# Patient Record
Sex: MALE | Race: BLACK OR AFRICAN AMERICAN | NOT HISPANIC OR LATINO | Employment: OTHER | URBAN - METROPOLITAN AREA
[De-identification: names, ages, dates, MRNs, and addresses within clinical notes are randomized per-mention and may not be internally consistent; named-entity substitution may affect disease eponyms.]

---

## 2017-01-01 PROBLEM — I50.32 CHRONIC DIASTOLIC CONGESTIVE HEART FAILURE: Status: ACTIVE | Noted: 2017-01-01

## 2017-10-18 ENCOUNTER — TELEPHONE (OUTPATIENT)
Dept: UROLOGY | Facility: CLINIC | Age: 57
End: 2017-10-18

## 2017-10-18 NOTE — TELEPHONE ENCOUNTER
----- Message from Coco Alcocer sent at 10/18/2017 12:01 PM CDT -----  Contact: Em- Brianda Strickland- Brianda [facility] -New pt needs to follow up  with Dr Gastelum who saw in the hospital in 1 week 10/25 for post discharge follow up ...[phone]

## 2017-11-06 ENCOUNTER — OFFICE VISIT (OUTPATIENT)
Dept: UROLOGY | Facility: CLINIC | Age: 57
End: 2017-11-06
Payer: COMMERCIAL

## 2017-11-06 ENCOUNTER — LAB VISIT (OUTPATIENT)
Dept: LAB | Facility: HOSPITAL | Age: 57
End: 2017-11-06
Attending: NURSE PRACTITIONER
Payer: COMMERCIAL

## 2017-11-06 VITALS
BODY MASS INDEX: 44.1 KG/M2 | SYSTOLIC BLOOD PRESSURE: 176 MMHG | HEIGHT: 71 IN | WEIGHT: 315 LBS | TEMPERATURE: 98 F | HEART RATE: 89 BPM | DIASTOLIC BLOOD PRESSURE: 92 MMHG

## 2017-11-06 DIAGNOSIS — R39.198 DIFFICULTY IN URINATION: ICD-10-CM

## 2017-11-06 DIAGNOSIS — R39.198 DIFFICULTY IN URINATION: Primary | ICD-10-CM

## 2017-11-06 DIAGNOSIS — R32 URINARY INCONTINENCE, UNSPECIFIED TYPE: ICD-10-CM

## 2017-11-06 PROCEDURE — 36415 COLL VENOUS BLD VENIPUNCTURE: CPT

## 2017-11-06 PROCEDURE — 99999 PR PBB SHADOW E&M-EST. PATIENT-LVL V: CPT | Mod: PBBFAC,,, | Performed by: NURSE PRACTITIONER

## 2017-11-06 PROCEDURE — 51798 US URINE CAPACITY MEASURE: CPT | Mod: S$GLB,,, | Performed by: NURSE PRACTITIONER

## 2017-11-06 PROCEDURE — 99204 OFFICE O/P NEW MOD 45 MIN: CPT | Mod: 25,S$GLB,, | Performed by: NURSE PRACTITIONER

## 2017-11-06 PROCEDURE — 84153 ASSAY OF PSA TOTAL: CPT

## 2017-11-06 RX ORDER — ALFUZOSIN HYDROCHLORIDE 10 MG/1
10 TABLET, EXTENDED RELEASE ORAL
Qty: 30 TABLET | Refills: 12 | Status: SHIPPED | OUTPATIENT
Start: 2017-11-06 | End: 2018-01-11

## 2017-11-06 NOTE — PATIENT INSTRUCTIONS
Treating Urinary Incontinence in Men    You cant always control the release of urine. You may leak urine. Or you may not be able to hold your urine until you can get to a bathroom. This is called urinary incontinence. The problem can be managed. Talk to your doctor about your treatment options.  Taking medicines  Prescription medicine may help you. They may help:  · The sphincter to work better (this is the muscle that closes to keep urine from leaking out of the bladder)  · Stop the bladder from sabina too often to push urine out  · The bladder muscles contract with more force  · Relax the sphincter muscle and allow urine to flow more freely  Making changes to your routine  Certain changes in your daily routine may help. These include:  · Avoiding caffeine and alcohol  · Using timed voiding (this is following a schedule for drinking fluids and urinating)  · Doing Kegel exercises daily (these exercises involve tightening the muscles in your sphincter and around your bladder to help strengthen them)  Using a catheter  A catheter is a narrow tube that is inserted through the urethra into the bladder. It drains urine. A condom catheter covers the penis. It channels urine into a collection bag. It is worn most of the time. Intermittent catheterization means inserting a catheter to drain the bladder, then removing it. This is done on a regular schedule.  Special therapies  · Biofeedback. This technique is taught by a nurse or physical therapist. During the therapy, a small sensor is placed inside or just outside the anus. Another sensor is placed on your stomach. these sensors read signals from the pelvic floor muscles. When you contract or relax your muscles, these signals are show as images on a computer screen. Using the images, you can learn to relax or contract certain muscles. This can help you better control these muscles. And, it can help you learn pelvic floor muscle exercises.  · Electrical stimulation.  This is a painless therapy that uses a tiny amount of electrical current. It helps strengthen very weak or damaged pelvic floor muscles. The electric current is sent through the muscles of the pelvic floor and bladder. This causes the muscles to contract. In time, this helps make the muscles stronger.  · Stimulator implants. This technique is used to treat urge incontinence. A small device is implanted under the skin near the stomach. This device gives off mild electrical signals. These block extra signals that are being sent to the bladder muscles. This helps the bladder work more normally.  Having surgery  If other options dont work, surgery may be recommended. If surgery is an option, your healthcare provider can discuss it with you and explain its risks and benefits.  Healing after prostate surgery  Surgery on the prostate gland can cause incontinence. Most often, the incontinence is only for a short time. It clears up when healing is complete. Rarely, prostate surgery can result in permanent incontinence.   Date Last Reviewed: 1/1/2017  © 4303-2769 [employer]. [address] [place] [address], [place] [address]. All rights reserved. This information is not intended as a substitute for professional medical care. Always follow your healthcare professional's instructions.

## 2017-11-06 NOTE — PROGRESS NOTES
[facility] Note  Staff: MARK Maldonado    Referring provider and please cc:   PCP: Dr. Matias Barboza    Chief Complaint: Urinary incontinence    Subjective:        HPI: Jose Leon is a 57 y.o. male new patient to Urology clinic presents with complaints of inability to control urination.  He is unable to make it to restroom without urinating on himself.  Pt's LUTS has progressively worsened over the last six months.  Pt currently is taking Flomax 0.4 mg daily for hx of BPH with lower urinary tract symptoms but pt states meds are not helping with his symptoms at this time.    *Medical history does include uncontrollable diabetes    Questions asked the pt during office visit today:  Urgency: YES, urge incontinence? YES  NTF: 5-6x, DTF: YES  Dysuria: No  Gross Hematuria:No  Straining:No, Hesistancy:No, Intermittency:No, Weak stream:No  ED:No  STDs in past: No  Vasectomy: None    Last PSA Screening:   Lab Results   Component Value Date    PSA 0.34 06/28/2017     REVIEW OF SYSTEMS:  Review of Systems   Constitutional: Negative for chills, diaphoresis, fever and weight loss.   HENT: Negative for congestion, hearing loss, nosebleeds and sore throat.    Eyes: Negative for blurred vision and pain.   Respiratory: Negative for cough and wheezing.    Cardiovascular: Negative for chest pain, palpitations and leg swelling.   Gastrointestinal: Negative for abdominal pain, heartburn, nausea and vomiting.   Genitourinary: Positive for frequency and urgency. Negative for dysuria, flank pain and hematuria.        +Urinary incontinence   Musculoskeletal: Negative for back pain, joint pain, myalgias and neck pain.   Skin: Negative for itching and rash.   Neurological: Negative for dizziness, tremors, sensory change, seizures, loss of consciousness, weakness and headaches.   Endo/Heme/Allergies: Does not bruise/bleed easily.   Psychiatric/Behavioral: Negative for depression and suicidal ideas. The  "patient is not nervous/anxious.      Physical Exam    PMHx:  Past Medical History:   Diagnosis Date    a A H/O Medical Noncompliance     With His CHF Diet    a Cardiac Diastolic Dysfunction     Dr. Aure Washington    a Chronic Anticoagulation With Eliquis     Dr. Aure Washington    a Chronic Anticoagulation With Eliquis     Dr. Aure Washington    a Coronary Artery Disease With H/O Stenting     Dr. Aure Washington; Was Hospitalized At [facility] 3/8/17-3/17/17 For CHF Exacerbatioin Due To "Dietary Discrepancies" With LCST Negative There    a Nonsustained Ventricular Tachycardia (NSVT)     a Paroxysmal Atrial Fibrillation With H/O RVR     Dr. Aure Washington; On Chronic Eliquis    a Syncopal Episode     [facility] 4/3/17-4/7/17 Stay For This: Was Likely Due To NSVT, And His Medications Were Adjusted    a Systolic CHF With EF 35-45%     Dr. Aure Washington; Was Hospitalized At [facility] 3/8/17-3/17/17 For CHF Exacerbatioin Due To "Dietary Discrepancies" With LCST Negative There    b Hypertension     b Proteinuria     04/2014 Referred To Dr. Leryo Allison; 4/1/14 Bilateral Renal U/S = Normal; On Lisinopril 20 Mg Daily    b Stage 2 CKD     c Hypercholesterolemia With Low HDL     d Type 2 DM     7/5/17 Referred To [facility]    f Morbid Obesity     i 1 PPD X 25+ YRs Chronic Tobacco Use Disorder     7/5/17 Increased Wellbutrin-XL To 300 Mg Daily; 6/8/17 RXd Wellbutrin- Mg Daily X 4 Months    i JULY On CPAP     Dr. Marion ngo Chronic Left Groin Pain     l Chronic Left Shoulder Pain     Dr. MARTINA Suarez    l Left 5-7th Rib FXs 04/2016 4/23/16 [facility] Left Rib XRays = Questionable Nondisplaced Left 5-7th Rib FXs With Normal Lung Fields    l Right Shouder SX 5/26/16 Due To Work Related Injury     Dr. Mora At [facility]; Dr. MARTINA hatch H/O Transient Ischemic Attack In 2013     n Anxiety And Depression     n H/O ETOH Abuse, Quit In 09/2014     q Accident at workplace     q Chronic Bilateral Lower Extremity Edema     q " "Disability Examination 7/15/16     Dr. Aure Washington; Was Hospitalized At [facility] 3/8/17-3/17/17 For CHF Exacerbatioin Due To "Dietary Discrepancies" With LCST Negative There    Wellness Visit 11/6/2017      Kidney stones: No  Cataracts? None    PSHx:  Past Surgical History:   Procedure Laterality Date    CARDIAC SURGERY      coronary stent    heart stent      INCISION AND DRAINAGE OF WOUND      on stomach    SHOULDER SURGERY       Fam Hx:   malignancies: No    kidney stones: Yes - daughter     Soc Hx:  Quit tobacco use three months ago    Allergies:  Atorvastatin and Effexor [venlafaxine]    Medications: reviewed   Anticoagulation: Yes - Eliquis 5 mg, Aspirin 81 mg daily    Objective:     Vitals:    11/06/17 1418   BP: (!) 176/92   Pulse: 89   Temp: 98.1 °F (36.7 °C)     General:WDWN in NAD  Eyes: PERRLA, normal conjunctiva  Respiratory: no increased work on breathing, clear to auscultation  Cardiovascular: regular rate and rhythm. No obvious extremity edema.  GI: palpation of masses. No tenderness. No hepatosplenomegaly to palpation.  Musculoskeletal: normal range of motion of bilateral upper extremities. Normal muscle strength and tone.  Skin: no obvious rashes or lesions. No tightening of skin noted.  Neurologic: CN grossly normal. Normal sensation.   Psychiatric: awake, alert and oriented x 3. Mood and affect normal. Cooperative.    :  Inspection of anus and perineum normal  No scrotal rashes, cysts or lesions  Epididymis normal in size, no tenderness  Testes normal and size, equal size bilaterally, no masses  Urethral meatus normal without discharge  Penis is circumcised,    KRISS: Unable to exam due to large body habitus    PVR by bladder scan performed by me today: 11 mL    LABS REVIEW:  UA today:  Pt tried several times to give a urine specimen and was unable to do so.  He urinated right before appointment and then after several attempts urinated all over his clothes.    UCx: No results found for this or " any previous visit.  Cr:   Lab Results   Component Value Date    CREATININE 1.14 06/28/2017     Assessment:       1. Difficulty in urination    2. Urinary incontinence, unspecified type          Plan:     1.  Stop Flomax  2.  Start Uroxatral 10 mg one tablet daily.  3.  Draw PSA level today.    F/u--Schedule pt for pvr and UFS in two weeks.  Report findings to me in office on day of tests and I will determine next POC.  Pt verbalized understanding.      Althea Castro, QUETAP-C

## 2017-11-07 LAB
PROSTATE SPECIFIC ANTIGEN, TOTAL: 0.17 NG/ML
PSA FREE MFR SERPL: 64.71 %
PSA FREE SERPL-MCNC: 0.11 NG/ML

## 2017-11-20 ENCOUNTER — TELEPHONE (OUTPATIENT)
Dept: UROLOGY | Facility: CLINIC | Age: 57
End: 2017-11-20

## 2017-11-20 NOTE — TELEPHONE ENCOUNTER
----- Message from Rupa Potter sent at 11/20/2017  9:30 AM CST -----  Contact: self  Patient needs to reschedule nurse appointment due to uroflow w/PVR, give results to NP. Patient states his transportation did not pick him up. Please call patient at [phone]. Thanks!

## 2017-11-20 NOTE — TELEPHONE ENCOUNTER
Returned call patient states transportation did not come to pick him up, nurse visit appt rescheduled as requested, patient verbally understood.

## 2017-12-05 ENCOUNTER — CLINICAL SUPPORT (OUTPATIENT)
Dept: UROLOGY | Facility: CLINIC | Age: 57
End: 2017-12-05
Payer: COMMERCIAL

## 2017-12-05 NOTE — PROGRESS NOTES
Patient arrived to clinic for uroflow and PVR he could not hold his urine before arriving to clinic. Drank a cup of water and had the need to go.  Bladder scan 34 ml, patient complains of urinary leakage. Results given to NP, she order for patient to follow up with one of the Providers.Uroflow results today: Peak flow: 49 mL/s, Mean flow: 24mL/s, Voided time: 3s, Flow time: 3s, TTP flow: 1s, Voided volume: 81 mL, Pattern of curve: TBD. Pvr: 34

## 2017-12-11 ENCOUNTER — OFFICE VISIT (OUTPATIENT)
Dept: UROLOGY | Facility: CLINIC | Age: 57
End: 2017-12-11
Payer: COMMERCIAL

## 2017-12-11 VITALS
RESPIRATION RATE: 18 BRPM | HEIGHT: 71 IN | BODY MASS INDEX: 44.1 KG/M2 | SYSTOLIC BLOOD PRESSURE: 115 MMHG | DIASTOLIC BLOOD PRESSURE: 69 MMHG | HEART RATE: 73 BPM | WEIGHT: 315 LBS

## 2017-12-11 DIAGNOSIS — Z79.4 DIABETES MELLITUS, TYPE II, INSULIN DEPENDENT: ICD-10-CM

## 2017-12-11 DIAGNOSIS — R33.9 INCOMPLETE EMPTYING OF BLADDER: ICD-10-CM

## 2017-12-11 DIAGNOSIS — E66.01 MORBID OBESITY: ICD-10-CM

## 2017-12-11 DIAGNOSIS — N39.41 URGE INCONTINENCE: Primary | ICD-10-CM

## 2017-12-11 DIAGNOSIS — E11.9 DIABETES MELLITUS, TYPE II, INSULIN DEPENDENT: ICD-10-CM

## 2017-12-11 PROCEDURE — 99999 PR PBB SHADOW E&M-EST. PATIENT-LVL III: CPT | Mod: PBBFAC,,, | Performed by: UROLOGY

## 2017-12-11 PROCEDURE — 51798 US URINE CAPACITY MEASURE: CPT | Mod: S$GLB,,, | Performed by: UROLOGY

## 2017-12-11 PROCEDURE — 99215 OFFICE O/P EST HI 40 MIN: CPT | Mod: 25,S$GLB,, | Performed by: UROLOGY

## 2017-12-11 PROCEDURE — 81002 URINALYSIS NONAUTO W/O SCOPE: CPT | Mod: S$GLB,,, | Performed by: UROLOGY

## 2017-12-11 PROCEDURE — 87086 URINE CULTURE/COLONY COUNT: CPT

## 2017-12-11 NOTE — PATIENT INSTRUCTIONS
Discussed conservative measures to control urgency and frequency including avoiding bladder irritants, timed voiding (on a schedule, before the urgent need so you empty before its an emergency), not postponing voiding (dont hold it), and bowel regimen as distended bowel has extrinsic compressive effect on bladder. Also make effort to double void (wait and try to urinate more when done since you dont empty)    Bowel regimen:  - miralax (1cap/day, can go down to 1/2 cap or up to 2x/d)  - stool softener - 1x/day to 2x/day  - fiber supplements (metamucil, fiber pills) and increase dietary fiber  - prunes   Can use any/all in any combination daily that produces soft daily bowel movement - will take some adjustment     Discussed bladder irritants include coffe (even decaf), tea, alcohol, soda, spicy foods, acidic juices (orange, tomato), vinegar, and artificial sweeteners/sugary beverages.    NEED GOOD DIABETIC CONTROL - sugar in urine is irritating and you had a lot of glucose in your urine today. - this includes diet (eliminate sugar/carbs, such as chocolate milk and grits)    CONTINUE uroxatral to help bladder emptying (bc still dont completely empty)  RESTART your lasix   START myrbetriq (mirabegron) 50mg daily. This is to help control overactive bladder and decrease urgency and urge incontinence    Diet and exercise! A healthy you is a healthy bladder

## 2017-12-11 NOTE — PROGRESS NOTES
[facility] Urology:    Jose Leon is a 57 y.o. who presents for presents for evaluation of incontinence    He saw LETICIA Castro on 11/6/17 noting he is unable to make it to the restroom without urinating on himself, with worsening of his LUTS over the last six months despite flomax.  Uncontrolled diabetic. Urgency and frequency with urge incontinence. NTF: 5-6x. Denies dysuria, hematuria, hesitancy, intermittency or weak stream.  He urinated just prior to appointment, PVR 11cc, then in attempt to give specimen urinated on himself  He was switched from flomax to uroxatral 10mg.  On 12/5/17 he presented for nurse visit for uroflow/pvr. Unable to hold urine prior to arriving, drank water, voided 81cc only with Qm 49cc/s, Qa 24cc/s with 3 second voiding time and PVR 34cc.  He was advised to follow-up with a urologist.    He presents today noting urgency with all voids.  He also has frequency, especially when he lays down in bed and needs to urinate 2-3 times per hour.  DT F 15-20 times, NTF 3+.  He has severe urge incontinence and changes his underwear and short 3-4 times per day.  Has not used dependent or pad as he is ashamed to to do so  Keeps a cup close by that he often urinates into as he can rarely make it to the bathroom.  He also notes immediate urge when standing up after he has been sitting in his chair for a while.  He denies urinary hesitancy or intermittency.  He feels like he empties his bladder subjectively.  He denies post void dribble.  A CT scan from 2014, on personal review, demonstrates a very small prostate.  PSA 10/19/15 is 0.51.  PSA 11/6/17 is 0.17 (64.71% free)  He does take Lasix, and as he noted no improvement in urinary symptoms after starting alpha blocker therapy, he self discontinued his Lasix.  Creatinine 1.14 6/28/17, previously 0.97 12/30/16.     He is also morbidly obese poorly controlled diabetic.  His hemoglobin A1c has been in the range of 10-12 for the last 4 years, the most recently  "was 8.8 on 6/28/17.  In the last 4 months, he has quit smoking.  He reports a desire to begin dieting and eliminating sugar and carbs, which she has not done yet.  He most recently saw his PCP in November 2017 for a flu shot only, and lasts management of his chronic medical problems was done in July 2017 with changing his insulin regimen from Lantus to basaglar  He reports his blood glucoses have been running high in the 200s, however if he takes his metformin and meds/insulin, he may bottom out.  He has pending colonoscopy on the 19th    He did also have a stroke 2 years ago and has left-sided deficits.  The stroke was after a right shoulder repair.  He does still have persistent shoulder pain  He also reports that his urinary urge incontinence predates his stroke  He drinks occasional Justin-Aid but mostly water, at least 90 ounces daily.  Alcohol use is social gathering's only and he is trying to not use any alcohol as he has quit smoking and prefers to smoke when he drinks.  He also notes severe constipation, with the bowel movements approximately every 2 days that are long and hard.  This seems to improve with increased spinach and chocolate milk    Urinalysis dipstick: SG 1.015, pH 5, 30 protein, 1000 glucose, trace blood  Postvoid residual by bladder scan: 101 cc      Past Medical History:   Diagnosis Date    a A H/O Medical Noncompliance     With His CHF Diet    a Cardiac Diastolic Dysfunction     Dr. Aure Washington    a Chronic Anticoagulation With Eliquis     Dr. Aure Washington    a Chronic Anticoagulation With Eliquis     Dr. Aure Washington    a Coronary Artery Disease With H/O Stenting     Dr. Aure Washington; Was Hospitalized At [facility] 3/8/17-3/17/17 For CHF Exacerbatioin Due To "Dietary Discrepancies" With LCST Negative There    a Nonsustained Ventricular Tachycardia (NSVT)     a Paroxysmal Atrial Fibrillation With H/O RVR     Dr. Aure Washington; On Chronic Eliquis    a Syncopal Episode     [facility] 4/3/17-4/7/17 Stay " "For This: Was Likely Due To NSVT, And His Medications Were Adjusted    a Systolic CHF With EF 35-45%     Dr. Aure Washington; Was Hospitalized At [facility] 3/8/17-3/17/17 For CHF Exacerbatioin Due To "Dietary Discrepancies" With LCST Negative There    b Hypertension     b Proteinuria     04/2014 Referred To Dr. Leroy Allison; 4/1/14 Bilateral Renal U/S = Normal; On Lisinopril 20 Mg Daily    b Stage 2 CKD     c Hypercholesterolemia With Low HDL     d Type 2 DM     7/5/17 Referred To DM [facility]    f Morbid Obesity     i 1 PPD X 25+ YRs Chronic Tobacco Use Disorder     7/5/17 Increased Wellbutrin-XL To 300 Mg Daily; 6/8/17 RXd Wellbutrin- Mg Daily X 4 Months    i JULY On CPAP     Dr. Marion ngo Chronic Left Groin Pain     l Chronic Left Shoulder Pain     Dr. MARTINA martinez Left 5-7th Rib FXs 04/2016 4/23/16 [facility] Left Rib XRays = Questionable Nondisplaced Left 5-7th Rib FXs With Normal Lung Fields    l Right Shouder SX 5/26/16 Due To Work Related Injury     Dr[facility] Mora [facility]; Dr. MARTINA Suarez    m H/O Transient Ischemic Attack In 2013     n Anxiety And Depression     n H/O ETOH Abuse, Quit In 09/2014     q Accident at workplace     q Chronic Bilateral Lower Extremity Edema     q Disability Examination 7/15/16     Dr. Aure Washington; Was Hospitalized At [facility] 3/8/17-3/17/17 For CHF Exacerbatioin Due To "Dietary Discrepancies" With LCST Negative There    Wellness Visit 11/6/2017        Past Surgical History:   Procedure Laterality Date    CARDIAC SURGERY      coronary stent    heart stent      INCISION AND DRAINAGE OF WOUND      on stomach    SHOULDER SURGERY         Family History   Problem Relation Age of Onset    Heart disease Mother     Arthritis Mother     Diabetes Mother     Hyperlipidemia Mother     Hypertension Mother     Heart disease Father     Arthritis Father     Asthma Father     COPD Father     Hyperlipidemia Father     Hypertension Father     Stroke Father     " Diabetes Sister     Diabetes Maternal Uncle        Social History     Social History    Marital status:      Spouse name: N/A    Number of children: N/A    Years of education: N/A     Occupational History    Not on file.     Social History Main Topics    Smoking status: Former Smoker     Packs/day: 0.25     Types: Cigarettes     Start date: 1/1/1981     Quit date: 9/24/2016    Smokeless tobacco: Never Used      Comment: every now and again with drinks    Alcohol use Yes      Comment: occas    Drug use: No    Sexual activity: Yes     Partners: Female     Birth control/ protection: None     Other Topics Concern    Not on file     Social History Narrative    No narrative on file       Review of patient's allergies indicates:   Allergen Reactions    Atorvastatin      Other reaction(s): Generalized Myalgias    Effexor [venlafaxine] Other (See Comments)     Tremulousness       Medications Reviewed: see MAR    ROS:    Constitutional: denies fevers, chills, night sweats, fatigue, malaise  Respiratory: negative for cough, shortness of breath, wheezing, dyspnea.  Cardiovascular: + for high blood pressure, negative for chest pain, varicose veins, ankle swelling, palpitations, syncope.  GI: negative for abdominal pain, heartburn, indigestion, nausea, vomiting, constipation, diarrhea, blood in stool.   Urology: as noted above in HPI  Endocrinology: negative for cold intolerance, excessive thirst, not feeling tired/sluggish, no heat intolerance.   Hematology/Lymph: negative for easy bleeding, easy bruising, swollen glands.  Musculoskeletal: negative for back pain, joint pain, joint swelling, neck pain.  Allergy-Immunology: negative for seasonal allergies, negative for unusual infections.   Skin: negative for boils, breast lumps, hives, itching, rash.   Neurology: negative for, dizziness, headache, tingling/numbness, tremors.   Psych: satisfied with life; negative for, anxiety, depression, suicidal thoughts.  "    PHYSICAL EXAM:    Vitals:    12/11/17 1318   BP: 115/69   Pulse: 73   Resp: 18     Body mass index is 49.04 kg/m². Weight: (!) 159.5 kg (351 lb 10.1 oz) Height: 5' 11" (180.3 cm)       General: Alert, cooperative, no distress, appears stated age  Head: Normocephalic, without obvious abnormality, atraumatic  Neck: no masses, no thyromegaly, no lymphadenopathy  Eyes: PERRL, conjunctiva/corneas clear  Lungs: Respirations unlabored, normal effort, no accessory muscle use  CV: Warm and well perfused extremities  Abdomen: Soft, non-tender, no CVA tenderness, no hepatosplenomegaly, no hernia, 2+ pannus  Penis: phallus normal, no plaques or lesions, largely buried in suprapubic fat.   Scrotum: no cysts, no lesions, no rash, no hydrocele.   Epididymes: normal, nontender, symmetrical, no masses or cysts.   Testes: normal, both descended, no masses.   Urethra: palpably normal with orthotopic meatus of normal size    KRISS: normal sphincter tone, no masses, unable to palpate prostate secondary to body habitus   Extremities: Extremities normal, atraumatic, no cyanosis or edema  Skin: Normal color, texture, and turgor, no rashes or lesions  Psych: Appropriate, well oriented, normal affect, normal mood  Neuro: Non-focal    Lab Results   Component Value Date    PSA 0.34 06/28/2017       LABS:    Recent Results (from the past 336 hour(s))   Urine culture    Collection Time: 12/11/17  2:51 PM   Result Value Ref Range    Urine Culture, Routine       Multiple organisms isolated. None in predominance.  Repeat if    Urine Culture, Routine clinically necessary.    POCT URINE DIPSTICK WITHOUT MICROSCOPE    Collection Time: 12/13/17  8:41 AM   Result Value Ref Range    Color, UA clear     Spec Grav UA 1.015     pH, UA 5.0     WBC, UA neg     Nitrite, UA neg     Protein 30     Glucose, UA 1,000     Ketones, UA neg     Urobilinogen, UA neg     Bilirubin neg     Blood, UA trace    POCT Bladder Scan    Collection Time: 12/13/17  8:41 AM "   Result Value Ref Range    POC Residual Urine Volume 101 (A) 0 - 100 mL         Assessment/Diagnosis:    1. Urge incontinence  POCT URINE DIPSTICK WITHOUT MICROSCOPE    mirabegron 50 mg Tb24    Urine culture   2. Incomplete emptying of bladder  POCT Bladder Scan   3. Type 2 DM  Hemoglobin A1c    Basic metabolic panel   4. Morbid obesity         Plans:  He does have significant urinary urgency/frequency/OAB and urge incontinence in the setting of poorly controlled diabetes, obesity, and history of stroke.  Though he does note urgency incontinence predates his stroke, it may be contributory.  Certainly contributory to his significant urgency and frequency is his glucosuria as a result of his poorly controlled diabetes, as well as his significant constipation. [place] to his improved urinary symptoms, and overall health, is improved diabetic control and sugar in the urine is irritating.  Important to good glucose control is not only his medical regimen, which may need adjusted based on his described bottoming out as above, but also includes diet of which staples to his diet currently include chocolate milk and grits.  We briefly discussed the effects of sugar and white carbohydrates, and that he would benefit from diabetic education and further counseling.    Discussed conservative measures to control urgency and frequency including avoiding bladder irritants, timed voiding (on a schedule, before the urgent need so can empty before its an emergency), not postponing voiding (dont hold it), and bowel regimen as distended bowel has extrinsic compressive effect on bladder. Also make effort to double void (wait and try to urinate more when done since has incomplete emptying)  Discussed bladder irritants include coffe (even decaf), tea, alcohol, soda, spicy foods, acidic juices (orange, tomato), vinegar, and artificial sweeteners/sugary beverages.  Bowel regimen - with goal of soft daily bowel movement without pushing and  straining given his severe constipation:  - miralax (1cap/day, can go down to 1/2 cap or up to 2x/d)  - stool softener - 1x/day to 2x/day  - fiber supplements (metamucil, fiber pills) and increase dietary fiber  - prunes   Can use any/all in any combination daily that produces soft daily bowel movement - will take some adjustment  We did discuss that chocolate milk is not an appropriate bowel regimen, especially with his poorly controlled diabetes.     Though he does not have many obstructive symptoms, and has a small prostate on imaging, he does demonstrate incomplete emptying and therefore should remain on alpha-blocker therapy and continue his Uroxatral.   Given his significant comorbidities, I did advise that he restart his Lasix as he should not discontinue medications like this himself without consulting physician.  Given the severity of his symptoms, we did discuss starting medical therapy for OAB and urge incontinence in addition to conservative measures as above.  With his history of stroke and unclear cognitive effect, as well as his profound severe chronic constipation, would avoid anticholinergics in this patient due to their side effect profile, and has therefore prescribed myrbetriq (mirabegron) 50mg daily.   In addition to all recommendations above, I did stress the importance of diet and exercise, and increasing water intake    At his request, I have provided him with a urinal so he does not have to continue to use household cups to urinate in.  I have also ordered a repeat hemoglobin A1c and BMP for further assessment of diabetic control and renal function, given slight uptrend of creatinine and incomplete emptying.    I will CC his primary care doctor, Dr. Barboza, as he would benefit from further medical evaluation of his chronic medical problems, specifically his diabetes which may require further evaluation of his insulin regimen and oral agents, as well as continued diabetic education.  Also  should discuss Lasix with his primary care doctor, as he had self discontinued it prior to my evaluation.  The repeat labs I've ordered will be available for his assessment as well.    RTC 3 months

## 2017-12-11 NOTE — Clinical Note
See note - may need to see patient sooner to adjust diabetic meds/insulin and provide further diabetic education.  I did also order a repeat A1c and BMP Thanks, Francesco

## 2017-12-11 NOTE — Clinical Note
1.  Good note to read  2.  Please remind patient to do labs, had given business card with names of labs ordered to present to lab and he wanted to go in [place]. When you talk to patient, please make sure he was able to get his Myrbetriq, as this is often an issue upfront due to cost though I did include clinical justification should there be issues and a preauthorization be necessary

## 2017-12-13 LAB
BACTERIA UR CULT: NORMAL
BACTERIA UR CULT: NORMAL
BILIRUB SERPL-MCNC: ABNORMAL MG/DL
BLOOD URINE, POC: ABNORMAL
COLOR, POC UA: CLEAR
GLUCOSE UR QL STRIP: 1000
KETONES UR QL STRIP: ABNORMAL
LEUKOCYTE ESTERASE URINE, POC: ABNORMAL
NITRITE, POC UA: ABNORMAL
PH, POC UA: 5
POC RESIDUAL URINE VOLUME: 101 ML (ref 0–100)
PROTEIN, POC: 30
SPECIFIC GRAVITY, POC UA: 1.01
UROBILINOGEN, POC UA: ABNORMAL

## 2017-12-19 PROBLEM — Z86.010 HX OF COLONIC POLYP: Status: ACTIVE | Noted: 2017-12-19

## 2017-12-19 PROBLEM — Z86.0100 HX OF COLONIC POLYP: Status: ACTIVE | Noted: 2017-12-19

## 2017-12-27 ENCOUNTER — TELEPHONE (OUTPATIENT)
Dept: UROLOGY | Facility: CLINIC | Age: 57
End: 2017-12-27

## 2017-12-27 NOTE — TELEPHONE ENCOUNTER
Spoke with pt. Pt states he has been taking the myrbetriq as prescribed and states he has had less trips to the bathroom. Pt states he was waiting to hear back from his PCP to coordinate getting his labs done. Pt states he may be going tomorrow. Pt will call back to advise that he got his labs done.

## 2017-12-27 NOTE — TELEPHONE ENCOUNTER
Called pt and left message for pt to return call. Called to make sure pt was able to get his Myrbetriq filled and to see if pt got his labs done.

## 2017-12-28 ENCOUNTER — TELEPHONE (OUTPATIENT)
Dept: UROLOGY | Facility: CLINIC | Age: 57
End: 2017-12-28

## 2017-12-28 NOTE — TELEPHONE ENCOUNTER
Returned pt's call. Pt was calling to let Dr Isaac know that he had his labs done yesterday. Dr Isaac to be advised.

## 2017-12-28 NOTE — TELEPHONE ENCOUNTER
----- Message from Julia Davenport sent at 12/28/2017 12:22 PM CST -----  Contact: patient  Patient returning a missed call about test results. Please advise.    Call back   Thanks!

## 2018-01-02 ENCOUNTER — TELEPHONE (OUTPATIENT)
Dept: UROLOGY | Facility: CLINIC | Age: 58
End: 2018-01-02

## 2018-01-02 NOTE — TELEPHONE ENCOUNTER
Pt returned call. Results given from his bloodwork results. Pt verbalized understanding. Pt stated he has f/u appt with PCP scheduled on 1/11/18.

## 2018-07-24 ENCOUNTER — OFFICE VISIT (OUTPATIENT)
Dept: PAIN MEDICINE | Facility: CLINIC | Age: 58
End: 2018-07-24
Payer: COMMERCIAL

## 2018-07-24 ENCOUNTER — HOSPITAL ENCOUNTER (OUTPATIENT)
Dept: RADIOLOGY | Facility: HOSPITAL | Age: 58
Discharge: HOME | End: 2018-07-24
Attending: ANESTHESIOLOGY
Payer: COMMERCIAL

## 2018-07-24 VITALS
DIASTOLIC BLOOD PRESSURE: 70 MMHG | HEIGHT: 71 IN | BODY MASS INDEX: 44.1 KG/M2 | TEMPERATURE: 97 F | OXYGEN SATURATION: 98 % | HEART RATE: 91 BPM | WEIGHT: 315 LBS | SYSTOLIC BLOOD PRESSURE: 140 MMHG | RESPIRATION RATE: 18 BRPM

## 2018-07-24 DIAGNOSIS — M54.12 CERVICAL RADICULOPATHY: ICD-10-CM

## 2018-07-24 DIAGNOSIS — Z79.4 DIABETES MELLITUS DUE TO UNDERLYING CONDITION WITH HYPERGLYCEMIA, WITH LONG-TERM CURRENT USE OF INSULIN: ICD-10-CM

## 2018-07-24 DIAGNOSIS — M51.36 DDD (DEGENERATIVE DISC DISEASE), LUMBAR: ICD-10-CM

## 2018-07-24 DIAGNOSIS — E08.65 DIABETES MELLITUS DUE TO UNDERLYING CONDITION WITH HYPERGLYCEMIA, WITH LONG-TERM CURRENT USE OF INSULIN: ICD-10-CM

## 2018-07-24 DIAGNOSIS — M50.30 DDD (DEGENERATIVE DISC DISEASE), CERVICAL: ICD-10-CM

## 2018-07-24 DIAGNOSIS — M54.16 BILATERAL LUMBAR RADICULOPATHY: ICD-10-CM

## 2018-07-24 DIAGNOSIS — Z72.0 TOBACCO USE: ICD-10-CM

## 2018-07-24 DIAGNOSIS — M54.12 CERVICAL RADICULOPATHY: Primary | ICD-10-CM

## 2018-07-24 DIAGNOSIS — E66.01 MORBID OBESITY WITH BMI OF 45.0-49.9, ADULT: ICD-10-CM

## 2018-07-24 DIAGNOSIS — Z79.891 OPIOID CONTRACT EXISTS: ICD-10-CM

## 2018-07-24 PROCEDURE — 72131 CT LUMBAR SPINE W/O DYE: CPT | Mod: TC,PO

## 2018-07-24 PROCEDURE — 3077F SYST BP >= 140 MM HG: CPT | Mod: CPTII,S$GLB,, | Performed by: ANESTHESIOLOGY

## 2018-07-24 PROCEDURE — 3008F BODY MASS INDEX DOCD: CPT | Mod: CPTII,S$GLB,, | Performed by: ANESTHESIOLOGY

## 2018-07-24 PROCEDURE — 72125 CT NECK SPINE W/O DYE: CPT | Mod: TC,PO

## 2018-07-24 PROCEDURE — 3078F DIAST BP <80 MM HG: CPT | Mod: CPTII,S$GLB,, | Performed by: ANESTHESIOLOGY

## 2018-07-24 PROCEDURE — 99204 OFFICE O/P NEW MOD 45 MIN: CPT | Mod: S$GLB,,, | Performed by: ANESTHESIOLOGY

## 2018-07-24 PROCEDURE — 72131 CT LUMBAR SPINE W/O DYE: CPT | Mod: 26,,, | Performed by: RADIOLOGY

## 2018-07-24 PROCEDURE — 99999 PR PBB SHADOW E&M-EST. PATIENT-LVL V: CPT | Mod: PBBFAC,,, | Performed by: ANESTHESIOLOGY

## 2018-07-24 PROCEDURE — 72125 CT NECK SPINE W/O DYE: CPT | Mod: 26,,, | Performed by: RADIOLOGY

## 2018-07-24 PROCEDURE — 80307 DRUG TEST PRSMV CHEM ANLYZR: CPT

## 2018-07-24 RX ORDER — GABAPENTIN 300 MG/1
CAPSULE ORAL
Qty: 90 CAPSULE | Refills: 3 | Status: SHIPPED | OUTPATIENT
Start: 2018-07-24 | End: 2018-11-13 | Stop reason: SDUPTHER

## 2018-07-24 RX ORDER — HYDROCODONE BITARTRATE AND ACETAMINOPHEN 5; 325 MG/1; MG/1
1 TABLET ORAL DAILY PRN
Qty: 30 TABLET | Refills: 0 | Status: SHIPPED | OUTPATIENT
Start: 2018-07-24 | End: 2018-08-20 | Stop reason: SDUPTHER

## 2018-07-24 NOTE — LETTER
July 24, 2018      Matias Barboza MD  [address]  [place] [address]           [place] [facility]  [address] [facility] [address]  [facility] [address]  Phone: [phone]  Fax: [phone]          Patient: Jose Leon   MR Number: [id number]   YOB: 1960   Date of Visit: 7/24/2018       Dear Dr. Matias Barboza:    Thank you for referring Jose Leon to me for evaluation. Attached you will find relevant portions of my assessment and plan of care.    If you have questions, please do not hesitate to call me. I look forward to following Jose Leon along with you.    Sincerely,    Ismael Hernández MD    Enclosure  CC:  No Recipients    If you would like to receive this communication electronically, please contact [email or web address] or [phone] to request more information on [facility] Link access.    For providers and/or their staff who would like to refer a patient to [facility], please contact us through our one-stop-shop provider referral line, [facility], at [phone].    If you feel you have received this communication in error or would no longer like to receive these types of communications, please e-mail [email or web address]

## 2018-07-24 NOTE — PROGRESS NOTES
This note was completed with dictation software and grammatical errors may exist.    CC:Back pain, neck pain, left shoulder and arm pain    HPI: The patient is a 57-year-old man with a history of CHF, morbid obesity, diabetes who presents in referral from Dr. Matias Barboza for chronic back pain.  The patient states that over a year ago he was injured when a pole fell onto his right shoulder and cause a rotator cuff tear.  The patient also fell on his back at that time as well.  Nonetheless, he had surgery to repair his right shoulder about one year ago, has had improvement in strength and pain in that shoulder.  However, after his hospital stay for his shoulder, this is when his back pain began.  It is located in the bilateral low back, radiates into the legs, equal right and left.  The longer he stands with the further he walks the pain gets worse.  It does seem to ease up in 3-4 minutes when sitting down.  He does have some numbness in his feet which she attributes to peripheral neuropathy from diabetes, he has been taking gabapentin once to twice a day with some benefit.  He has not done any physical therapy for his back, has not had any imaging for his back.  He has been taking hydrocodone, states that if he takes more than one pill a day, he can have hallucinations so he does not like the way this makes him feel.  It does however provide some relief when he takes it when his pain is severe.    His other complaint is left shoulder pain and left arm pain with numbness and tingling throughout his left arm to his hand.  He is unable to lift his hand above his head, feels that he has weakness in his left arm.  He has numbness through the third through fifth digits.  He actually has great deal of left shoulder pain as well and it radiates into the neck.  He had seen Dr. Suarez, orthopedics for the left shoulder and was told that he could have a surgery to repair this, however he could not come up with the $450  "co-pay.  He has not returned to see Dr. Suarez.     Pain intervention history: He had done physical therapy for his right shoulder but not for his active.  He has been taking hydrocodone either 5-10 mg 0-1 time per day for the last year.    She does have a history of alcohol abuse but quit in 2014.  He continues to smoke.  He has taken benzodiazepines twice daily for years.    4/3/18 Hemoglobin A1c: 7.8    ROS: He reports weight gain, vision change, cough, joint swelling, back pain, difficulty sleeping, anxiety, depression and loss of balance.  Balance of review of systems is negative.    Past Medical History:   Diagnosis Date    a A H/O Medical Noncompliance     H/O Chronic Noncompliance With His CHF Diet    a Cardiac Diastolic Dysfunction     Dr. Aure Washington    a Chronic Anticoagulation With Eliquis     Dr. Aure Washington    a Coronary Artery Disease With H/O Stenting     Dr. Aure Washington; Was Hospitalized At [facility] 3/8/17-3/17/17 For CHF Exacerbatioin Due To "Dietary Discrepancies" With LCST Negative There    a Nonsustained Ventricular Tachycardia (NSVT)     a Paroxysmal Atrial Fibrillation With H/O RVR     Dr. Aure Washington; On Chronic Eliquis    a Syncopal Episode     [facility] 4/3/17-4/7/17 Stay For This: Was Likely Due To NSVT, And His Medications Were Adjusted    a Systolic CHF With EF 35-45%     Dr. Aure Washington; Was Hospitalized At [facility] 3/8/17-3/17/17 For CHF Exacerbatioin Due To "Dietary Discrepancies" With LCST Negative There    b Hypertension     b Proteinuria     04/2014 Referred To Dr. Leroy Allison; 4/1/14 Bilateral Renal U/S = Normal; On Lisinopril 20 Mg Daily    b Stage 2 CKD     c Hypercholesterolemia With Low HDL     d Type 2 DM On Insulin     1/11/18 Referred To Dr. Dipika Rodriguez And Re-Referred To [facility]; 12/28/17 HgA1c = 12.0;" 7/5/17 Referred To [facility]    f Morbid Obesity     i 1 PPD X 25+ YRs Chronic Tobacco Use Disorder     7/5/17 Increased Wellbutrin-XL To 300 Mg Daily; 6/8/17 RXd " Wellbutrin- Mg Daily X 4 Months    i JULY On CPAP     Dr. Marion ngo Chronic Left Groin Pain     l Chronic Left Shoulder Pain     Dr. MARTINA martinez Chronic Recurrent Low Back Pain 05/29/2018 5/219/18 Referred To Dr. Ismael martinez Left 5-7th Rib FXs 04/2016 4/23/16 [facility] Left Rib XRays = Questionable Nondisplaced Left 5-7th Rib FXs With Normal Lung Fields    l Right Shouder SX 5/26/16 Due To Work Related Injury     [facility] Mor[facility]; Dr. MARTINA hatch H/O Transient Ischemic Attack In 2013     n Anxiety And Depression 05/29/2018    RTC In 6 Weeks; 5/29/18 Increased 100 Mg Zoloft To 100 Mg Bid    n Continuous Benzodiazepine (Xanax) Use 05/29/2018 5/29/18 I Am Weaning Him Off Of This By Decreasing 1 Mg Bid To 0.5 Mg Bid PRN, And Will Wean Further Next OV    n H/O ETOH Abuse, Quit In 09/2014     q Bilateral Lower Extremity Venous Stasis Ulcers     2/28/18 [facility]    q Chronic Bilateral Lower Extremity Edema     2/28/18 Added Metolazone 10 Mg qAM On MWF And Referred Back To Dr. Washington; On Lasix 40 Mg Bid; He Wears Bilateral Compressin Hose Stockings    q Disability Examination 7/15/16     For CHF, PAF, DM2, And JULY On CPAP    Wellness Visit 11/6/2017        Past Surgical History:   Procedure Laterality Date    CARDIAC SURGERY      coronary stent    COLONOSCOPY N/A 12/19/2017    Procedure: COLONOSCOPY;  Surgeon: Dave Allen MD;  Location: [facility];  Service: Endoscopy;  Laterality: N/A;    DIABETES MANAGEMENT LABS      heart stent      INCISION AND DRAINAGE OF WOUND      on stomach    SHOULDER SURGERY         Social History     Social History    Marital status:      Spouse name: N/A    Number of children: N/A    Years of education: N/A     Social History Main Topics    Smoking status: Former Smoker     Packs/day: 0.25     Types: Cigarettes     Start date: 1/1/1981      "Quit date: 9/24/2016    Smokeless tobacco: Never Used      Comment: every now and again with drinks    Alcohol use Yes      Comment: occas    Drug use: No    Sexual activity: Yes     Partners: Female     Birth control/ protection: None     Other Topics Concern    None     Social History Narrative    None         Medications/Allergies: See med card    Vitals:    07/24/18 0746   BP: (!) 140/70   Pulse: 91   Resp: 18   Temp: 96.6 °F (35.9 °C)   TempSrc: Oral   SpO2: 98%   Weight: (!) 151 kg (332 lb 12.6 oz)   Height: 5' 11" (1.803 m)   PainSc:   6   PainLoc: Back   Body mass index is 46.41 kg/m².        Physical exam:  Gen: A and O x3, pleasant, well-groomed, morbidly obese  Skin: No rashes or obvious lesions  HEENT: PERRLA, no obvious deformities on ears or in canals. Trachea midline.  CVS: Regular rate and rhythm, normal palpable pulses.  Resp:No increased work of breathing, symmetrical chest rise.  Abdomen: Soft, NT/ND.  Musculoskeletal: Slow, wide-based gait.   Left shoulder exam: Pain with internal and external rotation of shoulder against resistance, pain with empty can test.  Can only raise his left arm to about 70° before pain limits this.  He cannot raise his arm over his head.    Neuro:  Lower extremities: 5/5 strength bilaterally  Reflexes: Patellar 0+, Achilles 0+ bilaterally.  Sensory:  Intact and symmetrical to light touch and pinprick in L2-S1 dermatomes bilaterally.  Neuro:  Upper extremities: 5/5 strength bilaterally, except unable to abduct with deltoid on the left side primarily secondary to pain, also 4/5 strength with extension of interossei left side   Reflexes: Brachioradialis 2+, Bicep 2+, Tricep 2+.   Sensory: Intact and symmetrical to light touch and pinprick in C2-T1 dermatomes bilaterally, except for greatly decreased sensation to light touch throughout left third through fifth fingers.    Lumbar spine:  Lumbar spine: Range of motion is severely reduced with both flexion and extension " with increased pain especially on extension.  Bird's test causes no increased pain on either side.    Supine straight leg raise is negative bilaterally.    Internal and external rotation of the hip causes no increased pain on either side.  Myofascial exam: No tenderness to palpation across lumbar paraspinous muscles.    Cervical Spine:  Cervical spine: Range of motion is moderately reduced with lateral rotation especially to the left side causing left neck pain, full with flexion with mild increased neck pain, lateral rotation of the right is preserved, no increased pain.  Spurling's maneuver causes neck pain and left shoulder pain when turning to the left side.  Myofascial exam: Tenderness palpation across the left cervical paraspinous and left trapezius muscles.    Imaging:  No imaging of the lumbar spine, cervical spine or shoulders are available    Assessment:    1. Cervical radiculopathy  CT Cervical Spine Without Contrast   2. Bilateral lumbar radiculopathy  CT Lumbar Spine Without Contrast   3. DDD (degenerative disc disease), lumbar  CT Lumbar Spine Without Contrast   4. DDD (degenerative disc disease), cervical  CT Cervical Spine Without Contrast   5. Opioid contract exists  [facility] Drug Screen   6. 1 PPD X 25+ YRs Chronic Tobacco Use Disorder     7. Morbid obesity with BMI of 45.0-49.9, adult     8. Diabetes mellitus due to underlying condition with hyperglycemia, with long-term current use of insulin           Plan:  1.  For his back pain, we are going to get a CT of his lumbar spine, he is unable to have an MRI because he has a piece of metal in his body from an accident.  I suspect that he has lumbar stenosis and we can discuss treatments, I would like to see his blood sugars better controlled before we consider any epidural steroid injections or any other steroids.  To help with his radicular leg pain but also his left arm pain, I have encouraged him to continue the gabapentin and increase it  from once daily up to 300 mg in the morning and 600 mg at night and I have given him a new prescription for this.    2.  His left arm pain seems to be a combination of rotator cuff syndrome but he may also have some radiculopathy as well since he has some strength loss in the left hand.  I'm going to get a CT of his cervical spine and we are going to follow-up and review this.  3.  For his severe pain, I have agreed to continue the hydrocodone, although he does have a history of alcohol abuse, tobacco abuse and chronic benzodiazepine use, he actually has been fairly responsible with the use of this medication and has not shown any impulsive behavior with it.  I have given him a prescription for 5/325 hydrocodone to use up to once daily as needed.  I've had him sign an opioid agreement and complete a urine drug screen. [place] Board of Pharmacy website checked and no aberrant patterns noted.  4.  He is going to follow-up in several weeks to review imaging, I am also getting records from his orthopedic surgeon regarding his left shoulder and I may have him return to see Dr. Suarez to discuss further treatment.    Thank you for referring this interesting patient, and I look forward to continuing to collaborate in his care.

## 2018-07-29 LAB

## 2018-08-09 ENCOUNTER — OFFICE VISIT (OUTPATIENT)
Dept: PAIN MEDICINE | Facility: CLINIC | Age: 58
End: 2018-08-09
Payer: COMMERCIAL

## 2018-08-09 VITALS
WEIGHT: 315 LBS | TEMPERATURE: 97 F | SYSTOLIC BLOOD PRESSURE: 139 MMHG | BODY MASS INDEX: 47.01 KG/M2 | DIASTOLIC BLOOD PRESSURE: 86 MMHG | HEART RATE: 105 BPM | RESPIRATION RATE: 20 BRPM | OXYGEN SATURATION: 97 %

## 2018-08-09 DIAGNOSIS — M54.16 BILATERAL LUMBAR RADICULOPATHY: Primary | ICD-10-CM

## 2018-08-09 DIAGNOSIS — M54.12 CERVICAL RADICULOPATHY: ICD-10-CM

## 2018-08-09 DIAGNOSIS — M51.36 DDD (DEGENERATIVE DISC DISEASE), LUMBAR: ICD-10-CM

## 2018-08-09 DIAGNOSIS — M25.512 CHRONIC LEFT SHOULDER PAIN: ICD-10-CM

## 2018-08-09 DIAGNOSIS — M48.02 CERVICAL SPINAL STENOSIS: ICD-10-CM

## 2018-08-09 DIAGNOSIS — G89.29 CHRONIC LEFT SHOULDER PAIN: ICD-10-CM

## 2018-08-09 PROCEDURE — 99999 PR PBB SHADOW E&M-EST. PATIENT-LVL IV: CPT | Mod: PBBFAC,,, | Performed by: ANESTHESIOLOGY

## 2018-08-09 PROCEDURE — 3008F BODY MASS INDEX DOCD: CPT | Mod: CPTII,S$GLB,, | Performed by: ANESTHESIOLOGY

## 2018-08-09 PROCEDURE — 3079F DIAST BP 80-89 MM HG: CPT | Mod: CPTII,S$GLB,, | Performed by: ANESTHESIOLOGY

## 2018-08-09 PROCEDURE — 3075F SYST BP GE 130 - 139MM HG: CPT | Mod: CPTII,S$GLB,, | Performed by: ANESTHESIOLOGY

## 2018-08-09 PROCEDURE — 99213 OFFICE O/P EST LOW 20 MIN: CPT | Mod: S$GLB,,, | Performed by: ANESTHESIOLOGY

## 2018-08-09 NOTE — PROGRESS NOTES
This note was completed with dictation software and grammatical errors may exist.    CC:Back pain, neck pain, left shoulder and arm pain    HPI: The patient is a 57-year-old man with a history of CHF, morbid obesity, diabetes who presents in referral from Dr. Matias Barboza for chronic back pain. He returns in follow-up today to review records from his orthopedic surgeon, review imaging. In review of records from his orthopedic surgeon, Dr. Suarez he was apparently supposed to have a CT arthrogram, but he was unable to complete the injection of the dye secondary to pain.  They did offer him surgery to scope the shoulder in order to evaluate because he cannot have an MRI.  He has not followed up with this.  He also had not followed up with recommendations to get an EMG of his upper extremities because of cost.    While he continues to complain of left shoulder pain, left arm weakness and numbness, he states that the worst pain right now is his low back and bilateral legs.  The further he walks to have ear his legs get any feels that he is less able to do activity and feels that this is likely contributing to weight gain as well.  We have obtained CT of his cervical spine and lumbar spine and have reviewed this as noted below.    Previous pain history:  The patient states that over a year ago he was injured when a pole fell onto his right shoulder and cause a rotator cuff tear.  The patient also fell on his back at that time as well.  Nonetheless, he had surgery to repair his right shoulder about one year ago, has had improvement in strength and pain in that shoulder.  However, after his hospital stay for his shoulder, this is when his back pain began.  It is located in the bilateral low back, radiates into the legs, equal right and left.  The longer he stands with the further he walks the pain gets worse.  It does seem to ease up in 3-4 minutes when sitting down.  He does have some numbness in his feet which she  attributes to peripheral neuropathy from diabetes, he has been taking gabapentin once to twice a day with some benefit.  He has not done any physical therapy for his back, has not had any imaging for his back.  He has been taking hydrocodone, states that if he takes more than one pill a day, he can have hallucinations so he does not like the way this makes him feel.  It does however provide some relief when he takes it when his pain is severe.    His other complaint is left shoulder pain and left arm pain with numbness and tingling throughout his left arm to his hand.  He is unable to lift his hand above his head, feels that he has weakness in his left arm.  He has numbness through the third through fifth digits.  He actually has great deal of left shoulder pain as well and it radiates into the neck.  He had seen Dr. Suarez, orthopedics for the left shoulder and was told that he could have a surgery to repair this, however he could not come up with the $450 co-pay.  He has not returned to see Dr. Suarez.     Pain intervention history: He had done physical therapy for his right shoulder but not for his active.  He has been taking hydrocodone either 5-10 mg 0-1 time per day for the last year.        She does have a history of alcohol abuse but quit in 2014.  He continues to smoke.  He has taken benzodiazepines twice daily for years.    4/3/18 Hemoglobin A1c: 7.8    ROS: He reports weight gain, vision change, cough, joint swelling, back pain, difficulty sleeping, anxiety, depression and loss of balance.  Balance of review of systems is negative.    Past Medical History:   Diagnosis Date    a A H/O Medical Noncompliance     H/O Chronic Noncompliance With His CHF Diet    a Cardiac Diastolic Dysfunction     Dr. Aure Washington    a Chronic Anticoagulation With Eliquis     Dr. Aure Washington    a Coronary Artery Disease With H/O Stenting     Dr. Aure Washington; Was Hospitalized At [facility] 3/8/17-3/17/17 For CHF Exacerbatioin Due  "To "Dietary Discrepancies" With LCST Negative There    a Nonsustained Ventricular Tachycardia (NSVT)     a Paroxysmal Atrial Fibrillation With H/O RVR     Dr. Aure Washington; On Chronic Eliquis    a Syncopal Episode     [facility] 4/3/17-4/7/17 Stay For This: Was Likely Due To NSVT, And His Medications Were Adjusted    a Systolic CHF With EF 35-45%     Dr. Aure Washington; Was Hospitalized At [facility] 3/8/17-3/17/17 For CHF Exacerbatioin Due To "Dietary Discrepancies" With LCST Negative There    b Hypertension     b Proteinuria     04/2014 Referred To Dr. Leroy Allison; 4/1/14 Bilateral Renal U/S = Normal; On Lisinopril 20 Mg Daily    b Stage 2 CKD     c Hypercholesterolemia With Low HDL     d Type 2 DM On Insulin     1/11/18 Referred To Dr. Dipika Rodriguez And Re-Referred To DM EDU; 12/28/17 HgA1c = 12.0;" 7/5/17 Referred To [facility] EDU    f Morbid Obesity     i 1 PPD X 25+ YRs Chronic Tobacco Use Disorder     7/5/17 Increased Wellbutrin-XL To 300 Mg Daily; 6/8/17 RXd Wellbutrin- Mg Daily X 4 Months    i JULY On CPAP     Dr. Marion ngo Chronic Left Groin Pain     l Chronic Left Shoulder Pain     Dr. MARTINA martinez Chronic Recurrent Low Back Pain 05/29/2018 5/219/18 Referred To Dr. Ismael Hernández    l Left 5-7th Rib FXs 04/2016 4/23/16 [facility] Left Rib XRays = Questionable Nondisplaced Left 5-7th Rib FXs With Normal Lung Fields    l Right Shouder SX 5/26/16 Due To Work Related Injury     Dr. Mora At [facility]; Dr. MARTINA hatch H/O Transient Ischemic Attack In 2013     n Anxiety And Depression 05/29/2018    RTC In 6 Weeks; 5/29/18 Increased 100 Mg Zoloft To 100 Mg Bid    n Continuous Benzodiazepine (Xanax) Use 05/29/2018 5/29/18 I Am Weaning Him Off Of This By Decreasing 1 Mg Bid To 0.5 Mg Bid PRN, And Will Wean Further Next OV    n H/O ETOH Abuse, Quit In 09/2014     q Bilateral Lower Extremity Venous Stasis Ulcers     2/28/18 [facility]    q Chronic Bilateral Lower Extremity Edema     2/28/18 Added Metolazone 10 Mg qAM On MWF And Referred Back To Dr. Washington; On Lasix 40 Mg Bid; He Wears Bilateral Compressin Hose Stockings    q Disability Examination 7/15/16     For CHF, PAF, DM2, And JULY On CPAP    Wellness Visit 11/6/2017        Past Surgical History:   Procedure Laterality Date    CARDIAC SURGERY      coronary stent    COLONOSCOPY N/A 12/19/2017    Procedure: COLONOSCOPY;  Surgeon: Dave Allen MD;  Location: [facility];  Service: Endoscopy;  Laterality: N/A;    DIABETES MANAGEMENT LABS      heart stent      INCISION AND DRAINAGE OF WOUND      on stomach    SHOULDER SURGERY         Social History     Social History    Marital status:      Spouse name: N/A    Number of children: N/A    Years of education: N/A     Social History Main Topics    Smoking status: Former Smoker     Packs/day: 0.25     Types: Cigarettes     Start date: 1/1/1981     Quit date: 9/24/2016    Smokeless tobacco: Never Used      Comment: every now and again with drinks    Alcohol use Yes      Comment: occas    Drug use: No    Sexual activity: Yes     Partners: Female     Birth control/ protection: None     Other Topics Concern    None     Social History Narrative    None         Medications/Allergies: See med card    Vitals:    08/09/18 0930   BP: 139/86   Pulse: 105   Resp: 20   Temp: 97.3 °F (36.3 °C)   TempSrc: Oral   SpO2: 97%   Weight: (!) 152.9 kg (337 lb 1.3 oz)   PainSc:   6   PainLoc: Back   Body mass index is 47.01 kg/m².        Physical exam:  Gen: A and O x3, pleasant, well-groomed, morbidly obese  Skin: No rashes or obvious lesions  HEENT: PERRLA, no obvious deformities on ears or in canals. Trachea midline.  CVS: Regular rate and rhythm, normal palpable pulses.  Resp:No increased work of breathing, symmetrical chest rise.  Abdomen: Soft, NT/ND.  Musculoskeletal: Slow, wide-based gait.   Left shoulder exam: Pain  with internal and external rotation of shoulder against resistance, pain with empty can test.  Can only raise his left arm to about 70° before pain limits this.  He cannot raise his arm over his head.    Neuro:  Lower extremities: 5/5 strength bilaterally  Reflexes: Patellar 0+, Achilles 0+ bilaterally.  Sensory:  Intact and symmetrical to light touch and pinprick in L2-S1 dermatomes bilaterally.  Neuro:  Upper extremities: 5/5 strength bilaterally, except unable to abduct with deltoid on the left side primarily secondary to pain, also 4/5 strength with extension of interossei left side   Reflexes: Brachioradialis 2+, Bicep 2+, Tricep 2+.   Sensory: Intact and symmetrical to light touch and pinprick in C2-T1 dermatomes bilaterally, except for greatly decreased sensation to light touch throughout left third through fifth fingers.    Lumbar spine:  Lumbar spine: Range of motion is severely reduced with both flexion and extension with increased pain especially on extension.  Bird's test causes no increased pain on either side.    Supine straight leg raise is negative bilaterally.    Internal and external rotation of the hip causes no increased pain on either side.  Myofascial exam: No tenderness to palpation across lumbar paraspinous muscles.    Cervical Spine:  Cervical spine: Range of motion is moderately reduced with lateral rotation especially to the left side causing left neck pain, full with flexion with mild increased neck pain, lateral rotation of the right is preserved, no increased pain.  Spurling's maneuver causes neck pain and left shoulder pain when turning to the left side.  Myofascial exam: Tenderness palpation across the left cervical paraspinous and left trapezius muscles.    Imagin18 CT L-spine  At the T12-L1 level, no significant disc bulge, central canal stenosis, or neural foraminal stenosis is noted  At the L1-L2 level, no significant disc bulge, central canal stenosis, or neural  foraminal stenosis is noted.  At the L2-L3 level, minimal bulge may be noted towards each neural foramen with minimal bilateral neural foraminal narrowing and no significant central canal stenosis noted.  Mild ligamentous hypertrophy is noted.  At the L3-L4 level, mild ligamentous hypertrophy and facet changes are noted.  Broad-based bulging is noted paracentric to the left greater than right of 3 mm, mild left neural foraminal narrowing is noted.  Mild thecal sac narrowing is noted to 10 mm.  Mild right-sided neural foraminal narrowing is noted.  At the L4-L5 level, broad-based bulge appears to be present of 4 mm.  Mild bilateral neural foraminal narrowing is noted.  Mild thecal sac narrowing is noted to 9 mm.  Left lateral recess narrowing greater than right lateral recess narrowing appears to be present.  At the L5-S1 level, disc osteophyte bulge appears to be present of 4 mm.  Mild to moderate left greater than right neural foraminal narrowing appears to be present.  Mild thecal sac narrowing is noted to 13 mm.   Sclerosis is noted at the SI joints bilaterally with partial ankylosis on the right, please correlate for a history of sacroiliitis or degenerative change.     7/24/18 CT C-spine  A congenitally small central canal is noted which accentuates degenerative changes.  Intervertebral disc height loss noted at the C4-C5-C5-C6 C6-C7-C7-T1 levels. There is loss the normal cervical lordosis which may be positional or related to muscular strain.  Disc osteophyte bulge appears to be present at the C2-C3 level of 3 mm no significant neural foraminal stenosis is noted.  Moderate thecal sac narrowing is noted to 8 mm.  Possible anterior cord contact is noted  At the C3-C4 level, facet arthropathy is noted right greater than left.  Uncovertebral spurring is noted.  Moderate central canal narrowing is noted with possible anterior cord contact, the central canal is narrowed to 7-8 mm.  Moderate to severe right neural  foraminal narrowing is noted with mild left neural foraminal narrowing.  At the C4-C5 level, uncovertebral spurring is noted left greater than right.  Mild central canal narrowing is noted to 9 mm.  Moderate left neural foraminal narrowing is noted with mild right neural foraminal narrowing.  At the C5-C6 level, uncovertebral spurring and facet arthropathy is noted bilaterally.  Mild disc osteophyte spurring is noted of 3 mm.  Mild to moderate thecal sac narrowing to 8 mm is noted.  Moderate bilateral neural foraminal narrowing is suspected.  At the C6-C7 level, disc osteophyte uncovertebral spurring is noted bilaterally.  The central canal is not ideally visualized but mild central canal narrowing is suspected.  Mild to moderate bilateral neural foraminal narrowing is suspected.  At the C7-T1 level, uncovertebral spurring appears to be present bilaterally with moderate bilateral neural foraminal stenosis.  Minimal central canal narrowing is suspected, the central canal is not ideally evaluated.    Assessment:  The patient is a 57-year-old man with a history of CHF, morbid obesity, diabetes who presents in referral from Dr. Matias Barboza for chronic back pain.  1. Bilateral lumbar radiculopathy     2. DDD (degenerative disc disease), lumbar     3. Cervical radiculopathy  EMG W/ ULTRASOUND AND NERVE CONDUCTION TEST 2 Extremities   4. Chronic left shoulder pain  EMG W/ ULTRASOUND AND NERVE CONDUCTION TEST 2 Extremities   5. Cervical spinal stenosis  EMG W/ ULTRASOUND AND NERVE CONDUCTION TEST 2 Extremities         Plan:  1.  Since I had last seen him, the gabapentin was increased up to 600 mg at night, he feels that this helps to a mild degree but not tremendously.  I have encouraged him to continue this however.  2.  In terms of his cervical spine, he does have central canal narrowing at C2/C3 and C3/4 which I am concerned may contribute to some of his left arm weakness but it is difficult to say in light of his  left shoulder injury as well. I explained that we would need to get an EMG of his upper extremities to help delineate the etiology of his pain and weakness in the left arm.  He has agreed to this,.  3.  In the meantime, he continues to have severe back pain and leg pain, we discussed that physical therapy would likely be 1 of the best options for him, however apparently he would have to pay $45 co-pay for each visit and he cannot afford this.  We discussed the role of epidural steroid injections and he would like to try this.  He does have some canal narrowing at multiple levels from L3/4 down to L5/S1 in addition to some disc bulging and this may account for some of his back and leg pain.  I will set him up for an TATIANA and have him follow up in several weeks after the injection or sooner as needed.

## 2018-08-17 ENCOUNTER — TELEPHONE (OUTPATIENT)
Dept: PAIN MEDICINE | Facility: CLINIC | Age: 58
End: 2018-08-17

## 2018-08-20 ENCOUNTER — TELEPHONE (OUTPATIENT)
Dept: PAIN MEDICINE | Facility: CLINIC | Age: 58
End: 2018-08-20

## 2018-08-20 RX ORDER — HYDROCODONE BITARTRATE AND ACETAMINOPHEN 5; 325 MG/1; MG/1
TABLET ORAL
Qty: 30 TABLET | Refills: 0 | Status: SHIPPED | OUTPATIENT
Start: 2018-08-20 | End: 2018-09-17 | Stop reason: SDUPTHER

## 2018-08-20 NOTE — TELEPHONE ENCOUNTER
----- Message from Shahana Velásquez sent at 8/20/2018  1:32 PM CDT -----  Contact: Self  Patient would like to know if you received clearance from his cardio doctor to do the nerve block, he is really hurting.   Please call back to advise at [phone] (home).  Thank you!

## 2018-08-20 NOTE — TELEPHONE ENCOUNTER
----- Message from Rupa Potter sent at 8/20/2018 10:49 AM CDT -----  Contact: self  Patient is calling regarding the appointment for a spinal tap. Patient is in extreme pain and has not heard when he is scheduled for . Please call patient at [phone]. Thanks!

## 2018-08-20 NOTE — TELEPHONE ENCOUNTER
Spoke with patient and informed him we have not recevied the cardio clearance back yet. Patient stated he would call.

## 2018-08-20 NOTE — TELEPHONE ENCOUNTER
----- Message from Rupa Potter sent at 8/20/2018  3:00 PM CDT -----  Contact: self  Type: Needs Medical Advice    Who Called:  self  Best Call Back Number: [phone]  Additional Information: patient would like to speak to nurse regarding conversation with cardiology. Patient has appointment on Thursday 08/23/18 with cardiologist. Please call patient.

## 2018-08-20 NOTE — TELEPHONE ENCOUNTER
Spoke with patient regarding this and he was instructed to call back after cardiology visit so the request can be resent. Verbalized understanding.

## 2018-08-23 ENCOUNTER — TELEPHONE (OUTPATIENT)
Dept: PAIN MEDICINE | Facility: CLINIC | Age: 58
End: 2018-08-23

## 2018-08-23 NOTE — TELEPHONE ENCOUNTER
----- Message from Hosea Nascimento sent at 8/23/2018 11:00 AM CDT -----  Contact: patient  Jose, [phone]. Calling to speak with the nurse regarding his spinal tap CT scan. Says he received a letter from medicaid denying the service, because there wasn't enough information provided for approval. Please advise. Thanks.

## 2018-08-24 NOTE — TELEPHONE ENCOUNTER
Spoke with patient and informed him medicaid does not cover pain management services. He verbalized understanding. Patient will call us once he completes the cardiology visit so we can resend the clearance.

## 2018-08-24 NOTE — TELEPHONE ENCOUNTER
Left voice message that medicaid does not cover pain management services. His primary insurance is [employer] secondary is Medicaid.

## 2018-08-24 NOTE — TELEPHONE ENCOUNTER
----- Message from Jaylen Pickering sent at 8/24/2018  8:42 AM CDT -----  Contact: same  Patient called in and stated someone called in earlier today 8/24/18 and he was returning call.  Patient stated he has been calling back & forth about his procedure he is trying to get scheduled.  Patient call back number is [phone]

## 2018-09-14 RX ORDER — HYDROCODONE BITARTRATE AND ACETAMINOPHEN 5; 325 MG/1; MG/1
TABLET ORAL
Qty: 30 TABLET | Refills: 0 | OUTPATIENT
Start: 2018-09-14

## 2018-09-14 NOTE — TELEPHONE ENCOUNTER
Explained to patient that his medication is not due for refill yet. He verbalized understanding that this will not be increased and that he should only take this as directed. He will call next week after he sees the cardiologist.

## 2018-09-14 NOTE — TELEPHONE ENCOUNTER
----- Message from Kiesha Harper sent at 9/14/2018 10:24 AM CDT -----  Pt is requesting a new prescription / HYDROcodone-acetaminophen (NORCO) 5-325 mg per tablet 30 tablet  / please change to 10 mgs / he is out / had to double the dose / please call  ...[phone]       [facility] [address] [address]  [address] [address] [address]  Phone: [phone] Fax: [phone]

## 2018-09-17 RX ORDER — HYDROCODONE BITARTRATE AND ACETAMINOPHEN 5; 325 MG/1; MG/1
TABLET ORAL
Qty: 30 TABLET | Refills: 0 | Status: SHIPPED | OUTPATIENT
Start: 2018-09-19 | End: 2018-10-15 | Stop reason: SDUPTHER

## 2018-10-15 RX ORDER — HYDROCODONE BITARTRATE AND ACETAMINOPHEN 5; 325 MG/1; MG/1
TABLET ORAL
Qty: 30 TABLET | Refills: 0 | Status: SHIPPED | OUTPATIENT
Start: 2018-10-15 | End: 2018-11-14 | Stop reason: SDUPTHER

## 2018-11-06 ENCOUNTER — TELEPHONE (OUTPATIENT)
Dept: ADMINISTRATIVE | Facility: CLINIC | Age: 58
End: 2018-11-06

## 2018-11-06 NOTE — TELEPHONE ENCOUNTER
Home Health SOC 10/25/2018 - 12/23/2018 with [facility] - Dr. Matias Barboza.  services.

## 2018-11-12 RX ORDER — HYDROCODONE BITARTRATE AND ACETAMINOPHEN 5; 325 MG/1; MG/1
TABLET ORAL
Qty: 30 TABLET | Refills: 0 | OUTPATIENT
Start: 2018-11-12 | End: 2018-12-12

## 2018-11-12 RX ORDER — GABAPENTIN 300 MG/1
CAPSULE ORAL
Qty: 90 CAPSULE | Refills: 3 | OUTPATIENT
Start: 2018-11-12

## 2018-12-19 DIAGNOSIS — N39.41 URGE INCONTINENCE: ICD-10-CM

## 2018-12-22 ENCOUNTER — HOSPITAL ENCOUNTER (EMERGENCY)
Facility: HOSPITAL | Age: 58
Discharge: HOME | End: 2018-12-22
Attending: EMERGENCY MEDICINE
Payer: COMMERCIAL

## 2018-12-22 VITALS
SYSTOLIC BLOOD PRESSURE: 161 MMHG | BODY MASS INDEX: 44.1 KG/M2 | HEART RATE: 66 BPM | WEIGHT: 315 LBS | DIASTOLIC BLOOD PRESSURE: 68 MMHG | RESPIRATION RATE: 20 BRPM | TEMPERATURE: 98 F | OXYGEN SATURATION: 97 % | HEIGHT: 71 IN

## 2018-12-22 DIAGNOSIS — Z91.119 DIETARY NONCOMPLIANCE: ICD-10-CM

## 2018-12-22 DIAGNOSIS — R60.0 PERIPHERAL EDEMA: Primary | ICD-10-CM

## 2018-12-22 LAB
ANION GAP SERPL CALC-SCNC: 7 MMOL/L
BASOPHILS # BLD AUTO: 0 K/UL
BASOPHILS NFR BLD: 0.5 %
BUN SERPL-MCNC: 12 MG/DL
CALCIUM SERPL-MCNC: 8.8 MG/DL
CHLORIDE SERPL-SCNC: 103 MMOL/L
CO2 SERPL-SCNC: 29 MMOL/L
CREAT SERPL-MCNC: 1 MG/DL
DIFFERENTIAL METHOD: ABNORMAL
EOSINOPHIL # BLD AUTO: 0.3 K/UL
EOSINOPHIL NFR BLD: 3.6 %
ERYTHROCYTE [DISTWIDTH] IN BLOOD BY AUTOMATED COUNT: 16.2 %
EST. GFR  (AFRICAN AMERICAN): >60 ML/MIN/1.73 M^2
EST. GFR  (NON AFRICAN AMERICAN): >60 ML/MIN/1.73 M^2
GIANT PLATELETS BLD QL SMEAR: PRESENT
GLUCOSE SERPL-MCNC: 159 MG/DL
HCT VFR BLD AUTO: 42.6 %
HGB BLD-MCNC: 13.2 G/DL
LYMPHOCYTES # BLD AUTO: 1.2 K/UL
LYMPHOCYTES NFR BLD: 15.9 %
MCH RBC QN AUTO: 23.5 PG
MCHC RBC AUTO-ENTMCNC: 31 G/DL
MCV RBC AUTO: 76 FL
MONOCYTES # BLD AUTO: 0.5 K/UL
MONOCYTES NFR BLD: 6.2 %
NEUTROPHILS # BLD AUTO: 5.6 K/UL
NEUTROPHILS NFR BLD: 73.8 %
PLATELET # BLD AUTO: 258 K/UL
PLATELET BLD QL SMEAR: ABNORMAL
PMV BLD AUTO: 10 FL
POTASSIUM SERPL-SCNC: 4.4 MMOL/L
RBC # BLD AUTO: 5.62 M/UL
SODIUM SERPL-SCNC: 139 MMOL/L
WBC # BLD AUTO: 7.6 K/UL

## 2018-12-22 PROCEDURE — 93005 ELECTROCARDIOGRAM TRACING: CPT

## 2018-12-22 PROCEDURE — 63600175 PHARM REV CODE 636 W HCPCS: Performed by: EMERGENCY MEDICINE

## 2018-12-22 PROCEDURE — 80048 BASIC METABOLIC PNL TOTAL CA: CPT

## 2018-12-22 PROCEDURE — 85025 COMPLETE CBC W/AUTO DIFF WBC: CPT

## 2018-12-22 PROCEDURE — 36415 COLL VENOUS BLD VENIPUNCTURE: CPT

## 2018-12-22 PROCEDURE — 96372 THER/PROPH/DIAG INJ SC/IM: CPT

## 2018-12-22 PROCEDURE — 25000003 PHARM REV CODE 250: Performed by: EMERGENCY MEDICINE

## 2018-12-22 PROCEDURE — 99285 EMERGENCY DEPT VISIT HI MDM: CPT | Mod: 25

## 2018-12-22 RX ORDER — DOFETILIDE 0.25 MG/1
125 CAPSULE ORAL EVERY 12 HOURS
Status: ON HOLD | COMMUNITY
End: 2020-04-22 | Stop reason: HOSPADM

## 2018-12-22 RX ORDER — ACETAMINOPHEN 325 MG/1
650 TABLET ORAL
Status: COMPLETED | OUTPATIENT
Start: 2018-12-22 | End: 2018-12-22

## 2018-12-22 RX ORDER — FUROSEMIDE 10 MG/ML
40 INJECTION INTRAMUSCULAR; INTRAVENOUS
Status: COMPLETED | OUTPATIENT
Start: 2018-12-22 | End: 2018-12-22

## 2018-12-22 RX ADMIN — FUROSEMIDE 40 MG: 10 INJECTION, SOLUTION INTRAVENOUS at 03:12

## 2018-12-22 RX ADMIN — ACETAMINOPHEN 650 MG: 325 TABLET ORAL at 03:12

## 2018-12-22 NOTE — ED NOTES
Pt presents to ED from home with c/o SOB and leg swelling. Per the pt he normally has leg swelling but it is worse today. Lower legs are noted to be swollen with blisters to both legs. No drainage noted. Pt is AAOx4. Skin warm, dry to touch. Respirations even, nonabored. NAD Noted. VSS. Pt is wearing LifeVest Defibrillator.

## 2018-12-22 NOTE — ED PROVIDER NOTES
"Encounter Date: 12/22/2018    SCRIBE #1 NOTE: I, Marcie Head, am scribing for, and in the presence of, Dr. Villela.       History     Chief Complaint   Patient presents with    Shortness of Breath     " too much fluid "    Leg Swelling       Time seen by provider: 2:10 PM on 12/22/2018    Jose Leon is a 58 y.o. male with Atrial Fibrillation on pradaxa, CHF with systolc dysfunction, CKD, obesity, HTN, prior TIA, IDDM(T2), and CAD who presents to the ED with shortness of breath and lower extremity swelling. Patient states he usually has lower leg swelling but they have gotten worse over the past few days with associated shortness of breath on exertion. He adds that he has some dizziness when standing and coughing noted last night that was non productive. He reports sleeping with 3-4 pillows, does not follow his salt restricted diet, and is a smoker. Patient denies any new injuries to his legs, nausea, vomiting, diarrhea, chest pain, palpitations, passing out, sneezing, congestion, pain in his legs, back pain, neck pain, or fever. Patient adds his defibrillator recently shocked him about a week ago and at that time he stayed at [facility] for a few days and states he also had a spine fusion recently; he is a poor historian regarding both of these occurences. Shx includes coronary stents.       The history is provided by the patient. No  was used.     Review of patient's allergies indicates:   Allergen Reactions    Atorvastatin      Other reaction(s): Generalized Myalgias    Effexor [venlafaxine] Other (See Comments)     Tremulousness     Past Medical History:   Diagnosis Date    a A H/O Medical Noncompliance     H/O Chronic Noncompliance With His CHF Diet    a Cardiac Diastolic Dysfunction     Dr. Aure Washington    a Chronic Anticoagulation With Pradaxa     Dr. Aure Washington    a Coronary Artery Disease With H/O Stenting     Dr. Aure Washington; Was Hospitalized At [facility] 3/8/17-3/17/17 For CHF " "Exacerbatioin Due To "Dietary Discrepancies" With LCST Negative There    a Nonsustained Ventricular Tachycardia (NSVT)     a Paroxysmal Atrial Fibrillation With H/O RVR     Dr. Aure Washington; On Chronic Eliquis    a Syncopal Episode     [facility] 4/3/17-4/7/17 Stay For This: Was Likely Due To NSVT, And His Medications Were Adjusted    a Systolic CHF With EF 35-45%     Dr. Aure Washington; Was Hospitalized At [facility] 3/8/17-3/17/17 For CHF Exacerbatioin Due To "Dietary Discrepancies" With LCST Negative There    b Hypertension     b Proteinuria     04/2014 Referred To Dr. Leroy Allison; 4/1/14 Bilateral Renal U/S = Normal; On Lisinopril 20 Mg Daily    b Stage 2 CKD     c Hypercholesterolemia With Low HDL     d Type 2 DM On Insulin     ** 12/4/18 Referred To [facility] EDU; 1/11/18 Referred To Dr. Dipika Rodriguez And Re-Referred To [facility] ED[facility]; 12/28/17 HgA1c = 12.0;" 7/5/17 Referred To [facility] EDU    f Morbid Obesity     i 1 PPD X 25+ YRs Chronic Tobacco Use Disorder     7/5/17 Increased Wellbutrin-XL To 300 Mg Daily; 6/8/17 RXd Wellbutrin- Mg Daily X 4 Months    i JULY On CPAP     Dr. Marion ngo Chronic Left Groin Pain     l Chronic Left Shoulder Pain     Dr. MARTINA martinez Chronic Recurrent Low Back Pain 12/04/2018    Dr. Llamas Is His Pain Management Neurologist; 5/219/18 Referred To Dr. Ismael Hernández    l Left 5-7th Rib FXs 04/2016 4/23/16 [place] Left Rib XRays = Questionable Nondisplaced Left 5-7th Rib FXs With Normal Lung Fields    l Right Shouder SX 5/26/16 Due To Work Related Injury     Dr. Mora At [facility]; Dr. MARTINA hatch H/O Transient Ischemic Attack In 2013     n Anxiety And Depression 12/04/2018    RTC In 6 Weeks; 12/4/18 Added Wellbutrin- Mg qAM; 5/29/18 Increased 100 Mg Zoloft To 100 Mg Bid    n Continuous Benzodiazepine (Xanax) Use 12/04/2018 5/29/18 I Am Weaning Him Off Of This By Decreasing 1 Mg Bid To 0.5 Mg Bid PRN, And Will Wean Further Next OV    n H/O ETOH Abuse, Quit " In 2014     q Bilateral Lower Extremity Venous Stasis Ulcers     18 [facility]    q Chronic Bilateral Lower Extremity Edema     18 Added Metolazone 10 Mg qAM On MWF And Referred Back To Dr. Washington; On Lasix 40 Mg Bid; He Wears Bilateral Compressin Hose Stockings    q Disability Examination 7/15/16     For CHF, PAF, DM2, And JULY On CPAP    Wellness Visit 2017      Past Surgical History:   Procedure Laterality Date    ANGIOGRAM-CORONARY Left 3/11/2016    [facility]    CARDIAC SURGERY      coronary stent    COLONOSCOPY N/A 2017    Performed by [facility] at [facility] ENDO    DIABETES MANAGEMENT LABS      heart stent      INCISION AND DRAINAGE OF WOUND      on stomach    PLACEMENT-LOOP RECORDER N/A 2016    [facility] CATH    SHOULDER SURGERY       Family History   Problem Relation Age of Onset    Heart disease Mother     Arthritis Mother     Diabetes Mother     Hyperlipidemia Mother     Hypertension Mother     Heart disease Father     Arthritis Father     Asthma Father     COPD Father     Hyperlipidemia Father     Hypertension Father     Stroke Father     Diabetes Sister     Diabetes Maternal Uncle      Social History     Tobacco Use    Smoking status: Former Smoker     Packs/day: 0.25     Types: Cigarettes     Start date: 1981     Last attempt to quit: 2016     Years since quittin.2    Smokeless tobacco: Never Used    Tobacco comment: every now and again with drinks   Substance Use Topics    Alcohol use: Yes     Comment: occas    Drug use: No     Review of Systems   Constitutional: Negative for chills and fever.   HENT: Negative for congestion, drooling, sore throat and trouble swallowing.    Eyes: Negative for photophobia and visual disturbance.   Respiratory: Positive for cough and shortness of breath.         +dysnpea  on exertion  +chronic orthopnea   Cardiovascular: Positive for leg swelling. Negative for chest pain and palpitations.   Gastrointestinal: Negative for abdominal pain, diarrhea, nausea and vomiting.   Genitourinary: Negative for dysuria and hematuria.   Musculoskeletal: Negative for back pain and neck pain.   Skin: Negative for rash.   Neurological: Positive for dizziness (upon standing). Negative for seizures, syncope, speech difficulty, weakness and numbness.   Hematological: Does not bruise/bleed easily.   Psychiatric/Behavioral: Negative for confusion.       Physical Exam     Initial Vitals [12/22/18 1352]   BP Pulse Resp Temp SpO2   (!) 186/81 68 20 97.9 °F (36.6 °C) 96 %      MAP       --         Physical Exam    Nursing note and vitals reviewed.  Constitutional: He appears well-developed and well-nourished. He is not diaphoretic. No distress.   HENT:   Head: Normocephalic and atraumatic.   Mouth/Throat: Oropharynx is clear and moist.   Eyes: Conjunctivae are normal.   Neck: Neck supple.   Cardiovascular: Normal rate, regular rhythm, normal heart sounds and intact distal pulses. Exam reveals no gallop and no friction rub.    No murmur heard.  Pulmonary/Chest: Breath sounds normal. No respiratory distress. He has no wheezes. He has no rhonchi. He has no rales.   No accessory muscle use.    Abdominal: Soft. He exhibits no distension. There is no tenderness.   Musculoskeletal: Normal range of motion. He exhibits edema.        Right lower leg: He exhibits edema.        Left lower leg: He exhibits edema.   2+ pitting edema to bilateral lower legs just short of the knee with few clear blisters.    Neurological: He is alert and oriented to person, place, and time.   Skin: No rash noted. No erythema.         ED Course   Procedures  Labs Reviewed   CBC W/ AUTO DIFFERENTIAL   BASIC METABOLIC PANEL          Imaging Results          X-Ray Chest PA And Lateral (Final result)  Result time 12/22/18 14:57:21    Final result  by Miles Londono MD (12/22/18 14:57:21)                 Impression:      1. No acute chest disease.  2. Cardiomegaly.      Electronically signed by: Miles Londono  Date:    12/22/2018  Time:    14:57             Narrative:    EXAMINATION:  XR CHEST PA AND LATERAL    CLINICAL HISTORY:  SOB;    TECHNIQUE:  PA and lateral views of the chest were performed.    COMPARISON:  Chest x-ray 11/03/2015.    FINDINGS:  The lungs are clear.  No focal consolidation.  Pulmonary vascularity unremarkable.    Heart size is enlarged.  Stimulator device projecting over the left cardiac heart border minimal calcified aortic plaque.  Trachea midline.    Bony thorax intact.                            (radiology reading, visualized by me)     Medical Decision Making:   History:   Old Medical Records: I decided to obtain old medical records.  Clinical Tests:   Lab Tests: Ordered and Reviewed  Radiological Study: Ordered and Reviewed  Medical Tests: Ordered and Reviewed            Scribe Attestation:   Scribe #1: I performed the above scribed service and the documentation accurately describes the services I performed. I attest to the accuracy of the note.    I, Dr. Paco Villela, personally performed the services described in this documentation. All medical record entries made by the scribe were at my direction and in my presence.  I have reviewed the chart and agree that the record reflects my personal performance and is accurate and complete. Paco Villela MD.  6:18 PM 12/22/2018    Jose Leon is a 58 y.o. male presenting with acute on chronic leg swelling in the setting of worsened a exertional dyspnea.  Patient has no new orthopnea or dyspnea at rest.  He is well-appearing with normal work of breathing here.  There is no sign of pulmonary edema. I have very low suspicion for ACS with EKG reviewed.  I do not think further cardiac biomarkers are indicated.  No sign of new anemia or renal failure.  He is noncompliant with  diet reviewed in detail with him.  Continue outpatient diuretic with IM furosemide given for additional diuresis pending close cardiology follow-up.  I have very low suspicion for other emergent, life-threatening etiologies such as pneumonia, sepsis, PE.  I do not think additional imaging or inpatient monitoring is necessary.  I doubt significant CHF exacerbation.  There is no sign of pulmonary edema.  Return precautions reviewed.        ED Course as of Dec 22 1509   Sat Dec 22, 2018   1438 EKG:  Atrial flutter with variable block, normal intervals, L axis.  Rate of 66.  Old lateral ST/T changes in lateral leads I, aVL, V5, V6 compared to prior. There are no acute ST or T wave changes suggestive of acute ischemia or infarction.    [MR]      ED Course User Index  [MR] Paco Villela MD     Clinical Impression:   There were no encounter diagnoses.      Disposition:   Disposition: Discharged  Condition: Stable                        Paco Villela MD  12/22/18 4704

## 2018-12-31 RX ORDER — MIRABEGRON 50 MG/1
TABLET, FILM COATED, EXTENDED RELEASE ORAL
Qty: 30 TABLET | Refills: 11 | Status: ON HOLD | OUTPATIENT
Start: 2018-12-31 | End: 2020-04-22 | Stop reason: HOSPADM

## 2019-01-03 ENCOUNTER — TELEPHONE (OUTPATIENT)
Dept: ADMINISTRATIVE | Facility: CLINIC | Age: 59
End: 2019-01-03

## 2019-01-03 NOTE — TELEPHONE ENCOUNTER
Home Health Recert 12/24/2018 - 02/21/2019 with [facility] ([place]) - Dr. Matias Barboza.  services.

## 2019-03-01 ENCOUNTER — LAB VISIT (OUTPATIENT)
Dept: LAB | Facility: HOSPITAL | Age: 59
End: 2019-03-01
Attending: EMERGENCY MEDICINE
Payer: COMMERCIAL

## 2019-03-01 DIAGNOSIS — E11.22 TYPE 2 DIABETES MELLITUS WITH END-STAGE RENAL DISEASE: Primary | ICD-10-CM

## 2019-03-01 DIAGNOSIS — N18.6 TYPE 2 DIABETES MELLITUS WITH END-STAGE RENAL DISEASE: Primary | ICD-10-CM

## 2019-03-01 DIAGNOSIS — I87.2 PERIPHERAL VENOUS INSUFFICIENCY: ICD-10-CM

## 2019-03-01 LAB
ALBUMIN SERPL BCP-MCNC: 3.2 G/DL
ALP SERPL-CCNC: 117 U/L
ALT SERPL W/O P-5'-P-CCNC: 10 U/L
ANION GAP SERPL CALC-SCNC: 11 MMOL/L
AST SERPL-CCNC: 21 U/L
BILIRUB SERPL-MCNC: 0.6 MG/DL
BUN SERPL-MCNC: 14 MG/DL
CALCIUM SERPL-MCNC: 9.3 MG/DL
CHLORIDE SERPL-SCNC: 103 MMOL/L
CO2 SERPL-SCNC: 23 MMOL/L
CREAT SERPL-MCNC: 0.9 MG/DL
CRP SERPL-MCNC: 11.3 MG/L
ERYTHROCYTE [DISTWIDTH] IN BLOOD BY AUTOMATED COUNT: 17.9 %
ERYTHROCYTE [SEDIMENTATION RATE] IN BLOOD BY WESTERGREN METHOD: 5 MM/HR
EST. GFR  (AFRICAN AMERICAN): >60 ML/MIN/1.73 M^2
EST. GFR  (NON AFRICAN AMERICAN): >60 ML/MIN/1.73 M^2
GLUCOSE SERPL-MCNC: 112 MG/DL
HCT VFR BLD AUTO: 45.4 %
HGB BLD-MCNC: 14.4 G/DL
MCH RBC QN AUTO: 23.2 PG
MCHC RBC AUTO-ENTMCNC: 31.7 G/DL
MCV RBC AUTO: 73 FL
PLATELET # BLD AUTO: 219 K/UL
PMV BLD AUTO: ABNORMAL FL
POTASSIUM SERPL-SCNC: 5.2 MMOL/L
PROT SERPL-MCNC: 7.1 G/DL
RBC # BLD AUTO: 6.2 M/UL
SODIUM SERPL-SCNC: 137 MMOL/L
WBC # BLD AUTO: 8.89 K/UL

## 2019-03-01 PROCEDURE — 85651 RBC SED RATE NONAUTOMATED: CPT | Mod: PO

## 2019-03-01 PROCEDURE — 86140 C-REACTIVE PROTEIN: CPT

## 2019-03-01 PROCEDURE — 85027 COMPLETE CBC AUTOMATED: CPT | Mod: PO

## 2019-03-01 PROCEDURE — 80053 COMPREHEN METABOLIC PANEL: CPT

## 2019-03-01 PROCEDURE — 84134 ASSAY OF PREALBUMIN: CPT

## 2019-03-04 LAB — PREALB SERPL-MCNC: 17 MG/DL

## 2019-06-10 ENCOUNTER — HOSPITAL ENCOUNTER (EMERGENCY)
Facility: HOSPITAL | Age: 59
Discharge: HOME | End: 2019-06-10
Attending: EMERGENCY MEDICINE
Payer: MEDICARE

## 2019-06-10 VITALS
BODY MASS INDEX: 47.14 KG/M2 | RESPIRATION RATE: 16 BRPM | DIASTOLIC BLOOD PRESSURE: 75 MMHG | WEIGHT: 315 LBS | OXYGEN SATURATION: 100 % | HEART RATE: 79 BPM | TEMPERATURE: 98 F | SYSTOLIC BLOOD PRESSURE: 144 MMHG

## 2019-06-10 DIAGNOSIS — R06.02 SHORTNESS OF BREATH: ICD-10-CM

## 2019-06-10 LAB
ALBUMIN SERPL BCP-MCNC: 3.5 G/DL (ref 3.5–5.2)
ALP SERPL-CCNC: 112 U/L (ref 55–135)
ALT SERPL W/O P-5'-P-CCNC: 10 U/L (ref 10–44)
ANION GAP SERPL CALC-SCNC: 7 MMOL/L (ref 8–16)
AST SERPL-CCNC: 11 U/L (ref 10–40)
BASOPHILS # BLD AUTO: 0 K/UL (ref 0–0.2)
BASOPHILS NFR BLD: 0.4 % (ref 0–1.9)
BILIRUB SERPL-MCNC: 0.5 MG/DL (ref 0.1–1)
BNP SERPL-MCNC: 122 PG/ML (ref 0–99)
BUN SERPL-MCNC: 17 MG/DL (ref 6–20)
CALCIUM SERPL-MCNC: 9.1 MG/DL (ref 8.7–10.5)
CHLORIDE SERPL-SCNC: 102 MMOL/L (ref 95–110)
CO2 SERPL-SCNC: 28 MMOL/L (ref 23–29)
CREAT SERPL-MCNC: 1.2 MG/DL (ref 0.5–1.4)
DIFFERENTIAL METHOD: ABNORMAL
EOSINOPHIL # BLD AUTO: 0.2 K/UL (ref 0–0.5)
EOSINOPHIL NFR BLD: 2.2 % (ref 0–8)
ERYTHROCYTE [DISTWIDTH] IN BLOOD BY AUTOMATED COUNT: 16.6 % (ref 11.5–14.5)
EST. GFR  (AFRICAN AMERICAN): >60 ML/MIN/1.73 M^2
EST. GFR  (NON AFRICAN AMERICAN): >60 ML/MIN/1.73 M^2
GLUCOSE SERPL-MCNC: 195 MG/DL (ref 70–110)
HCT VFR BLD AUTO: 44.8 % (ref 40–54)
HGB BLD-MCNC: 14 G/DL (ref 14–18)
INR PPP: 1.1 (ref 0.8–1.2)
LYMPHOCYTES # BLD AUTO: 1.5 K/UL (ref 1–4.8)
LYMPHOCYTES NFR BLD: 21.1 % (ref 18–48)
MCH RBC QN AUTO: 23.4 PG (ref 27–31)
MCHC RBC AUTO-ENTMCNC: 31.4 G/DL (ref 32–36)
MCV RBC AUTO: 75 FL (ref 82–98)
MONOCYTES # BLD AUTO: 0.5 K/UL (ref 0.3–1)
MONOCYTES NFR BLD: 7.3 % (ref 4–15)
NEUTROPHILS # BLD AUTO: 4.9 K/UL (ref 1.8–7.7)
NEUTROPHILS NFR BLD: 69 % (ref 38–73)
PLATELET # BLD AUTO: 183 K/UL (ref 150–350)
PMV BLD AUTO: 11.1 FL (ref 9.2–12.9)
POTASSIUM SERPL-SCNC: 4.3 MMOL/L (ref 3.5–5.1)
PROT SERPL-MCNC: 7.3 G/DL (ref 6–8.4)
PROTHROMBIN TIME: 10.8 SEC (ref 9–12.5)
RBC # BLD AUTO: 6.01 M/UL (ref 4.6–6.2)
SODIUM SERPL-SCNC: 137 MMOL/L (ref 136–145)
TROPONIN I SERPL DL<=0.01 NG/ML-MCNC: 0.01 NG/ML (ref 0–0.03)
WBC # BLD AUTO: 7.1 K/UL (ref 3.9–12.7)

## 2019-06-10 PROCEDURE — 63600175 PHARM REV CODE 636 W HCPCS: Performed by: EMERGENCY MEDICINE

## 2019-06-10 PROCEDURE — 85025 COMPLETE CBC W/AUTO DIFF WBC: CPT

## 2019-06-10 PROCEDURE — 93010 ELECTROCARDIOGRAM REPORT: CPT | Mod: ,,, | Performed by: INTERNAL MEDICINE

## 2019-06-10 PROCEDURE — 36415 COLL VENOUS BLD VENIPUNCTURE: CPT

## 2019-06-10 PROCEDURE — 85610 PROTHROMBIN TIME: CPT

## 2019-06-10 PROCEDURE — 84484 ASSAY OF TROPONIN QUANT: CPT

## 2019-06-10 PROCEDURE — 99285 EMERGENCY DEPT VISIT HI MDM: CPT | Mod: 25

## 2019-06-10 PROCEDURE — 93010 EKG 12-LEAD: ICD-10-PCS | Mod: ,,, | Performed by: INTERNAL MEDICINE

## 2019-06-10 PROCEDURE — 80053 COMPREHEN METABOLIC PANEL: CPT

## 2019-06-10 PROCEDURE — 83880 ASSAY OF NATRIURETIC PEPTIDE: CPT

## 2019-06-10 PROCEDURE — 93005 ELECTROCARDIOGRAM TRACING: CPT

## 2019-06-10 PROCEDURE — 96374 THER/PROPH/DIAG INJ IV PUSH: CPT

## 2019-06-10 RX ORDER — AZITHROMYCIN 250 MG/1
250 TABLET, FILM COATED ORAL DAILY
Qty: 6 TABLET | Refills: 0 | Status: SHIPPED | OUTPATIENT
Start: 2019-06-10 | End: 2019-06-15

## 2019-06-10 RX ORDER — FUROSEMIDE 10 MG/ML
80 INJECTION INTRAMUSCULAR; INTRAVENOUS
Status: COMPLETED | OUTPATIENT
Start: 2019-06-10 | End: 2019-06-10

## 2019-06-10 RX ADMIN — FUROSEMIDE 80 MG: 10 INJECTION, SOLUTION INTRAVENOUS at 05:06

## 2019-06-10 NOTE — ED NOTES
"Pt presents to ED with c/o fatigue over the past few days. States " I just keep falling asleep in the middle of everything I do." Pt also complains of pain and swelling to bl lower extremities. Pt does state he feel as if his lungs are filling up with fluid also. Pt denies any chest pain. Placed on BP, pulse px, and cardiac monitor. VS are stable. No other needs are identified at this time. Will monitor prn.   "

## 2019-06-10 NOTE — ED PROVIDER NOTES
"Encounter Date: 6/10/2019    SCRIBE #1 NOTE: I, Paulette Hurtado and am scribing for, and in the presence of, Herb Llanos MD.       History     Chief Complaint   Patient presents with    Weakness     overall weakness and fatigue.     Time seen by provider: 4:36 PM on 06/10/2019    Jose Leon is a 58 y.o. male with PMHx of CAD, HTN, stroke, stage 2 CKD, CHF, and Type 2 DM who presents to the ED with an onset of fatigue, SOB, and dizziness. The patient reports that his SOB is worse with exertion. He reports that he falls asleep really easily and often. He is also complaining of chills and increased urgency of urination. He has had 2 strokes in the past, and he has chronic left-sided weakness due to a stroke. The patient denies fainting, fever, blood in his urine, painful urination, or any other symptoms at this time. Patient's PSHx includes cardiac surgery. Patient is a former smoker. Drug allergies to Atorvastatin and Effexor noted.     The history is provided by the patient.     Review of patient's allergies indicates:   Allergen Reactions    Atorvastatin      Other reaction(s): Generalized Myalgias    Effexor [venlafaxine] Other (See Comments)     Tremulousness     Past Medical History:   Diagnosis Date    a A H/O Medical Noncompliance     H/O Chronic Noncompliance With His CHF Diet    a Cardiac Diastolic Dysfunction     Dr. Aure Washington    a Chronic Anticoagulation With Pradaxa     Dr. Aure Washington    a Coronary Artery Disease With H/O Stenting     Dr. Aure Washington; Was Hospitalized At [facility] 3/8/17-3/17/17 For CHF Exacerbatioin Due To "Dietary Discrepancies" With LCST Negative There    a Nonsustained Ventricular Tachycardia (NSVT)     a Paroxysmal Atrial Fibrillation With H/O RVR     Dr. Aure Washington; On Chronic Eliquis    a Syncopal Episode     [facility] 4/3/17-4/7/17 Stay For This: Was Likely Due To NSVT, And His Medications Were Adjusted    a Systolic CHF With EF 35-45%     Dr. Aure Washington; Was " "Hospitalized At [facility] 3/8/17-3/17/17 For CHF Exacerbatioin Due To "Dietary Discrepancies" With LCST Negative There    b Hypertension     b Proteinuria     04/2014 Referred To Dr. Leroy Allison; 4/1/14 Bilateral Renal U/S = Normal; On Lisinopril 20 Mg Daily    b Stage 2 CKD     c Hypercholesterolemia With Low HDL     d Type 2 DM On Insulin     ** 12/4/18 Referred To [facility]; 1/11/18 Referred To Dr. Dipika Rodriguez And Re-Referred To [facility]; 12/28/17 HgA1c = 12.0;" 7/5/17 Referred To [facility]    f Morbid Obesity     i 1 PPD X 25+ YRs Chronic Tobacco Use Disorder     7/5/17 Increased Wellbutrin-XL To 300 Mg Daily; 6/8/17 RXd Wellbutrin- Mg Daily X 4 Months    i JULY On CPAP     Dr. Marion ngo Chronic Left Groin Pain     l Chronic Left Shoulder Pain     Dr. MARTINA martinez Chronic Recurrent Low Back Pain 12/04/2018    Dr. Llamas Is His Pain Management Neurologist; 5/219/18 Referred To Dr. Ismael Hernández    l Left 5-7th Rib FXs 04/2016 4/23/16 [facility] Left Rib XRays = Questionable Nondisplaced Left 5-7th Rib FXs With Normal Lung Fields    l Right Shouder SX 5/26/16 Due To Work Related Injury     Dr. Mora At [facility]; Dr. MARTINA hatch H/O Transient Ischemic Attack In 2013     n Anxiety And Depression 12/04/2018    RTC In 6 Weeks; 12/4/18 Added Wellbutrin- Mg qAM; 5/29/18 Increased 100 Mg Zoloft To 100 Mg Bid    n Continuous Benzodiazepine (Xanax) Use 12/04/2018 5/29/18 I Am Weaning Him Off Of This By Decreasing 1 Mg Bid To 0.5 Mg Bid PRN, And Will Wean Further Next OV    n H/O ETOH Abuse, Quit In 09/2014     q Bilateral Lower Extremity Venous Stasis Ulcers     2/28/18 [facility]    q Chronic Bilateral Lower Extremity Edema     2/28/18 Added Metolazone 10 Mg qAM On MWF And Referred Back To Dr. Washington; On Lasix 40 Mg Bid; He Wears Bilateral Compressin Hose Stockings    q Disability Examination 7/15/16     For " CHF, PAF, DM2, And JULY On CPAP    Wellness Visit 2017      Past Surgical History:   Procedure Laterality Date    ANGIOGRAM-CORONARY Left 3/11/2016    [facility]    CARDIAC SURGERY      coronary stent    COLONOSCOPY N/A 2017    [facility] END[facility]    DIABETES MANAGEMENT LABS      heart stent      INCISION AND DRAINAGE OF WOUND      on stomach    PLACEMENT-LOOP RECORDER N/A 2016    [facility]    SHOULDER SURGERY       Family History   Problem Relation Age of Onset    Heart disease Mother     Arthritis Mother     Diabetes Mother     Hyperlipidemia Mother     Hypertension Mother     Heart disease Father     Arthritis Father     Asthma Father     COPD Father     Hyperlipidemia Father     Hypertension Father     Stroke Father     Diabetes Sister     Diabetes Maternal Uncle      Social History     Tobacco Use    Smoking status: Former Smoker     Packs/day: 0.25     Types: Cigarettes     Start date: 1981     Last attempt to quit: 2016     Years since quittin.7    Smokeless tobacco: Never Used    Tobacco comment: every now and again with drinks   Substance Use Topics    Alcohol use: Yes     Comment: occas    Drug use: No     Review of Systems   Constitutional: Positive for chills and fatigue. Negative for activity change, diaphoresis and fever.   HENT: Negative for drooling, rhinorrhea, sore throat and trouble swallowing.    Eyes: Negative for pain and visual disturbance.   Respiratory: Positive for shortness of breath. Negative for cough and stridor.    Cardiovascular: Negative for chest pain and leg swelling.   Gastrointestinal: Negative for abdominal distention, abdominal pain, constipation and vomiting.   Genitourinary: Positive for urgency. Negative for discharge, dysuria and hematuria.   Musculoskeletal: Negative for gait problem.   Skin: Negative for rash.   Neurological:  Positive for dizziness and weakness (left-sided, chronic). Negative for seizures, syncope, facial asymmetry and headaches.   Psychiatric/Behavioral: Negative for hallucinations and suicidal ideas.       Physical Exam     Initial Vitals [06/10/19 1616]   BP Pulse Resp Temp SpO2   (!) 166/79 89 16 98.2 °F (36.8 °C) 100 %      MAP       --         Physical Exam    Nursing note and vitals reviewed.  Constitutional: He is Obese . He appears ill. No distress.   Morbid obesity   HENT:   Head: Normocephalic and atraumatic.   Nose: Nose normal.   Eyes: EOM are normal.   Neck: Neck supple. No tracheal deviation present. No JVD present.   Cardiovascular: Normal rate, regular rhythm, normal heart sounds and intact distal pulses. Exam reveals no gallop and no friction rub.    No murmur heard.  2+ pitting edema to BLE.   Pulmonary/Chest: No respiratory distress. He has no wheezes.   Minimal bibasilar crackles on exam.    Abdominal: Soft. Bowel sounds are normal. There is no tenderness.   Musculoskeletal: Normal range of motion.   Neurological: He is alert and oriented to person, place, and time. No cranial nerve deficit.   Skin: Skin is warm and dry. No rash noted.   Psychiatric: He has a normal mood and affect.         ED Course   Procedures  Labs Reviewed   CBC W/ AUTO DIFFERENTIAL - Abnormal; Notable for the following components:       Result Value    Mean Corpuscular Volume 75 (*)     Mean Corpuscular Hemoglobin 23.4 (*)     Mean Corpuscular Hemoglobin Conc 31.4 (*)     RDW 16.6 (*)     All other components within normal limits   COMPREHENSIVE METABOLIC PANEL - Abnormal; Notable for the following components:    Glucose 195 (*)     Anion Gap 7 (*)     All other components within normal limits   B-TYPE NATRIURETIC PEPTIDE - Abnormal; Notable for the following components:     (*)     All other components within normal limits   TROPONIN I   PROTIME-INR     EKG Readings: (Independently Interpreted)   Initial Reading: No  STEMI.   AFIB with pacemaker at a rate of 81 bpm. Normal QRS. Normal QTC. Nonspecific ST changes. No STEMI.       Imaging Results          X-Ray Chest AP Portable (Final result)  Result time 06/10/19 17:34:54    Final result by Jessica Cortez MD (06/10/19 17:34:54)                 Impression:      Mildly prominent right infrahilar markings suggesting mild infrahilar infiltrate without consolidation      Electronically signed by: Jessica Cortez MD  Date:    06/10/2019  Time:    17:34             Narrative:    EXAMINATION:  XR CHEST AP PORTABLE    CLINICAL HISTORY:  CHF;    TECHNIQUE:  Single frontal view of the chest was performed.    COMPARISON:  12/22/2018    FINDINGS:  The cardiomediastinal silhouette appears stable.  There is loop recorder present.  There are mildly prominent markings right infrahilar not appearing significantly changed.  No confluent infiltrate.                                 Medical Decision Making:   History:   Old Medical Records: I decided to obtain old medical records.  Clinical Tests:   Lab Tests: Ordered and Reviewed  Radiological Study: Ordered and Reviewed  Medical Tests: Ordered and Reviewed            Scribe Attestation:   Scribe #1: I performed the above scribed service and the documentation accurately describes the services I performed. I attest to the accuracy of the note.      Attending Attestation:     Physician Attestation for Scribe:    I, Dr. Herb Llanos, personally performed the services described in this documentation.   All medical record entries made by the scribe were at my direction and in my presence.   I have reviewed the chart and agree that the record is accurate and complete.   Herb Llanos MD  10:20 PM 06/10/2019     DISCLAIMER: This note was prepared with [employer] Naturally Speaking voice recognition transcription software. Garbled syntax, mangled pronouns, and other bizarre constructions may be attributed to that software system.          ED Course as of  Serge 10 2219   Mon Serge 10, 2019   1717 58 y.o. male with Atrial Fibrillation on pradaxa, CHF with systolc dysfunction, CKD, obesity, HTN, prior TIA, IDDM(T2), and CAD pw fatigue and SOB.  Reports noncompliance w diet (eats fried chicken and salty red beans).  Afebrile.  Vital signs stable. Obese male. 2+ pitting edema bilateral extremities. Lungs with minimal bibasilar crackles. No increased work of breathing. Oxygenating well without difficulty. Doubt ACS or PE.  Suspect patient with mild volume overload will give 80 of IV Lasix.    [BD]   1757 Impression       Mildly prominent right infrahilar markings suggesting mild infrahilar infiltrate without consolidation      Electronically signed by: Jessica Cortez MD  Date: 06/10/2019  Time: 17:34    BNP similar to previous and CXR shows no pulmonary edema.  Symptoms improved in ED with lasix. CXR w possible early Pneumonia. Will treat with azithromycin given comorbidities and symptoms although likely viral. Recommend diet control and close cardiology and PCP fu. Pt understands and agrees with discharge instructions. Pt also given strict return precautions for any new or worsening symptoms and plans to follow up closely with PCP.      [BD]      ED Course User Index  [BD] Herb Llanos MD     Clinical Impression:       ICD-10-CM ICD-9-CM   1. Shortness of breath R06.02 786.05         Disposition:   Disposition: Discharged  Condition: Stable                        Herb Llanos MD  06/11/19 8209

## 2019-06-17 ENCOUNTER — HOSPITAL ENCOUNTER (OUTPATIENT)
Dept: TELEMEDICINE | Facility: HOSPITAL | Age: 59
Discharge: HOME | End: 2019-06-17
Payer: COMMERCIAL

## 2019-06-17 PROCEDURE — G0425 PR INPT TELEHEALTH CONSULT 30M: ICD-10-PCS | Mod: GT,,, | Performed by: PSYCHIATRY & NEUROLOGY

## 2019-06-17 PROCEDURE — G0425 INPT/ED TELECONSULT30: HCPCS | Mod: GT,,, | Performed by: PSYCHIATRY & NEUROLOGY
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
## 2019-09-01 ENCOUNTER — HOSPITAL ENCOUNTER (EMERGENCY)
Facility: HOSPITAL | Age: 59
Discharge: HOME | End: 2019-09-01
Attending: EMERGENCY MEDICINE
Payer: COMMERCIAL

## 2019-09-01 VITALS
HEIGHT: 71 IN | DIASTOLIC BLOOD PRESSURE: 79 MMHG | SYSTOLIC BLOOD PRESSURE: 136 MMHG | TEMPERATURE: 98 F | RESPIRATION RATE: 28 BRPM | BODY MASS INDEX: 44.1 KG/M2 | HEART RATE: 80 BPM | OXYGEN SATURATION: 98 % | WEIGHT: 315 LBS

## 2019-09-01 DIAGNOSIS — J98.01 BRONCHOSPASM: ICD-10-CM

## 2019-09-01 DIAGNOSIS — I48.20 CHRONIC ATRIAL FIBRILLATION: Primary | ICD-10-CM

## 2019-09-01 DIAGNOSIS — R07.9 CHEST PAIN: ICD-10-CM

## 2019-09-01 DIAGNOSIS — R73.9 HYPERGLYCEMIA: ICD-10-CM

## 2019-09-01 LAB
ALBUMIN SERPL BCP-MCNC: 3.9 G/DL (ref 3.5–5.2)
ALP SERPL-CCNC: 95 U/L (ref 55–135)
ALT SERPL W/O P-5'-P-CCNC: 14 U/L (ref 10–44)
ANION GAP SERPL CALC-SCNC: 12 MMOL/L (ref 8–16)
AST SERPL-CCNC: 13 U/L (ref 10–40)
BASOPHILS # BLD AUTO: 0.08 K/UL (ref 0–0.2)
BASOPHILS NFR BLD: 0.6 % (ref 0–1.9)
BILIRUB SERPL-MCNC: 1 MG/DL (ref 0.1–1)
BNP SERPL-MCNC: 241 PG/ML (ref 0–99)
BUN SERPL-MCNC: 15 MG/DL (ref 6–20)
CALCIUM SERPL-MCNC: 8.7 MG/DL (ref 8.7–10.5)
CHLORIDE SERPL-SCNC: 95 MMOL/L (ref 95–110)
CO2 SERPL-SCNC: 29 MMOL/L (ref 23–29)
CREAT SERPL-MCNC: 1.2 MG/DL (ref 0.5–1.4)
DIFFERENTIAL METHOD: ABNORMAL
EOSINOPHIL # BLD AUTO: 0 K/UL (ref 0–0.5)
EOSINOPHIL NFR BLD: 0.3 % (ref 0–8)
ERYTHROCYTE [DISTWIDTH] IN BLOOD BY AUTOMATED COUNT: 17.2 % (ref 11.5–14.5)
EST. GFR  (AFRICAN AMERICAN): >60 ML/MIN/1.73 M^2
EST. GFR  (NON AFRICAN AMERICAN): >60 ML/MIN/1.73 M^2
GLUCOSE SERPL-MCNC: 193 MG/DL (ref 70–110)
HCT VFR BLD AUTO: 52.8 % (ref 40–54)
HGB BLD-MCNC: 16.4 G/DL (ref 14–18)
IMM GRANULOCYTES # BLD AUTO: 0.04 K/UL (ref 0–0.04)
IMM GRANULOCYTES NFR BLD AUTO: 0.3 % (ref 0–0.5)
INR PPP: 1.1
LYMPHOCYTES # BLD AUTO: 1.4 K/UL (ref 1–4.8)
LYMPHOCYTES NFR BLD: 11.4 % (ref 18–48)
MCH RBC QN AUTO: 23.7 PG (ref 27–31)
MCHC RBC AUTO-ENTMCNC: 31.1 G/DL (ref 32–36)
MCV RBC AUTO: 76 FL (ref 82–98)
MONOCYTES # BLD AUTO: 0.7 K/UL (ref 0.3–1)
MONOCYTES NFR BLD: 5.6 % (ref 4–15)
NEUTROPHILS # BLD AUTO: 10.2 K/UL (ref 1.8–7.7)
NEUTROPHILS NFR BLD: 81.8 % (ref 38–73)
NRBC BLD-RTO: 0 /100 WBC
PLATELET # BLD AUTO: 172 K/UL (ref 150–350)
PMV BLD AUTO: 12.7 FL (ref 9.2–12.9)
POTASSIUM SERPL-SCNC: 3.7 MMOL/L (ref 3.5–5.1)
PROT SERPL-MCNC: 7.5 G/DL (ref 6–8.4)
PROTHROMBIN TIME: 13.7 SEC (ref 11.7–14)
RBC # BLD AUTO: 6.93 M/UL (ref 4.6–6.2)
SODIUM SERPL-SCNC: 136 MMOL/L (ref 136–145)
TROPONIN I SERPL DL<=0.01 NG/ML-MCNC: <0.03 NG/ML (ref 0.02–0.04)
WBC # BLD AUTO: 12.49 K/UL (ref 3.9–12.7)

## 2019-09-01 PROCEDURE — 85025 COMPLETE CBC W/AUTO DIFF WBC: CPT

## 2019-09-01 PROCEDURE — 80053 COMPREHEN METABOLIC PANEL: CPT

## 2019-09-01 PROCEDURE — 93005 ELECTROCARDIOGRAM TRACING: CPT

## 2019-09-01 PROCEDURE — 99285 EMERGENCY DEPT VISIT HI MDM: CPT | Mod: 25

## 2019-09-01 PROCEDURE — 25000242 PHARM REV CODE 250 ALT 637 W/ HCPCS: Performed by: EMERGENCY MEDICINE

## 2019-09-01 PROCEDURE — 85610 PROTHROMBIN TIME: CPT

## 2019-09-01 PROCEDURE — 94640 AIRWAY INHALATION TREATMENT: CPT

## 2019-09-01 PROCEDURE — 83880 ASSAY OF NATRIURETIC PEPTIDE: CPT

## 2019-09-01 PROCEDURE — 84484 ASSAY OF TROPONIN QUANT: CPT

## 2019-09-01 RX ORDER — IPRATROPIUM BROMIDE AND ALBUTEROL SULFATE 2.5; .5 MG/3ML; MG/3ML
3 SOLUTION RESPIRATORY (INHALATION)
Status: COMPLETED | OUTPATIENT
Start: 2019-09-01 | End: 2019-09-01

## 2019-09-01 RX ORDER — NAPROXEN SODIUM 220 MG/1
324 TABLET, FILM COATED ORAL ONCE
Status: DISCONTINUED | OUTPATIENT
Start: 2019-09-01 | End: 2019-09-01 | Stop reason: HOSPADM

## 2019-09-01 RX ADMIN — IPRATROPIUM BROMIDE AND ALBUTEROL SULFATE 3 ML: .5; 3 SOLUTION RESPIRATORY (INHALATION) at 01:09

## 2019-09-01 NOTE — ED PROVIDER NOTES
"Encounter Date: 9/1/2019       History     Chief Complaint   Patient presents with    Shortness of Breath    Chest Pain     58-year-old male who has a history of chronic atrial fibrillation, prior CVA, diastolic dysfunction, coronary artery disease,, stage II CKD, hypertension and chronic lumbar back pain, presents emergency room with a history that today at home he felt somewhat short of breath and weak.  Patient states he took his normal a.m. medications.  He states he had some vague sharp left precordial chest discomfort that lasted only a few seconds.  He had some nausea and no vomiting. No diaphoresis.  No fever or chills. No purulent sputum production. No complaints of any tachycardia.  The patient denies any diarrhea.  He does admit to some urinary dribbling incontinence but no dysuria or hematuria.  He additionally complains of some dizziness.  There is no history of any thyroid disease.  Denies any caffeine excess.        Review of patient's allergies indicates:   Allergen Reactions    Atorvastatin      Other reaction(s): Generalized Myalgias    Effexor [venlafaxine] Other (See Comments)     Tremulousness     Past Medical History:   Diagnosis Date    a A H/O Medical Noncompliance     H/O Chronic Noncompliance With His CHF Diet    a Cardiac Diastolic Dysfunction     Dr. Aure Washington    a Chronic Anticoagulation With Pradaxa     Dr. Aure Washington    a Coronary Artery Disease With H/O Stenting     Dr. Aure Washington; Was Hospitalized At [facility] 3/8/17-3/17/17 For CHF Exacerbatioin Due To "Dietary Discrepancies" With LCST Negative There    a Nonsustained Ventricular Tachycardia (NSVT)     a Paroxysmal Atrial Fibrillation With H/O RVR     Dr. Aure Washington; On Chronic Eliquis    a Syncopal Episode     [facility] 4/3/17-4/7/17 Stay For This: Was Likely Due To NSVT, And His Medications Were Adjusted    a Systolic CHF With EF 35-45%     Dr. Aure Washington; Was Hospitalized At [facility] 3/8/17-3/17/17 For CHF Exacerbatioin Due To " ""Dietary Discrepancies" With LCST Negative There    b Hypertension     b Proteinuria     04/2014 Referred To Dr. Leroy Allison; 4/1/14 Bilateral Renal U/S = Normal; On Lisinopril 20 Mg Daily    b Stage 2 CKD     c Hypercholesterolemia With Low HDL     d Type 2 DM On Insulin     ** 12/4/18 Referred To [facility] EDU; 1/11/18 Referred To Dr. Dipika Rodriguez And Re-Referred To [facility] EDU; 12/28/17 HgA1c = 12.0;" 7/5/17 Referred To [facility] EDU    f Morbid Obesity     i 1 PPD X 25+ YRs Chronic Tobacco Use Disorder     7/5/17 Increased Wellbutrin-XL To 300 Mg Daily; 6/8/17 RXd Wellbutrin- Mg Daily X 4 Months    i JULY On CPAP     Dr. Marion ngo Chronic Left Groin Pain     l Chronic Left Shoulder Pain     Dr. MARTINA martinez Chronic Recurrent Low Back Pain 12/04/2018    Dr. Llamas Is His Pain Management Neurologist; 5/219/18 Referred To Dr. Ismael martinez Left 5-7th Rib FXs 04/2016 4/23/16 [facility] Left Rib XRays = Questionable Nondisplaced Left 5-7th Rib FXs With Normal Lung Fields    l Right Shouder SX 5/26/16 Due To Work Related Injury     Dr. Mora At [facility]; Dr. MARTINA hatch H/O Transient Ischemic Attack In 2013     n Anxiety And Depression 12/04/2018    RTC In 6 Weeks; 12/4/18 Added Wellbutrin- Mg qAM; 5/29/18 Increased 100 Mg Zoloft To 100 Mg Bid    n Continuous Benzodiazepine (Xanax) Use 12/04/2018 5/29/18 I Am Weaning Him Off Of This By Decreasing 1 Mg Bid To 0.5 Mg Bid PRN, And Will Wean Further Next OV    n H/O ETOH Abuse, Quit In 09/2014     q Bilateral Lower Extremity Venous Stasis Ulcers     2/28/18 [facility]    q Chronic Bilateral Lower Extremity Edema     2/28/18 Added Metolazone 10 Mg qAM On MWF And Referred Back To Dr. Washington; On Lasix 40 Mg Bid; He Wears Bilateral Compressin Hose Stockings    q Disability Examination 7/15/16     For CHF, PAF, DM2, And JULY On CPAP    Wellness Visit 11/6/2017  "     Past Surgical History:   Procedure Laterality Date    ANGIOGRAM-CORONARY Left 3/11/2016    Performed by [facility]ure [facility] at [facility] CATH    CARDIAC SURGERY      coronary stent    COLONOSCOPY N/A 2017    Performed by Dave Allen MD at [facility] ENDO    DIABETES MANAGEMENT LABS      heart stent      INCISION AND DRAINAGE OF WOUND      on stomach    PLACEMENT-LOOP RECORDER N/A 2016    Performed by [facility]ure [facility] at [facility] CATH    SHOULDER SURGERY       Family History   Problem Relation Age of Onset    Heart disease Mother     Arthritis Mother     Diabetes Mother     Hyperlipidemia Mother     Hypertension Mother     Heart disease Father     Arthritis Father     Asthma Father     COPD Father     Hyperlipidemia Father     Hypertension Father     Stroke Father     Diabetes Sister     Diabetes Maternal Uncle      Social History     Tobacco Use    Smoking status: Former Smoker     Packs/day: 0.25     Types: Cigarettes     Start date: 1981     Last attempt to quit: 2016     Years since quittin.9    Smokeless tobacco: Never Used    Tobacco comment: every now and again with drinks   Substance Use Topics    Alcohol use: Yes     Comment: occas    Drug use: No     Review of Systems   Constitutional: Negative for activity change, chills and fever.   HENT: Negative for congestion, ear pain, rhinorrhea, sinus pain and sore throat.    Eyes: Negative for pain.   Respiratory: Negative for cough and shortness of breath.    Cardiovascular: Positive for chest pain, palpitations and leg swelling.   Gastrointestinal: Negative for abdominal pain, constipation, diarrhea, nausea and vomiting.   Genitourinary: Negative for flank pain, frequency and hematuria.        Incontinence   Musculoskeletal: Positive for back pain. Negative for neck pain and neck stiffness.   Skin: Negative for pallor, rash and wound.   Neurological: Negative for dizziness, syncope, speech difficulty, weakness, numbness  and headaches.   Hematological: Does not bruise/bleed easily.   All other systems reviewed and are negative.      Physical Exam     Initial Vitals [09/01/19 0528]   BP Pulse Resp Temp SpO2   132/77 (!) 136 (!) 26 98.1 °F (36.7 °C) 97 %      MAP       --         Physical Exam    Vitals reviewed.  Constitutional: He appears well-developed and well-nourished. He is not diaphoretic. No distress.   Obese   HENT:   Head: Normocephalic and atraumatic.   Right Ear: External ear normal.   Left Ear: External ear normal.   Nose: Nose normal.   Mouth/Throat: Oropharynx is clear and moist. No oropharyngeal exudate.   Eyes: Conjunctivae and EOM are normal. Pupils are equal, round, and reactive to light. Right eye exhibits no discharge. Left eye exhibits no discharge. No scleral icterus.   Neck: Normal range of motion. Neck supple. No thyromegaly present. No tracheal deviation present. No JVD present.   Cardiovascular: Normal rate, normal heart sounds and intact distal pulses. Exam reveals no gallop and no friction rub.    No murmur heard.  Pulmonary/Chest: No accessory muscle usage. No tachypnea. No respiratory distress. He has wheezes. He has no rhonchi. He has no rales. He exhibits no tenderness.   Abdominal: Soft. Bowel sounds are normal. He exhibits no distension and no mass. There is no tenderness. There is no rebound and no guarding.   Genitourinary: Penis normal.   Musculoskeletal: Normal range of motion. He exhibits no edema or tenderness.   Both lower legs are wrapped in an Unna boots.  The patient has a history of diabetic ulcers.   Lymphadenopathy:     He has no cervical adenopathy.   Neurological: He is alert and oriented to person, place, and time. He has normal strength and normal reflexes. No cranial nerve deficit or sensory deficit. GCS score is 15. GCS eye subscore is 4. GCS verbal subscore is 5. GCS motor subscore is 6.   Skin: Skin is warm and dry. Capillary refill takes less than 2 seconds. No rash noted. No  erythema. No pallor.   Psychiatric: He has a normal mood and affect. His behavior is normal. Judgment and thought content normal.         ED Course   Procedures  Labs Reviewed   CBC W/ AUTO DIFFERENTIAL - Abnormal; Notable for the following components:       Result Value    RBC 6.93 (*)     Mean Corpuscular Volume 76 (*)     Mean Corpuscular Hemoglobin 23.7 (*)     Mean Corpuscular Hemoglobin Conc 31.1 (*)     RDW 17.2 (*)     Gran # (ANC) 10.2 (*)     Gran% 81.8 (*)     Lymph% 11.4 (*)     All other components within normal limits   COMPREHENSIVE METABOLIC PANEL - Abnormal; Notable for the following components:    Glucose 193 (*)     All other components within normal limits   B-TYPE NATRIURETIC PEPTIDE - Abnormal; Notable for the following components:     (*)     All other components within normal limits   TROPONIN I   PROTIME-INR        ECG Results          EKG 12-lead (In process)  Result time 09/01/19 08:41:32    In process by Interface, Lab In [facility] (09/01/19 08:41:32)                 Narrative:    Test Reason : R07.9,    Vent. Rate : 114 BPM     Atrial Rate : 192 BPM     P-R Int : 000 ms          QRS Dur : 082 ms      QT Int : 348 ms       P-R-T Axes : 000 -65 053 degrees     QTc Int : 479 ms    Atrial fibrillation with rapid ventricular response  Left axis deviation  Anteroseptal infarct (cited on or before 22-DEC-2018)  Abnormal ECG  When compared with ECG of 10-GERBER-2019 17:20,  T wave inversion no longer evident in Lateral leads    Referred By: [email or web address]   SELF           Confirmed By:                             Imaging Results          X-Ray Chest AP Portable (Final result)  Result time 09/01/19 06:24:42    Final result by Parag Capmbell MD (09/01/19 06:24:42)                 Impression:      No evidence of active cardiopulmonary disease.      Electronically signed by: Parag Campbell MD  Date:    09/01/2019  Time:    06:24             Narrative:    EXAMINATION:  XR CHEST AP PORTABLE    CLINICAL  HISTORY:  R07.9 chest pain, unspecified    FINDINGS:  Portable chest radiograph at 05:52 hours compared to 06/10/2019 shows the cardiomediastinal silhouette and pulmonary vasculature are within normal limits.  Cardiac loop recording device lies in the left anterior chest wall.    The lungs are normally expanded, with no consolidation, pleural effusion, or evidence of pulmonary edema. No confluent infiltrates or pneumothorax. There are no significant osseous abnormalities.                                              Attending Attestation:             Attending ED Notes:   This 58-year-old male who has a history of CHF, atrial fibrillation and who continues to smoke, had a negative chest x-ray.  His EKG showed atrial fibrillation initially a rate of 114 but after being observed the emergency room it drop into the 80 s. The troponin was normal.  Chemistries only remarkable for blood sugar of 193.  His BNP was 241.  The patient unfortunately was in the emergency room for a very extended period time due to multiple critically ill patient is taking hours including 1 protracted a resuscitation for 2-3 hours.  The patient was later reassessed and his lungs revealed scattered wheezes for which she received a DuoNeb treatment.  After receiving the treatment his lungs were reassessed lung fields are clear in the patient states he felt fine.  At this point felt that he is able to be discharged to follow up with his primary physicians but will not require hospitalization at this time.             Clinical Impression:       ICD-10-CM ICD-9-CM   1. Chronic atrial fibrillation I48.2 427.31   2. Chest pain R07.9 786.50   3. Bronchospasm J98.01 519.11   4. Hyperglycemia R73.9 790.29                                George Gallegos Jr., MD  09/01/19 2970

## 2020-04-03 ENCOUNTER — HOSPITAL ENCOUNTER (INPATIENT)
Facility: HOSPITAL | Age: 60
LOS: 19 days | Discharge: NURSING, REHAB OR LONG-TERM CARE | DRG: 177 | End: 2020-04-22
Attending: EMERGENCY MEDICINE | Admitting: INTERNAL MEDICINE
Payer: COMMERCIAL

## 2020-04-03 DIAGNOSIS — U07.1 ACUTE RESPIRATORY DISEASE DUE TO COVID-19 VIRUS: ICD-10-CM

## 2020-04-03 DIAGNOSIS — J06.9 ACUTE RESPIRATORY DISEASE DUE TO COVID-19 VIRUS: ICD-10-CM

## 2020-04-03 DIAGNOSIS — E66.01 MORBID OBESITY WITH BMI OF 40.0-44.9, ADULT: ICD-10-CM

## 2020-04-03 DIAGNOSIS — J96.01 ACUTE RESPIRATORY FAILURE WITH HYPOXIA: Primary | ICD-10-CM

## 2020-04-03 DIAGNOSIS — I50.9 CHF (CONGESTIVE HEART FAILURE): ICD-10-CM

## 2020-04-03 DIAGNOSIS — R42 DIZZINESS: ICD-10-CM

## 2020-04-03 PROBLEM — R10.31 RIGHT LOWER QUADRANT ABDOMINAL PAIN: Status: ACTIVE | Noted: 2020-04-03

## 2020-04-03 LAB
ALBUMIN SERPL BCP-MCNC: 3.4 G/DL (ref 3.5–5.2)
ALBUMIN SERPL BCP-MCNC: 3.6 G/DL (ref 3.5–5.2)
ALP SERPL-CCNC: 118 U/L (ref 55–135)
ALP SERPL-CCNC: 121 U/L (ref 55–135)
ALT SERPL W/O P-5'-P-CCNC: 32 U/L (ref 10–44)
ALT SERPL W/O P-5'-P-CCNC: 35 U/L (ref 10–44)
ANION GAP SERPL CALC-SCNC: 14 MMOL/L (ref 8–16)
ANION GAP SERPL CALC-SCNC: 14 MMOL/L (ref 8–16)
AST SERPL-CCNC: 24 U/L (ref 10–40)
AST SERPL-CCNC: 32 U/L (ref 10–40)
BACTERIA #/AREA URNS HPF: ABNORMAL /HPF
BASOPHILS # BLD AUTO: 0.01 K/UL (ref 0–0.2)
BASOPHILS # BLD AUTO: 0.01 K/UL (ref 0–0.2)
BASOPHILS NFR BLD: 0.2 % (ref 0–1.9)
BASOPHILS NFR BLD: 0.2 % (ref 0–1.9)
BILIRUB SERPL-MCNC: 0.7 MG/DL (ref 0.1–1)
BILIRUB SERPL-MCNC: 0.9 MG/DL (ref 0.1–1)
BILIRUB UR QL STRIP: ABNORMAL
BNP SERPL-MCNC: 193 PG/ML (ref 0–99)
BUN SERPL-MCNC: 15 MG/DL (ref 6–20)
BUN SERPL-MCNC: 17 MG/DL (ref 6–20)
CALCIUM SERPL-MCNC: 8.5 MG/DL (ref 8.7–10.5)
CALCIUM SERPL-MCNC: 8.8 MG/DL (ref 8.7–10.5)
CHLORIDE SERPL-SCNC: 100 MMOL/L (ref 95–110)
CHLORIDE SERPL-SCNC: 100 MMOL/L (ref 95–110)
CK SERPL-CCNC: 77 U/L (ref 20–200)
CLARITY UR: ABNORMAL
CO2 SERPL-SCNC: 21 MMOL/L (ref 23–29)
CO2 SERPL-SCNC: 22 MMOL/L (ref 23–29)
COLOR UR: YELLOW
CREAT SERPL-MCNC: 1 MG/DL (ref 0.5–1.4)
CREAT SERPL-MCNC: 1.1 MG/DL (ref 0.5–1.4)
CRP SERPL-MCNC: 151.8 MG/L (ref 0–8.2)
DIFFERENTIAL METHOD: ABNORMAL
DIFFERENTIAL METHOD: ABNORMAL
EOSINOPHIL # BLD AUTO: 0 K/UL (ref 0–0.5)
EOSINOPHIL # BLD AUTO: 0 K/UL (ref 0–0.5)
EOSINOPHIL NFR BLD: 0 % (ref 0–8)
EOSINOPHIL NFR BLD: 0.2 % (ref 0–8)
ERYTHROCYTE [DISTWIDTH] IN BLOOD BY AUTOMATED COUNT: 15.7 % (ref 11.5–14.5)
ERYTHROCYTE [DISTWIDTH] IN BLOOD BY AUTOMATED COUNT: 15.9 % (ref 11.5–14.5)
ERYTHROCYTE [SEDIMENTATION RATE] IN BLOOD BY WESTERGREN METHOD: 27 MM/HR (ref 0–10)
EST. GFR  (AFRICAN AMERICAN): >60 ML/MIN/1.73 M^2
EST. GFR  (AFRICAN AMERICAN): >60 ML/MIN/1.73 M^2
EST. GFR  (NON AFRICAN AMERICAN): >60 ML/MIN/1.73 M^2
EST. GFR  (NON AFRICAN AMERICAN): >60 ML/MIN/1.73 M^2
FERRITIN SERPL-MCNC: 1053 NG/ML (ref 20–300)
GLUCOSE SERPL-MCNC: 92 MG/DL (ref 70–110)
GLUCOSE SERPL-MCNC: 96 MG/DL (ref 70–110)
GLUCOSE UR QL STRIP: ABNORMAL
HCT VFR BLD AUTO: 40.4 % (ref 40–54)
HCT VFR BLD AUTO: 42.8 % (ref 40–54)
HGB BLD-MCNC: 12.3 G/DL (ref 14–18)
HGB BLD-MCNC: 13 G/DL (ref 14–18)
HGB UR QL STRIP: ABNORMAL
HYALINE CASTS #/AREA URNS LPF: 7 /LPF
IMM GRANULOCYTES # BLD AUTO: 0 K/UL (ref 0–0.04)
IMM GRANULOCYTES # BLD AUTO: 0.01 K/UL (ref 0–0.04)
IMM GRANULOCYTES NFR BLD AUTO: 0 % (ref 0–0.5)
IMM GRANULOCYTES NFR BLD AUTO: 0.2 % (ref 0–0.5)
KETONES UR QL STRIP: ABNORMAL
LACTATE SERPL-SCNC: 1.5 MMOL/L (ref 0.5–2.2)
LDH SERPL L TO P-CCNC: 308 U/L (ref 110–260)
LEUKOCYTE ESTERASE UR QL STRIP: NEGATIVE
LIPASE SERPL-CCNC: 4 U/L (ref 4–60)
LYMPHOCYTES # BLD AUTO: 0.6 K/UL (ref 1–4.8)
LYMPHOCYTES # BLD AUTO: 0.7 K/UL (ref 1–4.8)
LYMPHOCYTES NFR BLD: 12.9 % (ref 18–48)
LYMPHOCYTES NFR BLD: 13.9 % (ref 18–48)
MAGNESIUM SERPL-MCNC: 1.6 MG/DL (ref 1.6–2.6)
MCH RBC QN AUTO: 23.7 PG (ref 27–31)
MCH RBC QN AUTO: 23.9 PG (ref 27–31)
MCHC RBC AUTO-ENTMCNC: 30.4 G/DL (ref 32–36)
MCHC RBC AUTO-ENTMCNC: 30.4 G/DL (ref 32–36)
MCV RBC AUTO: 78 FL (ref 82–98)
MCV RBC AUTO: 79 FL (ref 82–98)
MICROSCOPIC COMMENT: ABNORMAL
MONOCYTES # BLD AUTO: 0.3 K/UL (ref 0.3–1)
MONOCYTES # BLD AUTO: 0.4 K/UL (ref 0.3–1)
MONOCYTES NFR BLD: 5.7 % (ref 4–15)
MONOCYTES NFR BLD: 8.4 % (ref 4–15)
NEUTROPHILS # BLD AUTO: 3.7 K/UL (ref 1.8–7.7)
NEUTROPHILS # BLD AUTO: 3.9 K/UL (ref 1.8–7.7)
NEUTROPHILS NFR BLD: 78.5 % (ref 38–73)
NEUTROPHILS NFR BLD: 79.8 % (ref 38–73)
NITRITE UR QL STRIP: NEGATIVE
NRBC BLD-RTO: 0 /100 WBC
NRBC BLD-RTO: 0 /100 WBC
PH UR STRIP: 6 [PH] (ref 5–8)
PHOSPHATE SERPL-MCNC: 2.4 MG/DL (ref 2.7–4.5)
PLATELET # BLD AUTO: 157 K/UL (ref 150–350)
PLATELET # BLD AUTO: 177 K/UL (ref 150–350)
PMV BLD AUTO: 12.4 FL (ref 9.2–12.9)
PMV BLD AUTO: 12.7 FL (ref 9.2–12.9)
POTASSIUM SERPL-SCNC: 3.3 MMOL/L (ref 3.5–5.1)
POTASSIUM SERPL-SCNC: 4.2 MMOL/L (ref 3.5–5.1)
PROCALCITONIN SERPL IA-MCNC: 0.03 NG/ML
PROT SERPL-MCNC: 7.7 G/DL (ref 6–8.4)
PROT SERPL-MCNC: 7.8 G/DL (ref 6–8.4)
PROT UR QL STRIP: ABNORMAL
RBC # BLD AUTO: 5.18 M/UL (ref 4.6–6.2)
RBC # BLD AUTO: 5.43 M/UL (ref 4.6–6.2)
RBC #/AREA URNS HPF: 0 /HPF (ref 0–4)
SARS-COV-2 RNA AMPLIFICATION, QUAL: POSITIVE
SODIUM SERPL-SCNC: 135 MMOL/L (ref 136–145)
SODIUM SERPL-SCNC: 136 MMOL/L (ref 136–145)
SP GR UR STRIP: >=1.03 (ref 1–1.03)
SQUAMOUS #/AREA URNS HPF: 20 /HPF
TROPONIN I SERPL DL<=0.01 NG/ML-MCNC: 0.02 NG/ML (ref 0–0.03)
URN SPEC COLLECT METH UR: ABNORMAL
UROBILINOGEN UR STRIP-ACNC: ABNORMAL EU/DL
WBC # BLD AUTO: 4.74 K/UL (ref 3.9–12.7)
WBC # BLD AUTO: 4.9 K/UL (ref 3.9–12.7)
WBC #/AREA URNS HPF: 4 /HPF (ref 0–5)

## 2020-04-03 PROCEDURE — 83880 ASSAY OF NATRIURETIC PEPTIDE: CPT

## 2020-04-03 PROCEDURE — 87040 BLOOD CULTURE FOR BACTERIA: CPT

## 2020-04-03 PROCEDURE — 82550 ASSAY OF CK (CPK): CPT

## 2020-04-03 PROCEDURE — 36415 COLL VENOUS BLD VENIPUNCTURE: CPT

## 2020-04-03 PROCEDURE — U0002 COVID-19 LAB TEST NON-CDC: HCPCS

## 2020-04-03 PROCEDURE — 27000221 HC OXYGEN, UP TO 24 HOURS

## 2020-04-03 PROCEDURE — 83605 ASSAY OF LACTIC ACID: CPT

## 2020-04-03 PROCEDURE — 80053 COMPREHEN METABOLIC PANEL: CPT

## 2020-04-03 PROCEDURE — 83735 ASSAY OF MAGNESIUM: CPT

## 2020-04-03 PROCEDURE — 25000003 PHARM REV CODE 250: Performed by: INTERNAL MEDICINE

## 2020-04-03 PROCEDURE — 63600175 PHARM REV CODE 636 W HCPCS: Performed by: INTERNAL MEDICINE

## 2020-04-03 PROCEDURE — 80053 COMPREHEN METABOLIC PANEL: CPT | Mod: 91

## 2020-04-03 PROCEDURE — 83615 LACTATE (LD) (LDH) ENZYME: CPT

## 2020-04-03 PROCEDURE — 84484 ASSAY OF TROPONIN QUANT: CPT

## 2020-04-03 PROCEDURE — 85651 RBC SED RATE NONAUTOMATED: CPT

## 2020-04-03 PROCEDURE — 81000 URINALYSIS NONAUTO W/SCOPE: CPT

## 2020-04-03 PROCEDURE — 84100 ASSAY OF PHOSPHORUS: CPT

## 2020-04-03 PROCEDURE — 83690 ASSAY OF LIPASE: CPT

## 2020-04-03 PROCEDURE — 82728 ASSAY OF FERRITIN: CPT

## 2020-04-03 PROCEDURE — 86140 C-REACTIVE PROTEIN: CPT

## 2020-04-03 PROCEDURE — 85025 COMPLETE CBC W/AUTO DIFF WBC: CPT

## 2020-04-03 PROCEDURE — 99291 CRITICAL CARE FIRST HOUR: CPT

## 2020-04-03 PROCEDURE — 12000002 HC ACUTE/MED SURGE SEMI-PRIVATE ROOM

## 2020-04-03 PROCEDURE — 84145 PROCALCITONIN (PCT): CPT

## 2020-04-03 PROCEDURE — 93005 ELECTROCARDIOGRAM TRACING: CPT

## 2020-04-03 PROCEDURE — 25500020 PHARM REV CODE 255: Performed by: EMERGENCY MEDICINE

## 2020-04-03 RX ORDER — SPIRONOLACTONE 25 MG/1
25 TABLET ORAL EVERY MORNING
Status: DISCONTINUED | OUTPATIENT
Start: 2020-04-04 | End: 2020-04-22 | Stop reason: HOSPADM

## 2020-04-03 RX ORDER — PRAVASTATIN SODIUM 40 MG/1
80 TABLET ORAL NIGHTLY
Status: DISCONTINUED | OUTPATIENT
Start: 2020-04-03 | End: 2020-04-22 | Stop reason: HOSPADM

## 2020-04-03 RX ORDER — INSULIN ASPART 100 [IU]/ML
0-5 INJECTION, SOLUTION INTRAVENOUS; SUBCUTANEOUS
Status: DISCONTINUED | OUTPATIENT
Start: 2020-04-03 | End: 2020-04-22 | Stop reason: HOSPADM

## 2020-04-03 RX ORDER — ONDANSETRON 2 MG/ML
4 INJECTION INTRAMUSCULAR; INTRAVENOUS EVERY 8 HOURS PRN
Status: DISCONTINUED | OUTPATIENT
Start: 2020-04-03 | End: 2020-04-22 | Stop reason: HOSPADM

## 2020-04-03 RX ORDER — POTASSIUM CHLORIDE 20 MEQ/15ML
40 SOLUTION ORAL
Status: DISCONTINUED | OUTPATIENT
Start: 2020-04-03 | End: 2020-04-22 | Stop reason: HOSPADM

## 2020-04-03 RX ORDER — ASPIRIN 81 MG/1
81 TABLET ORAL NIGHTLY
Status: DISCONTINUED | OUTPATIENT
Start: 2020-04-03 | End: 2020-04-22 | Stop reason: HOSPADM

## 2020-04-03 RX ORDER — BUPROPION HYDROCHLORIDE 150 MG/1
150 TABLET ORAL EVERY MORNING
Status: DISCONTINUED | OUTPATIENT
Start: 2020-04-04 | End: 2020-04-22 | Stop reason: HOSPADM

## 2020-04-03 RX ORDER — DABIGATRAN ETEXILATE 75 MG/1
75 CAPSULE ORAL 2 TIMES DAILY
Status: DISCONTINUED | OUTPATIENT
Start: 2020-04-03 | End: 2020-04-22 | Stop reason: HOSPADM

## 2020-04-03 RX ORDER — IBUPROFEN 200 MG
24 TABLET ORAL
Status: DISCONTINUED | OUTPATIENT
Start: 2020-04-03 | End: 2020-04-22 | Stop reason: HOSPADM

## 2020-04-03 RX ORDER — DIGOXIN 125 MCG
250 TABLET ORAL DAILY
Status: DISCONTINUED | OUTPATIENT
Start: 2020-04-04 | End: 2020-04-09

## 2020-04-03 RX ORDER — GABAPENTIN 300 MG/1
300 CAPSULE ORAL 2 TIMES DAILY
Status: DISCONTINUED | OUTPATIENT
Start: 2020-04-03 | End: 2020-04-22 | Stop reason: HOSPADM

## 2020-04-03 RX ORDER — METOPROLOL TARTRATE 25 MG/1
25 TABLET, FILM COATED ORAL 2 TIMES DAILY
Status: DISCONTINUED | OUTPATIENT
Start: 2020-04-03 | End: 2020-04-04

## 2020-04-03 RX ORDER — ALPRAZOLAM 0.25 MG/1
0.5 TABLET ORAL 2 TIMES DAILY
Status: DISCONTINUED | OUTPATIENT
Start: 2020-04-03 | End: 2020-04-22 | Stop reason: HOSPADM

## 2020-04-03 RX ORDER — LANOLIN ALCOHOL/MO/W.PET/CERES
400 CREAM (GRAM) TOPICAL DAILY
Status: DISCONTINUED | OUTPATIENT
Start: 2020-04-04 | End: 2020-04-22 | Stop reason: HOSPADM

## 2020-04-03 RX ORDER — IBUPROFEN 200 MG
16 TABLET ORAL
Status: DISCONTINUED | OUTPATIENT
Start: 2020-04-03 | End: 2020-04-22 | Stop reason: HOSPADM

## 2020-04-03 RX ORDER — LANOLIN ALCOHOL/MO/W.PET/CERES
800 CREAM (GRAM) TOPICAL
Status: DISCONTINUED | OUTPATIENT
Start: 2020-04-03 | End: 2020-04-22 | Stop reason: HOSPADM

## 2020-04-03 RX ORDER — SERTRALINE HYDROCHLORIDE 25 MG/1
100 TABLET, FILM COATED ORAL DAILY
Status: DISCONTINUED | OUTPATIENT
Start: 2020-04-04 | End: 2020-04-22 | Stop reason: HOSPADM

## 2020-04-03 RX ORDER — SIMVASTATIN 10 MG/1
20 TABLET, FILM COATED ORAL NIGHTLY
Status: DISCONTINUED | OUTPATIENT
Start: 2020-04-03 | End: 2020-04-03

## 2020-04-03 RX ORDER — AMIODARONE HYDROCHLORIDE 200 MG/1
200 TABLET ORAL 2 TIMES DAILY
Status: DISCONTINUED | OUTPATIENT
Start: 2020-04-03 | End: 2020-04-22 | Stop reason: HOSPADM

## 2020-04-03 RX ORDER — HYDRALAZINE HYDROCHLORIDE 10 MG/1
10 TABLET, FILM COATED ORAL EVERY 12 HOURS
Status: DISCONTINUED | OUTPATIENT
Start: 2020-04-03 | End: 2020-04-09

## 2020-04-03 RX ORDER — LISINOPRIL 10 MG/1
20 TABLET ORAL 2 TIMES DAILY
Status: DISCONTINUED | OUTPATIENT
Start: 2020-04-03 | End: 2020-04-09

## 2020-04-03 RX ORDER — HYDROCODONE BITARTRATE AND ACETAMINOPHEN 5; 325 MG/1; MG/1
1 TABLET ORAL EVERY 6 HOURS PRN
Status: DISCONTINUED | OUTPATIENT
Start: 2020-04-03 | End: 2020-04-07

## 2020-04-03 RX ORDER — SODIUM,POTASSIUM PHOSPHATES 280-250MG
2 POWDER IN PACKET (EA) ORAL
Status: DISCONTINUED | OUTPATIENT
Start: 2020-04-03 | End: 2020-04-22 | Stop reason: HOSPADM

## 2020-04-03 RX ORDER — GLUCAGON 1 MG
1 KIT INJECTION
Status: DISCONTINUED | OUTPATIENT
Start: 2020-04-03 | End: 2020-04-22 | Stop reason: HOSPADM

## 2020-04-03 RX ORDER — FUROSEMIDE 40 MG/1
40 TABLET ORAL 2 TIMES DAILY
Status: DISCONTINUED | OUTPATIENT
Start: 2020-04-03 | End: 2020-04-09

## 2020-04-03 RX ORDER — SODIUM CHLORIDE 0.9 % (FLUSH) 0.9 %
10 SYRINGE (ML) INJECTION
Status: DISCONTINUED | OUTPATIENT
Start: 2020-04-03 | End: 2020-04-22 | Stop reason: HOSPADM

## 2020-04-03 RX ADMIN — METOPROLOL TARTRATE 25 MG: 25 TABLET, FILM COATED ORAL at 10:04

## 2020-04-03 RX ADMIN — AMIODARONE HYDROCHLORIDE 200 MG: 200 TABLET ORAL at 10:04

## 2020-04-03 RX ADMIN — GABAPENTIN 300 MG: 300 CAPSULE ORAL at 10:04

## 2020-04-03 RX ADMIN — ASPIRIN 81 MG: 81 TABLET, COATED ORAL at 10:04

## 2020-04-03 RX ADMIN — HYDROCODONE BITARTRATE AND ACETAMINOPHEN 1 TABLET: 5; 325 TABLET ORAL at 10:04

## 2020-04-03 RX ADMIN — ALPRAZOLAM 0.5 MG: 0.25 TABLET ORAL at 10:04

## 2020-04-03 RX ADMIN — IOHEXOL 100 ML: 350 INJECTION, SOLUTION INTRAVENOUS at 03:04

## 2020-04-03 RX ADMIN — AZITHROMYCIN MONOHYDRATE 500 MG: 500 INJECTION, POWDER, LYOPHILIZED, FOR SOLUTION INTRAVENOUS at 10:04

## 2020-04-03 RX ADMIN — LISINOPRIL 20 MG: 10 TABLET ORAL at 10:04

## 2020-04-03 RX ADMIN — PRAVASTATIN SODIUM 80 MG: 40 TABLET ORAL at 10:04

## 2020-04-03 RX ADMIN — HYDRALAZINE HYDROCHLORIDE 10 MG: 10 TABLET ORAL at 10:04

## 2020-04-03 RX ADMIN — DABIGATRAN ETEXILATE MESYLATE 75 MG: 75 CAPSULE ORAL at 10:04

## 2020-04-03 RX ADMIN — FUROSEMIDE 40 MG: 40 TABLET ORAL at 10:04

## 2020-04-03 NOTE — HPI
Patient is a 59-year-old morbidly obese male with past medical history significant for multiple medical problems including hypertension, hyperlipidemia, coronary artery disease status post coronary artery stent placement, obstructive sleep apnea (on CPAP), chronic combined systolic and diastolic congestive heart failure, history of TIA, long-term anticoagulation with Pradaxa and paroxysmal atrial fibrillation is being admitted to Hospital Medicine from [facility] Emergency Room under inpatient status for worsening shortness of breath and right lower quadrant abdominal pain.  Patient presented to the emergency room with complaint of profound generalized weakness associated with diarrhea and vomiting for 1 day.  If patient feels dizzy and has also been experiencing nonradiating right lower quadrant abdominal pain.  Patient denies any hematemesis, melena or bleeding per rectum.  No recent travel or sick contact history reported.  Patient'sCOVID 19 test is positive in the emergency room.  Patient was recently discharged from [facility].  In the emergency room patient was noted to be hypoxic with exertion and minimal level around 85%.  Presently requiring 3 L per minute supplemental oxygen via nasal cannula.

## 2020-04-03 NOTE — ASSESSMENT & PLAN NOTE
Follow CT abdomen results.  Continue supportive care with intravenous narcotics and antiemetics as needed.

## 2020-04-03 NOTE — ED PROVIDER NOTES
"Encounter Date: 4/3/2020    SCRIBE #1 NOTE: I, Gladys Wilson, am scribing for, and in the presence of, Nicholas Puentes MD.       History     Chief Complaint   Patient presents with    Abdominal Pain    Dizziness    Diarrhea       Time seen by provider: 12:34 PM on 04/03/2020    Jose Leon is a 59 y.o. male who presents the ED with complaints of RLQ abdominal pain, dizziness, generalized weakness, diarrhea, and vomiting since this morning. The patient reports decreased appetite in the past x1 week. He had an episode of diarrhea this morning as well as x4 episodes of vomiting without any blood. The patient denies any known sick contact but discharged from [facility] x2 days ago per triage note. He currently denies fever. PMHx of DM, ulcers, hypercholesterolemia, CHF, HTN, CKD, and CAD.    The history is provided by the patient.     Review of patient's allergies indicates:   Allergen Reactions    Atorvastatin      Other reaction(s): Generalized Myalgias    Effexor [venlafaxine] Other (See Comments)     Tremulousness     Past Medical History:   Diagnosis Date    a A H/O Medical Noncompliance     H/O Chronic Noncompliance With His CHF Diet    a Cardiac Diastolic Dysfunction     Dr. Aure Washington    a Chronic Anticoagulation With Pradaxa     Dr. Aure Washington    a Coronary Artery Disease With H/O Stenting     Dr. Aure Washington; Was Hospitalized At [facility] 3/8/17-3/17/17 For CHF Exacerbatioin Due To "Dietary Discrepancies" With LCST Negative There    a Nonsustained Ventricular Tachycardia (NSVT)     a Paroxysmal Atrial Fibrillation With H/O RVR     Dr. Aure Washington; On Chronic Eliquis    a Syncopal Episode     [facility] 4/3/17-4/7/17 Stay For This: Was Likely Due To NSVT, And His Medications Were Adjusted    a Systolic CHF With EF 35-45%     Dr. Aure Washington; Was Hospitalized At [facility] 3/8/17-3/17/17 For CHF Exacerbatioin Due To "Dietary Discrepancies" With LCST Negative There    b Hypertension     b Proteinuria     " "04/2014 Referred To Dr. Leroy Allison; 4/1/14 Bilateral Renal U/S = Normal; On Lisinopril 20 Mg Daily    b Stage 2 CKD     c Hypercholesterolemia With Low HDL     d Type 2 DM On Insulin     ** 12/4/18 Referred To [facility] ED[facility]; 1/11/18 Referred To Dr. Dipika Rodriguez And Re-Referred To [facility]; 12/28/17 HgA1c = 12.0;" 7/5/17 Referred To [facility]    f Morbid Obesity     i 1 PPD X 25+ YRs Chronic Tobacco Use Disorder     7/5/17 Increased Wellbutrin-XL To 300 Mg Daily; 6/8/17 RXd Wellbutrin- Mg Daily X 4 Months    i JULY On CPAP     Dr. Marion ngo Chronic Left Groin Pain     l Chronic Left Shoulder Pain     Dr. MARTINA martinez Chronic Recurrent Low Back Pain 12/04/2018    Dr. Llamas Is His Pain Management Neurologist; 5/219/18 Referred To Dr. Ismael martinez Left 5-7th Rib FXs 04/2016 4/23/16 [facility] Left Rib XRays = Questionable Nondisplaced Left 5-7th Rib FXs With Normal Lung Fields    l Right Shouder SX 5/26/16 Due To Work Related Injury     Dr. Mora At [facility]; Dr. MARTINA hatch H/O Transient Ischemic Attack In 2013     n Anxiety And Depression 12/04/2018    RTC In 6 Weeks; 12/4/18 Added Wellbutrin- Mg qAM; 5/29/18 Increased 100 Mg Zoloft To 100 Mg Bid    n Continuous Benzodiazepine (Xanax) Use 12/04/2018 5/29/18 I Am Weaning Him Off Of This By Decreasing 1 Mg Bid To 0.5 Mg Bid PRN, And Will Wean Further Next OV    n H/O ETOH Abuse, Quit In 09/2014     q Bilateral Lower Extremity Venous Stasis Ulcers     2/28/18 [facility]    q Chronic Bilateral Lower Extremity Edema     2/28/18 Added Metolazone 10 Mg qAM On MWF And Referred Back To Dr. Washington; On Lasix 40 Mg Bid; He Wears Bilateral Compressin Hose Stockings    q Disability Examination 7/15/16     For CHF, PAF, DM2, And JULY On CPAP    Wellness Visit 11/6/2017      Past Surgical History:   Procedure Laterality Date    CARDIAC SURGERY      coronary stent "    COLONOSCOPY N/A 12/19/2017    Procedure: COLONOSCOPY;  Surgeon: Dave Allen MD;  Location: [facility];  Service: Endoscopy;  Laterality: N/A;    DIABETES MANAGEMENT LABS      heart stent      INCISION AND DRAINAGE OF WOUND      on stomach    SHOULDER SURGERY       Family History   Problem Relation Age of Onset    Heart disease Mother     Arthritis Mother     Diabetes Mother     Hyperlipidemia Mother     Hypertension Mother     Heart disease Father     Arthritis Father     Asthma Father     COPD Father     Hyperlipidemia Father     Hypertension Father     Stroke Father     Diabetes Sister     Diabetes Maternal Uncle      Social History     Tobacco Use    Smoking status: Former Smoker     Packs/day: 0.25     Types: Cigarettes     Start date: 1/1/1981     Last attempt to quit: 9/24/2016     Years since quitting: 3.5    Smokeless tobacco: Never Used    Tobacco comment: every now and again with drinks   Substance Use Topics    Alcohol use: Yes     Comment: occas    Drug use: No     Review of Systems   Constitutional: Positive for appetite change. Negative for fever.   HENT: Negative for rhinorrhea.    Eyes: Negative for visual disturbance.   Respiratory: Negative for shortness of breath.    Cardiovascular: Negative for chest pain.   Gastrointestinal: Positive for abdominal pain, diarrhea and vomiting.   Musculoskeletal: Negative for myalgias.   Skin: Negative for rash.   Neurological: Positive for dizziness and weakness.   Hematological: Does not bruise/bleed easily.       Physical Exam     Initial Vitals   BP Pulse Resp Temp SpO2   04/03/20 1154 04/03/20 1154 04/03/20 1148 04/03/20 1148 04/03/20 1154   (!) 165/99 107 20 99.8 °F (37.7 °C) 98 %      MAP       --                Physical Exam    Constitutional: He appears well-developed and well-nourished.  Non-toxic appearance. No distress.   HENT:   Head: Normocephalic and atraumatic.   Eyes: EOM are normal. Pupils are equal, round, and  reactive to light.   Neck: Normal range of motion. Neck supple. No neck rigidity. No JVD present.   Cardiovascular: Normal rate, regular rhythm, normal heart sounds and intact distal pulses. Exam reveals no gallop and no friction rub.    No murmur heard.  Normal heart sounds.   Pulmonary/Chest: He has wheezes. He has no rhonchi. He has no rales.   Equal breath sounds bilaterally with expiratory wheezing.   Abdominal: Soft. Bowel sounds are normal. He exhibits distension. There is tenderness in the right lower quadrant. There is no rebound and no guarding.   RLQ tenderness without rebound or guarding. Abdomen is mildly distended.   Musculoskeletal: Normal range of motion.   Neurological: He is alert and oriented to person, place, and time. He has normal strength and normal reflexes. No cranial nerve deficit or sensory deficit. He exhibits normal muscle tone. Coordination normal. GCS eye subscore is 4. GCS verbal subscore is 5. GCS motor subscore is 6.   Skin: Skin is warm and dry.   Psychiatric: He has a normal mood and affect. His speech is normal and behavior is normal. He is not actively hallucinating.         ED Course   Procedures  Labs Reviewed   CBC W/ AUTO DIFFERENTIAL - Abnormal; Notable for the following components:       Result Value    Hemoglobin 12.3 (*)     Mean Corpuscular Volume 78 (*)     Mean Corpuscular Hemoglobin 23.7 (*)     Mean Corpuscular Hemoglobin Conc 30.4 (*)     RDW 15.9 (*)     Lymph # 0.6 (*)     Gran% 78.5 (*)     Lymph% 12.9 (*)     All other components within normal limits   COMPREHENSIVE METABOLIC PANEL - Abnormal; Notable for the following components:    Sodium 135 (*)     CO2 21 (*)     Calcium 8.5 (*)     Albumin 3.4 (*)     All other components within normal limits   URINALYSIS, REFLEX TO URINE CULTURE - Abnormal; Notable for the following components:    Appearance, UA Hazy (*)     Specific Gravity, UA >=1.030 (*)     Protein, UA 2+ (*)     Glucose, UA 1+ (*)     Ketones, UA  Trace (*)     Bilirubin (UA) 2+ (*)     Occult Blood UA Trace (*)     Urobilinogen, UA 4.0-6.0 (*)     All other components within normal limits    Narrative:     Preferred Collection Type->Urine, Clean Catch   C-REACTIVE PROTEIN - Abnormal; Notable for the following components:    .8 (*)     All other components within normal limits   SARS-COV-2 RNA AMPLIFICATION, QUAL - Abnormal; Notable for the following components:    SARS-CoV-2 RNA, Amplification, Qual Positive (*)     All other components within normal limits   FERRITIN - Abnormal; Notable for the following components:    Ferritin 1,053 (*)     All other components within normal limits   B-TYPE NATRIURETIC PEPTIDE - Abnormal; Notable for the following components:     (*)     All other components within normal limits   URINALYSIS MICROSCOPIC - Abnormal; Notable for the following components:    Bacteria Moderate (*)     Hyaline Casts, UA 7 (*)     All other components within normal limits    Narrative:     Preferred Collection Type->Urine, Clean Catch   CLOSTRIDIUM DIFFICILE   LIPASE   TROPONIN I   CK          Imaging Results          CT Abdomen Pelvis With Contrast (Final result)  Result time 04/03/20 15:42:55    Final result by Paco Varma MD (04/03/20 15:42:55)                 Impression:      1. No evidence for appendicitis.  2. Urinary bladder wall thickening could relate to under distension.  Outlet obstruction or cystitis also possible in the appropriate clinical setting.  3. Interstitial lung disease with some degree of chronicity.  Consider dedicated CT chest when clinically feasible.  4. Enlarged heart.  5. Remote L1 compression fracture.  Additional compression fractures at the lowest 2 lumbar levels, age indeterminate but new since 2019 comparison.      Electronically signed by: Paco Varma  Date:    04/03/2020  Time:    15:42             Narrative:    EXAMINATION:  CT ABDOMEN PELVIS WITH CONTRAST    CLINICAL HISTORY:  RLQ  pain, appendicitis suspected;    TECHNIQUE:  Low dose axial images, sagittal and coronal reformations were obtained from the lung bases to the pubic symphysis following the IV administration of 100 mL of Omnipaque 350 .  Oral contrast was not given.    COMPARISON:  Lumbar spine radiograph 02/07/2019; chest radiographs 04/03/2020 and 06/10/2019    FINDINGS:  Patchy ground-glass disease in the lung bases left greater than right.  Cardiomegaly.  Nondistended gallbladder.    There is respiratory motion artifact in the upper abdomen.  Pancreas atrophy.  Remaining solid abdominal organs are unremarkable.    There is no enteric contrast which limits bowel assessment.  No dilated bowel loops.  Normal appendix.    Atherosclerosis.  Small fat containing umbilical defect.  Diffuse urinary bladder wall thickening.  Normal size of the prostate.    Mild compression fracture at L1 with prior vertebroplasty.  Additional mild compression fractures along the superior endplate of L4 and L5 minimal degenerative change of both hip joints.                               X-Ray Chest AP Portable (Final result)  Result time 04/03/20 13:15:07    Final result by Leroy Vasquez Jr., MD (04/03/20 13:15:07)                 Impression:      Borderline heart size.  Loop recorder in place.  Mild interstitial, chronic.  New intrapulmonary masses or infiltrates are not seen.      Electronically signed by: Leroy Vasquez MD  Date:    04/03/2020  Time:    13:15             Narrative:    EXAMINATION:  XR CHEST AP PORTABLE    CLINICAL HISTORY:  Dizziness and giddiness    TECHNIQUE:  Single frontal view of the chest was performed.    COMPARISON:  Prior chest of September 1, 2019 and Toya 10, 2019.    FINDINGS:  The heart size is borderline.  A loop recorder is identified in the anterior left chest.  There is faint interstitial prominence in the left mid and right lower lung field however this is unchanged from prior studies.  A new intrapulmonary  mass or infiltrate is not seen.  There is no pneumothorax or pleural effusion.                                 Medical Decision Making:   History:   Old Medical Records: I decided to obtain old medical records.  Initial Assessment:   59-year-old man presents with dizziness, generalized weakness, right lower quadrant abdominal pain, diarrhea, vomiting.  Right lower quadrant tenderness on examination.  Contaminated urinalysis with 20 squamous epithelial, moderate bacteria, nitrite negative and only 4 white blood cells.  COVID-19 test is positive.  Patient with hyponatremia and elevated CRP of 151.  Patient desaturates to 85% with minimal exertion on room air.  I believe the patient needs to be admitted for high risk of decompensation given his multiple comorbidities and hypoxia.  Discussed with Hospital Medicine who agrees to admit the patient on nasal cannula for further management and monitoring.  Independently Interpreted Test(s):   I have ordered and independently interpreted EKG Reading(s) - see summary below  Clinical Tests:   Lab Tests: Reviewed and Ordered  Radiological Study: Reviewed and Ordered  Medical Tests: Reviewed and Ordered  ED Management:  Critical Care Time MDM    The high probability of sudden, clinically significant deterioration in the patient's condition required the highest level of my preparedness to intervene urgently.    The services I provided to this patient were to treat and/or prevent clinically significant deterioration that could result in permanent disability, chronic pain and/or death.     Services included the following: chart data review, reviewing nursing notes and/or old charts, documentation time, consultant collaboration regarding findings and treatment options, medication orders and management, direct patient care, vital sign assessments and ordering, interpreting and reviewing diagnostic studies/lab tests.    Aggregate critical care time was between 30 and 74 minutes, which  includes only time during which I was engaged in work directly related to the patient's care, as described above, whether at the bedside or elsewhere in the Emergency Department.     Nicholas Puentes M.D.                 Scribe Attestation:   Scribe #1: I performed the above scribed service and the documentation accurately describes the services I performed. I attest to the accuracy of the note.     I, Dhaval Kelly, personally performed the services described in this documentation. All medical record entries made by the scribe were at my direction and in my presence.  I have reviewed the chart and agree that the record reflects my personal performance and is accurate and complete. Nicholas Puentes MD.        ED Course as of Apr 03 1602 Fri Apr 03, 2020   1314 EKG interpreted by myself:  Atrial fibrillation 92 beats per minute, left axis deviation with prolonged  milliseconds    [AS]      ED Course User Index  [AS] Nicholas Puentes MD                Clinical Impression:       ICD-10-CM ICD-9-CM   1. Acute respiratory failure with hypoxia J96.01 518.81   2. Dizziness R42 780.4         Disposition:   Disposition: Admitted     ED Disposition Condition    Admit                           Nicholas Puentes MD  04/03/20 1602

## 2020-04-03 NOTE — SUBJECTIVE & OBJECTIVE
"Past Medical History:   Diagnosis Date    a A H/O Medical Noncompliance     H/O Chronic Noncompliance With His CHF Diet    a Cardiac Diastolic Dysfunction     Dr. Aure Washington    a Chronic Anticoagulation With Pradaxa     Dr. Aure Washington    a Coronary Artery Disease With H/O Stenting     Dr. Aure Washington; Was Hospitalized At [facility] 3/8/17-3/17/17 For CHF Exacerbatioin Due To "Dietary Discrepancies" With LCST Negative There    a Nonsustained Ventricular Tachycardia (NSVT)     a Paroxysmal Atrial Fibrillation With H/O RVR     Dr. Aure Washington; On Chronic Eliquis    a Syncopal Episode     [facility] 4/3/17-4/7/17 Stay For This: Was Likely Due To NSVT, And His Medications Were Adjusted    a Systolic CHF With EF 35-45%     Dr. Aure Washington; Was Hospitalized At [facility] 3/8/17-3/17/17 For CHF Exacerbatioin Due To "Dietary Discrepancies" With LCST Negative There    b Hypertension     b Proteinuria     04/2014 Referred To Dr. Leroy Allison; 4/1/14 Bilateral Renal U/S = Normal; On Lisinopril 20 Mg Daily    b Stage 2 CKD     c Hypercholesterolemia With Low HDL     d Type 2 DM On Insulin     ** 12/4/18 Referred To [facility]; 1/11/18 Referred To Dr. Dipika Rodriguez And Re-Referred To [facility]; 12/28/17 HgA1c = 12.0;" 7/5/17 Referred To [facility]    f Morbid Obesity     i 1 PPD X 25+ YRs Chronic Tobacco Use Disorder     7/5/17 Increased Wellbutrin-XL To 300 Mg Daily; 6/8/17 RXd Wellbutrin- Mg Daily X 4 Months    i JULY On CPAP     Dr. Marion ngo Chronic Left Groin Pain     l Chronic Left Shoulder Pain     Dr. MARTINA martinez Chronic Recurrent Low Back Pain 12/04/2018    Dr. Llamas Is His Pain Management Neurologist; 5/219/18 Referred To Dr. Ismael martinez Left 5-7th Rib FXs 04/2016 4/23/16 [facility] Left Rib XRays = Questionable Nondisplaced Left 5-7th Rib FXs With Normal Lung Fields    l Right Shouder SX 5/26/16 Due To Work Related Injury     Dr. Mora At [facility]; Dr. MARTINA hatch H/O Transient Ischemic " Attack In 2013     n Anxiety And Depression 12/04/2018    RTC In 6 Weeks; 12/4/18 Added Wellbutrin- Mg qAM; 5/29/18 Increased 100 Mg Zoloft To 100 Mg Bid    n Continuous Benzodiazepine (Xanax) Use 12/04/2018 5/29/18 I Am Weaning Him Off Of This By Decreasing 1 Mg Bid To 0.5 Mg Bid PRN, And Will Wean Further Next OV    n H/O ETOH Abuse, Quit In 09/2014     q Bilateral Lower Extremity Venous Stasis Ulcers     2/28/18 [facility]    q Chronic Bilateral Lower Extremity Edema     2/28/18 Added Metolazone 10 Mg qAM On MWF And Referred Back To Dr. Washington; On Lasix 40 Mg Bid; He Wears Bilateral Compressin Hose Stockings    q Disability Examination 7/15/16     For CHF, PAF, DM2, And JULY On CPAP    Wellness Visit 11/6/2017        Past Surgical History:   Procedure Laterality Date    CARDIAC SURGERY      coronary stent    COLONOSCOPY N/A 12/19/2017    Procedure: COLONOSCOPY;  Surgeon: Dave Allen MD;  Location: [facility];  Service: Endoscopy;  Laterality: N/A;    DIABETES MANAGEMENT LABS      heart stent      INCISION AND DRAINAGE OF WOUND      on stomach    SHOULDER SURGERY         Review of patient's allergies indicates:   Allergen Reactions    Atorvastatin      Other reaction(s): Generalized Myalgias    Effexor [venlafaxine] Other (See Comments)     Tremulousness       No current facility-administered medications on file prior to encounter.      Current Outpatient Medications on File Prior to Encounter   Medication Sig    ALPRAZolam (XANAX) 0.5 MG tablet TAKE ONE TABLET BY MOUTH TWICE DAILY AS NEEDED FOR ANXIETY    amiodarone (PACERONE) 200 MG Tab 200 mg 2 (two) times daily.     aspirin (ECOTRIN) 81 MG EC tablet Take 81 mg by mouth every evening.     BASAGLAR KWIKPEN U-100 INSULIN 100 unit/mL (3 mL) InPn pen INJECT 32 UNITS INTO THE SKIN EVERY EVENING    buPROPion (WELLBUTRIN XL) 150 MG TB24 tablet Take 1 tablet (150 mg total)  "by mouth every morning.    dabigatran etexilate (PRADAXA) 75 mg Cap Take 75 mg by mouth 2 (two) times daily. "Do NOT break, chew, or open capsules."    digoxin (LANOXIN) 250 mcg tablet Take 250 mcg by mouth once daily.    dofetilide (TIKOSYN) 250 MCG Cap Take 125 mcg by mouth every 12 (twelve) hours.    furosemide (LASIX) 40 MG tablet Take 1 tablet (40 mg total) by mouth 2 (two) times daily.    gabapentin (NEURONTIN) 300 MG capsule TAKE ONE CAPSULE BY MOUTH IN THE MORNING AND TAKE TWO CAPSULES BY MOUTH AT BEDTIME    hydrALAZINE (APRESOLINE) 10 MG tablet Take 10 mg by mouth every 12 (twelve) hours.    lancets Misc 1 lancet by Misc.(Non-Drug; Combo Route) route 4 (four) times daily before meals and nightly. One Touch lancets    lisinopril (PRINIVIL,ZESTRIL) 20 MG tablet Take 1 tablet (20 mg total) by mouth 2 (two) times daily.    magnesium oxide (MAG-OX) 400 mg tablet Take 1 tablet (400 mg total) by mouth once daily.    metFORMIN (GLUCOPHAGE) 1000 MG tablet TAKE 1 TABLET BY MOUTH TWICE DAILY WITH MEALS    metoprolol tartrate (LOPRESSOR) 25 MG tablet TAKE 1 TABLET BY MOUTH 2 TIMES DAILY. (Patient taking differently: TAKE 1 TABLET BY MOUTH DAILY.)    MYRBETRIQ 50 mg Tb24 TAKE ONE TABLET BY MOUTH ONCE DAILY    ONETOUCH ULTRA BLUE TEST STRIP Strp TEST FOUR TIMES A DAY (BEFORE MEALS AND NIGHTLY)    pravastatin (PRAVACHOL) 80 MG tablet TAKE 1 TABLET BY MOUTH AT BEDTIME    sertraline (ZOLOFT) 100 MG tablet Take 1 tablet (100 mg total) by mouth 2 (two) times daily.    simvastatin (ZOCOR) 20 MG tablet TAKE 1 TABLET BY MOUTH EVERY EVENING    spironolactone (ALDACTONE) 25 MG tablet Take 1 tablet (25 mg total) by mouth every morning.    TRUEPLUS PEN NEEDLE 32 gauge x 5/32" Ndle USE TO INJECT INSULIN    UNABLE TO FIND Take by mouth. Super beta prostate for prostate health    zolpidem (AMBIEN) 10 mg Tab TAKE 1 TABLET (10 MG TOTAL) BY MOUTH NIGHTLY.     Family History     Problem Relation (Age of Onset)    " Arthritis Mother, Father    Asthma Father    COPD Father    Diabetes Mother, Sister, Maternal Uncle    Heart disease Mother, Father    Hyperlipidemia Mother, Father    Hypertension Mother, Father    Stroke Father        Tobacco Use    Smoking status: Former Smoker     Packs/day: 0.25     Types: Cigarettes     Start date: 1/1/1981     Last attempt to quit: 9/24/2016     Years since quitting: 3.5    Smokeless tobacco: Never Used    Tobacco comment: every now and again with drinks   Substance and Sexual Activity    Alcohol use: Yes     Comment: occas    Drug use: No    Sexual activity: Yes     Partners: Female     Birth control/protection: None     Review of Systems   Constitutional: Positive for appetite change and fatigue.   Respiratory: Positive for cough.    Gastrointestinal: Positive for abdominal pain, nausea and vomiting.   Neurological: Positive for dizziness and weakness.   All other systems reviewed and are negative.    Objective:     Vital Signs (Most Recent):  Temp: 99.8 °F (37.7 °C) (04/03/20 1148)  Pulse: 96 (04/03/20 1350)  Resp: 20 (04/03/20 1148)  BP: (!) 157/69 (04/03/20 1332)  SpO2: (!) 88 % (04/03/20 1350) Vital Signs (24h Range):  Temp:  [99.8 °F (37.7 °C)] 99.8 °F (37.7 °C)  Pulse:  [] 96  Resp:  [20] 20  SpO2:  [85 %-98 %] 88 %  BP: (157-165)/(69-99) 157/69     Weight: 135.6 kg (299 lb)  Body mass index is 41.7 kg/m².    Physical Exam   Constitutional: He appears well-developed.   Morbidly obese male   HENT:   Head: Normocephalic and atraumatic.   Nose: Nose normal. No septal deviation.   Mouth/Throat: Oropharynx is clear and moist.   Eyes: Pupils are equal, round, and reactive to light. Conjunctivae are normal.   Neck: No JVD present. No tracheal tenderness present. No tracheal deviation present. No thyroid mass present.   Cardiovascular: Normal rate, regular rhythm, S1 normal and S2 normal. Exam reveals no gallop and no friction rub.   No murmur heard.  Pulmonary/Chest: Effort  normal and breath sounds normal. He has no decreased breath sounds. He has no wheezes. He has no rhonchi. He has no rales.   Per Dr. Puentes, bilateral expiratory rhonchi.   Abdominal: Soft. Normal appearance and bowel sounds are normal. He exhibits no distension and no mass. There is no hepatosplenomegaly, splenomegaly or hepatomegaly. There is no tenderness.   Per Dr. Puentes right lower quadrant abdominal tenderness without any peritoneal sign, guarding or rigidity.   Neurological: He is alert. He has normal strength. No cranial nerve deficit or sensory deficit.   Grossly nonfocal   Skin: No rash noted. No cyanosis.   Nursing note and vitals reviewed.        CRANIAL NERVES     CN III, IV, VI   Pupils are equal, round, and reactive to light.       Significant Labs:   CBC:   Recent Labs   Lab 04/03/20  1300   WBC 4.74   HGB 12.3*   HCT 40.4        CMP:   Recent Labs   Lab 04/03/20  1300   *   K 4.2      CO2 21*   GLU 96   BUN 17   CREATININE 1.1   CALCIUM 8.5*   PROT 7.7   ALBUMIN 3.4*   BILITOT 0.7   ALKPHOS 118   AST 32   ALT 32   ANIONGAP 14   EGFRNONAA >60     Troponin:   Recent Labs   Lab 04/03/20  1300   TROPONINI 0.024       Rapid COVID -19 test:  Positive  CPK 77    Ferritin 1053    Significant Imaging:   CXR: Borderline heart size.  Loop recorder in place.  Mild interstitial, chronic.  New intrapulmonary masses or infiltrates are not seen.    CT abdomen and pelvis: Pending.

## 2020-04-03 NOTE — ASSESSMENT & PLAN NOTE
Patient with known CAD s/p CABG. Will continue and monitor for S/Sx of angina/ACS. Continue to monitor on telemetry.

## 2020-04-03 NOTE — ASSESSMENT & PLAN NOTE
Supplemental O2 via nasal canula; titrate O2 saturation to >92%.   Continue beta 2 agonist bronchodilator treatments.   Continue IV antibiotics - Azithromycin and Ceftriaxone.  Check sputum GS and Cx.   Continue routine medications as before.   Follow air bone/contact isolations per protocol.

## 2020-04-03 NOTE — ASSESSMENT & PLAN NOTE
Body mass index is 41.7 kg/m². Morbid obesity complicates all aspects of disease management from diagnostic modalities to treatment. Weight loss encouraged and health benefits explained to patient.

## 2020-04-03 NOTE — ASSESSMENT & PLAN NOTE
Continue supplemental oxygen to maintain pulse ox above 92%.  Unable to use CPAP per hospital policy at this time due to COVID-19.

## 2020-04-03 NOTE — ED TRIAGE NOTES
Patients presents to ED via EMS from home with c/o low abdominal pain, dizziness, and 99 F temp at home. He was discharge from [facility] two days ago. He is AAOx4. Skin warm, dry to touch. Respirations even, nonlabored.

## 2020-04-03 NOTE — ASSESSMENT & PLAN NOTE
Monitor BUN/SCr.  Monitor I/Os.  Monitor electrolytes.  Avoid non-steroidal anti-inflammatory medications.

## 2020-04-03 NOTE — H&P
[facility] Hospital Medicine  History & Physical    Patient Name: Jose Leon  MRN: [id number]  Admission Date: 4/3/2020  Attending Physician: Nicholas Puentes MD   Primary Care Provider: Josh Giordano MD         Patient information was obtained from patient and ER records.     Subjective:     Principal Problem:Acute respiratory disease due to COVID-19 virus    Chief Complaint:   Chief Complaint   Patient presents with    Abdominal Pain    Dizziness    Diarrhea        HPI: Patient is a 59-year-old morbidly obese male with past medical history significant for multiple medical problems including hypertension, hyperlipidemia, coronary artery disease status post coronary artery stent placement, obstructive sleep apnea (on CPAP), chronic combined systolic and diastolic congestive heart failure, history of TIA, long-term anticoagulation with Pradaxa and paroxysmal atrial fibrillation is being admitted to Hospital Medicine from [facility] Emergency Room under inpatient status for worsening shortness of breath and right lower quadrant abdominal pain.  Patient presented to the emergency room with complaint of profound generalized weakness associated with diarrhea and vomiting for 1 day.  If patient feels dizzy and has also been experiencing nonradiating right lower quadrant abdominal pain.  Patient denies any hematemesis, melena or bleeding per rectum.  No recent travel or sick contact history reported.  Patient'sCOVID 19 test is positive in the emergency room.  Patient was recently discharged from [facility] nursing [facility].  In the emergency room patient was noted to be hypoxic with exertion and minimal level around 85%.  Presently requiring 3 L per minute supplemental oxygen via nasal cannula.        Past Medical History:   Diagnosis Date    a A H/O Medical Noncompliance     H/O Chronic Noncompliance With His CHF Diet    a Cardiac Diastolic Dysfunction       "Aure Washington    a Chronic Anticoagulation With Pradaxa     Dr. Aure Washington    a Coronary Artery Disease With H/O Stenting     Dr. Aure Washington; Was Hospitalized At [facility] 3/8/17-3/17/17 For CHF Exacerbatioin Due To "Dietary Discrepancies" With LCST Negative There    a Nonsustained Ventricular Tachycardia (NSVT)     a Paroxysmal Atrial Fibrillation With H/O RVR     Dr. Aure Washington; On Chronic Eliquis    a Syncopal Episode     [facility] 4/3/17-4/7/17 Stay For This: Was Likely Due To NSVT, And His Medications Were Adjusted    a Systolic CHF With EF 35-45%     Dr. Aure Washington; Was Hospitalized At [facility] 3/8/17-3/17/17 For CHF Exacerbatioin Due To "Dietary Discrepancies" With LCST Negative There    b Hypertension     b Proteinuria     04/2014 Referred To Dr. Leroy Allison; 4/1/14 Bilateral Renal U/S = Normal; On Lisinopril 20 Mg Daily    b Stage 2 CKD     c Hypercholesterolemia With Low HDL     d Type 2 DM On Insulin     ** 12/4/18 Referred To DM EDU; 1/11/18 Referred To Dr. Dipika Rodriguez And Re-Referred To DM EDU; 12/28/17 HgA1c = 12.0;" 7/5/17 Referred To DM EDU    f Morbid Obesity     i 1 PPD X 25+ YRs Chronic Tobacco Use Disorder     7/5/17 Increased Wellbutrin-XL To 300 Mg Daily; 6/8/17 RXd Wellbutrin- Mg Daily X 4 Months    i JULY On CPAP     Dr. Marion ngo Chronic Left Groin Pain     l Chronic Left Shoulder Pain     Dr. MARTINA martinez Chronic Recurrent Low Back Pain 12/04/2018    Dr. Llamas Is His Pain Management Neurologist; 5/219/18 Referred To Dr. Ismael martinez Left 5-7th Rib FXs 04/2016 4/23/16 [facility] Left Rib XRays = Questionable Nondisplaced Left 5-7th Rib FXs With Normal Lung Fields    l Right Shouder SX 5/26/16 Due To Work Related Injury     Dr. Mora At [facility]; Dr. MARTINA hatch H/O Transient Ischemic Attack In 2013     n Anxiety And Depression 12/04/2018    RTC In 6 Weeks; 12/4/18 Added Wellbutrin- Mg qAM; 5/29/18 Increased 100 Mg Zoloft To 100 Mg Bid    " "n Continuous Benzodiazepine (Xanax) Use 12/04/2018 5/29/18 I Am Weaning Him Off Of This By Decreasing 1 Mg Bid To 0.5 Mg Bid PRN, And Will Wean Further Next OV    n H/O ETOH Abuse, Quit In 09/2014     q Bilateral Lower Extremity Venous Stasis Ulcers     2/28/18 [facility]    q Chronic Bilateral Lower Extremity Edema     2/28/18 Added Metolazone 10 Mg qAM On MWF And Referred Back To Dr. Washington; On Lasix 40 Mg Bid; He Wears Bilateral Compressin Hose Stockings    q Disability Examination 7/15/16     For CHF, PAF, DM2, And JULY On CPAP    Wellness Visit 11/6/2017        Past Surgical History:   Procedure Laterality Date    CARDIAC SURGERY      coronary stent    COLONOSCOPY N/A 12/19/2017    Procedure: COLONOSCOPY;  Surgeon: Dave Allen MD;  Location: [facility];  Service: Endoscopy;  Laterality: N/A;    DIABETES MANAGEMENT LABS      heart stent      INCISION AND DRAINAGE OF WOUND      on stomach    SHOULDER SURGERY         Review of patient's allergies indicates:   Allergen Reactions    Atorvastatin      Other reaction(s): Generalized Myalgias    Effexor [venlafaxine] Other (See Comments)     Tremulousness       No current facility-administered medications on file prior to encounter.      Current Outpatient Medications on File Prior to Encounter   Medication Sig    ALPRAZolam (XANAX) 0.5 MG tablet TAKE ONE TABLET BY MOUTH TWICE DAILY AS NEEDED FOR ANXIETY    amiodarone (PACERONE) 200 MG Tab 200 mg 2 (two) times daily.     aspirin (ECOTRIN) 81 MG EC tablet Take 81 mg by mouth every evening.     BASAGLAR KWIKPEN U-100 INSULIN 100 unit/mL (3 mL) InPn pen INJECT 32 UNITS INTO THE SKIN EVERY EVENING    buPROPion (WELLBUTRIN XL) 150 MG TB24 tablet Take 1 tablet (150 mg total) by mouth every morning.    dabigatran etexilate (PRADAXA) 75 mg Cap Take 75 mg by mouth 2 (two) times daily. "Do NOT break, chew, or open capsules."    digoxin " "(LANOXIN) 250 mcg tablet Take 250 mcg by mouth once daily.    dofetilide (TIKOSYN) 250 MCG Cap Take 125 mcg by mouth every 12 (twelve) hours.    furosemide (LASIX) 40 MG tablet Take 1 tablet (40 mg total) by mouth 2 (two) times daily.    gabapentin (NEURONTIN) 300 MG capsule TAKE ONE CAPSULE BY MOUTH IN THE MORNING AND TAKE TWO CAPSULES BY MOUTH AT BEDTIME    hydrALAZINE (APRESOLINE) 10 MG tablet Take 10 mg by mouth every 12 (twelve) hours.    lancets Misc 1 lancet by Misc.(Non-Drug; Combo Route) route 4 (four) times daily before meals and nightly. One Touch lancets    lisinopril (PRINIVIL,ZESTRIL) 20 MG tablet Take 1 tablet (20 mg total) by mouth 2 (two) times daily.    magnesium oxide (MAG-OX) 400 mg tablet Take 1 tablet (400 mg total) by mouth once daily.    metFORMIN (GLUCOPHAGE) 1000 MG tablet TAKE 1 TABLET BY MOUTH TWICE DAILY WITH MEALS    metoprolol tartrate (LOPRESSOR) 25 MG tablet TAKE 1 TABLET BY MOUTH 2 TIMES DAILY. (Patient taking differently: TAKE 1 TABLET BY MOUTH DAILY.)    MYRBETRIQ 50 mg Tb24 TAKE ONE TABLET BY MOUTH ONCE DAILY    ONETOUCH ULTRA BLUE TEST STRIP Strp TEST FOUR TIMES A DAY (BEFORE MEALS AND NIGHTLY)    pravastatin (PRAVACHOL) 80 MG tablet TAKE 1 TABLET BY MOUTH AT BEDTIME    sertraline (ZOLOFT) 100 MG tablet Take 1 tablet (100 mg total) by mouth 2 (two) times daily.    simvastatin (ZOCOR) 20 MG tablet TAKE 1 TABLET BY MOUTH EVERY EVENING    spironolactone (ALDACTONE) 25 MG tablet Take 1 tablet (25 mg total) by mouth every morning.    TRUEPLUS PEN NEEDLE 32 gauge x 5/32" Ndle USE TO INJECT INSULIN    UNABLE TO FIND Take by mouth. Super beta prostate for prostate health    zolpidem (AMBIEN) 10 mg Tab TAKE 1 TABLET (10 MG TOTAL) BY MOUTH NIGHTLY.     Family History     Problem Relation (Age of Onset)    Arthritis Mother, Father    Asthma Father    COPD Father    Diabetes Mother, Sister, Maternal Uncle    Heart disease Mother, Father    Hyperlipidemia Mother, Father    " Hypertension Mother, Father    Stroke Father        Tobacco Use    Smoking status: Former Smoker     Packs/day: 0.25     Types: Cigarettes     Start date: 1/1/1981     Last attempt to quit: 9/24/2016     Years since quitting: 3.5    Smokeless tobacco: Never Used    Tobacco comment: every now and again with drinks   Substance and Sexual Activity    Alcohol use: Yes     Comment: occas    Drug use: No    Sexual activity: Yes     Partners: Female     Birth control/protection: None     Review of Systems   Constitutional: Positive for appetite change and fatigue.   Respiratory: Positive for cough.    Gastrointestinal: Positive for abdominal pain, nausea and vomiting.   Neurological: Positive for dizziness and weakness.   All other systems reviewed and are negative.    Objective:     Vital Signs (Most Recent):  Temp: 99.8 °F (37.7 °C) (04/03/20 1148)  Pulse: 96 (04/03/20 1350)  Resp: 20 (04/03/20 1148)  BP: (!) 157/69 (04/03/20 1332)  SpO2: (!) 88 % (04/03/20 1350) Vital Signs (24h Range):  Temp:  [99.8 °F (37.7 °C)] 99.8 °F (37.7 °C)  Pulse:  [] 96  Resp:  [20] 20  SpO2:  [85 %-98 %] 88 %  BP: (157-165)/(69-99) 157/69     Weight: 135.6 kg (299 lb)  Body mass index is 41.7 kg/m².    Physical Exam   Constitutional: He appears well-developed.   Morbidly obese male   HENT:   Head: Normocephalic and atraumatic.   Nose: Nose normal. No septal deviation.   Mouth/Throat: Oropharynx is clear and moist.   Eyes: Pupils are equal, round, and reactive to light. Conjunctivae are normal.   Neck: No JVD present. No tracheal tenderness present. No tracheal deviation present. No thyroid mass present.   Cardiovascular: Normal rate, regular rhythm, S1 normal and S2 normal. Exam reveals no gallop and no friction rub.   No murmur heard.  Pulmonary/Chest: Effort normal and breath sounds normal. He has no decreased breath sounds. He has no wheezes. He has no rhonchi. He has no rales.   Per Dr. Puentes, bilateral expiratory rhonchi.    Abdominal: Soft. Normal appearance and bowel sounds are normal. He exhibits no distension and no mass. There is no hepatosplenomegaly, splenomegaly or hepatomegaly. There is no tenderness.   Per Dr. Puentes right lower quadrant abdominal tenderness without any peritoneal sign, guarding or rigidity.   Neurological: He is alert. He has normal strength. No cranial nerve deficit or sensory deficit.   Grossly nonfocal   Skin: No rash noted. No cyanosis.   Nursing note and vitals reviewed.        CRANIAL NERVES     CN III, IV, VI   Pupils are equal, round, and reactive to light.       Significant Labs:   CBC:   Recent Labs   Lab 04/03/20  1300   WBC 4.74   HGB 12.3*   HCT 40.4        CMP:   Recent Labs   Lab 04/03/20  1300   *   K 4.2      CO2 21*   GLU 96   BUN 17   CREATININE 1.1   CALCIUM 8.5*   PROT 7.7   ALBUMIN 3.4*   BILITOT 0.7   ALKPHOS 118   AST 32   ALT 32   ANIONGAP 14   EGFRNONAA >60     Troponin:   Recent Labs   Lab 04/03/20  1300   TROPONINI 0.024       Rapid COVID -19 test:  Positive  CPK 77    Ferritin 1053    Significant Imaging:   CXR: Borderline heart size.  Loop recorder in place.  Mild interstitial, chronic.  New intrapulmonary masses or infiltrates are not seen.    CT abdomen and pelvis: Pending.    Assessment/Plan:     * Acute respiratory disease due to COVID-19 virus  Supplemental O2 via nasal canula; titrate O2 saturation to >92%.   Continue beta 2 agonist bronchodilator treatments.   Continue IV antibiotics - Azithromycin and Ceftriaxone.  Check sputum GS and Cx.   Continue routine medications as before.   Follow air bone/contact isolations per protocol.            Right lower quadrant abdominal pain  Follow CT abdomen results.  Continue supportive care with intravenous narcotics and antiemetics as needed.      Type 2 DM On Insulin  Check blood glucose level q AC/HS.  Use Novolog Insulin Sliding Scale as needed.   Continue American [employer]abetic Association 1800 Kcal  diet.        1 PPD X 25+ YRs Chronic Tobacco Use Disorder  Smoking cessation counseling performed. Dangers of cigarette smoking were reviewed with patient in detail and patient was encouraged to quit. Nicotine replacement options were discussed for > 3 minutes.        Anxiety And Depression  Continue Xanax and Wellbutrin use as before      Stage 2 CKD  Monitor BUN/SCr.  Monitor I/Os.  Monitor electrolytes.  Avoid non-steroidal anti-inflammatory medications.          Hypertension  Chronic problem. Will continue chronic medications and monitor for any changes, adjusting as needed.          Atrial Fibrillation With H/O RVR  Continue Pradaxa and Pacerone as before      Coronary Artery Disease With H/O Stenting  Patient with known CAD s/p CABG. Will continue and monitor for S/Sx of angina/ACS. Continue to monitor on telemetry.        JULY on CPAP  Continue supplemental oxygen to maintain pulse ox above 92%.  Unable to use CPAP per hospital policy at this time due to COVID-19.      Morbid obesity  Body mass index is 41.7 kg/m². Morbid obesity complicates all aspects of disease management from diagnostic modalities to treatment. Weight loss encouraged and health benefits explained to patient.            DVT prophylaxis, on Pradaxa already.    Jhony Palomo MD  Department of Hospital Medicine   [facility]

## 2020-04-03 NOTE — NURSING
Pt admitted to room 405. VSS obtained. Dinner tray provided. Pt has no appetite and rest chicken noodle soup. RN called dietary for soup. Admit questions obtained via phone. Rn called ex wife to update per pt request, she is very concerned she and their children may have been exposed. Instructed her to self quarantine to decrease exposure and seek medical help as needed.

## 2020-04-04 LAB
INFLUENZA A, MOLECULAR: NEGATIVE
INFLUENZA B, MOLECULAR: NEGATIVE
SPECIMEN SOURCE: NORMAL

## 2020-04-04 PROCEDURE — 25000003 PHARM REV CODE 250: Performed by: INTERNAL MEDICINE

## 2020-04-04 PROCEDURE — 87502 INFLUENZA DNA AMP PROBE: CPT

## 2020-04-04 PROCEDURE — 63600175 PHARM REV CODE 636 W HCPCS: Performed by: INTERNAL MEDICINE

## 2020-04-04 PROCEDURE — C9399 UNCLASSIFIED DRUGS OR BIOLOG: HCPCS | Performed by: INTERNAL MEDICINE

## 2020-04-04 PROCEDURE — 12000002 HC ACUTE/MED SURGE SEMI-PRIVATE ROOM

## 2020-04-04 PROCEDURE — 94761 N-INVAS EAR/PLS OXIMETRY MLT: CPT

## 2020-04-04 PROCEDURE — 21400001 HC TELEMETRY ROOM

## 2020-04-04 PROCEDURE — 27000221 HC OXYGEN, UP TO 24 HOURS

## 2020-04-04 RX ORDER — METOPROLOL TARTRATE 50 MG/1
50 TABLET ORAL 2 TIMES DAILY
Status: DISCONTINUED | OUTPATIENT
Start: 2020-04-04 | End: 2020-04-09

## 2020-04-04 RX ADMIN — DABIGATRAN ETEXILATE MESYLATE 75 MG: 75 CAPSULE ORAL at 08:04

## 2020-04-04 RX ADMIN — BUPROPION HYDROCHLORIDE 150 MG: 150 TABLET, EXTENDED RELEASE ORAL at 06:04

## 2020-04-04 RX ADMIN — AMIODARONE HYDROCHLORIDE 200 MG: 200 TABLET ORAL at 09:04

## 2020-04-04 RX ADMIN — AMIODARONE HYDROCHLORIDE 200 MG: 200 TABLET ORAL at 08:04

## 2020-04-04 RX ADMIN — SPIRONOLACTONE 25 MG: 25 TABLET ORAL at 08:04

## 2020-04-04 RX ADMIN — GABAPENTIN 300 MG: 300 CAPSULE ORAL at 09:04

## 2020-04-04 RX ADMIN — HYDRALAZINE HYDROCHLORIDE 10 MG: 10 TABLET ORAL at 08:04

## 2020-04-04 RX ADMIN — AZITHROMYCIN MONOHYDRATE 500 MG: 500 INJECTION, POWDER, LYOPHILIZED, FOR SOLUTION INTRAVENOUS at 08:04

## 2020-04-04 RX ADMIN — HYDROCODONE BITARTRATE AND ACETAMINOPHEN 1 TABLET: 5; 325 TABLET ORAL at 09:04

## 2020-04-04 RX ADMIN — METOPROLOL TARTRATE 25 MG: 25 TABLET, FILM COATED ORAL at 08:04

## 2020-04-04 RX ADMIN — ASPIRIN 81 MG: 81 TABLET, COATED ORAL at 09:04

## 2020-04-04 RX ADMIN — HYDROCODONE BITARTRATE AND ACETAMINOPHEN 1 TABLET: 5; 325 TABLET ORAL at 08:04

## 2020-04-04 RX ADMIN — LISINOPRIL 20 MG: 10 TABLET ORAL at 08:04

## 2020-04-04 RX ADMIN — METOPROLOL TARTRATE 50 MG: 50 TABLET, FILM COATED ORAL at 08:04

## 2020-04-04 RX ADMIN — GABAPENTIN 300 MG: 300 CAPSULE ORAL at 08:04

## 2020-04-04 RX ADMIN — INSULIN DETEMIR 32 UNITS: 100 INJECTION, SOLUTION SUBCUTANEOUS at 08:04

## 2020-04-04 RX ADMIN — FUROSEMIDE 40 MG: 40 TABLET ORAL at 08:04

## 2020-04-04 RX ADMIN — PRAVASTATIN SODIUM 80 MG: 40 TABLET ORAL at 08:04

## 2020-04-04 RX ADMIN — CEFTRIAXONE 1 G: 1 INJECTION, SOLUTION INTRAVENOUS at 02:04

## 2020-04-04 RX ADMIN — FUROSEMIDE 40 MG: 40 TABLET ORAL at 09:04

## 2020-04-04 RX ADMIN — SERTRALINE HYDROCHLORIDE 100 MG: 50 TABLET ORAL at 08:04

## 2020-04-04 RX ADMIN — ROSUVASTATIN CALCIUM 250 MCG: 10 TABLET, FILM COATED ORAL at 08:04

## 2020-04-04 RX ADMIN — ALPRAZOLAM 0.5 MG: 0.25 TABLET ORAL at 08:04

## 2020-04-04 RX ADMIN — ALPRAZOLAM 0.5 MG: 0.25 TABLET ORAL at 09:04

## 2020-04-04 RX ADMIN — Medication 400 MG: at 08:04

## 2020-04-04 NOTE — RESPIRATORY THERAPY
04/04/20 0753   PRE-TX-O2   O2 Device (Oxygen Therapy) nasal cannula   $ Is the patient on Low Flow Oxygen? Yes   Flow (L/min) 2   SpO2 96 %   Pulse Oximetry Type Continuous   $ Pulse Oximetry - Multiple Charge Pulse Oximetry - Multiple

## 2020-04-04 NOTE — PROGRESS NOTES
[facility] Hospital Medicine  Progress Note    Patient Name: Jose Leon  MRN: [id number]  Patient Class: IP- Inpatient   Admission Date: 4/3/2020  Length of Stay: 1 days  Attending Physician: Jhony Palomo MD  Primary Care Provider: Josh Giordano MD        Subjective:     Principal Problem:Acute respiratory disease due to COVID-19 virus        HPI:  Patient is a 59-year-old morbidly obese male with past medical history significant for multiple medical problems including hypertension, hyperlipidemia, coronary artery disease status post coronary artery stent placement, obstructive sleep apnea (on CPAP), chronic combined systolic and diastolic congestive heart failure, history of TIA, long-term anticoagulation with Pradaxa and paroxysmal atrial fibrillation is being admitted to Hospital Medicine from [facility] Emergency Room under inpatient status for worsening shortness of breath and right lower quadrant abdominal pain.  Patient presented to the emergency room with complaint of profound generalized weakness associated with diarrhea and vomiting for 1 day.  If patient feels dizzy and has also been experiencing nonradiating right lower quadrant abdominal pain.  Patient denies any hematemesis, melena or bleeding per rectum.  No recent travel or sick contact history reported.  Patient'sCOVID 19 test is positive in the emergency room.  Patient was recently discharged from [facility].  In the emergency room patient was noted to be hypoxic with exertion and minimal level around 85%.  Presently requiring 3 L per minute supplemental oxygen via nasal cannula.        Overview/Hospital Course:  No notes on file    Interval History:  Patient admitted yesterday for shortness of breath/respiratory failure due to COVID-19.  Today patient reports shortness of breast persist.  He denies fevers today.  Does reports some abdominal pain and weakness.  He is somewhat  hypertensive this afternoon.    Review of Systems   Constitutional: Positive for appetite change and fatigue.   Respiratory: Positive for cough.    Gastrointestinal: Positive for abdominal pain, nausea and vomiting.   Neurological: Positive for dizziness and weakness.   All other systems reviewed and are negative.    Objective:     Vital Signs (Most Recent):  Temp: 98.8 °F (37.1 °C) (04/04/20 1504)  Pulse: 81 (04/04/20 1504)  Resp: 20 (04/04/20 1504)  BP: (!) 176/86 (04/04/20 1504)  SpO2: 95 % (04/04/20 1504) Vital Signs (24h Range):  Temp:  [98.4 °F (36.9 °C)-99 °F (37.2 °C)] 98.8 °F (37.1 °C)  Pulse:  [67-86] 81  Resp:  [18-24] 20  SpO2:  [95 %-97 %] 95 %  BP: (131-176)/(65-86) 176/86     Weight: 135.6 kg (299 lb)  Body mass index is 41.7 kg/m².  No intake or output data in the 24 hours ending 04/04/20 1509   Physical Exam   Constitutional: He appears well-developed.   Morbidly obese male   HENT:   Head: Normocephalic and atraumatic.   Nose: Nose normal. No septal deviation.   Mouth/Throat: Oropharynx is clear and moist.   Eyes: Pupils are equal, round, and reactive to light. Conjunctivae are normal.   Neck: No JVD present. No tracheal tenderness present. No tracheal deviation present. No thyroid mass present.   Cardiovascular: Normal rate, regular rhythm, S1 normal and S2 normal. Exam reveals no gallop and no friction rub.   No murmur heard.  Pulmonary/Chest: Effort normal and breath sounds normal. He has no decreased breath sounds. He has no wheezes. He has no rhonchi. He has no rales.   Per Dr. Puentes, bilateral expiratory rhonchi.   Abdominal: Soft. Normal appearance and bowel sounds are normal. He exhibits no distension and no mass. There is no hepatosplenomegaly, splenomegaly or hepatomegaly. There is no tenderness.   Per Dr. Puentes right lower quadrant abdominal tenderness without any peritoneal sign, guarding or rigidity.   Neurological: He is alert. He has normal strength. No cranial nerve deficit or sensory  deficit.   Grossly nonfocal   Skin: No rash noted. No cyanosis.   Nursing note and vitals reviewed.      Significant Labs:   Recent Lab Results       04/03/20  1900        Procalcitonin 0.03  Comment:  A concentration < 0.25 ng/mL represents a low risk bacterial   infection.  Procalcitonin may not be accurate among patients with localized   infection, recent trauma or major surgery, immunosuppressed state,   invasive fungal infection, renal dysfunction. Decisions regarding   initiation or continuation of antibiotic therapy should not be based   solely on procalcitonin levels.       Albumin 3.6     Alkaline Phosphatase 121     ALT 35     Anion Gap 14     AST 24     Baso # 0.01     Basophil% 0.2     BILIRUBIN TOTAL 0.9  Comment:  For infants and newborns, interpretation of results should be based  on gestational age, weight and in agreement with clinical  observations.  Premature Infant recommended reference ranges:  Up to 24 hours.............<8.0 mg/dL  Up to 48 hours............<12.0 mg/dL  3-5 days..................<15.0 mg/dL  6-29 days.................<15.0 mg/dL       Blood Culture, Routine No Growth to date[P]      No Growth to date[P]     BUN, Bld 15     Calcium 8.8     Chloride 100     CO2 22     Creatinine 1.0     Differential Method Automated     eGFR if  >60     eGFR if non  >60  Comment:  Calculation used to obtain the estimated glomerular filtration  rate (eGFR) is the CKD-EPI equation.        Eos # 0.0     Eosinophil% 0.2     Glucose 92     Gran # (ANC) 3.9     Gran% 79.8     Hematocrit 42.8     Hemoglobin 13.0     Immature Grans (Abs) 0.01  Comment:  Mild elevation in immature granulocytes is non specific and   can be seen in a variety of conditions including stress response,   acute inflammation, trauma and pregnancy. Correlation with other   laboratory and clinical findings is essential.       Immature Granulocytes 0.2     Lactate, Cory 1.5  Comment:  Falsely low  lactic acid results can be found in samples   containing >=13.0 mg/dL total bilirubin and/or >=3.5 mg/dL   direct bilirubin.         Comment:  Results are increased in hemolyzed samples.     Lymph # 0.7     Lymph% 13.9     Magnesium 1.6     MCH 23.9     MCHC 30.4     MCV 79     Mono # 0.3     Mono% 5.7     MPV 12.4     nRBC 0     Phosphorus 2.4     Platelets 157     Potassium 3.3     PROTEIN TOTAL 7.8     RBC 5.43     RDW 15.7     Sed Rate 27     Sodium 136     WBC 4.90           Significant Imaging: I have reviewed and interpreted all pertinent imaging results/findings within the past 24 hours.      Assessment/Plan:      * Acute respiratory disease due to COVID-19 virus  Supplemental O2 via nasal canula; titrate O2 saturation to >92%.   Continue beta 2 agonist bronchodilator treatments.   Continue IV antibiotics - Azithromycin and Ceftriaxone.  Check sputum GS and Cx.   Continue routine medications as before.   Follow air bone/contact isolations per protocol.            Right lower quadrant abdominal pain  Follow CT abdomen results.  Continue supportive care with intravenous narcotics and antiemetics as needed.      Type 2 DM On Insulin  Check blood glucose level q AC/HS.  Use Novolog Insulin Sliding Scale as needed.   Continue American Diabetic Association 1800 Kcal diet.        1 PPD X 25+ YRs Chronic Tobacco Use Disorder  Smoking cessation counseling performed. Dangers of cigarette smoking were reviewed with patient in detail and patient was encouraged to quit. Nicotine replacement options were discussed for > 3 minutes.        Anxiety And Depression  Continue Xanax and Wellbutrin use as before      Stage 2 CKD  Monitor BUN/SCr.  Monitor I/Os.  Monitor electrolytes.  Avoid non-steroidal anti-inflammatory medications.          Hypertension  Chronic problem. Will continue chronic medications and monitor for any changes, adjusting as needed.          Atrial Fibrillation With H/O RVR  Continue Pradaxa and  Pacerone as before      Coronary Artery Disease With H/O Stenting  Patient with known CAD s/p CABG. Will continue and monitor for S/Sx of angina/ACS. Continue to monitor on telemetry.        JULY on CPAP  Continue supplemental oxygen to maintain pulse ox above 92%.  Unable to use CPAP per hospital policy at this time due to COVID-19.      Morbid obesity  Body mass index is 41.7 kg/m². Morbid obesity complicates all aspects of disease management from diagnostic modalities to treatment. Weight loss encouraged and health benefits explained to patient.            VTE Risk Mitigation (From admission, onward)         Ordered     dabigatran etexilate capsule 75 mg  2 times daily      04/03/20 1813     IP VTE HIGH RISK PATIENT  Once      04/03/20 1813     Place OMI hose  Until discontinued      04/03/20 1813     Place sequential compression device  Until discontinued      04/03/20 1813                      Fredy Teran MD  Department of Hospital Medicine   [facility]

## 2020-04-04 NOTE — PLAN OF CARE
Pt remains on 2L NC. Resp labored, reports SOB. Sats>94%. Up to chair with max X3 assist. Pt incontinent of bowl. Loose stools X3 today. Unable to collect sample due to stool absorbed into sheets. Flu test resulted negative today.

## 2020-04-04 NOTE — SUBJECTIVE & OBJECTIVE
Interval History:  Patient admitted yesterday for shortness of breath/respiratory failure due to COVID-19.  Today patient reports shortness of breast persist.  He denies fevers today.  Does reports some abdominal pain and weakness.  He is somewhat hypertensive this afternoon.    Review of Systems   Constitutional: Positive for appetite change and fatigue.   Respiratory: Positive for cough.    Gastrointestinal: Positive for abdominal pain, nausea and vomiting.   Neurological: Positive for dizziness and weakness.   All other systems reviewed and are negative.    Objective:     Vital Signs (Most Recent):  Temp: 98.8 °F (37.1 °C) (04/04/20 1504)  Pulse: 81 (04/04/20 1504)  Resp: 20 (04/04/20 1504)  BP: (!) 176/86 (04/04/20 1504)  SpO2: 95 % (04/04/20 1504) Vital Signs (24h Range):  Temp:  [98.4 °F (36.9 °C)-99 °F (37.2 °C)] 98.8 °F (37.1 °C)  Pulse:  [67-86] 81  Resp:  [18-24] 20  SpO2:  [95 %-97 %] 95 %  BP: (131-176)/(65-86) 176/86     Weight: 135.6 kg (299 lb)  Body mass index is 41.7 kg/m².  No intake or output data in the 24 hours ending 04/04/20 1509   Physical Exam   Constitutional: He appears well-developed.   Morbidly obese male   HENT:   Head: Normocephalic and atraumatic.   Nose: Nose normal. No septal deviation.   Mouth/Throat: Oropharynx is clear and moist.   Eyes: Pupils are equal, round, and reactive to light. Conjunctivae are normal.   Neck: No JVD present. No tracheal tenderness present. No tracheal deviation present. No thyroid mass present.   Cardiovascular: Normal rate, regular rhythm, S1 normal and S2 normal. Exam reveals no gallop and no friction rub.   No murmur heard.  Pulmonary/Chest: Effort normal and breath sounds normal. He has no decreased breath sounds. He has no wheezes. He has no rhonchi. He has no rales.   Per Dr. Puentes, bilateral expiratory rhonchi.   Abdominal: Soft. Normal appearance and bowel sounds are normal. He exhibits no distension and no mass. There is no hepatosplenomegaly,  splenomegaly or hepatomegaly. There is no tenderness.   Per Dr. Puentes right lower quadrant abdominal tenderness without any peritoneal sign, guarding or rigidity.   Neurological: He is alert. He has normal strength. No cranial nerve deficit or sensory deficit.   Grossly nonfocal   Skin: No rash noted. No cyanosis.   Nursing note and vitals reviewed.      Significant Labs:   Recent Lab Results       04/03/20  1900        Procalcitonin 0.03  Comment:  A concentration < 0.25 ng/mL represents a low risk bacterial   infection.  Procalcitonin may not be accurate among patients with localized   infection, recent trauma or major surgery, immunosuppressed state,   invasive fungal infection, renal dysfunction. Decisions regarding   initiation or continuation of antibiotic therapy should not be based   solely on procalcitonin levels.       Albumin 3.6     Alkaline Phosphatase 121     ALT 35     Anion Gap 14     AST 24     Baso # 0.01     Basophil% 0.2     BILIRUBIN TOTAL 0.9  Comment:  For infants and newborns, interpretation of results should be based  on gestational age, weight and in agreement with clinical  observations.  Premature Infant recommended reference ranges:  Up to 24 hours.............<8.0 mg/dL  Up to 48 hours............<12.0 mg/dL  3-5 days..................<15.0 mg/dL  6-29 days.................<15.0 mg/dL       Blood Culture, Routine No Growth to date[P]      No Growth to date[P]     BUN, Bld 15     Calcium 8.8     Chloride 100     CO2 22     Creatinine 1.0     Differential Method Automated     eGFR if  >60     eGFR if non  >60  Comment:  Calculation used to obtain the estimated glomerular filtration  rate (eGFR) is the CKD-EPI equation.        Eos # 0.0     Eosinophil% 0.2     Glucose 92     Gran # (ANC) 3.9     Gran% 79.8     Hematocrit 42.8     Hemoglobin 13.0     Immature Grans (Abs) 0.01  Comment:  Mild elevation in immature granulocytes is non specific and   can be seen  in a variety of conditions including stress response,   acute inflammation, trauma and pregnancy. Correlation with other   laboratory and clinical findings is essential.       Immature Granulocytes 0.2     Lactate, Cory 1.5  Comment:  Falsely low lactic acid results can be found in samples   containing >=13.0 mg/dL total bilirubin and/or >=3.5 mg/dL   direct bilirubin.         Comment:  Results are increased in hemolyzed samples.     Lymph # 0.7     Lymph% 13.9     Magnesium 1.6     MCH 23.9     MCHC 30.4     MCV 79     Mono # 0.3     Mono% 5.7     MPV 12.4     nRBC 0     Phosphorus 2.4     Platelets 157     Potassium 3.3     PROTEIN TOTAL 7.8     RBC 5.43     RDW 15.7     Sed Rate 27     Sodium 136     WBC 4.90           Significant Imaging: I have reviewed and interpreted all pertinent imaging results/findings within the past 24 hours.

## 2020-04-04 NOTE — PLAN OF CARE
Patient AAO, VSS. PIV CDI/ Infusing IV antbx.  COVID care measures taken. Telemetry monitoring in place. Continuous Pulse ox in place. Pt remains on Airborne/Droplet/Contact precautions. Pt c/o pain PRN meds given for resolve. Pt on 2L NC. Pt verbalized understanding of POC. Purposeful hourly/q2hr rounding done during shift to promote patient safety. Patient free from falls and injury during shift.  Bed in lowest position, brakes locked, and call light within reach.  Will continue to monitor.

## 2020-04-05 LAB
ALBUMIN SERPL BCP-MCNC: 3.1 G/DL (ref 3.5–5.2)
ALP SERPL-CCNC: 120 U/L (ref 55–135)
ALT SERPL W/O P-5'-P-CCNC: 32 U/L (ref 10–44)
ANION GAP SERPL CALC-SCNC: 10 MMOL/L (ref 8–16)
AST SERPL-CCNC: 31 U/L (ref 10–40)
BACTERIA #/AREA URNS HPF: ABNORMAL /HPF
BASOPHILS # BLD AUTO: 0.03 K/UL (ref 0–0.2)
BASOPHILS NFR BLD: 0.8 % (ref 0–1.9)
BILIRUB SERPL-MCNC: 0.5 MG/DL (ref 0.1–1)
BILIRUB UR QL STRIP: NEGATIVE
BUN SERPL-MCNC: 14 MG/DL (ref 6–20)
CALCIUM SERPL-MCNC: 8.3 MG/DL (ref 8.7–10.5)
CHLORIDE SERPL-SCNC: 99 MMOL/L (ref 95–110)
CLARITY UR: CLEAR
CO2 SERPL-SCNC: 28 MMOL/L (ref 23–29)
COLOR UR: YELLOW
CREAT SERPL-MCNC: 1 MG/DL (ref 0.5–1.4)
DIFFERENTIAL METHOD: ABNORMAL
EOSINOPHIL # BLD AUTO: 0.1 K/UL (ref 0–0.5)
EOSINOPHIL NFR BLD: 1.3 % (ref 0–8)
ERYTHROCYTE [DISTWIDTH] IN BLOOD BY AUTOMATED COUNT: 15.6 % (ref 11.5–14.5)
EST. GFR  (AFRICAN AMERICAN): >60 ML/MIN/1.73 M^2
EST. GFR  (NON AFRICAN AMERICAN): >60 ML/MIN/1.73 M^2
GLUCOSE SERPL-MCNC: 98 MG/DL (ref 70–110)
GLUCOSE UR QL STRIP: NEGATIVE
HCT VFR BLD AUTO: 40.7 % (ref 40–54)
HGB BLD-MCNC: 12.3 G/DL (ref 14–18)
HGB UR QL STRIP: ABNORMAL
HYALINE CASTS #/AREA URNS LPF: 3 /LPF
IMM GRANULOCYTES # BLD AUTO: 0.01 K/UL (ref 0–0.04)
IMM GRANULOCYTES NFR BLD AUTO: 0.3 % (ref 0–0.5)
KETONES UR QL STRIP: NEGATIVE
LEUKOCYTE ESTERASE UR QL STRIP: NEGATIVE
LYMPHOCYTES # BLD AUTO: 0.9 K/UL (ref 1–4.8)
LYMPHOCYTES NFR BLD: 23.2 % (ref 18–48)
MAGNESIUM SERPL-MCNC: 1.7 MG/DL (ref 1.6–2.6)
MCH RBC QN AUTO: 23.8 PG (ref 27–31)
MCHC RBC AUTO-ENTMCNC: 30.2 G/DL (ref 32–36)
MCV RBC AUTO: 79 FL (ref 82–98)
MICROSCOPIC COMMENT: ABNORMAL
MONOCYTES # BLD AUTO: 0.2 K/UL (ref 0.3–1)
MONOCYTES NFR BLD: 5.8 % (ref 4–15)
NEUTROPHILS # BLD AUTO: 2.7 K/UL (ref 1.8–7.7)
NEUTROPHILS NFR BLD: 68.6 % (ref 38–73)
NITRITE UR QL STRIP: NEGATIVE
NRBC BLD-RTO: 0 /100 WBC
PH UR STRIP: 5 [PH] (ref 5–8)
PHOSPHATE SERPL-MCNC: 2.7 MG/DL (ref 2.7–4.5)
PLATELET # BLD AUTO: 187 K/UL (ref 150–350)
PMV BLD AUTO: 11.7 FL (ref 9.2–12.9)
POTASSIUM SERPL-SCNC: 3.2 MMOL/L (ref 3.5–5.1)
PROT SERPL-MCNC: 7.1 G/DL (ref 6–8.4)
PROT UR QL STRIP: ABNORMAL
RBC # BLD AUTO: 5.16 M/UL (ref 4.6–6.2)
RBC #/AREA URNS HPF: 1 /HPF (ref 0–4)
SODIUM SERPL-SCNC: 137 MMOL/L (ref 136–145)
SP GR UR STRIP: >=1.03 (ref 1–1.03)
SQUAMOUS #/AREA URNS HPF: 2 /HPF
URN SPEC COLLECT METH UR: ABNORMAL
UROBILINOGEN UR STRIP-ACNC: ABNORMAL EU/DL
WBC # BLD AUTO: 3.97 K/UL (ref 3.9–12.7)
WBC #/AREA URNS HPF: 3 /HPF (ref 0–5)

## 2020-04-05 PROCEDURE — 63600175 PHARM REV CODE 636 W HCPCS: Performed by: INTERNAL MEDICINE

## 2020-04-05 PROCEDURE — 94761 N-INVAS EAR/PLS OXIMETRY MLT: CPT

## 2020-04-05 PROCEDURE — 83735 ASSAY OF MAGNESIUM: CPT

## 2020-04-05 PROCEDURE — 84100 ASSAY OF PHOSPHORUS: CPT

## 2020-04-05 PROCEDURE — 85025 COMPLETE CBC W/AUTO DIFF WBC: CPT

## 2020-04-05 PROCEDURE — 12000002 HC ACUTE/MED SURGE SEMI-PRIVATE ROOM

## 2020-04-05 PROCEDURE — 25000003 PHARM REV CODE 250: Performed by: INTERNAL MEDICINE

## 2020-04-05 PROCEDURE — 81000 URINALYSIS NONAUTO W/SCOPE: CPT

## 2020-04-05 PROCEDURE — 27000221 HC OXYGEN, UP TO 24 HOURS

## 2020-04-05 PROCEDURE — 36415 COLL VENOUS BLD VENIPUNCTURE: CPT

## 2020-04-05 PROCEDURE — 21400001 HC TELEMETRY ROOM

## 2020-04-05 PROCEDURE — 80053 COMPREHEN METABOLIC PANEL: CPT

## 2020-04-05 RX ADMIN — METOPROLOL TARTRATE 50 MG: 50 TABLET, FILM COATED ORAL at 09:04

## 2020-04-05 RX ADMIN — AMIODARONE HYDROCHLORIDE 200 MG: 200 TABLET ORAL at 09:04

## 2020-04-05 RX ADMIN — GABAPENTIN 300 MG: 300 CAPSULE ORAL at 09:04

## 2020-04-05 RX ADMIN — BUPROPION HYDROCHLORIDE 150 MG: 150 TABLET, EXTENDED RELEASE ORAL at 06:04

## 2020-04-05 RX ADMIN — HYDROCODONE BITARTRATE AND ACETAMINOPHEN 1 TABLET: 5; 325 TABLET ORAL at 09:04

## 2020-04-05 RX ADMIN — FUROSEMIDE 40 MG: 40 TABLET ORAL at 08:04

## 2020-04-05 RX ADMIN — METOPROLOL TARTRATE 50 MG: 50 TABLET, FILM COATED ORAL at 08:04

## 2020-04-05 RX ADMIN — GABAPENTIN 300 MG: 300 CAPSULE ORAL at 08:04

## 2020-04-05 RX ADMIN — ALPRAZOLAM 0.5 MG: 0.25 TABLET ORAL at 08:04

## 2020-04-05 RX ADMIN — CEFTRIAXONE 1 G: 1 INJECTION, SOLUTION INTRAVENOUS at 03:04

## 2020-04-05 RX ADMIN — ASPIRIN 81 MG: 81 TABLET, COATED ORAL at 09:04

## 2020-04-05 RX ADMIN — LISINOPRIL 20 MG: 10 TABLET ORAL at 09:04

## 2020-04-05 RX ADMIN — Medication 400 MG: at 08:04

## 2020-04-05 RX ADMIN — HYDRALAZINE HYDROCHLORIDE 10 MG: 10 TABLET ORAL at 08:04

## 2020-04-05 RX ADMIN — PRAVASTATIN SODIUM 80 MG: 40 TABLET ORAL at 09:04

## 2020-04-05 RX ADMIN — HYDRALAZINE HYDROCHLORIDE 10 MG: 10 TABLET ORAL at 09:04

## 2020-04-05 RX ADMIN — ALPRAZOLAM 0.5 MG: 0.25 TABLET ORAL at 09:04

## 2020-04-05 RX ADMIN — SPIRONOLACTONE 25 MG: 25 TABLET ORAL at 08:04

## 2020-04-05 RX ADMIN — SERTRALINE HYDROCHLORIDE 100 MG: 50 TABLET ORAL at 08:04

## 2020-04-05 RX ADMIN — DABIGATRAN ETEXILATE MESYLATE 75 MG: 75 CAPSULE ORAL at 09:04

## 2020-04-05 RX ADMIN — AMIODARONE HYDROCHLORIDE 200 MG: 200 TABLET ORAL at 08:04

## 2020-04-05 RX ADMIN — HYDROCODONE BITARTRATE AND ACETAMINOPHEN 1 TABLET: 5; 325 TABLET ORAL at 03:04

## 2020-04-05 RX ADMIN — LISINOPRIL 20 MG: 10 TABLET ORAL at 08:04

## 2020-04-05 RX ADMIN — HYDROCODONE BITARTRATE AND ACETAMINOPHEN 1 TABLET: 5; 325 TABLET ORAL at 10:04

## 2020-04-05 RX ADMIN — INSULIN DETEMIR 32 UNITS: 100 INJECTION, SOLUTION SUBCUTANEOUS at 08:04

## 2020-04-05 RX ADMIN — FUROSEMIDE 40 MG: 40 TABLET ORAL at 09:04

## 2020-04-05 RX ADMIN — AZITHROMYCIN MONOHYDRATE 500 MG: 500 INJECTION, POWDER, LYOPHILIZED, FOR SOLUTION INTRAVENOUS at 09:04

## 2020-04-05 RX ADMIN — DABIGATRAN ETEXILATE MESYLATE 75 MG: 75 CAPSULE ORAL at 08:04

## 2020-04-05 RX ADMIN — ROSUVASTATIN CALCIUM 250 MCG: 10 TABLET, FILM COATED ORAL at 08:04

## 2020-04-05 NOTE — RESPIRATORY THERAPY
04/04/20 1957   Patient Assessment/Suction   Level of Consciousness (AVPU) alert   PRE-TX-O2   O2 Device (Oxygen Therapy) nasal cannula   Flow (L/min) 2   Oxygen Concentration (%) 28   SpO2 (!) 92 %   Pulse Oximetry Type Continuous   Pulse 83   Resp 20   Ready to Wean/Extubation Screen   FIO2<=50 (chart decimal) 0.28

## 2020-04-05 NOTE — NURSING
Pt found on floor by nurse. Pt was attempting to go to the bathroom but slipped in his diarrhea. New abrasion noted @ L mid back, photo in chart. Pt did not hit his head, and denies any new pain. Nurse x4 to get pt off the floor & back to bed. monitoring

## 2020-04-05 NOTE — PROGRESS NOTES
[facility] Hospital Medicine  Progress Note    Patient Name: Jose Leon  MRN: [id number]  Patient Class: IP- Inpatient   Admission Date: 4/3/2020  Length of Stay: 2 days  Attending Physician: Jhony Palomo MD  Primary Care Provider: Josh Giordano MD        Subjective:     Principal Problem:Acute respiratory disease due to COVID-19 virus        HPI:  Patient is a 59-year-old morbidly obese male with past medical history significant for multiple medical problems including hypertension, hyperlipidemia, coronary artery disease status post coronary artery stent placement, obstructive sleep apnea (on CPAP), chronic combined systolic and diastolic congestive heart failure, history of TIA, long-term anticoagulation with Pradaxa and paroxysmal atrial fibrillation is being admitted to Hospital Medicine from [facility] Emergency Room under inpatient status for worsening shortness of breath and right lower quadrant abdominal pain.  Patient presented to the emergency room with complaint of profound generalized weakness associated with diarrhea and vomiting for 1 day.  If patient feels dizzy and has also been experiencing nonradiating right lower quadrant abdominal pain.  Patient denies any hematemesis, melena or bleeding per rectum.  No recent travel or sick contact history reported.  Patient'sCOVID 19 test is positive in the emergency room.  Patient was recently discharged from [facility].  In the emergency room patient was noted to be hypoxic with exertion and minimal level around 85%.  Presently requiring 3 L per minute supplemental oxygen via nasal cannula.        Overview/Hospital Course:  No notes on file    Interval History:  This morning patient had a fall while sitting up in bed eating breakfast.  Patient was not down long ended night any significant injury.  However while trying to lift patient he was incontinent of his bowels.  He was ultimately  cleaned up place past bed without issue.  At time of interview he was resting comfortably in no acute distress.    Review of Systems   Constitutional: Positive for appetite change and fatigue.   Respiratory: Positive for cough.    Gastrointestinal: Positive for abdominal pain, nausea and vomiting.   Neurological: Positive for dizziness and weakness.   All other systems reviewed and are negative.    Objective:     Vital Signs (Most Recent):  Temp: 97 °F (36.1 °C) (04/05/20 0029)  Pulse: 65 (04/05/20 0650)  Resp: 18 (04/05/20 0650)  BP: (!) 124/59 (04/05/20 0029)  SpO2: 95 % (04/05/20 0650) Vital Signs (24h Range):  Temp:  [97 °F (36.1 °C)-98.8 °F (37.1 °C)] 97 °F (36.1 °C)  Pulse:  [65-83] 65  Resp:  [18-20] 18  SpO2:  [92 %-95 %] 95 %  BP: (124-176)/(59-86) 124/59     Weight: 135.6 kg (299 lb)  Body mass index is 41.7 kg/m².    Intake/Output Summary (Last 24 hours) at 4/5/2020 1257  Last data filed at 4/5/2020 0230  Gross per 24 hour   Intake --   Output 200 ml   Net -200 ml      Physical Exam   Constitutional: He appears well-developed.   Morbidly obese male   HENT:   Head: Normocephalic and atraumatic.   Nose: Nose normal. No septal deviation.   Mouth/Throat: Oropharynx is clear and moist.   Eyes: Pupils are equal, round, and reactive to light. Conjunctivae are normal.   Neck: No JVD present. No tracheal tenderness present. No tracheal deviation present. No thyroid mass present.   Cardiovascular: Normal rate, regular rhythm, S1 normal and S2 normal. Exam reveals no gallop and no friction rub.   No murmur heard.  Pulmonary/Chest: Effort normal and breath sounds normal. He has no decreased breath sounds. He has no wheezes. He has no rhonchi. He has no rales.   Per Dr. Puentes, bilateral expiratory rhonchi.   Abdominal: Soft. Normal appearance and bowel sounds are normal. He exhibits no distension and no mass. There is no hepatosplenomegaly, splenomegaly or hepatomegaly. There is no tenderness.   Per Dr. Dhaval putnam  lower quadrant abdominal tenderness without any peritoneal sign, guarding or rigidity.   Neurological: He is alert. He has normal strength. No cranial nerve deficit or sensory deficit.   Grossly nonfocal   Skin: No rash noted. No cyanosis.   Nursing note and vitals reviewed.      Significant Labs:   Recent Lab Results       04/05/20  0354   04/04/20  1510        Influenza A, Molecular   Negative     Influenza B, Molecular   Negative     Appearance, UA Clear       Bacteria, UA Rare       Bilirubin (UA) Negative       Color, UA Yellow       Flu A & B Source   Nasal swab     Glucose, UA Negative       Hyaline Casts, UA 3       Ketones, UA Negative       Leukocytes, UA Negative       Microscopic Comment SEE COMMENT  Comment:  Other formed elements not mentioned in the report are not   present in the microscopic examination.          NITRITE UA Negative       Occult Blood UA Trace       pH, UA 5.0       Protein, UA 2+  Comment:  Recommend a 24 hour urine protein or a urine   protein/creatinine ratio if globulin induced proteinuria is  clinically suspected.         RBC, UA 1       Specific Gravity, UA >=1.030       Specimen UA Urine, Clean Catch       Squam Epithel, UA 2       UROBILINOGEN UA 2.0-3.0       WBC, UA 3             Significant Imaging: I have reviewed and interpreted all pertinent imaging results/findings within the past 24 hours.     THIS WAS A TELEMEDICINE ENCOUNTER FOR A PATIENT WITH NOVEL CORONAVIRUS, OF WHICH THE WORLD IS IN A PANDEMIC.  INTERVIEW OF PATIENT (IF PERFORMED) AND VISUAL INSPECTION OF PATIENT DONE BY VIDEO CONFERENCE.  I DID NOT EXAMINE PATIENT AT BEDSIDE.      Assessment/Plan:      * Acute respiratory disease due to COVID-19 virus  Supplemental O2 via nasal canula; titrate O2 saturation to >92%.   Continue beta 2 agonist bronchodilator treatments.   Continue IV antibiotics - Azithromycin and Ceftriaxone.  Check sputum GS and Cx.   Continue routine medications as before.   Follow air  bone/contact isolations per protocol.            Right lower quadrant abdominal pain  Follow CT abdomen results.  Continue supportive care with intravenous narcotics and antiemetics as needed.      Type 2 DM On Insulin  Check blood glucose level q AC/HS.  Use Novolog Insulin Sliding Scale as needed.   Continue American Diabetic Association 1800 Kcal diet.        1 PPD X 25+ YRs Chronic Tobacco Use Disorder  Smoking cessation counseling performed. Dangers of cigarette smoking were reviewed with patient in detail and patient was encouraged to quit. Nicotine replacement options were discussed for > 3 minutes.        Anxiety And Depression  Continue Xanax and Wellbutrin use as before      Stage 2 CKD  Monitor BUN/SCr.  Monitor I/Os.  Monitor electrolytes.  Avoid non-steroidal anti-inflammatory medications.          Hypertension  Chronic problem. Will continue chronic medications and monitor for any changes, adjusting as needed.          Atrial Fibrillation With H/O RVR  Continue Pradaxa and Pacerone as before      Coronary Artery Disease With H/O Stenting  Patient with known CAD s/p CABG. Will continue and monitor for S/Sx of angina/ACS. Continue to monitor on telemetry.        JULY on CPAP  Continue supplemental oxygen to maintain pulse ox above 92%.  Unable to use CPAP per hospital policy at this time due to COVID-19.      Morbid obesity  Body mass index is 41.7 kg/m². Morbid obesity complicates all aspects of disease management from diagnostic modalities to treatment. Weight loss encouraged and health benefits explained to patient.            VTE Risk Mitigation (From admission, onward)         Ordered     dabigatran etexilate capsule 75 mg  2 times daily      04/03/20 1813     IP VTE HIGH RISK PATIENT  Once      04/03/20 1813     Place OMI hose  Until discontinued      04/03/20 1813     Place sequential compression device  Until discontinued      04/03/20 1813                      Fredy Teran,  MD  Department of Hospital Medicine   [facility]

## 2020-04-05 NOTE — SUBJECTIVE & OBJECTIVE
Interval History:  This morning patient had a fall while sitting up in bed eating breakfast.  Patient was not down long ended night any significant injury.  However while trying to lift patient he was incontinent of his bowels.  He was ultimately cleaned up place past bed without issue.  At time of interview he was resting comfortably in no acute distress.    Review of Systems   Constitutional: Positive for appetite change and fatigue.   Respiratory: Positive for cough.    Gastrointestinal: Positive for abdominal pain, nausea and vomiting.   Neurological: Positive for dizziness and weakness.   All other systems reviewed and are negative.    Objective:     Vital Signs (Most Recent):  Temp: 97 °F (36.1 °C) (04/05/20 0029)  Pulse: 65 (04/05/20 0650)  Resp: 18 (04/05/20 0650)  BP: (!) 124/59 (04/05/20 0029)  SpO2: 95 % (04/05/20 0650) Vital Signs (24h Range):  Temp:  [97 °F (36.1 °C)-98.8 °F (37.1 °C)] 97 °F (36.1 °C)  Pulse:  [65-83] 65  Resp:  [18-20] 18  SpO2:  [92 %-95 %] 95 %  BP: (124-176)/(59-86) 124/59     Weight: 135.6 kg (299 lb)  Body mass index is 41.7 kg/m².    Intake/Output Summary (Last 24 hours) at 4/5/2020 1257  Last data filed at 4/5/2020 0230  Gross per 24 hour   Intake --   Output 200 ml   Net -200 ml      Physical Exam   Constitutional: He appears well-developed.   Morbidly obese male   HENT:   Head: Normocephalic and atraumatic.   Nose: Nose normal. No septal deviation.   Mouth/Throat: Oropharynx is clear and moist.   Eyes: Pupils are equal, round, and reactive to light. Conjunctivae are normal.   Neck: No JVD present. No tracheal tenderness present. No tracheal deviation present. No thyroid mass present.   Cardiovascular: Normal rate, regular rhythm, S1 normal and S2 normal. Exam reveals no gallop and no friction rub.   No murmur heard.  Pulmonary/Chest: Effort normal and breath sounds normal. He has no decreased breath sounds. He has no wheezes. He has no rhonchi. He has no rales.   Per Dr. Puentes  bilateral expiratory rhonchi.   Abdominal: Soft. Normal appearance and bowel sounds are normal. He exhibits no distension and no mass. There is no hepatosplenomegaly, splenomegaly or hepatomegaly. There is no tenderness.   Per Dr. Puentes right lower quadrant abdominal tenderness without any peritoneal sign, guarding or rigidity.   Neurological: He is alert. He has normal strength. No cranial nerve deficit or sensory deficit.   Grossly nonfocal   Skin: No rash noted. No cyanosis.   Nursing note and vitals reviewed.      Significant Labs:   Recent Lab Results       04/05/20  0354   04/04/20  1510        Influenza A, Molecular   Negative     Influenza B, Molecular   Negative     Appearance, UA Clear       Bacteria, UA Rare       Bilirubin (UA) Negative       Color, UA Yellow       Flu A & B Source   Nasal swab     Glucose, UA Negative       Hyaline Casts, UA 3       Ketones, UA Negative       Leukocytes, UA Negative       Microscopic Comment SEE COMMENT  Comment:  Other formed elements not mentioned in the report are not   present in the microscopic examination.          NITRITE UA Negative       Occult Blood UA Trace       pH, UA 5.0       Protein, UA 2+  Comment:  Recommend a 24 hour urine protein or a urine   protein/creatinine ratio if globulin induced proteinuria is  clinically suspected.         RBC, UA 1       Specific Gravity, UA >=1.030       Specimen UA Urine, Clean Catch       Squam Epithel, UA 2       UROBILINOGEN UA 2.0-3.0       WBC, UA 3             Significant Imaging: I have reviewed and interpreted all pertinent imaging results/findings within the past 24 hours.     THIS WAS A TELEMEDICINE ENCOUNTER FOR A PATIENT WITH NOVEL CORONAVIRUS, OF WHICH THE WORLD IS IN A PANDEMIC.  INTERVIEW OF PATIENT (IF PERFORMED) AND VISUAL INSPECTION OF PATIENT DONE BY VIDEO CONFERENCE.  I DID NOT EXAMINE PATIENT AT BEDSIDE.

## 2020-04-05 NOTE — RESPIRATORY THERAPY
04/05/20 0650   Patient Assessment/Suction   Level of Consciousness (AVPU) alert   PRE-TX-O2   O2 Device (Oxygen Therapy) nasal cannula   $ Is the patient on Low Flow Oxygen? Yes   Flow (L/min) 2   SpO2 95 %   Pulse Oximetry Type Continuous   $ Pulse Oximetry - Multiple Charge Pulse Oximetry - Multiple   Pulse 65   Resp 18

## 2020-04-05 NOTE — PLAN OF CARE
"I competed the assessment with the pts ana Leon [phone] and he added his sister to the emergency contact sheet, Ayesha Leon [phone]. He stated that the pt was admitted to [facility] in January for a stroke and discharged to [facility]. He stated that , " although he had a month left to to stay there with medicaid paying, they discharged him home due to not progressing with therapy." He stated that the pt has been home for 2 days and the  nurse told him that he should not of been sent home. He does not recall the name of the current  company. The pt has a home wheelchair but no walker. He stated that the pt is bedbound and not able to walk unless he has an assisted device. He stated that he had his cousin Fede staying with the pt at his home however he will likely not be able to continue staying there with the pt being covid 19 positive and the pt is not able to be at home alone. He confirmed that they use [facility] and the pts PCP is Dr. Jv Smith. He has [employer] connections insurance . At this time the pts discharge plan is unclear and CM will continue to follow. Lisa Glass, [facility]     04/05/20 105   Discharge Assessment   Assessment Type Discharge Planning Assessment   Confirmed/corrected address and phone number on facesheet? Yes   Assessment information obtained from? Caregiver   Communicated expected length of stay with patient/caregiver no   Prior to hospitilization cognitive status: Unable to Assess   Prior to hospitalization functional status: Assistive Equipment   Current cognitive status: Unable to Assess   Current Functional Status: Assistive Equipment   Lives With alone   Able to Return to Prior Arrangements no   Is patient able to care for self after discharge? Yes   Who are your caregiver(s) and their phone number(s)? SonKhadar Zaman [phone] and pritesh Anders [phone]   Readmission Within the Last 30 Days no previous admission in last 30 days "   Patient currently being followed by outpatient case management? No   Patient currently receives any other outside agency services? Yes   Equipment Currently Used at Home CPAP;wheelchair   Do you have any problems affording any of your prescribed medications? No   Is the patient taking medications as prescribed? yes   Does the patient have transportation home? Yes   Transportation Anticipated family or friend will provide   Does the patient receive services at the [facility]? No   Discharge Plan A Home with family;Home Health   DME Needed Upon Discharge  none   Patient/Family in Agreement with Plan unable to assess

## 2020-04-06 PROBLEM — R41.0 ACUTE DELIRIUM: Status: ACTIVE | Noted: 2020-04-06

## 2020-04-06 PROBLEM — R10.31 RIGHT LOWER QUADRANT ABDOMINAL PAIN: Status: RESOLVED | Noted: 2020-04-03 | Resolved: 2020-04-06

## 2020-04-06 LAB — POCT GLUCOSE: 142 MG/DL (ref 70–110)

## 2020-04-06 PROCEDURE — 12000002 HC ACUTE/MED SURGE SEMI-PRIVATE ROOM

## 2020-04-06 PROCEDURE — 27000221 HC OXYGEN, UP TO 24 HOURS

## 2020-04-06 PROCEDURE — 25000003 PHARM REV CODE 250: Performed by: INTERNAL MEDICINE

## 2020-04-06 PROCEDURE — 63600175 PHARM REV CODE 636 W HCPCS: Performed by: INTERNAL MEDICINE

## 2020-04-06 PROCEDURE — 63700000 PHARM REV CODE 250 ALT 637 W/O HCPCS: Performed by: INTERNAL MEDICINE

## 2020-04-06 PROCEDURE — 21400001 HC TELEMETRY ROOM

## 2020-04-06 PROCEDURE — 94761 N-INVAS EAR/PLS OXIMETRY MLT: CPT

## 2020-04-06 RX ADMIN — POTASSIUM CHLORIDE 40 MEQ: 20 SOLUTION ORAL at 08:04

## 2020-04-06 RX ADMIN — ROSUVASTATIN CALCIUM 250 MCG: 10 TABLET, FILM COATED ORAL at 08:04

## 2020-04-06 RX ADMIN — POTASSIUM CHLORIDE 40 MEQ: 20 SOLUTION ORAL at 11:04

## 2020-04-06 RX ADMIN — GABAPENTIN 300 MG: 300 CAPSULE ORAL at 08:04

## 2020-04-06 RX ADMIN — PRAVASTATIN SODIUM 80 MG: 40 TABLET ORAL at 08:04

## 2020-04-06 RX ADMIN — HYDRALAZINE HYDROCHLORIDE 10 MG: 10 TABLET ORAL at 08:04

## 2020-04-06 RX ADMIN — BUPROPION HYDROCHLORIDE 150 MG: 150 TABLET, EXTENDED RELEASE ORAL at 06:04

## 2020-04-06 RX ADMIN — ALPRAZOLAM 0.5 MG: 0.25 TABLET ORAL at 08:04

## 2020-04-06 RX ADMIN — SERTRALINE HYDROCHLORIDE 100 MG: 50 TABLET ORAL at 08:04

## 2020-04-06 RX ADMIN — FUROSEMIDE 40 MG: 40 TABLET ORAL at 08:04

## 2020-04-06 RX ADMIN — ASPIRIN 81 MG: 81 TABLET, COATED ORAL at 08:04

## 2020-04-06 RX ADMIN — AMIODARONE HYDROCHLORIDE 200 MG: 200 TABLET ORAL at 08:04

## 2020-04-06 RX ADMIN — HYDROCODONE BITARTRATE AND ACETAMINOPHEN 1 TABLET: 5; 325 TABLET ORAL at 08:04

## 2020-04-06 RX ADMIN — SPIRONOLACTONE 25 MG: 25 TABLET ORAL at 08:04

## 2020-04-06 RX ADMIN — Medication 400 MG: at 08:04

## 2020-04-06 RX ADMIN — METOPROLOL TARTRATE 50 MG: 50 TABLET, FILM COATED ORAL at 08:04

## 2020-04-06 RX ADMIN — LISINOPRIL 20 MG: 10 TABLET ORAL at 08:04

## 2020-04-06 RX ADMIN — INSULIN DETEMIR 32 UNITS: 100 INJECTION, SOLUTION SUBCUTANEOUS at 08:04

## 2020-04-06 RX ADMIN — DABIGATRAN ETEXILATE MESYLATE 75 MG: 75 CAPSULE ORAL at 08:04

## 2020-04-06 RX ADMIN — CEFTRIAXONE 1 G: 1 INJECTION, SOLUTION INTRAVENOUS at 03:04

## 2020-04-06 RX ADMIN — AZITHROMYCIN MONOHYDRATE 500 MG: 500 INJECTION, POWDER, LYOPHILIZED, FOR SOLUTION INTRAVENOUS at 08:04

## 2020-04-06 NOTE — PLAN OF CARE
Patient AAO, VSS. PIV CDI/ Infusing IV antbx.  COVID care measures taken. Telemetry monitoring in place. Continuous Pulse ox in place. Pt remains on Airborne/Droplet/Contact precautions. Pt c/o pain PRN meds given for resolve. Pt on 2L NC. Pt intermittently confused/Avasys Tele sitter in place. Pt verbalized understanding of POC. Purposeful hourly/q2hr rounding done during shift to promote patient safety. Patient free from falls and injury during shift.  Bed in lowest position, brakes locked, and call light within reach.  Will continue to monitor.

## 2020-04-06 NOTE — RESPIRATORY THERAPY
04/06/20 0718   Patient Assessment/Suction   Level of Consciousness (AVPU) alert   All Lung Fields Breath Sounds diminished   PRE-TX-O2   O2 Device (Oxygen Therapy) nasal cannula   $ Is the patient on Low Flow Oxygen? Yes   Flow (L/min) 3   SpO2 96 %   Pulse Oximetry Type Continuous   $ Pulse Oximetry - Multiple Charge Pulse Oximetry - Multiple   Pulse 66   Resp 20

## 2020-04-07 LAB
ALBUMIN SERPL BCP-MCNC: 2.7 G/DL (ref 3.5–5.2)
ALP SERPL-CCNC: 124 U/L (ref 55–135)
ALT SERPL W/O P-5'-P-CCNC: 25 U/L (ref 10–44)
ANION GAP SERPL CALC-SCNC: 11 MMOL/L (ref 8–16)
AST SERPL-CCNC: 27 U/L (ref 10–40)
BASOPHILS # BLD AUTO: 0.01 K/UL (ref 0–0.2)
BASOPHILS NFR BLD: 0.2 % (ref 0–1.9)
BILIRUB SERPL-MCNC: 0.5 MG/DL (ref 0.1–1)
BUN SERPL-MCNC: 14 MG/DL (ref 6–20)
CALCIUM SERPL-MCNC: 8.4 MG/DL (ref 8.7–10.5)
CHLORIDE SERPL-SCNC: 98 MMOL/L (ref 95–110)
CO2 SERPL-SCNC: 27 MMOL/L (ref 23–29)
CREAT SERPL-MCNC: 0.9 MG/DL (ref 0.5–1.4)
DIFFERENTIAL METHOD: ABNORMAL
EOSINOPHIL # BLD AUTO: 0 K/UL (ref 0–0.5)
EOSINOPHIL NFR BLD: 0.9 % (ref 0–8)
ERYTHROCYTE [DISTWIDTH] IN BLOOD BY AUTOMATED COUNT: 14.8 % (ref 11.5–14.5)
EST. GFR  (AFRICAN AMERICAN): >60 ML/MIN/1.73 M^2
EST. GFR  (NON AFRICAN AMERICAN): >60 ML/MIN/1.73 M^2
GLUCOSE SERPL-MCNC: 96 MG/DL (ref 70–110)
HCT VFR BLD AUTO: 39.9 % (ref 40–54)
HGB BLD-MCNC: 12.1 G/DL (ref 14–18)
IMM GRANULOCYTES # BLD AUTO: 0.02 K/UL (ref 0–0.04)
IMM GRANULOCYTES NFR BLD AUTO: 0.5 % (ref 0–0.5)
LYMPHOCYTES # BLD AUTO: 0.5 K/UL (ref 1–4.8)
LYMPHOCYTES NFR BLD: 12.2 % (ref 18–48)
MAGNESIUM SERPL-MCNC: 1.7 MG/DL (ref 1.6–2.6)
MCH RBC QN AUTO: 23.6 PG (ref 27–31)
MCHC RBC AUTO-ENTMCNC: 30.3 G/DL (ref 32–36)
MCV RBC AUTO: 78 FL (ref 82–98)
MONOCYTES # BLD AUTO: 0.2 K/UL (ref 0.3–1)
MONOCYTES NFR BLD: 5.1 % (ref 4–15)
NEUTROPHILS # BLD AUTO: 3.5 K/UL (ref 1.8–7.7)
NEUTROPHILS NFR BLD: 81.1 % (ref 38–73)
NRBC BLD-RTO: 0 /100 WBC
PHOSPHATE SERPL-MCNC: 2.3 MG/DL (ref 2.7–4.5)
PLATELET # BLD AUTO: 222 K/UL (ref 150–350)
PMV BLD AUTO: 11.3 FL (ref 9.2–12.9)
POCT GLUCOSE: 101 MG/DL (ref 70–110)
POCT GLUCOSE: 125 MG/DL (ref 70–110)
POTASSIUM SERPL-SCNC: 3.5 MMOL/L (ref 3.5–5.1)
PROT SERPL-MCNC: 6.8 G/DL (ref 6–8.4)
RBC # BLD AUTO: 5.13 M/UL (ref 4.6–6.2)
SODIUM SERPL-SCNC: 136 MMOL/L (ref 136–145)
WBC # BLD AUTO: 4.34 K/UL (ref 3.9–12.7)

## 2020-04-07 PROCEDURE — 97161 PT EVAL LOW COMPLEX 20 MIN: CPT

## 2020-04-07 PROCEDURE — 25000003 PHARM REV CODE 250: Performed by: INTERNAL MEDICINE

## 2020-04-07 PROCEDURE — 63600175 PHARM REV CODE 636 W HCPCS: Performed by: INTERNAL MEDICINE

## 2020-04-07 PROCEDURE — 97535 SELF CARE MNGMENT TRAINING: CPT

## 2020-04-07 PROCEDURE — 25000242 PHARM REV CODE 250 ALT 637 W/ HCPCS: Performed by: HOSPITALIST

## 2020-04-07 PROCEDURE — 27000221 HC OXYGEN, UP TO 24 HOURS

## 2020-04-07 PROCEDURE — 21400001 HC TELEMETRY ROOM

## 2020-04-07 PROCEDURE — 97166 OT EVAL MOD COMPLEX 45 MIN: CPT

## 2020-04-07 PROCEDURE — 97530 THERAPEUTIC ACTIVITIES: CPT

## 2020-04-07 PROCEDURE — 94640 AIRWAY INHALATION TREATMENT: CPT

## 2020-04-07 PROCEDURE — 25000003 PHARM REV CODE 250: Performed by: HOSPITALIST

## 2020-04-07 PROCEDURE — 84100 ASSAY OF PHOSPHORUS: CPT

## 2020-04-07 PROCEDURE — 83735 ASSAY OF MAGNESIUM: CPT

## 2020-04-07 PROCEDURE — 85025 COMPLETE CBC W/AUTO DIFF WBC: CPT

## 2020-04-07 PROCEDURE — 36415 COLL VENOUS BLD VENIPUNCTURE: CPT

## 2020-04-07 PROCEDURE — 94761 N-INVAS EAR/PLS OXIMETRY MLT: CPT

## 2020-04-07 PROCEDURE — 12000002 HC ACUTE/MED SURGE SEMI-PRIVATE ROOM

## 2020-04-07 PROCEDURE — 80053 COMPREHEN METABOLIC PANEL: CPT

## 2020-04-07 RX ORDER — ALBUTEROL SULFATE 90 UG/1
2 AEROSOL, METERED RESPIRATORY (INHALATION) EVERY 8 HOURS
Status: DISCONTINUED | OUTPATIENT
Start: 2020-04-07 | End: 2020-04-08

## 2020-04-07 RX ORDER — ACETAMINOPHEN 500 MG
1000 TABLET ORAL EVERY 6 HOURS PRN
Status: DISCONTINUED | OUTPATIENT
Start: 2020-04-07 | End: 2020-04-22 | Stop reason: HOSPADM

## 2020-04-07 RX ORDER — INSULIN ASPART 100 [IU]/ML
7 INJECTION, SOLUTION INTRAVENOUS; SUBCUTANEOUS
Status: DISCONTINUED | OUTPATIENT
Start: 2020-04-08 | End: 2020-04-08

## 2020-04-07 RX ADMIN — ALBUTEROL SULFATE 2 PUFF: 90 AEROSOL, METERED RESPIRATORY (INHALATION) at 02:04

## 2020-04-07 RX ADMIN — GABAPENTIN 300 MG: 300 CAPSULE ORAL at 08:04

## 2020-04-07 RX ADMIN — CEFTRIAXONE 1 G: 1 INJECTION, SOLUTION INTRAVENOUS at 04:04

## 2020-04-07 RX ADMIN — FUROSEMIDE 40 MG: 40 TABLET ORAL at 08:04

## 2020-04-07 RX ADMIN — DABIGATRAN ETEXILATE MESYLATE 75 MG: 75 CAPSULE ORAL at 09:04

## 2020-04-07 RX ADMIN — GABAPENTIN 300 MG: 300 CAPSULE ORAL at 09:04

## 2020-04-07 RX ADMIN — AMIODARONE HYDROCHLORIDE 200 MG: 200 TABLET ORAL at 09:04

## 2020-04-07 RX ADMIN — INSULIN DETEMIR 32 UNITS: 100 INJECTION, SOLUTION SUBCUTANEOUS at 09:04

## 2020-04-07 RX ADMIN — HYDRALAZINE HYDROCHLORIDE 10 MG: 10 TABLET ORAL at 09:04

## 2020-04-07 RX ADMIN — ROSUVASTATIN CALCIUM 250 MCG: 10 TABLET, FILM COATED ORAL at 09:04

## 2020-04-07 RX ADMIN — SPIRONOLACTONE 25 MG: 25 TABLET ORAL at 09:04

## 2020-04-07 RX ADMIN — FUROSEMIDE 40 MG: 40 TABLET ORAL at 09:04

## 2020-04-07 RX ADMIN — SERTRALINE HYDROCHLORIDE 100 MG: 50 TABLET ORAL at 09:04

## 2020-04-07 RX ADMIN — Medication 400 MG: at 09:04

## 2020-04-07 RX ADMIN — LISINOPRIL 20 MG: 10 TABLET ORAL at 08:04

## 2020-04-07 RX ADMIN — ALPRAZOLAM 0.5 MG: 0.25 TABLET ORAL at 08:04

## 2020-04-07 RX ADMIN — AZITHROMYCIN MONOHYDRATE 500 MG: 500 INJECTION, POWDER, LYOPHILIZED, FOR SOLUTION INTRAVENOUS at 08:04

## 2020-04-07 RX ADMIN — AMIODARONE HYDROCHLORIDE 200 MG: 200 TABLET ORAL at 08:04

## 2020-04-07 RX ADMIN — LISINOPRIL 20 MG: 10 TABLET ORAL at 09:04

## 2020-04-07 RX ADMIN — DABIGATRAN ETEXILATE MESYLATE 75 MG: 75 CAPSULE ORAL at 08:04

## 2020-04-07 RX ADMIN — ALPRAZOLAM 0.5 MG: 0.25 TABLET ORAL at 09:04

## 2020-04-07 RX ADMIN — ALBUTEROL SULFATE 2 PUFF: 90 AEROSOL, METERED RESPIRATORY (INHALATION) at 11:04

## 2020-04-07 RX ADMIN — ACETAMINOPHEN 1000 MG: 500 TABLET ORAL at 05:04

## 2020-04-07 RX ADMIN — ASPIRIN 81 MG: 81 TABLET, COATED ORAL at 08:04

## 2020-04-07 RX ADMIN — METOPROLOL TARTRATE 50 MG: 50 TABLET, FILM COATED ORAL at 08:04

## 2020-04-07 RX ADMIN — METOPROLOL TARTRATE 50 MG: 50 TABLET, FILM COATED ORAL at 09:04

## 2020-04-07 RX ADMIN — HYDRALAZINE HYDROCHLORIDE 10 MG: 10 TABLET ORAL at 08:04

## 2020-04-07 RX ADMIN — BUPROPION HYDROCHLORIDE 150 MG: 150 TABLET, EXTENDED RELEASE ORAL at 05:04

## 2020-04-07 RX ADMIN — PRAVASTATIN SODIUM 80 MG: 40 TABLET ORAL at 08:04

## 2020-04-07 NOTE — ASSESSMENT & PLAN NOTE
Patient's FSGs are controlled on current hypoglycemics.   Last A1c reviewed-   Lab Results   Component Value Date    HGBA1C 7.8 (H) 04/03/2018     Most recent fingerstick glucose reviewed- No results for input(s): POCTGLUCOSE in the last 24 hours.  Current correctional scale  Medium  Maintain anti-hyperglycemic dose as follows-   Antihyperglycemics (From admission, onward)    Start     Stop Route Frequency Ordered    04/04/20 0900  insulin detemir U-100 pen 32 Units      -- SubQ Daily 04/03/20 1813    04/03/20 1813  insulin aspart U-100 pen 0-5 Units      -- SubQ Before meals & nightly PRN 04/03/20 1813        Hold Oral hypoglycemics while patient is in the hospital.

## 2020-04-07 NOTE — PT/OT/SLP EVAL
Physical Therapy Evaluation    Patient Name:  Jose Leon   MRN:  [id number]    Recommendations:     Discharge Recommendations:  home, home health PT, nursing facility, skilled   Discharge Equipment Recommendations: (unclear at this time)   Barriers to discharge: patient may need admission to facility for rehab prior to DC home dependenting on functional progress in hospital    Assessment:     Jose Leon is a 59 y.o. male admitted with a medical diagnosis of Acute respiratory disease due to COVID-19 virus.  He presents with the following impairments/functional limitations:  impaired balance, impaired functional mobilty, gait instability, decreased lower extremity function, decreased coordination, impaired endurance, weakness .  Patient agreeable to PT evaluation but did C/O having extreme chills during all of treatment.  Patient presented supine in bed and required max assist to transfer supine to sit and mod assist to stand from sitting.  Patient required constant mod assist to maintain sitting and prevent from falling back into the bed.  Patient may need admission to facility for rehab prior to DC home.    Rehab Prognosis: Fair; patient would benefit from acute skilled PT services to address these deficits and reach maximum level of function.    Recent Surgery: * No surgery found *      Plan:     During this hospitalization, patient to be seen 5 x/week to address the identified rehab impairments via gait training, therapeutic activities, therapeutic exercises and progress toward the following goals:    · Plan of Care Expires:  05/05/20    Subjective     Chief Complaint: chills  Patient/Family Comments/goals: go home  Pain/Comfort:  ·      Patients cultural, spiritual, Sikhism conflicts given the current situation:      Living Environment:  Lives with nephew in [address] [place] home with 3 steps to enter.  Prior to admission, patients level of function was mod I with rollator prior to CVA in 01/2020 but non  ambulatory and required assist with ADL post stroke.  Equipment used at home: walker, rolling.  DME owned (not currently used): none.  Upon discharge, patient will have assistance from family..    Objective:     Communicated with nurse prior to session.  Patient found supine with bed alarm, oxygen, telemetry  upon PT entry to room.    General Precautions: Standard, airborne, contact, fall, droplet   Orthopedic Precautions:    Braces:       Exams:  · RLE ROM: WFL  · RLE Strength: Deficits: 4-/5 overall  · LLE ROM: WFL  · LLE Strength: Deficits: 3-/5 overall    Functional Mobility:  · Bed Mobility:     · Supine to Sit: maximal assistance  · Sit to Supine: maximal assistance  · Transfers:     · Sit to Stand:  moderate assistance with rolling walker  · Gait: non ambulatory currently      Therapeutic Activities and Exercises:   none given    AM-PAC 6 CLICK MOBILITY  Total Score:11     Patient left supine with call button in reach, bed alarm on and nurse notified.    GOALS:   Multidisciplinary Problems     Physical Therapy Goals        Problem: Physical Therapy Goal    Goal Priority Disciplines Outcome Goal Variances Interventions   Physical Therapy Goal     PT, PT/OT Ongoing, Progressing     Description:  Goals to be met by: 2020    Patient will increase functional independence with mobility by performin. Supine to sit with Stand-by Assistance  2. Sit to supine with Stand-by Assistance  3. Sit to stand transfer with Stand-by Assistance  4. Bed to chair transfer with Contact Guard Assistance using Rolling Walker  5. Gait  x 50 feet with Minimal Assistance using Rolling Walker.                       History:     Past Medical History:   Diagnosis Date    a A H/O Medical Noncompliance     H/O Chronic Noncompliance With His CHF Diet    a Cardiac Diastolic Dysfunction     Dr. Aure Washington    a Chronic Anticoagulation With Pradaxa     Dr. Aure Washington    a Coronary Artery Disease With H/O Stenting     Dr. Looney  "Padmini; Was Hospitalized At [facility] 3/8/17-3/17/17 For CHF Exacerbatioin Due To "Dietary Discrepancies" With LCST Negative There    a Nonsustained Ventricular Tachycardia (NSVT)     a Paroxysmal Atrial Fibrillation With H/O RVR     Dr. Aure Washington; On Chronic Eliquis    a Syncopal Episode     [facility] 4/3/17-4/7/17 Stay For This: Was Likely Due To NSVT, And His Medications Were Adjusted    a Systolic CHF With EF 35-45%     Dr. Aure Washington; Was Hospitalized At [facility] 3/8/17-3/17/17 For CHF Exacerbatioin Due To "Dietary Discrepancies" With LCST Negative There    b Hypertension     b Proteinuria     04/2014 Referred To Dr. Leroy Allison; 4/1/14 Bilateral Renal U/S = Normal; On Lisinopril 20 Mg Daily    b Stage 2 CKD     c Hypercholesterolemia With Low HDL     d Type 2 DM On Insulin     ** 12/4/18 Referred To [facility] EDU; 1/11/18 Referred To Dr. Dipika Rodriguez And Re-Referred To [facility]; 12/28/17 HgA1c = 12.0;" 7/5/17 Referred To [facility] EDU    f Morbid Obesity     i 1 PPD X 25+ YRs Chronic Tobacco Use Disorder     7/5/17 Increased Wellbutrin-XL To 300 Mg Daily; 6/8/17 RXd Wellbutrin- Mg Daily X 4 Months    i JULY On CPAP     Dr. Marion ngo Chronic Left Groin Pain     l Chronic Left Shoulder Pain     Dr. MARTINA martinez Chronic Recurrent Low Back Pain 12/04/2018    Dr. Llamas Is His Pain Management Neurologist; 5/219/18 Referred To Dr. Ismael martinez Left 5-7th Rib FXs 04/2016 4/23/16 [facility] Left Rib XRays = Questionable Nondisplaced Left 5-7th Rib FXs With Normal Lung Fields    l Right Shouder SX 5/26/16 Due To Work Related Injury     Dr[facility] Mora [facility]; Dr. MARTINA hatch H/O Transient Ischemic Attack In 2013     n Anxiety And Depression 12/04/2018    RTC In 6 Weeks; 12/4/18 Added Wellbutrin- Mg qAM; 5/29/18 Increased 100 Mg Zoloft To 100 Mg Bid    n Continuous Benzodiazepine (Xanax) Use 12/04/2018 5/29/18 I Am Weaning Him Off Of This By Decreasing 1 Mg Bid To 0.5 Mg Bid PRN, And " Will Wean Further Next OV    n H/O ETOH Abuse, Quit In 09/2014     q Bilateral Lower Extremity Venous Stasis Ulcers     2/28/18 [facility]    q Chronic Bilateral Lower Extremity Edema     2/28/18 Added Metolazone 10 Mg qAM On MWF And Referred Back To Dr. Washington; On Lasix 40 Mg Bid; He Wears Bilateral Compressin Hose Stockings    q Disability Examination 7/15/16     For CHF, PAF, DM2, And JULY On CPAP    Wellness Visit 11/6/2017        Past Surgical History:   Procedure Laterality Date    CARDIAC SURGERY      coronary stent    COLONOSCOPY N/A 12/19/2017    Procedure: COLONOSCOPY;  Surgeon: Dave Allen MD;  [facility];  Service: Endoscopy;  Laterality: N/A;    DIABETES MANAGEMENT LABS      heart stent      INCISION AND DRAINAGE OF WOUND      on stomach    SHOULDER SURGERY         Time Tracking:     PT Received On: 04/07/20  PT Start Time: 1405     PT Stop Time: 1421  PT Total Time (min): 16 min     Billable Minutes: Evaluation 16      Chris Megilligan, PT  04/07/2020

## 2020-04-07 NOTE — ASSESSMENT & PLAN NOTE
Creatine stable for now. BMP reviewed- noted Estimated Creatinine Clearance: 111.8 mL/min (based on SCr of 1 mg/dL). according to latest data. Monitor UOP and serial BMP and adjust therapy as needed. Renally dose meds.

## 2020-04-07 NOTE — PLAN OF CARE
Problem: Occupational Therapy Goal  Goal: Occupational Therapy Goal  Description  Goals to be met by: 5/5/2020     Patient will increase functional independence with ADLs by performing:    LE Dressing with Contact Guard Assistance and Assistive Devices as needed.  Grooming while EOB with Stand-by Assistance.  Toileting from toilet with Minimal Assistance for hygiene and clothing management.   Supine to sit with Minimal Assistance.  Toilet transfer to bedside commode with Minimal Assistance.  Upper extremity exercise program x10 reps per handout, with supervision.     Outcome: Ongoing, Progressing

## 2020-04-07 NOTE — PLAN OF CARE
Problem: Physical Therapy Goal  Goal: Physical Therapy Goal  Description  Goals to be met by: 2020    Patient will increase functional independence with mobility by performin. Supine to sit with Stand-by Assistance  2. Sit to supine with Stand-by Assistance  3. Sit to stand transfer with Stand-by Assistance  4. Bed to chair transfer with Contact Guard Assistance using Rolling Walker  5. Gait  x 50 feet with Minimal Assistance using Rolling Walker.      Outcome: Ongoing, Progressing

## 2020-04-07 NOTE — PLAN OF CARE
POC discussed with patient, verbalized understanding. Patient with uneventful night, slept off and on between care. VS stable. Incontinent of urine X 2. Patient with Left sided weakness due to old CVA. A-Fib on Telemetry. ABX received. 02@ 3L/NC, 95%. Call light at bedside.

## 2020-04-07 NOTE — ASSESSMENT & PLAN NOTE
Patient with Persistent atrial fibrillation which is controlled currently with Beta Blocker, Amiodarone and Digoxin. [id number] score 5. Anticoagulation indicated. Anticoagulation done with Dabigatran.

## 2020-04-07 NOTE — PROGRESS NOTES
[facility] Hospital Medicine  Progress Note    Patient Name: Jose Leon  MRN: [id number]  Patient Class: IP- Inpatient   Admission Date: 4/3/2020  Length of Stay: 3 days  Attending Physician: Rajinder Spence MD  Primary Care Provider: Josh Giordano MD        Subjective:     Principal Problem:Acute respiratory disease due to COVID-19 virus        HPI:  Patient is a 59-year-old morbidly obese male with past medical history significant for multiple medical problems including hypertension, hyperlipidemia, coronary artery disease status post coronary artery stent placement, obstructive sleep apnea (on CPAP), chronic combined systolic and diastolic congestive heart failure, history of TIA, long-term anticoagulation with Pradaxa and paroxysmal atrial fibrillation is being admitted to Hospital Medicine from [facility] Emergency Room under inpatient status for worsening shortness of breath and right lower quadrant abdominal pain.  Patient presented to the emergency room with complaint of profound generalized weakness associated with diarrhea and vomiting for 1 day.  If patient feels dizzy and has also been experiencing nonradiating right lower quadrant abdominal pain.  Patient denies any hematemesis, melena or bleeding per rectum.  No recent travel or sick contact history reported.  Patient'sCOVID 19 test is positive in the emergency room.  Patient was recently discharged from [facility].  In the emergency room patient was noted to be hypoxic with exertion and minimal level around 85%.  Presently requiring 3 L per minute supplemental oxygen via nasal cannula.        Overview/Hospital Course:  No notes on file    Interval History:  Patient seen and examined.  Diarrhea appears to have improved.  Patient remains belligerent towards nursing staff intermittent confusion noted.    Review of Systems   Constitutional: Negative for chills, fatigue and fever.    Respiratory: Positive for cough and shortness of breath.    Cardiovascular: Negative for chest pain and leg swelling.   Gastrointestinal: Negative for abdominal pain, nausea and vomiting.   Musculoskeletal: Negative for back pain.   Neurological: Negative for weakness.   Psychiatric/Behavioral: Positive for agitation and confusion. The patient is not nervous/anxious.    All other systems reviewed and are negative.    Objective:     Vital Signs (Most Recent):  Temp: 99.2 °F (37.3 °C) (04/06/20 2036)  Pulse: 76 (04/06/20 2036)  Resp: 20 (04/06/20 2036)  BP: (!) 154/72 (04/06/20 2036)  SpO2: (!) 93 % (04/06/20 2036) Vital Signs (24h Range):  Temp:  [98.5 °F (36.9 °C)-99.2 °F (37.3 °C)] 99.2 °F (37.3 °C)  Pulse:  [61-76] 76  Resp:  [18-20] 20  SpO2:  [93 %-96 %] 93 %  BP: (132-166)/(70-80) 154/72     Weight: 135.6 kg (299 lb)  Body mass index is 41.7 kg/m².    Intake/Output Summary (Last 24 hours) at 4/6/2020 2242  Last data filed at 4/6/2020 1900  Gross per 24 hour   Intake 720 ml   Output --   Net 720 ml      Physical Exam   Constitutional: He appears well-developed and well-nourished.   HENT:   Head: Normocephalic and atraumatic.   Eyes: EOM are normal.   Pulmonary/Chest: Effort normal. No respiratory distress.   Abdominal: He exhibits no distension.   Musculoskeletal: Normal range of motion. He exhibits no edema.   Neurological:   Mild confusion noted.   Skin: No rash noted. No pallor.   Psychiatric: He has a normal mood and affect. His behavior is normal.   Nursing note and vitals reviewed.    VIRTUAL TELENOTE    Patient physical exam and history were performed via - 2 way video televisit.  Physical exam findings were other evidence primarily by visualization through 2 way video or by conversation with the patient's bedside nurse.  I was present throughout the entire tele visit.    The attending portion of this evaluation, treatment, and documentation was performed per Rajinder Spence MD via  audiovisual.      Significant Labs: All pertinent labs within the past 24 hours have been reviewed.    Significant Imaging: I have reviewed all pertinent imaging results/findings within the past 24 hours.      Assessment/Plan:      * Acute respiratory disease due to COVID-19 virus  - COVID-19 testing   - Infection Control notified 4/3/2020    - Isolation:   - Airborne, Contact and Droplet Precautions  - Cohort patients into COVID units  - N95 masks must be fit tested, wear eye protection  - 20 second hand hygiene              - Limit visitors per hospital policy              - Consolidating lab draws, nursing care, provider visits, and interventions      - Management:  Supplemental O2 to maintain SpO2 >92%  Continuous/intermittent Pulse Ox  Albuterol INHALER PRN (avoid nebulization of secretions)  Avoiding BiPAP to prevent aerosolization (including home BiPAP)    Advance Care Planning -patient full code for now                   Acute delirium  Patient with acute delirium. There is no specific treatment. Will avoid narcotics/benzos that are known to worsen condition and add PRN antipsychotics to limit behaviors of self harm. Monitor closely.        Type 2 DM On Insulin  Patient's FSGs are controlled on current hypoglycemics.   Last A1c reviewed-   Lab Results   Component Value Date    HGBA1C 7.8 (H) 04/03/2018     Most recent fingerstick glucose reviewed- No results for input(s): POCTGLUCOSE in the last 24 hours.  Current correctional scale  Medium  Maintain anti-hyperglycemic dose as follows-   Antihyperglycemics (From admission, onward)    Start     Stop Route Frequency Ordered    04/04/20 0900  insulin detemir U-100 pen 32 Units      -- SubQ Daily 04/03/20 1813    04/03/20 1813  insulin aspart U-100 pen 0-5 Units      -- SubQ Before meals & nightly PRN 04/03/20 1813        Hold Oral hypoglycemics while patient is in the hospital.          1 PPD X 25+ YRs Chronic Tobacco Use Disorder  Smoking cessation counseling  performed. Dangers of cigarette smoking were reviewed with patient in detail and patient was encouraged to quit. Nicotine replacement options were discussed for > 3 minutes.        Anxiety And Depression  Continue Xanax and Wellbutrin use as before      Stage 2 CKD  Creatine stable for now. BMP reviewed- noted Estimated Creatinine Clearance: 111.8 mL/min (based on SCr of 1 mg/dL). according to latest data. Monitor UOP and serial BMP and adjust therapy as needed. Renally dose meds.                Hypertension  Chronic, controlled.  Latest blood pressure and vitals reviewed-   Temp:  [98.5 °F (36.9 °C)-99.2 °F (37.3 °C)]   Pulse:  [61-76]   Resp:  [18-20]   BP: (132-166)/(70-80)   SpO2:  [93 %-96 %] .   Home meds for hypertension were reviewed and noted below. Hospital anti-hypertensive changes were made as shown below.  Hypertension Medications             furosemide (LASIX) 40 MG tablet Take 1 tablet (40 mg total) by mouth 2 (two) times daily.    hydrALAZINE (APRESOLINE) 10 MG tablet Take 10 mg by mouth every 12 (twelve) hours.    lisinopril (PRINIVIL,ZESTRIL) 20 MG tablet Take 1 tablet (20 mg total) by mouth 2 (two) times daily.    metoprolol tartrate (LOPRESSOR) 25 MG tablet TAKE 1 TABLET BY MOUTH 2 TIMES DAILY.    spironolactone (ALDACTONE) 25 MG tablet Take 1 tablet (25 mg total) by mouth every morning.      Hospital Medications             furosemide tablet 40 mg 40 mg, Oral, 2 times daily    hydrALAZINE tablet 10 mg 10 mg, Oral, Every 12 hours    lisinopriL tablet 20 mg 20 mg, Oral, 2 times daily    metoprolol tartrate (LOPRESSOR) tablet 50 mg 50 mg, Oral, 2 times daily    spironolactone tablet 25 mg 25 mg, Oral, Every morning        Will utilize p.r.n. blood pressure medication only if patient's blood pressure greater than  180/110 and he develops symptoms such as worsening chest pain or shortness of breath.            Atrial Fibrillation With H/O RVR  Patient with Persistent atrial fibrillation which is  controlled currently with Beta Blocker, Amiodarone and Digoxin. [id number] score 5. Anticoagulation indicated. Anticoagulation done with Dabigatran.        Coronary Artery Disease With H/O Stenting  Patient with known CAD s/p CABG. Will continue and monitor for S/Sx of angina/ACS. Continue to monitor on telemetry.        JULY on CPAP  Continue supplemental oxygen to maintain pulse ox above 92%.  Unable to use CPAP per hospital policy at this time due to COVID-19.      Morbid obesity  Body mass index is 41.7 kg/m². Morbid obesity complicates all aspects of disease management from diagnostic modalities to treatment. Weight loss encouraged and health benefits explained to patient.            VTE Risk Mitigation (From admission, onward)         Ordered     dabigatran etexilate capsule 75 mg  2 times daily      04/03/20 1813     IP VTE HIGH RISK PATIENT  Once      04/03/20 1813     Place OMI hose  Until discontinued      04/03/20 1813     Place sequential compression device  Until discontinued      04/03/20 1813                      Rajinder Spence MD  Department of Hospital Medicine   [facility]

## 2020-04-07 NOTE — RESPIRATORY THERAPY
04/07/20 1123   Home Oxygen Qualification   Room Air SpO2 At Rest (!) 87 %   Heart Rate on O2 81 bpm   SpO2 on Recovery 97 %   Recovery Heart Rate 78 bpm   Recovery O2 LPM 2 LPM

## 2020-04-07 NOTE — PT/OT/SLP EVAL
"Occupational Therapy   Evaluation    Name: Jose Leon  MRN: [id number]  Admitting Diagnosis:  Acute respiratory disease due to COVID-19 virus      Recommendations:     Discharge Recommendations: rehabilitation facility, nursing facility, skilled  Discharge Equipment Recommendations:  (TBD)  Barriers to discharge:  None    Assessment:     Jose Leon is a 59 y.o. male with a medical diagnosis of Acute respiratory disease due to COVID-19 virus.  He presents with L side weakness from prior stroke in January 2020. Patient required Max A with bed mobility. Patient sat EOB requiring intermittent mod > max A to maintain static sitting as patient would often lean to L side. Once positioned at EOB using B UE for balance, pt was able to maintain static sitting without assist for ~3 min before requiring max A to regain sitting balance again. Patient ate one or two bites of food while seated upright in bed with set up using mostly R UE. OT gave verbal cues to patient to use L UE when cutting his food. Patient would benefit from inpatient rehab vs SNF to maximize independence with ADLs and functional mobility. Performance deficits affecting function: weakness, impaired endurance, impaired self care skills, impaired functional mobilty, decreased lower extremity function, decreased upper extremity function, decreased safety awareness, decreased ROM, decreased coordination.      Rehab Prognosis: Fair; patient would benefit from acute skilled OT services to address these deficits and reach maximum level of function.       Plan:     Patient to be seen 3 x/week to address the above listed problems via self-care/home management, therapeutic activities, therapeutic exercises  · Plan of Care Expires: 05/05/20  · Plan of Care Reviewed with: patient    Subjective     Chief Complaint: L hip pain  Patient/Family Comments/goals: "My L hip is hurting bad"    Occupational Profile:  Living Environment: Patient lives with nephew in a 1 story " home and 3 steps to enter.   Previous level of function: Prior to stroke in Jan 2020, pt stated he was mod I with ADLs and ambulatory with rollator. Post stroke in Jan 2020, pt required assist with dressing and bathing. Patient stated he was not ambulatory post stroke.  Equipment Used at Home:  wheelchair, cane, straight, walker, rolling, rollator  Assistance upon Discharge: Patient will receive assistance from family.     Pain/Comfort:  · Pain Rating 1: 0/10  · Pain Rating Post-Intervention 1: 0/10    Patients cultural, spiritual, [employer]ybeterian conflicts given the current situation:      Objective:     Communicated with: nurse Oh prior to session.  Patient found HOB elevated with telemetry, bed alarm, oxygen(AVASYS) upon OT entry to room.    General Precautions: Standard, fall, contact, droplet, airborne   Orthopedic Precautions:N/A   Braces: N/A     Occupational Performance:    Bed Mobility:    · Patient completed Scooting/Bridging with moderate assistance  · Patient completed Supine to Sit with maximal assistance  · Patient completed Sit to Supine with maximal assistance    Functional Mobility/Transfers:  · Did not attempt transfers 2/2 safety concerns.    Activities of Daily Living:  · Feeding:  minimum assistance with positioning of eating utensils in L hand and verbal cues to incorporate L hand with cutting food.  · Grooming: stand by assistance with facial hygiene while seated upright in bed.  · Lower Body Dressing: maximal assistance to don/doff socks while supine at HOB.   · Toileting: dependence with adult brief donned.    Cognitive/Visual Perceptual:  Cognitive/Psychosocial Skills:     -       Oriented to: Person, Place and Situation   -       Follows Commands/attention:Follows multistep  commands  -       Communication: Mostly clear; pt is endentulous making it difficult to understand at times.   -       Safety awareness/insight to disability: impaired   -       Mood/Affect/Coping skills/emotional  control: Appropriate to situation and Cooperative  Visual/Perceptual:      -Intact     Physical Exam:  Balance:    -       Static/dynamic sitting balance is poor  Postural examination/scapula alignment:    -       Rounded shoulders  -       Forward head  Dominant hand:    -       Right  Upper Extremity Range of Motion:     -       Right Upper Extremity: WFL  -       Left Upper Extremity: 30* of active shldr flex; WFL distally. Pt c/o pain when passively ranging shldr pass ~45*  Upper Extremity Strength:    -       Right Upper Extremity: 3+/5 grossly  -       Left Upper Extremity: 2+/5 at shldr; 3/5 at elbow; 3/5 at wrist   Strength:    -       Right Upper Extremity: 3+/5  -       Left Upper Extremity: 3/5  Fine Motor Coordination:    -       Impaired  on L side 2/2 weakness  Gross motor coordination:   Partially impaired 2/2 L side weakness    AMPAC 6 Click ADL:  AMPAC Total Score: 15    Treatment & Education:  OT ed pt on OT role & POC as well as discharge recommendations.  OT repositioned pt to HOB & raised HOB, instructing pt on keeping HOB upright as pt can tolerate to preserve endurance and minimize effects of prolonged bedrest.   Patient instructed on safe positioning proximally with core to improve distal movement thereby increasing participation with ADLs while seated EOB.   OT ed pt on incorporating use of L UE with ADLs   OT re-emphasized importance of using call button to request assist from nursing or therapy staff to eliminate fall risk.  Education:    Patient left HOB elevated with all lines intact, call button in reach, bed alarm on and nurse notified    GOALS:   Multidisciplinary Problems     Occupational Therapy Goals        Problem: Occupational Therapy Goal    Goal Priority Disciplines Outcome Interventions   Occupational Therapy Goal     OT, PT/OT Ongoing, Progressing    Description:  Goals to be met by: 5/5/2020     Patient will increase functional independence with ADLs by performing:    LE  "Dressing with Contact Guard Assistance and Assistive Devices as needed.  Grooming while EOB with Stand-by Assistance.  Toileting from toilet with Minimal Assistance for hygiene and clothing management.   Supine to sit with Minimal Assistance.  Toilet transfer to bedside commode with Minimal Assistance.  Upper extremity exercise program x10 reps per handout, with supervision.                      History:     Past Medical History:   Diagnosis Date    a A H/O Medical Noncompliance     H/O Chronic Noncompliance With His CHF Diet    a Cardiac Diastolic Dysfunction     Dr. Aure [place]    a Chronic Anticoagulation With Pradaxa     Dr. Aure [place]    a Coronary Artery Disease With H/O Stenting     Dr. Aure [place]; Was Hospitalized At [facility] 3/8/17-3/17/17 For CHF Exacerbatioin Due To "Dietary Discrepancies" With LCST Negative There    a Nonsustained Ventricular Tachycardia (NSVT)     a Paroxysmal Atrial Fibrillation With H/O RVR     Dr. Aure [place]; On Chronic Eliquis    a Syncopal Episode     [facility] 4/3/17-4/7/17 Stay For This: Was Likely Due To NSVT, And His Medications Were Adjusted    a Systolic CHF With EF 35-45%     Dr. Aure [place]; Was Hospitalized At [facility] 3/8/17-3/17/17 For CHF Exacerbatioin Due To "Dietary Discrepancies" With LCST Negative There    b Hypertension     b Proteinuria     04/2014 Referred To Dr. Leroy Allison; 4/1/14 Bilateral Renal U/S = Normal; On Lisinopril 20 Mg Daily    b Stage 2 CKD     c Hypercholesterolemia With Low HDL     d Type 2 DM On Insulin     ** 12/4/18 Referred To [facility] EDU; 1/11/18 Referred To Dr. Dipika Rodriguez And Re-Referred To [facility]; 12/28/17 HgA1c = 12.0;" 7/5/17 Referred To [facility]    f Morbid Obesity     i 1 PPD X 25+ YRs Chronic Tobacco Use Disorder     7/5/17 Increased Wellbutrin-XL To 300 Mg Daily; 6/8/17 RXd Wellbutrin- Mg Daily X 4 Months    i JULY On CPAP     Dr. Marion ngo Chronic Left Groin Pain     l Chronic Left Shoulder Pain     Dr. MARTINA Salmon " Plauche    l Chronic Recurrent Low Back Pain 12/04/2018    Dr. Llamas Is His Pain Management Neurologist; 5/219/18 Referred To Dr. Ismael martinez Left 5-7th Rib FXs 04/2016 4/23/16 [facility] Left Rib XRays = Questionable Nondisplaced Left 5-7th Rib FXs With Normal Lung Fields    l Right Shouder SX 5/26/16 Due To Work Related Injury     Dr[facility] Mora At [facility]; Dr. MARTINA hatch H/O Transient Ischemic Attack In 2013     n Anxiety And Depression 12/04/2018    RTC In 6 Weeks; 12/4/18 Added Wellbutrin- Mg qAM; 5/29/18 Increased 100 Mg Zoloft To 100 Mg Bid    n Continuous Benzodiazepine (Xanax) Use 12/04/2018 5/29/18 I Am Weaning Him Off Of This By Decreasing 1 Mg Bid To 0.5 Mg Bid PRN, And Will Wean Further Next OV    n H/O ETOH Abuse, Quit In 09/2014     q Bilateral Lower Extremity Venous Stasis Ulcers     2/28/18 [facility]    q Chronic Bilateral Lower Extremity Edema     2/28/18 Added Metolazone 10 Mg qAM On MWF And Referred Back To Dr. [place]; On Lasix 40 Mg Bid; He Wears Bilateral Compressin Hose Stockings    q Disability Examination 7/15/16     For CHF, PAF, DM2, And JULY On CPAP    Wellness Visit 11/6/2017        Past Surgical History:   Procedure Laterality Date    CARDIAC SURGERY      coronary stent    COLONOSCOPY N/A 12/19/2017    Procedure: COLONOSCOPY;  Surgeon: Dave Allen MD;  Location: [facility];  Service: Endoscopy;  Laterality: N/A;    DIABETES MANAGEMENT LABS      heart stent      INCISION AND DRAINAGE OF WOUND      on stomach    SHOULDER SURGERY         Time Tracking:     OT Date of Treatment: 04/07/20  OT Start Time: 1124  OT Stop Time: 1202  OT Total Time (min): 38 min    Billable Minutes:Evaluation 10  Self Care/Home Management 18  Therapeutic Activity 10    Gerry Grant, TOREY  4/7/2020

## 2020-04-07 NOTE — ASSESSMENT & PLAN NOTE
- COVID-19 testing   - Infection Control notified 4/3/2020    - Isolation:   - Airborne, Contact and Droplet Precautions  - Cohort patients into COVID units  - N95 masks must be fit tested, wear eye protection  - 20 second hand hygiene              - Limit visitors per hospital policy              - Consolidating lab draws, nursing care, provider visits, and interventions      - Management:  Supplemental O2 to maintain SpO2 >92%  Continuous/intermittent Pulse Ox  Albuterol INHALER PRN (avoid nebulization of secretions)  Avoiding BiPAP to prevent aerosolization (including home BiPAP)    Advance Care Planning -patient full code for now

## 2020-04-07 NOTE — RESPIRATORY THERAPY
04/07/20 1456   Patient Assessment/Suction   Level of Consciousness (AVPU) alert   Respiratory Effort Normal;Unlabored;Shallow   Expansion/Accessory Muscles/Retractions no retractions;no use of accessory muscles;expansion symmetric   All Lung Fields Breath Sounds diminished   Cough Frequency no cough   PRE-TX-O2   O2 Device (Oxygen Therapy) nasal cannula   $ Is the patient on Low Flow Oxygen? Yes   Flow (L/min) 2   SpO2 95 %   Pulse Oximetry Type Continuous   $ Pulse Oximetry - Multiple Charge Pulse Oximetry - Multiple   Pulse 65   Resp 20   Positioning HOB elevated 30 degrees   Inhaler   $ Inhaler Charges MDI (Metered Dose Inahler) Treatment;Given With Spacer   Respiratory Treatment Status (Inhaler) given   Treatment Route (Inhaler) mouthpiece;spacer/holding chamber   Patient Position (Inhaler) HOB elevated   Post Treatment Assessment (Inhaler) breath sounds unchanged   Signs of Intolerance (Inhaler) none   Breath Sounds Post-Respiratory Treatment   Throughout All Fields Post-Treatment All Fields   Throughout All Fields Post-Treatment no change   Post-treatment Heart Rate (beats/min) 66   Post-treatment Resp Rate (breaths/min) 20

## 2020-04-07 NOTE — RESPIRATORY THERAPY
04/07/20 0742   Patient Assessment/Suction   Level of Consciousness (AVPU) alert   All Lung Fields Breath Sounds diminished   PRE-TX-O2   O2 Device (Oxygen Therapy) nasal cannula   $ Is the patient on Low Flow Oxygen? Yes   Flow (L/min) 3  (97decreased to 2lpm)   SpO2 97 %   Pulse Oximetry Type Continuous   $ Pulse Oximetry - Multiple Charge Pulse Oximetry - Multiple

## 2020-04-07 NOTE — PLAN OF CARE
Plan of care reviewed with patient. Safety precautions maintained. Pt remains free from injury. Cardiac monitoring maintained. Continuous pulse ox maintained. Glucose monitoring maintained. Incontinence care performed. No c/o pain.  PT/OT eval today. O2 @ 2L NC. Pradaxa for VTE prevention. Frequent checks performed q2 hours. Vital signs stable. Neuro checks q2h. TMax 100.5. Oriented to person and place. Bed locked and low. Siderails raised x2. Non-skid socks on. Call light within reach.

## 2020-04-07 NOTE — ASSESSMENT & PLAN NOTE
Chronic, controlled.  Latest blood pressure and vitals reviewed-   Temp:  [98.5 °F (36.9 °C)-99.2 °F (37.3 °C)]   Pulse:  [61-76]   Resp:  [18-20]   BP: (132-166)/(70-80)   SpO2:  [93 %-96 %] .   Home meds for hypertension were reviewed and noted below. Hospital anti-hypertensive changes were made as shown below.  Hypertension Medications             furosemide (LASIX) 40 MG tablet Take 1 tablet (40 mg total) by mouth 2 (two) times daily.    hydrALAZINE (APRESOLINE) 10 MG tablet Take 10 mg by mouth every 12 (twelve) hours.    lisinopril (PRINIVIL,ZESTRIL) 20 MG tablet Take 1 tablet (20 mg total) by mouth 2 (two) times daily.    metoprolol tartrate (LOPRESSOR) 25 MG tablet TAKE 1 TABLET BY MOUTH 2 TIMES DAILY.    spironolactone (ALDACTONE) 25 MG tablet Take 1 tablet (25 mg total) by mouth every morning.      Hospital Medications             furosemide tablet 40 mg 40 mg, Oral, 2 times daily    hydrALAZINE tablet 10 mg 10 mg, Oral, Every 12 hours    lisinopriL tablet 20 mg 20 mg, Oral, 2 times daily    metoprolol tartrate (LOPRESSOR) tablet 50 mg 50 mg, Oral, 2 times daily    spironolactone tablet 25 mg 25 mg, Oral, Every morning        Will utilize p.r.n. blood pressure medication only if patient's blood pressure greater than  180/110 and he develops symptoms such as worsening chest pain or shortness of breath.

## 2020-04-07 NOTE — SUBJECTIVE & OBJECTIVE
Interval History:  Patient seen and examined.  Diarrhea appears to have improved.  Patient remains belligerent towards nursing staff intermittent confusion noted.    Review of Systems   Constitutional: Negative for chills, fatigue and fever.   Respiratory: Positive for cough and shortness of breath.    Cardiovascular: Negative for chest pain and leg swelling.   Gastrointestinal: Negative for abdominal pain, nausea and vomiting.   Musculoskeletal: Negative for back pain.   Neurological: Negative for weakness.   Psychiatric/Behavioral: Positive for agitation and confusion. The patient is not nervous/anxious.    All other systems reviewed and are negative.    Objective:     Vital Signs (Most Recent):  Temp: 99.2 °F (37.3 °C) (04/06/20 2036)  Pulse: 76 (04/06/20 2036)  Resp: 20 (04/06/20 2036)  BP: (!) 154/72 (04/06/20 2036)  SpO2: (!) 93 % (04/06/20 2036) Vital Signs (24h Range):  Temp:  [98.5 °F (36.9 °C)-99.2 °F (37.3 °C)] 99.2 °F (37.3 °C)  Pulse:  [61-76] 76  Resp:  [18-20] 20  SpO2:  [93 %-96 %] 93 %  BP: (132-166)/(70-80) 154/72     Weight: 135.6 kg (299 lb)  Body mass index is 41.7 kg/m².    Intake/Output Summary (Last 24 hours) at 4/6/2020 2242  Last data filed at 4/6/2020 1900  Gross per 24 hour   Intake 720 ml   Output --   Net 720 ml      Physical Exam   Constitutional: He appears well-developed and well-nourished.   HENT:   Head: Normocephalic and atraumatic.   Eyes: EOM are normal.   Pulmonary/Chest: Effort normal. No respiratory distress.   Abdominal: He exhibits no distension.   Musculoskeletal: Normal range of motion. He exhibits no edema.   Neurological:   Mild confusion noted.   Skin: No rash noted. No pallor.   Psychiatric: He has a normal mood and affect. His behavior is normal.   Nursing note and vitals reviewed.    VIRTUAL TELENOTE    Patient physical exam and history were performed via - 2 way video televisit.  Physical exam findings were other evidence primarily by visualization through 2 way  video or by conversation with the patient's bedside nurse.  I was present throughout the entire tele visit.    The attending portion of this evaluation, treatment, and documentation was performed per Rajinder Spence MD via audiovisual.      Significant Labs: All pertinent labs within the past 24 hours have been reviewed.    Significant Imaging: I have reviewed all pertinent imaging results/findings within the past 24 hours.

## 2020-04-08 PROBLEM — J44.1 COPD EXACERBATION: Status: ACTIVE | Noted: 2020-04-08

## 2020-04-08 PROBLEM — R41.0 ACUTE DELIRIUM: Status: RESOLVED | Noted: 2020-04-06 | Resolved: 2020-04-08

## 2020-04-08 LAB
BACTERIA BLD CULT: NORMAL
BACTERIA BLD CULT: NORMAL
POCT GLUCOSE: 111 MG/DL (ref 70–110)
POCT GLUCOSE: 122 MG/DL (ref 70–110)
POCT GLUCOSE: 60 MG/DL (ref 70–110)
POCT GLUCOSE: 93 MG/DL (ref 70–110)

## 2020-04-08 PROCEDURE — 27000221 HC OXYGEN, UP TO 24 HOURS

## 2020-04-08 PROCEDURE — 63600175 PHARM REV CODE 636 W HCPCS: Performed by: INTERNAL MEDICINE

## 2020-04-08 PROCEDURE — 25000003 PHARM REV CODE 250: Performed by: INTERNAL MEDICINE

## 2020-04-08 PROCEDURE — 97110 THERAPEUTIC EXERCISES: CPT

## 2020-04-08 PROCEDURE — 12000002 HC ACUTE/MED SURGE SEMI-PRIVATE ROOM

## 2020-04-08 PROCEDURE — 94761 N-INVAS EAR/PLS OXIMETRY MLT: CPT

## 2020-04-08 PROCEDURE — 25000242 PHARM REV CODE 250 ALT 637 W/ HCPCS: Performed by: HOSPITALIST

## 2020-04-08 PROCEDURE — 94640 AIRWAY INHALATION TREATMENT: CPT

## 2020-04-08 PROCEDURE — 63600175 PHARM REV CODE 636 W HCPCS: Performed by: HOSPITALIST

## 2020-04-08 PROCEDURE — 97530 THERAPEUTIC ACTIVITIES: CPT

## 2020-04-08 PROCEDURE — 63700000 PHARM REV CODE 250 ALT 637 W/O HCPCS: Performed by: INTERNAL MEDICINE

## 2020-04-08 PROCEDURE — 21400001 HC TELEMETRY ROOM

## 2020-04-08 RX ORDER — IPRATROPIUM BROMIDE AND ALBUTEROL SULFATE 2.5; .5 MG/3ML; MG/3ML
3 SOLUTION RESPIRATORY (INHALATION) EVERY 6 HOURS
Status: DISCONTINUED | OUTPATIENT
Start: 2020-04-08 | End: 2020-04-22 | Stop reason: HOSPADM

## 2020-04-08 RX ORDER — PREDNISONE 20 MG/1
60 TABLET ORAL DAILY
Status: DISCONTINUED | OUTPATIENT
Start: 2020-04-08 | End: 2020-04-12

## 2020-04-08 RX ORDER — INSULIN ASPART 100 [IU]/ML
3 INJECTION, SOLUTION INTRAVENOUS; SUBCUTANEOUS
Status: DISCONTINUED | OUTPATIENT
Start: 2020-04-08 | End: 2020-04-10

## 2020-04-08 RX ADMIN — HYDRALAZINE HYDROCHLORIDE 10 MG: 10 TABLET ORAL at 08:04

## 2020-04-08 RX ADMIN — PREDNISONE 60 MG: 20 TABLET ORAL at 05:04

## 2020-04-08 RX ADMIN — IPRATROPIUM BROMIDE AND ALBUTEROL SULFATE 3 ML: .5; 3 SOLUTION RESPIRATORY (INHALATION) at 06:04

## 2020-04-08 RX ADMIN — GABAPENTIN 300 MG: 300 CAPSULE ORAL at 08:04

## 2020-04-08 RX ADMIN — ALBUTEROL SULFATE 2 PUFF: 90 AEROSOL, METERED RESPIRATORY (INHALATION) at 07:04

## 2020-04-08 RX ADMIN — IPRATROPIUM BROMIDE AND ALBUTEROL SULFATE 3 ML: .5; 3 SOLUTION RESPIRATORY (INHALATION) at 01:04

## 2020-04-08 RX ADMIN — METOPROLOL TARTRATE 50 MG: 50 TABLET, FILM COATED ORAL at 09:04

## 2020-04-08 RX ADMIN — AMIODARONE HYDROCHLORIDE 200 MG: 200 TABLET ORAL at 08:04

## 2020-04-08 RX ADMIN — POTASSIUM & SODIUM PHOSPHATES POWDER PACK 280-160-250 MG 2 PACKET: 280-160-250 PACK at 08:04

## 2020-04-08 RX ADMIN — AMIODARONE HYDROCHLORIDE 200 MG: 200 TABLET ORAL at 09:04

## 2020-04-08 RX ADMIN — BUPROPION HYDROCHLORIDE 150 MG: 150 TABLET, EXTENDED RELEASE ORAL at 06:04

## 2020-04-08 RX ADMIN — DABIGATRAN ETEXILATE MESYLATE 75 MG: 75 CAPSULE ORAL at 08:04

## 2020-04-08 RX ADMIN — SPIRONOLACTONE 25 MG: 25 TABLET ORAL at 08:04

## 2020-04-08 RX ADMIN — LISINOPRIL 20 MG: 10 TABLET ORAL at 09:04

## 2020-04-08 RX ADMIN — Medication 400 MG: at 08:04

## 2020-04-08 RX ADMIN — DABIGATRAN ETEXILATE MESYLATE 75 MG: 75 CAPSULE ORAL at 09:04

## 2020-04-08 RX ADMIN — ALPRAZOLAM 0.5 MG: 0.25 TABLET ORAL at 09:04

## 2020-04-08 RX ADMIN — CEFTRIAXONE 1 G: 1 INJECTION, SOLUTION INTRAVENOUS at 02:04

## 2020-04-08 RX ADMIN — METOPROLOL TARTRATE 50 MG: 50 TABLET, FILM COATED ORAL at 08:04

## 2020-04-08 RX ADMIN — HYDRALAZINE HYDROCHLORIDE 10 MG: 10 TABLET ORAL at 09:04

## 2020-04-08 RX ADMIN — INSULIN ASPART 3 UNITS: 100 INJECTION, SOLUTION INTRAVENOUS; SUBCUTANEOUS at 04:04

## 2020-04-08 RX ADMIN — ALPRAZOLAM 0.5 MG: 0.25 TABLET ORAL at 08:04

## 2020-04-08 RX ADMIN — Medication 16 G: at 06:04

## 2020-04-08 RX ADMIN — GABAPENTIN 300 MG: 300 CAPSULE ORAL at 09:04

## 2020-04-08 RX ADMIN — ROSUVASTATIN CALCIUM 250 MCG: 10 TABLET, FILM COATED ORAL at 08:04

## 2020-04-08 RX ADMIN — ASPIRIN 81 MG: 81 TABLET, COATED ORAL at 09:04

## 2020-04-08 RX ADMIN — FUROSEMIDE 40 MG: 40 TABLET ORAL at 09:04

## 2020-04-08 RX ADMIN — AZITHROMYCIN MONOHYDRATE 500 MG: 500 INJECTION, POWDER, LYOPHILIZED, FOR SOLUTION INTRAVENOUS at 09:04

## 2020-04-08 RX ADMIN — POTASSIUM & SODIUM PHOSPHATES POWDER PACK 280-160-250 MG 2 PACKET: 280-160-250 PACK at 11:04

## 2020-04-08 RX ADMIN — PRAVASTATIN SODIUM 80 MG: 40 TABLET ORAL at 09:04

## 2020-04-08 RX ADMIN — POTASSIUM CHLORIDE 40 MEQ: 20 SOLUTION ORAL at 09:04

## 2020-04-08 RX ADMIN — FUROSEMIDE 40 MG: 40 TABLET ORAL at 08:04

## 2020-04-08 RX ADMIN — SERTRALINE HYDROCHLORIDE 100 MG: 50 TABLET ORAL at 08:04

## 2020-04-08 RX ADMIN — LISINOPRIL 20 MG: 10 TABLET ORAL at 08:04

## 2020-04-08 NOTE — PLAN OF CARE
Attempted to call patient's relative to provide an update regarding the current care he is receiving while inpatient, however, unable to reach this contact. Will attempt another call tomorrow.

## 2020-04-08 NOTE — ASSESSMENT & PLAN NOTE
Chronic, controlled.  Latest blood pressure and vitals reviewed-   Temp:  [97.7 °F (36.5 °C)-100.5 °F (38.1 °C)]   Pulse:  [62-67]   Resp:  [14-22]   BP: (139-152)/(63-87)   SpO2:  [94 %-97 %] .   Home meds for hypertension were reviewed and noted below. Hospital anti-hypertensive changes were made as shown below.  Hypertension Medications             furosemide (LASIX) 40 MG tablet Take 1 tablet (40 mg total) by mouth 2 (two) times daily.    hydrALAZINE (APRESOLINE) 10 MG tablet Take 10 mg by mouth every 12 (twelve) hours.    lisinopril (PRINIVIL,ZESTRIL) 20 MG tablet Take 1 tablet (20 mg total) by mouth 2 (two) times daily.    metoprolol tartrate (LOPRESSOR) 25 MG tablet TAKE 1 TABLET BY MOUTH 2 TIMES DAILY.    spironolactone (ALDACTONE) 25 MG tablet Take 1 tablet (25 mg total) by mouth every morning.      Hospital Medications             furosemide tablet 40 mg 40 mg, Oral, 2 times daily    hydrALAZINE tablet 10 mg 10 mg, Oral, Every 12 hours    lisinopriL tablet 20 mg 20 mg, Oral, 2 times daily    metoprolol tartrate (LOPRESSOR) tablet 50 mg 50 mg, Oral, 2 times daily    spironolactone tablet 25 mg 25 mg, Oral, Every morning        Will utilize p.r.n. blood pressure medication only if patient's blood pressure greater than  180/110 and he develops symptoms such as worsening chest pain or shortness of breath.

## 2020-04-08 NOTE — PROGRESS NOTES
[facility] Hospital Medicine  Progress Note    Patient Name: Jose Leon  MRN: [id number]  Patient Class: IP- Inpatient   Admission Date: 4/3/2020  Length of Stay: 4 days  Attending Physician: Rajinder Spence MD  Primary Care Provider: Josh Giordano MD        Subjective:     Principal Problem:Acute respiratory disease due to COVID-19 virus        HPI:  Patient is a 59-year-old morbidly obese male with past medical history significant for multiple medical problems including hypertension, hyperlipidemia, coronary artery disease status post coronary artery stent placement, obstructive sleep apnea (on CPAP), chronic combined systolic and diastolic congestive heart failure, history of TIA, long-term anticoagulation with Pradaxa and paroxysmal atrial fibrillation is being admitted to Hospital Medicine from [facility] Emergency Room under inpatient status for worsening shortness of breath and right lower quadrant abdominal pain.  Patient presented to the emergency room with complaint of profound generalized weakness associated with diarrhea and vomiting for 1 day.  If patient feels dizzy and has also been experiencing nonradiating right lower quadrant abdominal pain.  Patient denies any hematemesis, melena or bleeding per rectum.  No recent travel or sick contact history reported.  Patient'sCOVID 19 test is positive in the emergency room.  Patient was [facility].  In the emergency room patient was noted to be hypoxic with exertion and minimal level around 85%.  Presently requiring 3 L per minute supplemental oxygen via nasal cannula.        Overview/Hospital Course:  No notes on file    Interval History:  Patient seen and examined.  Patient's agitation appears to have improved.  Remains mildly febrile and hypoxic.  Noted to be wheezing by nursing.    Review of Systems   Constitutional: Negative for chills, fatigue and fever.    Respiratory: Positive for cough and shortness of breath.    Cardiovascular: Negative for chest pain and leg swelling.   Gastrointestinal: Negative for abdominal pain, nausea and vomiting.   Musculoskeletal: Negative for back pain.   Neurological: Negative for weakness.   Psychiatric/Behavioral: Positive for agitation and confusion. The patient is not nervous/anxious.    All other systems reviewed and are negative.    Objective:     Vital Signs (Most Recent):  Temp: (!) 100.5 °F (38.1 °C) (04/07/20 1654)  Pulse: 66 (04/07/20 1858)  Resp: 16 (04/07/20 1858)  BP: (!) 152/87 (04/07/20 1654)  SpO2: (!) 94 % (04/07/20 1858) Vital Signs (24h Range):  Temp:  [97.7 °F (36.5 °C)-100.5 °F (38.1 °C)] 100.5 °F (38.1 °C)  Pulse:  [62-67] 66  Resp:  [14-22] 16  SpO2:  [94 %-97 %] 94 %  BP: (139-152)/(63-87) 152/87     Weight: 135.6 kg (299 lb)  Body mass index is 41.7 kg/m².    Intake/Output Summary (Last 24 hours) at 4/7/2020 2132  Last data filed at 4/7/2020 1700  Gross per 24 hour   Intake 630 ml   Output --   Net 630 ml      Physical Exam   Constitutional: He appears well-developed and well-nourished.   HENT:   Head: Normocephalic and atraumatic.   Eyes: EOM are normal.   Pulmonary/Chest: Effort normal. No respiratory distress.   Abdominal: He exhibits no distension.   Musculoskeletal: Normal range of motion. He exhibits no edema.   Neurological:   Mild confusion noted.   Skin: No rash noted. No pallor.   Psychiatric: He has a normal mood and affect. His behavior is normal.   Nursing note and vitals reviewed.    VIRTUAL TELENOTE    Patient physical exam and history were performed via - 2 way video televisit.  Physical exam findings were other evidence primarily by visualization through 2 way video or by conversation with the patient's bedside nurse.  I was present throughout the entire tele visit.    The attending portion of this evaluation, treatment, and documentation was performed per Rajinder Spence MD via  audiovisual.      Significant Labs: All pertinent labs within the past 24 hours have been reviewed.    Significant Imaging: I have reviewed all pertinent imaging results/findings within the past 24 hours.      Assessment/Plan:      * Acute respiratory disease due to COVID-19 virus  - COVID-19 testing   - Infection Control notified 4/3/2020    - Isolation:   - Airborne, Contact and Droplet Precautions  - Cohort patients into COVID units  - N95 masks must be fit tested, wear eye protection  - 20 second hand hygiene              - Limit visitors per hospital policy              - Consolidating lab draws, nursing care, provider visits, and interventions      - Management:  Supplemental O2 to maintain SpO2 >92%  Continuous/intermittent Pulse Ox  Albuterol INHALER PRN (avoid nebulization of secretions)  Avoiding BiPAP to prevent aerosolization (including home BiPAP)    Advance Care Planning -patient full code for now                   Acute delirium  Patient with acute delirium. There is no specific treatment. Will avoid narcotics/benzos that are known to worsen condition and add PRN antipsychotics to limit behaviors of self harm. Monitor closely.        Type 2 DM On Insulin  Patient's FSGs are controlled on current hypoglycemics.   Last A1c reviewed-   Lab Results   Component Value Date    HGBA1C 7.8 (H) 04/03/2018     Most recent fingerstick glucose reviewed-   Recent Labs   Lab 04/07/20  0722 04/07/20  1648   POCTGLUCOSE 125* 101     Current correctional scale  Low  Maintain anti-hyperglycemic dose as follows-   Antihyperglycemics (From admission, onward)    Start     Stop Route Frequency Ordered    04/04/20 0900  insulin detemir U-100 pen 32 Units      -- SubQ Daily 04/03/20 1813    04/03/20 1813  insulin aspart U-100 pen 0-5 Units      -- SubQ Before meals & nightly PRN 04/03/20 1813        Hold Oral hypoglycemics while patient is in the hospital.          1 PPD X 25+ YRs Chronic Tobacco Use Disorder  Smoking  cessation counseling performed. Dangers of cigarette smoking were reviewed with patient in detail and patient was encouraged to quit. Nicotine replacement options were discussed for > 3 minutes.        Anxiety And Depression  Continue Xanax and Wellbutrin use as before      Stage 2 CKD  Creatine stable for now. BMP reviewed- noted Estimated Creatinine Clearance: 111.8 mL/min (based on SCr of 1 mg/dL). according to latest data. Monitor UOP and serial BMP and adjust therapy as needed. Renally dose meds.                Hypertension  Chronic, controlled.  Latest blood pressure and vitals reviewed-   Temp:  [97.7 °F (36.5 °C)-100.5 °F (38.1 °C)]   Pulse:  [62-67]   Resp:  [14-22]   BP: (139-152)/(63-87)   SpO2:  [94 %-97 %] .   Home meds for hypertension were reviewed and noted below. Hospital anti-hypertensive changes were made as shown below.  Hypertension Medications             furosemide (LASIX) 40 MG tablet Take 1 tablet (40 mg total) by mouth 2 (two) times daily.    hydrALAZINE (APRESOLINE) 10 MG tablet Take 10 mg by mouth every 12 (twelve) hours.    lisinopril (PRINIVIL,ZESTRIL) 20 MG tablet Take 1 tablet (20 mg total) by mouth 2 (two) times daily.    metoprolol tartrate (LOPRESSOR) 25 MG tablet TAKE 1 TABLET BY MOUTH 2 TIMES DAILY.    spironolactone (ALDACTONE) 25 MG tablet Take 1 tablet (25 mg total) by mouth every morning.      Hospital Medications             furosemide tablet 40 mg 40 mg, Oral, 2 times daily    hydrALAZINE tablet 10 mg 10 mg, Oral, Every 12 hours    lisinopriL tablet 20 mg 20 mg, Oral, 2 times daily    metoprolol tartrate (LOPRESSOR) tablet 50 mg 50 mg, Oral, 2 times daily    spironolactone tablet 25 mg 25 mg, Oral, Every morning        Will utilize p.r.n. blood pressure medication only if patient's blood pressure greater than  180/110 and he develops symptoms such as worsening chest pain or shortness of breath.            Atrial Fibrillation With H/O RVR  Patient with Persistent atrial  fibrillation which is controlled currently with Beta Blocker, Amiodarone and Digoxin. [id number] score 5. Anticoagulation indicated. Anticoagulation done with Dabigatran.        Coronary Artery Disease With H/O Stenting  Patient with known CAD s/p CABG. Will continue and monitor for S/Sx of angina/ACS. Continue to monitor on telemetry.        JULY on CPAP  Continue supplemental oxygen to maintain pulse ox above 92%.  Unable to use CPAP per hospital policy at this time due to COVID-19.      Morbid obesity  Body mass index is 41.7 kg/m². Morbid obesity complicates all aspects of disease management from diagnostic modalities to treatment. Weight loss encouraged and health benefits explained to patient.            VTE Risk Mitigation (From admission, onward)         Ordered     dabigatran etexilate capsule 75 mg  2 times daily      04/03/20 1813     IP VTE HIGH RISK PATIENT  Once      04/03/20 1813     Place OMI hose  Until discontinued      04/03/20 1813     Place sequential compression device  Until discontinued      04/03/20 1813                      Rajinder Spence MD  Department of Hospital Medicine   [facility]

## 2020-04-08 NOTE — PROGRESS NOTES
[facility] Hospital Medicine  Progress Note    Patient Name: Jose Leon  MRN: [id number]  Patient Class: IP- Inpatient   Admission Date: 4/3/2020  Length of Stay: 5 days  Attending Physician: Rajinder Spence MD  Primary Care Provider: Josh Giordano MD        Subjective:     Principal Problem:Acute respiratory disease due to COVID-19 virus        HPI:  Patient is a 59-year-old morbidly obese male with past medical history significant for multiple medical problems including hypertension, hyperlipidemia, coronary artery disease status post coronary artery stent placement, obstructive sleep apnea (on CPAP), chronic combined systolic and diastolic congestive heart failure, history of TIA, long-term anticoagulation with Pradaxa and paroxysmal atrial fibrillation is being admitted to Hospital Medicine from [facility] Emergency Room under inpatient status for worsening shortness of breath and right lower quadrant abdominal pain.  Patient presented to the emergency room with complaint of profound generalized weakness associated with diarrhea and vomiting for 1 day.  If patient feels dizzy and has also been experiencing nonradiating right lower quadrant abdominal pain.  Patient denies any hematemesis, melena or bleeding per rectum.  No recent travel or sick contact history reported.  Patient'sCOVID 19 test is positive in the emergency room.  Patient was recently discharged from [facility].  In the emergency room patient was noted to be hypoxic with exertion and minimal level around 85%.  Presently requiring 3 L per minute supplemental oxygen via nasal cannula.        Overview/Hospital Course:  No notes on file    Interval History:  Patient seen and examined.  Patient's agitation appears to have improved.  Remains mildly febrile and hypoxic.  Noted to be wheezing by nursing.    Review of Systems   Constitutional: Negative for chills, fatigue and fever.    Respiratory: Positive for cough and shortness of breath.    Cardiovascular: Negative for chest pain and leg swelling.   Gastrointestinal: Negative for abdominal pain, nausea and vomiting.   Musculoskeletal: Negative for back pain.   Neurological: Negative for weakness.   Psychiatric/Behavioral: Positive for agitation and confusion. The patient is not nervous/anxious.    All other systems reviewed and are negative.    Objective:     Vital Signs (Most Recent):  Temp: (!) 100.5 °F (38.1 °C) (04/07/20 1654)  Pulse: 66 (04/07/20 1858)  Resp: 16 (04/07/20 1858)  BP: (!) 152/87 (04/07/20 1654)  SpO2: (!) 94 % (04/07/20 1858) Vital Signs (24h Range):  Temp:  [97.7 °F (36.5 °C)-100.5 °F (38.1 °C)] 100.5 °F (38.1 °C)  Pulse:  [62-67] 66  Resp:  [14-22] 16  SpO2:  [94 %-97 %] 94 %  BP: (139-152)/(63-87) 152/87     Weight: 135.6 kg (299 lb)  Body mass index is 41.7 kg/m².    Intake/Output Summary (Last 24 hours) at 4/7/2020 2132  Last data filed at 4/7/2020 1700  Gross per 24 hour   Intake 630 ml   Output --   Net 630 ml      Physical Exam   Constitutional: He appears well-developed and well-nourished.   HENT:   Head: Normocephalic and atraumatic.   Eyes: EOM are normal.   Pulmonary/Chest: Effort normal. No respiratory distress.   Abdominal: He exhibits no distension.   Musculoskeletal: Normal range of motion. He exhibits no edema.   Neurological:   Mild confusion noted.   Skin: No rash noted. No pallor.   Psychiatric: He has a normal mood and affect. His behavior is normal.   Nursing note and vitals reviewed.    VIRTUAL TELENOTE    Patient physical exam and history were performed via - 2 way video televisit.  Physical exam findings were other evidence primarily by visualization through 2 way video or by conversation with the patient's bedside nurse.  I was present throughout the entire tele visit.    The attending portion of this evaluation, treatment, and documentation was performed per Rajinder Spence MD via  audiovisual.      Significant Labs: All pertinent labs within the past 24 hours have been reviewed.    Significant Imaging: I have reviewed all pertinent imaging results/findings within the past 24 hours.      Assessment/Plan:      * Acute respiratory disease due to COVID-19 virus  - COVID-19 testing   - Infection Control notified 4/3/2020    - Isolation:   - Airborne, Contact and Droplet Precautions  - Cohort patients into COVID units  - N95 masks must be fit tested, wear eye protection  - 20 second hand hygiene              - Limit visitors per hospital policy              - Consolidating lab draws, nursing care, provider visits, and interventions      - Management:  Supplemental O2 to maintain SpO2 >92%  Continuous/intermittent Pulse Ox  Albuterol INHALER PRN (avoid nebulization of secretions)  Avoiding BiPAP to prevent aerosolization (including home BiPAP)    Advance Care Planning -patient full code for now                   Acute delirium  Patient with acute delirium. There is no specific treatment. Will avoid narcotics/benzos that are known to worsen condition and add PRN antipsychotics to limit behaviors of self harm. Monitor closely.        Type 2 DM On Insulin  Patient's FSGs are controlled on current hypoglycemics.   Last A1c reviewed-   Lab Results   Component Value Date    HGBA1C 7.8 (H) 04/03/2018     Most recent fingerstick glucose reviewed-   Recent Labs   Lab 04/07/20  0722 04/07/20  1648   POCTGLUCOSE 125* 101     Current correctional scale  Low  Maintain anti-hyperglycemic dose as follows-   Antihyperglycemics (From admission, onward)    Start     Stop Route Frequency Ordered    04/08/20 0900  insulin detemir U-100 pen 25 Units      -- SubQ Daily 04/07/20 2134    04/08/20 0715  insulin aspart U-100 pen 7 Units      -- SubQ 3 times daily with meals 04/07/20 2134    04/03/20 1813  insulin aspart U-100 pen 0-5 Units      -- SubQ Before meals & nightly PRN 04/03/20 1813        Hold Oral hypoglycemics  while patient is in the hospital.          1 PPD X 25+ YRs Chronic Tobacco Use Disorder  Smoking cessation counseling performed. Dangers of cigarette smoking were reviewed with patient in detail and patient was encouraged to quit. Nicotine replacement options were discussed for > 3 minutes.        Anxiety And Depression  Continue Xanax and Wellbutrin use as before      Stage 2 CKD  Creatine stable for now. BMP reviewed- noted Estimated Creatinine Clearance: 111.8 mL/min (based on SCr of 1 mg/dL). according to latest data. Monitor UOP and serial BMP and adjust therapy as needed. Renally dose meds.                Hypertension  Chronic, controlled.  Latest blood pressure and vitals reviewed-   Temp:  [97.7 °F (36.5 °C)-100.5 °F (38.1 °C)]   Pulse:  [62-67]   Resp:  [14-22]   BP: (139-152)/(63-87)   SpO2:  [94 %-97 %] .   Home meds for hypertension were reviewed and noted below. Hospital anti-hypertensive changes were made as shown below.  Hypertension Medications             furosemide (LASIX) 40 MG tablet Take 1 tablet (40 mg total) by mouth 2 (two) times daily.    hydrALAZINE (APRESOLINE) 10 MG tablet Take 10 mg by mouth every 12 (twelve) hours.    lisinopril (PRINIVIL,ZESTRIL) 20 MG tablet Take 1 tablet (20 mg total) by mouth 2 (two) times daily.    metoprolol tartrate (LOPRESSOR) 25 MG tablet TAKE 1 TABLET BY MOUTH 2 TIMES DAILY.    spironolactone (ALDACTONE) 25 MG tablet Take 1 tablet (25 mg total) by mouth every morning.      Hospital Medications             furosemide tablet 40 mg 40 mg, Oral, 2 times daily    hydrALAZINE tablet 10 mg 10 mg, Oral, Every 12 hours    lisinopriL tablet 20 mg 20 mg, Oral, 2 times daily    metoprolol tartrate (LOPRESSOR) tablet 50 mg 50 mg, Oral, 2 times daily    spironolactone tablet 25 mg 25 mg, Oral, Every morning        Will utilize p.r.n. blood pressure medication only if patient's blood pressure greater than  180/110 and he develops symptoms such as worsening chest pain or  shortness of breath.            Atrial Fibrillation With H/O RVR  Patient with Persistent atrial fibrillation which is controlled currently with Beta Blocker, Amiodarone and Digoxin. [id number] score 5. Anticoagulation indicated. Anticoagulation done with Dabigatran.        Coronary Artery Disease With H/O Stenting  Patient with known CAD s/p CABG. Will continue and monitor for S/Sx of angina/ACS. Continue to monitor on telemetry.        JULY on CPAP  Continue supplemental oxygen to maintain pulse ox above 92%.  Unable to use CPAP per hospital policy at this time due to COVID-19.      Morbid obesity  Body mass index is 41.7 kg/m². Morbid obesity complicates all aspects of disease management from diagnostic modalities to treatment. Weight loss encouraged and health benefits explained to patient.          VTE Risk Mitigation (From admission, onward)         Ordered     dabigatran etexilate capsule 75 mg  2 times daily      04/03/20 1813     IP VTE HIGH RISK PATIENT  Once      04/03/20 1813     Place OMI hose  Until discontinued      04/03/20 1813     Place sequential compression device  Until discontinued      04/03/20 1813                      Rajinder Spence MD  Department of Hospital Medicine   [facility]

## 2020-04-08 NOTE — ASSESSMENT & PLAN NOTE
Patient's FSGs are controlled on current hypoglycemics.   Last A1c reviewed-   Lab Results   Component Value Date    HGBA1C 7.8 (H) 04/03/2018     Most recent fingerstick glucose reviewed-   Recent Labs   Lab 04/07/20  0722 04/07/20  1648   POCTGLUCOSE 125* 101     Current correctional scale  Low  Maintain anti-hyperglycemic dose as follows-   Antihyperglycemics (From admission, onward)    Start     Stop Route Frequency Ordered    04/08/20 0900  insulin detemir U-100 pen 25 Units      -- SubQ Daily 04/07/20 2134 04/08/20 0715  insulin aspart U-100 pen 7 Units      -- SubQ 3 times daily with meals 04/07/20 2134 04/03/20 1813  insulin aspart U-100 pen 0-5 Units      -- SubQ Before meals & nightly PRN 04/03/20 1813        Hold Oral hypoglycemics while patient is in the hospital.

## 2020-04-08 NOTE — SUBJECTIVE & OBJECTIVE
Interval History:  Patient seen and examined.  Patient's agitation appears to have improved.  Remains mildly febrile and hypoxic.  Noted to be wheezing by nursing. Mild fever.    Review of Systems   Constitutional: Negative for chills, fatigue and fever.   Respiratory: Positive for cough and shortness of breath.    Cardiovascular: Negative for chest pain and leg swelling.   Gastrointestinal: Negative for abdominal pain, nausea and vomiting.   Musculoskeletal: Negative for back pain.   Neurological: Negative for weakness.   Psychiatric/Behavioral: Positive for agitation and confusion. The patient is not nervous/anxious.    All other systems reviewed and are negative.    Objective:     Vital Signs (Most Recent):  Temp: (!) 100.5 °F (38.1 °C) (04/07/20 1654)  Pulse: 66 (04/07/20 1858)  Resp: 16 (04/07/20 1858)  BP: (!) 152/87 (04/07/20 1654)  SpO2: (!) 94 % (04/07/20 1858) Vital Signs (24h Range):  Temp:  [97.7 °F (36.5 °C)-100.5 °F (38.1 °C)] 100.5 °F (38.1 °C)  Pulse:  [62-67] 66  Resp:  [14-22] 16  SpO2:  [94 %-97 %] 94 %  BP: (139-152)/(63-87) 152/87     Weight: 135.6 kg (299 lb)  Body mass index is 41.7 kg/m².    Intake/Output Summary (Last 24 hours) at 4/7/2020 2132  Last data filed at 4/7/2020 1700  Gross per 24 hour   Intake 630 ml   Output --   Net 630 ml      Physical Exam   Constitutional: He appears well-developed and well-nourished.   HENT:   Head: Normocephalic and atraumatic.   Eyes: EOM are normal.   Pulmonary/Chest: No respiratory distress. He has wheezes.   Increased effort and wheezing.   Abdominal: He exhibits no distension.   Obesity noted.   Musculoskeletal: Normal range of motion. He exhibits no edema.   Neurological:   L sided weakness chronic.   Skin: No rash noted. No pallor.   Psychiatric: He has a normal mood and affect. His behavior is normal.   Nursing note and vitals reviewed.      Significant Labs: All pertinent labs within the past 24 hours have been reviewed.    Significant Imaging: I  have reviewed all pertinent imaging results/findings within the past 24 hours.

## 2020-04-08 NOTE — ASSESSMENT & PLAN NOTE
Patient's FSGs are controlled on current hypoglycemics.   Last A1c reviewed-   Lab Results   Component Value Date    HGBA1C 7.8 (H) 04/03/2018     Most recent fingerstick glucose reviewed-   Recent Labs   Lab 04/07/20  0722 04/07/20  1648   POCTGLUCOSE 125* 101     Current correctional scale  Low  Maintain anti-hyperglycemic dose as follows-   Antihyperglycemics (From admission, onward)    Start     Stop Route Frequency Ordered    04/04/20 0900  insulin detemir U-100 pen 32 Units      -- SubQ Daily 04/03/20 1813    04/03/20 1813  insulin aspart U-100 pen 0-5 Units      -- SubQ Before meals & nightly PRN 04/03/20 1813        Hold Oral hypoglycemics while patient is in the hospital.

## 2020-04-08 NOTE — PT/OT/SLP PROGRESS
Physical Therapy Treatment    Patient Name:  Jose Leon   MRN:  [id number]    Recommendations:     Discharge Recommendations:  home, home health PT, nursing facility, skilled   Discharge Equipment Recommendations: (unclear at this time)   Barriers to discharge: patient may need admission to facility for rehab prior to DC home    Assessment:     Jose Leon is a 59 y.o. male admitted with a medical diagnosis of Acute respiratory disease due to COVID-19 virus.  He presents with the following impairments/functional limitations:  weakness, impaired endurance, impaired functional mobilty, gait instability, decreased lower extremity function, decreased coordination, impaired balance .  Patient agreeable to PT treatment this morning.  Patient presented supine in bed and reqired max assist to transfer to sitting but it did appear slightly improved since yesterday.  Patient then able to stand x 5 times with mod assist and stood 30-45 seconds each time with mod assist for balance.    Rehab Prognosis: Good; patient would benefit from acute skilled PT services to address these deficits and reach maximum level of function.    Recent Surgery: * No surgery found *      Plan:     During this hospitalization, patient to be seen 5 x/week to address the identified rehab impairments via gait training, therapeutic activities, therapeutic exercises and progress toward the following goals:    · Plan of Care Expires:  05/05/20    Subjective     Chief Complaint: fatigue  Patient/Family Comments/goals: go home  Pain/Comfort:  ·        Objective:     Communicated with nurse prior to session.  Patient found supine with bed alarm, oxygen, telemetry upon PT entry to room.     General Precautions: Standard, airborne, contact, droplet, fall   Orthopedic Precautions:    Braces:       Functional Mobility:  · Bed Mobility:     · Supine to Sit: maximal assistance  · Sit to Supine: maximal assistance  · Transfers:     · Sit to Stand:  moderate  assistance with rolling walker      AM-PAC 6 CLICK MOBILITY          Therapeutic Activities and Exercises:   transfer training supine to/from sit with max assist, sit to stand x 5 times with RW with mod assist  Standing tolerance/balance 5 x 30-45 seconds with RW with mod asssit  Exercise in supine to include ankle pumps, quad sets, heel slides and hip abd.  All done 2 x 10 reps as AAROM and with a 3 second hold on isometrics.    Patient left supine with call button in reach, bed alarm on and nurse notified..    GOALS:   Multidisciplinary Problems     Physical Therapy Goals        Problem: Physical Therapy Goal    Goal Priority Disciplines Outcome Goal Variances Interventions   Physical Therapy Goal     PT, PT/OT Ongoing, Progressing     Description:  Goals to be met by: 2020    Patient will increase functional independence with mobility by performin. Supine to sit with Stand-by Assistance  2. Sit to supine with Stand-by Assistance  3. Sit to stand transfer with Stand-by Assistance  4. Bed to chair transfer with Contact Guard Assistance using Rolling Walker  5. Gait  x 50 feet with Minimal Assistance using Rolling Walker.                       Time Tracking:     PT Received On: 20  PT Start Time: 0940     PT Stop Time: 1015  PT Total Time (min): 35 min     Billable Minutes: Therapeutic Activity 20 and Therapeutic Exercise 15    Treatment Type: Treatment  PT/PTA: PT     PTA Visit Number: 0     Chris Megilligan, PT  2020

## 2020-04-08 NOTE — RESPIRATORY THERAPY
04/08/20 0701   Patient Assessment/Suction   Level of Consciousness (AVPU) alert   All Lung Fields Breath Sounds diminished   PRE-TX-O2   O2 Device (Oxygen Therapy) nasal cannula w/ humidification   $ Is the patient on Low Flow Oxygen? Yes   Flow (L/min) 2   SpO2 (!) 91 %   Pulse Oximetry Type Continuous   $ Pulse Oximetry - Multiple Charge Pulse Oximetry - Multiple   Pulse 66   Resp (!) 22   Inhaler   $ Inhaler Charges MDI (Metered Dose Inahler) Treatment   Respiratory Treatment Status (Inhaler) given   Treatment Route (Inhaler) mask;spacer/holding chamber   Patient Position (Inhaler) HOB elevated   Signs of Intolerance (Inhaler) none   Breath Sounds Post-Respiratory Treatment   Throughout All Fields Post-Treatment All Fields   Throughout All Fields Post-Treatment no change   Post-treatment Heart Rate (beats/min) 66   Post-treatment Resp Rate (breaths/min) 20

## 2020-04-08 NOTE — PLAN OF CARE
Patient AAO, VSS. PIV CDI/Saline locked. Telemetry monitoring in place. Cont pulse ox2L O2 NC. Pt remains on Airborne/Droplet/Contact . Pt verbalized understanding of POC. Purposeful hourly/q2hr rounding done during shift to promote patient safety. Patient free from falls and injury during shift.  Bed in lowest position, brakes locked, and call light within reach.  Will continue to monitor and report.

## 2020-04-08 NOTE — PLAN OF CARE
Plan of care reviewed with patient. Safety precautions maintained. Pt remains free from injury. Cardiac monitoring maintained. Continuous pulse ox maintained. Glucose monitoring maintained. Incontinence care performed. No c/o pain.  PT/OT today. O2 @ 2L NC. Pradaxa for VTE prevention. Frequent checks performed q2 hours. Vital signs stable. Neuro checks q2h. Afebrile. Oriented to person and place. Bed locked and low. Siderails raised x2. Non-skid socks on. Call light within reach.

## 2020-04-08 NOTE — PLAN OF CARE
I sent to [facility] rehab, [facility] and [facility] to review for admit. CM following. Lisa Glass LCSW     Date Status/Notes Created By   · 4/8/2020 9:30:31 AM Note: ok thank you!  Lisa Glass  · 4/8/2020 9:24:35 AM Note: Lisa, We would need a negative Covid test and symptom free for 3 days.  Shakila Siddiqi@[facility]  · 4/8/2020 9:23:44 AM Under Review: Remote - Local Review  Shakila Siddiqi@[facility]  · 4/8/2020 9:21:16 AM New  Lisa Glass  ·   Per Shakila with [facility]- they need a negative covid test and 3 days fever free to consider admission. Lisa Glass LCSW      Per Sera - they do not take Covid positive pts until 2 negative tests are resulted 24 hours apart. Lisa Glass LCSW       04/08/20 0920   Post-Acute Status   Post-Acute Authorization Placement   Post-Acute Placement Status Referrals Sent

## 2020-04-08 NOTE — PLAN OF CARE
04/07/20 7891   Patient Assessment/Suction   Level of Consciousness (AVPU) alert   Respiratory Effort Normal;Unlabored   Expansion/Accessory Muscles/Retractions no use of accessory muscles;no retractions   All Lung Fields Breath Sounds diminished;Anterior:;Lateral:   Rhythm/Pattern, Respiratory no shortness of breath reported   Cough Frequency no cough   PRE-TX-O2   O2 Device (Oxygen Therapy) nasal cannula   $ Is the patient on Low Flow Oxygen? Yes   Flow (L/min) 2   SpO2 (!) 94 %   Pulse Oximetry Type Continuous   $ Pulse Oximetry - Multiple Charge Pulse Oximetry - Multiple   Pulse 66   Resp 16

## 2020-04-08 NOTE — PLAN OF CARE
Date Status/Notes Created By   · 4/8/2020 11:03:00 AM Declined: Other: need 2 negative covid tests prior to consideration  Sera Cait@[facility]  4/8/2020 9:20:11 AM New: Please review for admit. Thanks !  Lisa Glass     04/08/20 1105   Post-Acute Status   Post-Acute Authorization Placement   Post-Acute Placement Status Referrals Sent      The pt is denied by [facility]. CM following. Lisa Glass, JUANAW

## 2020-04-08 NOTE — ASSESSMENT & PLAN NOTE
Patient with acute delirium. There is no specific treatment. Will avoid narcotics/benzos that are known to worsen condition and add PRN antipsychotics to limit behaviors of self harm. Monitor closely.

## 2020-04-09 LAB
AORTIC ROOT ANNULUS: 3.77 CM
AORTIC VALVE CUSP SEPERATION: 2.43 CM
ASCENDING AORTA: 3.76 CM
AV INDEX (PROSTH): 1.27
AV MEAN GRADIENT: 2 MMHG
AV PEAK GRADIENT: 4 MMHG
AV VALVE AREA: 3.95 CM2
AV VELOCITY RATIO: 0.91
BSA FOR ECHO PROCEDURE: 2.61 M2
CV ECHO LV RWT: 0.51 CM
DOP CALC AO PEAK VEL: 1.01 M/S
DOP CALC AO VTI: 16.13 CM
DOP CALC LVOT AREA: 3.1 CM2
DOP CALC LVOT DIAMETER: 1.99 CM
DOP CALC LVOT PEAK VEL: 0.92 M/S
DOP CALC LVOT STROKE VOLUME: 63.67 CM3
DOP CALCLVOT PEAK VEL VTI: 20.48 CM
E WAVE DECELERATION TIME: 213.83 MSEC
E/A RATIO: 3.14
E/E' RATIO: 11.73 M/S
ECHO LV POSTERIOR WALL: 1.28 CM (ref 0.6–1.1)
FRACTIONAL SHORTENING: 33 % (ref 28–44)
INTERVENTRICULAR SEPTUM: 1.1 CM (ref 0.6–1.1)
IVRT: 137.01 MSEC
LA MAJOR: 6.2 CM
LA MINOR: 6.32 CM
LA WIDTH: 4.31 CM
LEFT ATRIUM SIZE: 5.92 CM
LEFT ATRIUM VOLUME INDEX: 54.3 ML/M2
LEFT ATRIUM VOLUME: 135.75 CM3
LEFT INTERNAL DIMENSION IN SYSTOLE: 3.34 CM (ref 2.1–4)
LEFT VENTRICLE DIASTOLIC VOLUME INDEX: 47.71 ML/M2
LEFT VENTRICLE DIASTOLIC VOLUME: 119.36 ML
LEFT VENTRICLE MASS INDEX: 93 G/M2
LEFT VENTRICLE SYSTOLIC VOLUME INDEX: 18.2 ML/M2
LEFT VENTRICLE SYSTOLIC VOLUME: 45.47 ML
LEFT VENTRICULAR INTERNAL DIMENSION IN DIASTOLE: 5.02 CM (ref 3.5–6)
LEFT VENTRICULAR MASS: 232.49 G
LV LATERAL E/E' RATIO: 11 M/S
LV SEPTAL E/E' RATIO: 12.57 M/S
MV MEAN GRADIENT: 1 MMHG
MV PEAK A VEL: 0.28 M/S
MV PEAK E VEL: 0.88 M/S
MV STENOSIS PRESSURE HALF TIME: 93 MS
MV VALVE AREA P 1/2 METHOD: 2.37 CM2
PISA TR MAX VEL: 2.45 M/S
POCT GLUCOSE: 202 MG/DL (ref 70–110)
POCT GLUCOSE: 211 MG/DL (ref 70–110)
POCT GLUCOSE: 245 MG/DL (ref 70–110)
PV PEAK VELOCITY: 0.89 CM/S
RA PRESSURE: 3 MMHG
RIGHT VENTRICULAR END-DIASTOLIC DIMENSION: 3.14 CM
STJ: 3.81 CM
TDI LATERAL: 0.08 M/S
TDI SEPTAL: 0.07 M/S
TDI: 0.08 M/S
TR MAX PG: 24 MMHG
TRICUSPID ANNULAR PLANE SYSTOLIC EXCURSION: 1.56 CM
TV REST PULMONARY ARTERY PRESSURE: 27 MMHG

## 2020-04-09 PROCEDURE — 27000221 HC OXYGEN, UP TO 24 HOURS

## 2020-04-09 PROCEDURE — 25000242 PHARM REV CODE 250 ALT 637 W/ HCPCS: Performed by: HOSPITALIST

## 2020-04-09 PROCEDURE — 97110 THERAPEUTIC EXERCISES: CPT | Mod: CO

## 2020-04-09 PROCEDURE — 63600175 PHARM REV CODE 636 W HCPCS: Performed by: INTERNAL MEDICINE

## 2020-04-09 PROCEDURE — 25000003 PHARM REV CODE 250: Performed by: HOSPITALIST

## 2020-04-09 PROCEDURE — 12000002 HC ACUTE/MED SURGE SEMI-PRIVATE ROOM

## 2020-04-09 PROCEDURE — 63600175 PHARM REV CODE 636 W HCPCS: Performed by: HOSPITALIST

## 2020-04-09 PROCEDURE — 94761 N-INVAS EAR/PLS OXIMETRY MLT: CPT

## 2020-04-09 PROCEDURE — 25000003 PHARM REV CODE 250: Performed by: INTERNAL MEDICINE

## 2020-04-09 PROCEDURE — 97112 NEUROMUSCULAR REEDUCATION: CPT | Mod: CO

## 2020-04-09 PROCEDURE — 97530 THERAPEUTIC ACTIVITIES: CPT

## 2020-04-09 PROCEDURE — 94640 AIRWAY INHALATION TREATMENT: CPT

## 2020-04-09 PROCEDURE — 97110 THERAPEUTIC EXERCISES: CPT

## 2020-04-09 RX ORDER — METOPROLOL TARTRATE 25 MG/1
25 TABLET, FILM COATED ORAL 2 TIMES DAILY
Status: DISCONTINUED | OUTPATIENT
Start: 2020-04-09 | End: 2020-04-22 | Stop reason: HOSPADM

## 2020-04-09 RX ORDER — LISINOPRIL 10 MG/1
10 TABLET ORAL DAILY
Status: DISCONTINUED | OUTPATIENT
Start: 2020-04-09 | End: 2020-04-22 | Stop reason: HOSPADM

## 2020-04-09 RX ORDER — LISINOPRIL 10 MG/1
10 TABLET ORAL 2 TIMES DAILY
Status: DISCONTINUED | OUTPATIENT
Start: 2020-04-09 | End: 2020-04-09

## 2020-04-09 RX ADMIN — PREDNISONE 60 MG: 20 TABLET ORAL at 09:04

## 2020-04-09 RX ADMIN — ALPRAZOLAM 0.5 MG: 0.25 TABLET ORAL at 09:04

## 2020-04-09 RX ADMIN — IPRATROPIUM BROMIDE AND ALBUTEROL SULFATE 3 ML: .5; 3 SOLUTION RESPIRATORY (INHALATION) at 12:04

## 2020-04-09 RX ADMIN — IPRATROPIUM BROMIDE AND ALBUTEROL SULFATE 3 ML: .5; 3 SOLUTION RESPIRATORY (INHALATION) at 07:04

## 2020-04-09 RX ADMIN — FUROSEMIDE 40 MG: 40 TABLET ORAL at 09:04

## 2020-04-09 RX ADMIN — AMIODARONE HYDROCHLORIDE 200 MG: 200 TABLET ORAL at 09:04

## 2020-04-09 RX ADMIN — METOPROLOL TARTRATE 25 MG: 25 TABLET, FILM COATED ORAL at 09:04

## 2020-04-09 RX ADMIN — SERTRALINE HYDROCHLORIDE 100 MG: 50 TABLET ORAL at 09:04

## 2020-04-09 RX ADMIN — PRAVASTATIN SODIUM 80 MG: 40 TABLET ORAL at 09:04

## 2020-04-09 RX ADMIN — GABAPENTIN 300 MG: 300 CAPSULE ORAL at 09:04

## 2020-04-09 RX ADMIN — ASPIRIN 81 MG: 81 TABLET, COATED ORAL at 09:04

## 2020-04-09 RX ADMIN — INSULIN ASPART 3 UNITS: 100 INJECTION, SOLUTION INTRAVENOUS; SUBCUTANEOUS at 12:04

## 2020-04-09 RX ADMIN — CEFTRIAXONE 1 G: 1 INJECTION, SOLUTION INTRAVENOUS at 02:04

## 2020-04-09 RX ADMIN — SPIRONOLACTONE 25 MG: 25 TABLET ORAL at 09:04

## 2020-04-09 RX ADMIN — DABIGATRAN ETEXILATE MESYLATE 75 MG: 75 CAPSULE ORAL at 09:04

## 2020-04-09 RX ADMIN — HYDRALAZINE HYDROCHLORIDE 10 MG: 10 TABLET ORAL at 09:04

## 2020-04-09 RX ADMIN — Medication 400 MG: at 09:04

## 2020-04-09 RX ADMIN — ROSUVASTATIN CALCIUM 250 MCG: 10 TABLET, FILM COATED ORAL at 09:04

## 2020-04-09 RX ADMIN — INSULIN ASPART 3 UNITS: 100 INJECTION, SOLUTION INTRAVENOUS; SUBCUTANEOUS at 04:04

## 2020-04-09 RX ADMIN — BUPROPION HYDROCHLORIDE 150 MG: 150 TABLET, EXTENDED RELEASE ORAL at 06:04

## 2020-04-09 NOTE — PLAN OF CARE
Discussed with patient's relative, Yady Petersen, recent vital signs, labs, findings, and recommendations by the primary team as it pertains to the current admission for COVID-19.  Family member's questions in regards to the patient's care were answered.

## 2020-04-09 NOTE — ASSESSMENT & PLAN NOTE
Creatine stable for now. BMP reviewed- noted Estimated Creatinine Clearance: 124.3 mL/min (based on SCr of 0.9 mg/dL). according to latest data. Monitor UOP and serial BMP and adjust therapy as needed. Renally dose meds.

## 2020-04-09 NOTE — PT/OT/SLP PROGRESS
Occupational Therapy   Treatment    Name: Jose Leon  MRN: [id number]  Admitting Diagnosis:  Acute respiratory disease due to COVID-19 virus       Recommendations:     Discharge Recommendations: home, home health PT, home health OT, nursing facility, skilled  Discharge Equipment Recommendations:  (TBD)  Barriers to discharge:  None    Assessment:     Jose Leon is a 59 y.o. male with a medical diagnosis of Acute respiratory disease due to COVID-19 virus.  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 Pt is slow to respond and requies increased time for motor planning and verbal response. Performance deficits affecting function are weakness, impaired endurance, impaired self care skills, impaired functional mobilty, gait instability, impaired balance, visual deficits, impaired cognition, impaired coordination, decreased safety awareness, impaired skin, abnormal tone, decreased ROM, edema, impaired fine motor, impaired cardiopulmonary response to activity, decreased upper extremity function, decreased lower extremity function.     Rehab Prognosis:  Good; patient would benefit from acute skilled OT services to address these deficits and reach maximum level of function.       Plan:     Patient to be seen 3 x/week to address the above listed problems via self-care/home management, therapeutic activities, therapeutic exercises  · Plan of Care Expires: 05/05/20  · Plan of Care Reviewed with: patient    Subjective     Pain/Comfort:  · Pain Rating 1: 0/10    Objective:     Communicated with: RNMaurilio prior to session.  Patient found HOB elevated and sleeping with lunch delivered and untouched on tray table across him, with bed alarm, peripheral IV, telemetry, oxygen upon OT entry to room.    General Precautions: Standard, airborne, contact, droplet, fall   Orthopedic  Precautions:N/A   Braces: N/A     Occupational Performance:     Bed Mobility:    · Patient completed Scooting with stand by assistance and VC for initiation and completion  · Patient completed Supine to Sit with minimum assistance and with leg lift and increased time with VC for sequencing.    Activities of Daily Living:  · Feeding:  pt stated that he doesn't need any help to feed himself because he is R handed.    [facility] 6 Click ADL: 16    Treatment & Education:  -Neuromuscular Re-Education: during EOB sitting x 15 mins pt tended to and used L UE as support hand on bed to maintain midline 50% of the time. He requires SBA for L UE placement x 5 during this time and mod VC for tall spine and head erect.  -TherEx: during EOB sitting shoulder shrugs x 10 with little to no movement of the L shoulder; R UE lap>flexion>abd>add>lap with demo with each rep and constant VC pt completed 10 reps. Pt is easily distracted and requires reorientation frequently.  -pt requested to look through his personal belonging bag for his phone  and was able to do this with B UE with Min A while sitting at EOB.    Patient left EOB with PTKwaku presentEducation:      GOALS:   Multidisciplinary Problems     Occupational Therapy Goals        Problem: Occupational Therapy Goal    Goal Priority Disciplines Outcome Interventions   Occupational Therapy Goal     OT, PT/OT Ongoing, Progressing    Description:  Goals to be met by: 5/5/2020     Patient will increase functional independence with ADLs by performing:    LE Dressing with Contact Guard Assistance and Assistive Devices as needed.  Grooming while EOB with Stand-by Assistance.  Toileting from toilet with Minimal Assistance for hygiene and clothing management.   Supine to sit with Minimal Assistance.  Toilet transfer to bedside commode with Minimal Assistance.  Upper extremity exercise program x10 reps per handout, with supervision.                      Time Tracking:     OT Date of  Treatment: 04/09/20  OT Start Time: 1308  OT Stop Time: 1333  OT Total Time (min): 25 min    Billable Minutes:Therapeutic Exercise 15min  Neuromuscular Re-education 10min    RAMOS Carrasco  4/9/2020

## 2020-04-09 NOTE — PT/OT/SLP PROGRESS
Physical Therapy Treatment    Patient Name:  Jose Leon   MRN:  [id number]    Recommendations:     Discharge Recommendations:  home, home health PT, nursing facility, skilled   Discharge Equipment Recommendations: (unclear at this time)   Barriers to discharge: patient may have difficulty going home if functional status does not improve    Assessment:     Jose Leon is a 59 y.o. male admitted with a medical diagnosis of Acute respiratory disease due to COVID-19 virus.  He presents with the following impairments/functional limitations:  weakness, impaired endurance, impaired functional mobilty, gait instability, impaired balance, decreased lower extremity function .  Patient readily agreeable to PT and function appeared much improved today.  Patient presented sitting EOB finishing up with OT and was able to sit EOB unsupported and was even able to perform LE exercise in sitting unsupported.  Patient's sit to stand transfer also improved as patient able to stand with min assist on last attempt. Patient's treatment frequency increased to 6x a week today due to patients improved status.    Rehab Prognosis: Good; patient would benefit from acute skilled PT services to address these deficits and reach maximum level of function.    Recent Surgery: * No surgery found *      Plan:     During this hospitalization, patient to be seen 6 x/week to address the identified rehab impairments via gait training, therapeutic activities, therapeutic exercises and progress toward the following goals:    · Plan of Care Expires:  05/05/20    Subjective     Chief Complaint: not being able to remember things from yesterday  Patient/Family Comments/goals: go home  Pain/Comfort:  ·        Objective:     Communicated with nurse prior to session.  Patient found sitting EOB finishing with OT with bed alarm, telemetry, oxygen, peripheral IV upon PT entry to room.     General Precautions: Standard, airborne, fall, droplet, contact   Orthopedic  Precautions:    Braces:       Functional Mobility:  · Bed Mobility:     · Sit to Supine: maximal assistance  · Transfers:     · Sit to Stand:  minimum assistance and moderate assistance with rolling walker  · Gait: unable      AM-PAC 6 CLICK MOBILITY          Therapeutic Activities and Exercises:   exercise in sitting to include LAQ, hip flexion, isometric hip abd and isometric hip add. All done bilateral LE x 12 reps with 3 second hold on isometrics.  Transfer training sit to stand x 4 times with min-mod assist, sit to supine with max assist  Standing tolerance 4 x 20-30 seconds with RW with min assist    Patient left supine with call button in reach, bed alarm on and nurse notified..    GOALS:   Multidisciplinary Problems     Physical Therapy Goals        Problem: Physical Therapy Goal    Goal Priority Disciplines Outcome Goal Variances Interventions   Physical Therapy Goal     PT, PT/OT Ongoing, Progressing     Description:  Goals to be met by: 2020    Patient will increase functional independence with mobility by performin. Supine to sit with Stand-by Assistance  2. Sit to supine with Stand-by Assistance  3. Sit to stand transfer with Stand-by Assistance  4. Bed to chair transfer with Contact Guard Assistance using Rolling Walker  5. Gait  x 50 feet with Minimal Assistance using Rolling Walker.                       Time Tracking:     PT Received On: 20  PT Start Time: 1328     PT Stop Time: 1401  PT Total Time (min): 33 min     Billable Minutes: Therapeutic Activity 15 and Therapeutic Exercise 18    Treatment Type: Treatment  PT/PTA: PT     PTA Visit Number: 0     Chris Megilligan, PT  2020

## 2020-04-09 NOTE — PROGRESS NOTES
[facility] Hospital Medicine  Progress Note    Patient Name: Jose Loen  MRN: [id number]  Patient Class: IP- Inpatient   Admission Date: 4/3/2020  Length of Stay: 5 days  Attending Physician: Rajinder Spence MD  Primary Care Provider: Josh Giordano MD        Subjective:     Principal Problem:Acute respiratory disease due to COVID-19 virus        HPI:  Patient is a 59-year-old morbidly obese male with past medical history significant for multiple medical problems including hypertension, hyperlipidemia, coronary artery disease status post coronary artery stent placement, obstructive sleep apnea (on CPAP), chronic combined systolic and diastolic congestive heart failure, history of TIA, long-term anticoagulation with Pradaxa and paroxysmal atrial fibrillation is being admitted to Hospital Medicine from [facility] Emergency Room under inpatient status for worsening shortness of breath and right lower quadrant abdominal pain.  Patient presented to the emergency room with complaint of profound generalized weakness associated with diarrhea and vomiting for 1 day.  If patient feels dizzy and has also been experiencing nonradiating right lower quadrant abdominal pain.  Patient denies any hematemesis, melena or bleeding per rectum.  No recent travel or sick contact history reported.  Patient'sCOVID 19 test is positive in the emergency room.  Patient was recently discharged from [facility].  In the emergency room patient was noted to be hypoxic with exertion and minimal level around 85%.  Presently requiring 3 L per minute supplemental oxygen via nasal cannula.        Overview/Hospital Course:  No notes on file    Interval History:  Patient seen and examined.  Patient's agitation appears to have improved.  Remains mildly febrile and hypoxic.  Noted to be wheezing by nursing. Mild fever.    Review of Systems   Constitutional: Negative for chills, fatigue and  fever.   Respiratory: Positive for cough and shortness of breath.    Cardiovascular: Negative for chest pain and leg swelling.   Gastrointestinal: Negative for abdominal pain, nausea and vomiting.   Musculoskeletal: Negative for back pain.   Neurological: Negative for weakness.   Psychiatric/Behavioral: Positive for agitation and confusion. The patient is not nervous/anxious.    All other systems reviewed and are negative.    Objective:     Vital Signs (Most Recent):  Temp: (!) 100.5 °F (38.1 °C) (04/07/20 1654)  Pulse: 66 (04/07/20 1858)  Resp: 16 (04/07/20 1858)  BP: (!) 152/87 (04/07/20 1654)  SpO2: (!) 94 % (04/07/20 1858) Vital Signs (24h Range):  Temp:  [97.7 °F (36.5 °C)-100.5 °F (38.1 °C)] 100.5 °F (38.1 °C)  Pulse:  [62-67] 66  Resp:  [14-22] 16  SpO2:  [94 %-97 %] 94 %  BP: (139-152)/(63-87) 152/87     Weight: 135.6 kg (299 lb)  Body mass index is 41.7 kg/m².    Intake/Output Summary (Last 24 hours) at 4/7/2020 2132  Last data filed at 4/7/2020 1700  Gross per 24 hour   Intake 630 ml   Output --   Net 630 ml      Physical Exam   Constitutional: He appears well-developed and well-nourished.   HENT:   Head: Normocephalic and atraumatic.   Eyes: EOM are normal.   Pulmonary/Chest: No respiratory distress. He has wheezes.   Increased effort and wheezing.   Abdominal: He exhibits no distension.   Obesity noted.   Musculoskeletal: Normal range of motion. He exhibits no edema.   Neurological:   L sided weakness chronic.   Skin: No rash noted. No pallor.   Psychiatric: He has a normal mood and affect. His behavior is normal.   Nursing note and vitals reviewed.      Significant Labs: All pertinent labs within the past 24 hours have been reviewed.    Significant Imaging: I have reviewed all pertinent imaging results/findings within the past 24 hours.      Assessment/Plan:      * Acute respiratory disease due to COVID-19 virus  - COVID-19 testing   - Infection Control notified 4/3/2020    - Isolation:   - Airborne,  Contact and Droplet Precautions  - Cohort patients into COVID units  - N95 masks must be fit tested, wear eye protection  - 20 second hand hygiene              - Limit visitors per hospital policy              - Consolidating lab draws, nursing care, provider visits, and interventions      - Management:  Supplemental O2 to maintain SpO2 >92%  Continuous/intermittent Pulse Ox  Albuterol INHALER PRN (avoid nebulization of secretions)  Avoiding BiPAP to prevent aerosolization (including home BiPAP)    Advance Care Planning -patient full code for now                   COPD exacerbation  Patient's COPD is uncontrolled due to continued dyspnea, use of accessory muscles for breathing and worsening of baseline hypoxia currently.  Patient is currently off COPD Pathway. Continue scheduled inhalers Steroids, Antibiotics and Supplemental oxygen and monitor respiratory status closely.         Type 2 DM On Insulin  Patient's FSGs are controlled on current hypoglycemics.   Last A1c reviewed-   Lab Results   Component Value Date    HGBA1C 7.8 (H) 04/03/2018     Most recent fingerstick glucose reviewed-   Recent Labs   Lab 04/07/20  0722 04/07/20  1648   POCTGLUCOSE 125* 101     Current correctional scale  Low  Maintain anti-hyperglycemic dose as follows-   Antihyperglycemics (From admission, onward)    Start     Stop Route Frequency Ordered    04/08/20 0900  insulin detemir U-100 pen 25 Units      -- SubQ Daily 04/07/20 2134    04/08/20 0715  insulin aspart U-100 pen 7 Units      -- SubQ 3 times daily with meals 04/07/20 2134    04/03/20 1813  insulin aspart U-100 pen 0-5 Units      -- SubQ Before meals & nightly PRN 04/03/20 1813        Hold Oral hypoglycemics while patient is in the hospital.          1 PPD X 25+ YRs Chronic Tobacco Use Disorder  Smoking cessation counseling performed. Dangers of cigarette smoking were reviewed with patient in detail and patient was encouraged to quit. Nicotine replacement options were discussed  for > 3 minutes.        Anxiety And Depression  Continue Xanax and Wellbutrin use as before      Stage 2 CKD  Creatine stable for now. BMP reviewed- noted Estimated Creatinine Clearance: 124.3 mL/min (based on SCr of 0.9 mg/dL). according to latest data. Monitor UOP and serial BMP and adjust therapy as needed. Renally dose meds.                Hypertension  Chronic, controlled.  Latest blood pressure and vitals reviewed-   Temp:  [97.7 °F (36.5 °C)-100.5 °F (38.1 °C)]   Pulse:  [62-67]   Resp:  [14-22]   BP: (139-152)/(63-87)   SpO2:  [94 %-97 %] .   Home meds for hypertension were reviewed and noted below. Hospital anti-hypertensive changes were made as shown below.  Hypertension Medications             furosemide (LASIX) 40 MG tablet Take 1 tablet (40 mg total) by mouth 2 (two) times daily.    hydrALAZINE (APRESOLINE) 10 MG tablet Take 10 mg by mouth every 12 (twelve) hours.    lisinopril (PRINIVIL,ZESTRIL) 20 MG tablet Take 1 tablet (20 mg total) by mouth 2 (two) times daily.    metoprolol tartrate (LOPRESSOR) 25 MG tablet TAKE 1 TABLET BY MOUTH 2 TIMES DAILY.    spironolactone (ALDACTONE) 25 MG tablet Take 1 tablet (25 mg total) by mouth every morning.      Hospital Medications             furosemide tablet 40 mg 40 mg, Oral, 2 times daily    hydrALAZINE tablet 10 mg 10 mg, Oral, Every 12 hours    lisinopriL tablet 20 mg 20 mg, Oral, 2 times daily    metoprolol tartrate (LOPRESSOR) tablet 50 mg 50 mg, Oral, 2 times daily    spironolactone tablet 25 mg 25 mg, Oral, Every morning        Will utilize p.r.n. blood pressure medication only if patient's blood pressure greater than  180/110 and he develops symptoms such as worsening chest pain or shortness of breath.            Atrial Fibrillation With H/O RVR  Patient with Persistent atrial fibrillation which is controlled currently with Beta Blocker, Amiodarone and Digoxin. [id number] score 5. Anticoagulation indicated. Anticoagulation done with  Dabigatran.        Coronary Artery Disease With H/O Stenting  Patient with known CAD s/p CABG. Will continue and monitor for S/Sx of angina/ACS. Continue to monitor on telemetry.        JULY on CPAP  Continue supplemental oxygen to maintain pulse ox above 92%.  Unable to use CPAP per hospital policy at this time due to COVID-19.      Morbid obesity  Body mass index is 41.7 kg/m². Morbid obesity complicates all aspects of disease management from diagnostic modalities to treatment. Weight loss encouraged and health benefits explained to patient.          VTE Risk Mitigation (From admission, onward)         Ordered     dabigatran etexilate capsule 75 mg  2 times daily      04/03/20 1813     IP VTE HIGH RISK PATIENT  Once      04/03/20 1813     Place OMI hose  Until discontinued      04/03/20 1813     Place sequential compression device  Until discontinued      04/03/20 1813                      Rajinder Spence MD  Department of Hospital Medicine   [facility]

## 2020-04-09 NOTE — ASSESSMENT & PLAN NOTE
Patient's COPD is uncontrolled due to continued dyspnea, use of accessory muscles for breathing and worsening of baseline hypoxia currently.  Patient is currently off COPD Pathway. Continue scheduled inhalers Steroids, Antibiotics and Supplemental oxygen and monitor respiratory status closely.

## 2020-04-09 NOTE — CARE UPDATE
04/09/20 0705   Patient Assessment/Suction   Level of Consciousness (AVPU) alert   Respiratory Effort Normal;Unlabored   Expansion/Accessory Muscles/Retractions no use of accessory muscles;no retractions;expansion symmetric   All Lung Fields Breath Sounds diminished;wheezes, expiratory   Rhythm/Pattern, Respiratory pattern regular;depth regular   Cough Frequency no cough   PRE-TX-O2   O2 Device (Oxygen Therapy) nasal cannula   $ Is the patient on Low Flow Oxygen? Yes   Flow (L/min) 3   SpO2 (!) 94 %   Pulse Oximetry Type Continuous   $ Pulse Oximetry - Multiple Charge Pulse Oximetry - Multiple   Pulse (!) 59   Resp 16   Aerosol Therapy   $ Aerosol Therapy Charges Aerosol Treatment   Daily Review of Necessity (SVN) completed   Respiratory Treatment Status (SVN) given   Treatment Route (SVN) mask   Patient Position (SVN) HOB elevated   Post Treatment Assessment (SVN) increased aeration   Signs of Intolerance (SVN) none   Breath Sounds Post-Respiratory Treatment   Throughout All Fields Post-Treatment All Fields   Throughout All Fields Post-Treatment aeration increased   Post-treatment Heart Rate (beats/min) 62   Post-treatment Resp Rate (breaths/min) 18       Duoneb q6 hours.

## 2020-04-09 NOTE — PLAN OF CARE
04/08/20 1852   Patient Assessment/Suction   Level of Consciousness (AVPU) alert   Respiratory Effort Normal;Unlabored   Expansion/Accessory Muscles/Retractions no use of accessory muscles;no retractions   All Lung Fields Breath Sounds diminished;wheezes, expiratory;Anterior:;Lateral:   Rhythm/Pattern, Respiratory shortness of breath   PRE-TX-O2   O2 Device (Oxygen Therapy) nasal cannula   $ Is the patient on Low Flow Oxygen? Yes   Flow (L/min) 3   SpO2 (!) 93 %   Pulse Oximetry Type Continuous   $ Pulse Oximetry - Multiple Charge Pulse Oximetry - Multiple   Pulse 76   Resp 16   Aerosol Therapy   $ Aerosol Therapy Charges Aerosol Treatment   Respiratory Treatment Status (SVN) given   Treatment Route (SVN) mask   Patient Position (SVN) semi-Youssef's   Post Treatment Assessment (SVN) breath sounds improved   Signs of Intolerance (SVN) none   Breath Sounds Post-Respiratory Treatment   Throughout All Fields Post-Treatment All Fields   Throughout All Fields Post-Treatment aeration increased   Post-treatment Heart Rate (beats/min) 74   Post-treatment Resp Rate (breaths/min) 18

## 2020-04-10 LAB
ALBUMIN SERPL BCP-MCNC: 2.6 G/DL (ref 3.5–5.2)
ALP SERPL-CCNC: 112 U/L (ref 55–135)
ALT SERPL W/O P-5'-P-CCNC: 25 U/L (ref 10–44)
ANION GAP SERPL CALC-SCNC: 13 MMOL/L (ref 8–16)
AST SERPL-CCNC: 20 U/L (ref 10–40)
BASOPHILS # BLD AUTO: 0.01 K/UL (ref 0–0.2)
BASOPHILS NFR BLD: 0.1 % (ref 0–1.9)
BILIRUB SERPL-MCNC: 0.4 MG/DL (ref 0.1–1)
BUN SERPL-MCNC: 20 MG/DL (ref 6–20)
CALCIUM SERPL-MCNC: 8.9 MG/DL (ref 8.7–10.5)
CHLORIDE SERPL-SCNC: 97 MMOL/L (ref 95–110)
CO2 SERPL-SCNC: 28 MMOL/L (ref 23–29)
CREAT SERPL-MCNC: 1 MG/DL (ref 0.5–1.4)
CRP SERPL-MCNC: 96.1 MG/L (ref 0–8.2)
DIFFERENTIAL METHOD: ABNORMAL
EOSINOPHIL # BLD AUTO: 0.2 K/UL (ref 0–0.5)
EOSINOPHIL NFR BLD: 2 % (ref 0–8)
ERYTHROCYTE [DISTWIDTH] IN BLOOD BY AUTOMATED COUNT: 14.9 % (ref 11.5–14.5)
EST. GFR  (AFRICAN AMERICAN): >60 ML/MIN/1.73 M^2
EST. GFR  (NON AFRICAN AMERICAN): >60 ML/MIN/1.73 M^2
GIANT PLATELETS BLD QL SMEAR: PRESENT
GLUCOSE SERPL-MCNC: 245 MG/DL (ref 70–110)
HCT VFR BLD AUTO: 42.6 % (ref 40–54)
HGB BLD-MCNC: 12.4 G/DL (ref 14–18)
IMM GRANULOCYTES # BLD AUTO: 0.03 K/UL (ref 0–0.04)
IMM GRANULOCYTES NFR BLD AUTO: 0.3 % (ref 0–0.5)
LYMPHOCYTES # BLD AUTO: 0.5 K/UL (ref 1–4.8)
LYMPHOCYTES NFR BLD: 5.8 % (ref 18–48)
MAGNESIUM SERPL-MCNC: 2.1 MG/DL (ref 1.6–2.6)
MCH RBC QN AUTO: 23.1 PG (ref 27–31)
MCHC RBC AUTO-ENTMCNC: 29.1 G/DL (ref 32–36)
MCV RBC AUTO: 80 FL (ref 82–98)
MONOCYTES # BLD AUTO: 0.4 K/UL (ref 0.3–1)
MONOCYTES NFR BLD: 4.8 % (ref 4–15)
NEUTROPHILS # BLD AUTO: 7.7 K/UL (ref 1.8–7.7)
NEUTROPHILS NFR BLD: 87 % (ref 38–73)
NRBC BLD-RTO: 0 /100 WBC
PLATELET # BLD AUTO: 327 K/UL (ref 150–350)
PLATELET BLD QL SMEAR: ABNORMAL
PMV BLD AUTO: 11.2 FL (ref 9.2–12.9)
POCT GLUCOSE: 235 MG/DL (ref 70–110)
POCT GLUCOSE: 242 MG/DL (ref 70–110)
POCT GLUCOSE: 252 MG/DL (ref 70–110)
POTASSIUM SERPL-SCNC: 3.6 MMOL/L (ref 3.5–5.1)
PROT SERPL-MCNC: 7.2 G/DL (ref 6–8.4)
RBC # BLD AUTO: 5.36 M/UL (ref 4.6–6.2)
SODIUM SERPL-SCNC: 138 MMOL/L (ref 136–145)
WBC # BLD AUTO: 8.81 K/UL (ref 3.9–12.7)

## 2020-04-10 PROCEDURE — 25000003 PHARM REV CODE 250: Performed by: INTERNAL MEDICINE

## 2020-04-10 PROCEDURE — 86140 C-REACTIVE PROTEIN: CPT

## 2020-04-10 PROCEDURE — 94640 AIRWAY INHALATION TREATMENT: CPT

## 2020-04-10 PROCEDURE — 97110 THERAPEUTIC EXERCISES: CPT

## 2020-04-10 PROCEDURE — 85025 COMPLETE CBC W/AUTO DIFF WBC: CPT

## 2020-04-10 PROCEDURE — 83735 ASSAY OF MAGNESIUM: CPT

## 2020-04-10 PROCEDURE — 63600175 PHARM REV CODE 636 W HCPCS: Performed by: HOSPITALIST

## 2020-04-10 PROCEDURE — 97530 THERAPEUTIC ACTIVITIES: CPT

## 2020-04-10 PROCEDURE — 36415 COLL VENOUS BLD VENIPUNCTURE: CPT

## 2020-04-10 PROCEDURE — 94761 N-INVAS EAR/PLS OXIMETRY MLT: CPT

## 2020-04-10 PROCEDURE — 12000002 HC ACUTE/MED SURGE SEMI-PRIVATE ROOM

## 2020-04-10 PROCEDURE — 63600175 PHARM REV CODE 636 W HCPCS: Performed by: INTERNAL MEDICINE

## 2020-04-10 PROCEDURE — 25000242 PHARM REV CODE 250 ALT 637 W/ HCPCS: Performed by: HOSPITALIST

## 2020-04-10 PROCEDURE — 80053 COMPREHEN METABOLIC PANEL: CPT

## 2020-04-10 PROCEDURE — 25000003 PHARM REV CODE 250: Performed by: HOSPITALIST

## 2020-04-10 PROCEDURE — 27000221 HC OXYGEN, UP TO 24 HOURS

## 2020-04-10 RX ORDER — INSULIN ASPART 100 [IU]/ML
5 INJECTION, SOLUTION INTRAVENOUS; SUBCUTANEOUS
Status: DISCONTINUED | OUTPATIENT
Start: 2020-04-11 | End: 2020-04-22 | Stop reason: HOSPADM

## 2020-04-10 RX ADMIN — PRAVASTATIN SODIUM 80 MG: 40 TABLET ORAL at 08:04

## 2020-04-10 RX ADMIN — Medication 400 MG: at 09:04

## 2020-04-10 RX ADMIN — DABIGATRAN ETEXILATE MESYLATE 75 MG: 75 CAPSULE ORAL at 09:04

## 2020-04-10 RX ADMIN — GABAPENTIN 300 MG: 300 CAPSULE ORAL at 08:04

## 2020-04-10 RX ADMIN — IPRATROPIUM BROMIDE AND ALBUTEROL SULFATE 3 ML: .5; 3 SOLUTION RESPIRATORY (INHALATION) at 12:04

## 2020-04-10 RX ADMIN — IPRATROPIUM BROMIDE AND ALBUTEROL SULFATE 3 ML: .5; 3 SOLUTION RESPIRATORY (INHALATION) at 07:04

## 2020-04-10 RX ADMIN — LISINOPRIL 10 MG: 10 TABLET ORAL at 09:04

## 2020-04-10 RX ADMIN — SPIRONOLACTONE 25 MG: 25 TABLET ORAL at 09:04

## 2020-04-10 RX ADMIN — ASPIRIN 81 MG: 81 TABLET, COATED ORAL at 08:04

## 2020-04-10 RX ADMIN — INSULIN ASPART 2 UNITS: 100 INJECTION, SOLUTION INTRAVENOUS; SUBCUTANEOUS at 04:04

## 2020-04-10 RX ADMIN — AMIODARONE HYDROCHLORIDE 200 MG: 200 TABLET ORAL at 08:04

## 2020-04-10 RX ADMIN — IPRATROPIUM BROMIDE AND ALBUTEROL SULFATE 3 ML: .5; 3 SOLUTION RESPIRATORY (INHALATION) at 01:04

## 2020-04-10 RX ADMIN — GABAPENTIN 300 MG: 300 CAPSULE ORAL at 09:04

## 2020-04-10 RX ADMIN — INSULIN ASPART 3 UNITS: 100 INJECTION, SOLUTION INTRAVENOUS; SUBCUTANEOUS at 06:04

## 2020-04-10 RX ADMIN — BUPROPION HYDROCHLORIDE 150 MG: 150 TABLET, EXTENDED RELEASE ORAL at 06:04

## 2020-04-10 RX ADMIN — INSULIN ASPART 3 UNITS: 100 INJECTION, SOLUTION INTRAVENOUS; SUBCUTANEOUS at 04:04

## 2020-04-10 RX ADMIN — DABIGATRAN ETEXILATE MESYLATE 75 MG: 75 CAPSULE ORAL at 08:04

## 2020-04-10 RX ADMIN — INSULIN ASPART 3 UNITS: 100 INJECTION, SOLUTION INTRAVENOUS; SUBCUTANEOUS at 11:04

## 2020-04-10 RX ADMIN — METOPROLOL TARTRATE 25 MG: 25 TABLET, FILM COATED ORAL at 08:04

## 2020-04-10 RX ADMIN — PREDNISONE 60 MG: 20 TABLET ORAL at 09:04

## 2020-04-10 RX ADMIN — METOPROLOL TARTRATE 25 MG: 25 TABLET, FILM COATED ORAL at 09:04

## 2020-04-10 RX ADMIN — SERTRALINE HYDROCHLORIDE 100 MG: 50 TABLET ORAL at 09:04

## 2020-04-10 RX ADMIN — AMIODARONE HYDROCHLORIDE 200 MG: 200 TABLET ORAL at 09:04

## 2020-04-10 RX ADMIN — ALPRAZOLAM 0.5 MG: 0.25 TABLET ORAL at 09:04

## 2020-04-10 RX ADMIN — ALPRAZOLAM 0.5 MG: 0.25 TABLET ORAL at 08:04

## 2020-04-10 NOTE — ASSESSMENT & PLAN NOTE
Chronic, controlled.  Latest blood pressure and vitals reviewed-   Temp:  [96.1 °F (35.6 °C)-97.2 °F (36.2 °C)]   Pulse:  [55-66]   Resp:  [16-22]   BP: (116-124)/(67-79)   SpO2:  [93 %-96 %] .   Home meds for hypertension were reviewed and noted below. Hospital anti-hypertensive changes were made as shown below.  Hypertension Medications             furosemide (LASIX) 40 MG tablet Take 1 tablet (40 mg total) by mouth 2 (two) times daily.    hydrALAZINE (APRESOLINE) 10 MG tablet Take 10 mg by mouth every 12 (twelve) hours.    lisinopril (PRINIVIL,ZESTRIL) 20 MG tablet Take 1 tablet (20 mg total) by mouth 2 (two) times daily.    metoprolol tartrate (LOPRESSOR) 25 MG tablet TAKE 1 TABLET BY MOUTH 2 TIMES DAILY.    spironolactone (ALDACTONE) 25 MG tablet Take 1 tablet (25 mg total) by mouth every morning.      Hospital Medications             lisinopriL tablet 10 mg 10 mg, Oral, Daily    metoprolol tartrate (LOPRESSOR) tablet 25 mg 25 mg, Oral, 2 times daily, Hold for HR &lt;60 or BP &lt;90/60    spironolactone tablet 25 mg 25 mg, Oral, Every morning        Will utilize p.r.n. blood pressure medication only if patient's blood pressure greater than  180/110 and he develops symptoms such as worsening chest pain or shortness of breath.

## 2020-04-10 NOTE — SUBJECTIVE & OBJECTIVE
Interval History:  Patient seen and examined.  Patient's agitation appears to have improved.  Patient remains afebrile overnight.  Hypoxemia and wheezing appear to be improved with steroids and nebulized albuterol.    Review of Systems   Constitutional: Negative for chills, fatigue and fever.   Respiratory: Positive for cough and shortness of breath.    Cardiovascular: Negative for chest pain and leg swelling.   Gastrointestinal: Negative for abdominal pain, nausea and vomiting.   Musculoskeletal: Negative for back pain.   Neurological: Negative for weakness.   Psychiatric/Behavioral: Positive for behavioral problems. The patient is not nervous/anxious.    All other systems reviewed and are negative.    Objective:     Vital Signs (Most Recent):  Temp: 96.1 °F (35.6 °C) (04/09/20 1508)  Pulse: (!) 55 (04/09/20 1937)  Resp: 20 (04/09/20 1902)  BP: 123/69 (04/09/20 1508)  SpO2: 95 % (04/09/20 1937) Vital Signs (24h Range):  Temp:  [96.1 °F (35.6 °C)-97.2 °F (36.2 °C)] 96.1 °F (35.6 °C)  Pulse:  [55-66] 55  Resp:  [16-22] 20  SpO2:  [93 %-96 %] 95 %  BP: (116-124)/(67-79) 123/69     Weight: 135.6 kg (299 lb)  Body mass index is 41.7 kg/m².    Intake/Output Summary (Last 24 hours) at 4/9/2020 2102  Last data filed at 4/9/2020 1200  Gross per 24 hour   Intake 480 ml   Output --   Net 480 ml      Physical Exam   Constitutional: He appears well-developed and well-nourished.   HENT:   Head: Normocephalic and atraumatic.   Eyes: EOM are normal.   Pulmonary/Chest: No respiratory distress. He has wheezes.   Increased effort and wheezing.   Abdominal: He exhibits no distension.   Obesity noted.   Musculoskeletal: Normal range of motion. He exhibits no edema.   Neurological:   L sided weakness chronic.   Skin: No rash noted. No pallor.   Psychiatric: He has a normal mood and affect. His behavior is normal.   Nursing note and vitals reviewed.    VIRTUAL TELENOTE    Patient physical exam and history were performed via - 2 way video  televisit.  Physical exam findings were other evidence primarily by visualization through 2 way video or by conversation with the patient's bedside nurse.  I was present throughout the entire tele visit.    The attending portion of this evaluation, treatment, and documentation was performed per Rajinder Spence MD via audiovisual.      Significant Labs: All pertinent labs within the past 24 hours have been reviewed.    Significant Imaging: I have reviewed all pertinent imaging results/findings within the past 24 hours.

## 2020-04-10 NOTE — PLAN OF CARE
Pt is calm and cooperative, pt agrees with plan of care, pt is aaox4, left sided weakness and drift present in both L extremities, L sided facial drooping, fall precautions maintained with tele sitter monitor in room, pt turned as able every 4 hours, po intake tolerated well, tele monitor in place, afebrile through shift, will continue to monitor.

## 2020-04-10 NOTE — PLAN OF CARE
Pt alert and oriented with some confusion at times. Can be incont of bladder. Plan of care continued. Call light in reach. Bed alarm on.

## 2020-04-10 NOTE — ASSESSMENT & PLAN NOTE
Patient's FSGs are controlled on current hypoglycemics.   Last A1c reviewed-   Lab Results   Component Value Date    HGBA1C 7.8 (H) 04/03/2018     Most recent fingerstick glucose reviewed-   Recent Labs   Lab 04/09/20  0601 04/09/20  1112 04/09/20  1626   POCTGLUCOSE 245* 211* 202*     Current correctional scale  Low  Maintain anti-hyperglycemic dose as follows-   Antihyperglycemics (From admission, onward)    Start     Stop Route Frequency Ordered    04/09/20 0900  insulin detemir U-100 pen 12 Units      -- SubQ Daily 04/08/20 1233    04/08/20 1645  insulin aspart U-100 pen 3 Units      -- SubQ 3 times daily with meals 04/08/20 1233    04/03/20 1813  insulin aspart U-100 pen 0-5 Units      -- SubQ Before meals & nightly PRN 04/03/20 1813        Hold Oral hypoglycemics while patient is in the hospital.

## 2020-04-10 NOTE — PLAN OF CARE
Attempt was made to contact patient's relative, Yady Petersen, in regards to recent vital signs, labs, findings, and recommendations by the primary team as it pertains to the current admission for COVID-19.  However, was unable to reach this point of contact at the listed numbers.  Will attempt this call again tomorrow.

## 2020-04-10 NOTE — PT/OT/SLP PROGRESS
Physical Therapy Treatment    Patient Name:  Jose Leon   MRN:  [id number]    Recommendations:     Discharge Recommendations:  nursing facility, skilled   Discharge Equipment Recommendations: (unclear at this time)   Barriers to discharge: patient will need admission to SNF for rehab prior to DC home with family    Assessment:     Jose Leon is a 59 y.o. male admitted with a medical diagnosis of Acute respiratory disease due to COVID-19 virus.  He presents with the following impairments/functional limitations:  weakness, impaired endurance, impaired balance, impaired functional mobilty, gait instability, decreased lower extremity function .  Patient readily agreeable to PT treatment this morning.  Patient performed bed exercise with no problems and was able to transfer supine to sit with mod assist.  Patient then able to transfer to stand with mod assist and attempted to take steps when standing but was unable to  either LE to advance them.  Patient would benefit from admission to SNF to help him return to premobid gait status.    Rehab Prognosis: Good; patient would benefit from acute skilled PT services to address these deficits and reach maximum level of function.    Recent Surgery: * No surgery found *      Plan:     During this hospitalization, patient to be seen 6 x/week to address the identified rehab impairments via gait training, therapeutic activities, therapeutic exercises and progress toward the following goals:    · Plan of Care Expires:  05/05/20    Subjective     Chief Complaint: weakness  Patient/Family Comments/goals: walk and go home  Pain/Comfort:  ·        Objective:     Communicated with nurse Thorpe prior to session.  Patient found supine with bed alarm, telemetry, oxygen, peripheral IV upon PT entry to room.     General Precautions: Standard, airborne, contact, droplet, fall   Orthopedic Precautions:    Braces:       Functional Mobility:  · Bed Mobility:     · Supine to Sit:  moderate assistance  · Sit to Supine: moderate assistance  · Transfers:     · Sit to Stand:  moderate assistance with rolling walker  · Gait: unable to  either LE to ambulate      AM-PAC 6 CLICK MOBILITY          Therapeutic Activities and Exercises:   exercise in supine to include heel slides, ankle pumps, quad sets and hip abd.  All done 2 x 10 reps as AAROM and with a 3 second hold on isometrics.  Transfer training supine to/from sit with mod assist, sit to stand x 4 times with RW with mod assist    Patient left supine with call button in reach, bed alarm on and nurse notified..    GOALS:   Multidisciplinary Problems     Physical Therapy Goals        Problem: Physical Therapy Goal    Goal Priority Disciplines Outcome Goal Variances Interventions   Physical Therapy Goal     PT, PT/OT Ongoing, Progressing     Description:  Goals to be met by: 2020    Patient will increase functional independence with mobility by performin. Supine to sit with Stand-by Assistance  2. Sit to supine with Stand-by Assistance  3. Sit to stand transfer with Stand-by Assistance  4. Bed to chair transfer with Contact Guard Assistance using Rolling Walker  5. Gait  x 50 feet with Minimal Assistance using Rolling Walker.                       Time Tracking:     PT Received On: 04/10/20  PT Start Time: 1005     PT Stop Time: 1032  PT Total Time (min): 27 min     Billable Minutes: Therapeutic Activity 12 and Therapeutic Exercise 15    Treatment Type: Treatment  PT/PTA: PT     PTA Visit Number: 0     Chris Megilligan, PT  04/10/2020

## 2020-04-10 NOTE — PLAN OF CARE
04/09/20 1902   Patient Assessment/Suction   Level of Consciousness (AVPU) responds to voice   Respiratory Effort Normal;Unlabored   Expansion/Accessory Muscles/Retractions expansion symmetric   All Lung Fields Breath Sounds diminished;wheezes, expiratory   Rhythm/Pattern, Respiratory pattern regular   Cough Frequency no cough   PRE-TX-O2   O2 Device (Oxygen Therapy) nasal cannula   Flow (L/min) 3   SpO2 96 %   Pulse Oximetry Type Continuous   Pulse (!) 58   Resp 20   Aerosol Therapy   $ Aerosol Therapy Charges Aerosol Treatment   Respiratory Treatment Status (SVN) given   Treatment Route (SVN) mask   Patient Position (SVN) HOB elevated   Post Treatment Assessment (SVN) increased aeration   Signs of Intolerance (SVN) none   Breath Sounds Post-Respiratory Treatment   Post-treatment Heart Rate (beats/min) 62   Post-treatment Resp Rate (breaths/min) 20

## 2020-04-10 NOTE — RESPIRATORY THERAPY
04/10/20 0712   Patient Assessment/Suction   Level of Consciousness (AVPU) alert   Respiratory Effort Normal;Unlabored   Expansion/Accessory Muscles/Retractions no use of accessory muscles;no retractions;expansion symmetric   All Lung Fields Breath Sounds coarse;diminished   Rhythm/Pattern, Respiratory unlabored;pattern regular;depth regular   Cough Frequency no cough   PRE-TX-O2   O2 Device (Oxygen Therapy) nasal cannula   $ Is the patient on Low Flow Oxygen? Yes   Flow (L/min) 3   SpO2 95 %   Pulse Oximetry Type Continuous   $ Pulse Oximetry - Multiple Charge Pulse Oximetry - Multiple   Pulse (!) 58   Resp 18   Aerosol Therapy   $ Aerosol Therapy Charges Aerosol Treatment   Respiratory Treatment Status (SVN) given   Treatment Route (SVN) mask   Patient Position (SVN) HOB elevated   Post Treatment Assessment (SVN) breath sounds unchanged   Signs of Intolerance (SVN) none   Breath Sounds Post-Respiratory Treatment   Throughout All Fields Post-Treatment All Fields   Throughout All Fields Post-Treatment no change   Post-treatment Heart Rate (beats/min) 60   Post-treatment Resp Rate (breaths/min) 18

## 2020-04-10 NOTE — PROGRESS NOTES
[facility] Hospital Medicine  Progress Note    Patient Name: Jose Leon  MRN: [id number]  Patient Class: IP- Inpatient   Admission Date: 4/3/2020  Length of Stay: 6 days  Attending Physician: Rajinder Spence MD  Primary Care Provider: Josh Giordano MD        Subjective:     Principal Problem:Acute respiratory disease due to COVID-19 virus        HPI:  Patient is a 59-year-old morbidly obese male with past medical history significant for multiple medical problems including hypertension, hyperlipidemia, coronary artery disease status post coronary artery stent placement, obstructive sleep apnea (on CPAP), chronic combined systolic and diastolic congestive heart failure, history of TIA, long-term anticoagulation with Pradaxa and paroxysmal atrial fibrillation is being admitted to Hospital Medicine from [facility] Emergency Room under inpatient status for worsening shortness of breath and right lower quadrant abdominal pain.  Patient presented to the emergency room with complaint of profound generalized weakness associated with diarrhea and vomiting for 1 day.  If patient feels dizzy and has also been experiencing nonradiating right lower quadrant abdominal pain.  Patient denies any hematemesis, melena or bleeding per rectum.  No recent travel or sick contact history reported.  Patient'sCOVID 19 test is positive in the emergency room.  Patient was recently discharged from [facility].  In the emergency room patient was noted to be hypoxic with exertion and minimal level around 85%.  Presently requiring 3 L per minute supplemental oxygen via nasal cannula.        Overview/Hospital Course:  No notes on file    Interval History:  Patient seen and examined.  Patient's agitation appears to have improved.  Patient remains afebrile overnight.  Hypoxemia and wheezing appear to be improved with steroids and nebulized albuterol.    Review of Systems    Constitutional: Negative for chills, fatigue and fever.   Respiratory: Positive for cough and shortness of breath.    Cardiovascular: Negative for chest pain and leg swelling.   Gastrointestinal: Negative for abdominal pain, nausea and vomiting.   Musculoskeletal: Negative for back pain.   Neurological: Negative for weakness.   Psychiatric/Behavioral: Positive for behavioral problems. The patient is not nervous/anxious.    All other systems reviewed and are negative.    Objective:     Vital Signs (Most Recent):  Temp: 96.1 °F (35.6 °C) (04/09/20 1508)  Pulse: (!) 55 (04/09/20 1937)  Resp: 20 (04/09/20 1902)  BP: 123/69 (04/09/20 1508)  SpO2: 95 % (04/09/20 1937) Vital Signs (24h Range):  Temp:  [96.1 °F (35.6 °C)-97.2 °F (36.2 °C)] 96.1 °F (35.6 °C)  Pulse:  [55-66] 55  Resp:  [16-22] 20  SpO2:  [93 %-96 %] 95 %  BP: (116-124)/(67-79) 123/69     Weight: 135.6 kg (299 lb)  Body mass index is 41.7 kg/m².    Intake/Output Summary (Last 24 hours) at 4/9/2020 2102  Last data filed at 4/9/2020 1200  Gross per 24 hour   Intake 480 ml   Output --   Net 480 ml      Physical Exam   Constitutional: He appears well-developed and well-nourished.   HENT:   Head: Normocephalic and atraumatic.   Eyes: EOM are normal.   Pulmonary/Chest: No respiratory distress. He has wheezes.   Increased effort and wheezing.   Abdominal: He exhibits no distension.   Obesity noted.   Musculoskeletal: Normal range of motion. He exhibits no edema.   Neurological:   L sided weakness chronic.   Skin: No rash noted. No pallor.   Psychiatric: He has a normal mood and affect. His behavior is normal.   Nursing note and vitals reviewed.    VIRTUAL TELENOTE    Patient physical exam and history were performed via - 2 way video televisit.  Physical exam findings were other evidence primarily by visualization through 2 way video or by conversation with the patient's bedside nurse.  I was present throughout the entire tele visit.    The attending portion of  this evaluation, treatment, and documentation was performed per Rajinder Spence MD via audiovisual.      Significant Labs: All pertinent labs within the past 24 hours have been reviewed.    Significant Imaging: I have reviewed all pertinent imaging results/findings within the past 24 hours.      Assessment/Plan:      * Acute respiratory disease due to COVID-19 virus  - COVID-19 testing   - Infection Control notified 4/3/2020    - Isolation:   - Airborne, Contact and Droplet Precautions  - Cohort patients into COVID units  - N95 masks must be fit tested, wear eye protection  - 20 second hand hygiene              - Limit visitors per hospital policy              - Consolidating lab draws, nursing care, provider visits, and interventions      - Management:  Supplemental O2 to maintain SpO2 >92%  Continuous/intermittent Pulse Ox  Albuterol INHALER PRN (avoid nebulization of secretions)  Avoiding BiPAP to prevent aerosolization (including home BiPAP)    Advance Care Planning -patient full code for now                   COPD exacerbation  Patient's COPD is uncontrolled due to continued dyspnea, use of accessory muscles for breathing and worsening of baseline hypoxia currently.  Patient is currently off COPD Pathway. Continue scheduled inhalers Steroids, Antibiotics and Supplemental oxygen and monitor respiratory status closely.         Type 2 DM On Insulin  Patient's FSGs are controlled on current hypoglycemics.   Last A1c reviewed-   Lab Results   Component Value Date    HGBA1C 7.8 (H) 04/03/2018     Most recent fingerstick glucose reviewed-   Recent Labs   Lab 04/09/20  0601 04/09/20  1112 04/09/20  1626   POCTGLUCOSE 245* 211* 202*     Current correctional scale  Low  Maintain anti-hyperglycemic dose as follows-   Antihyperglycemics (From admission, onward)    Start     Stop Route Frequency Ordered    04/09/20 0900  insulin detemir U-100 pen 12 Units      -- SubQ Daily 04/08/20 1233    04/08/20 1645  insulin aspart U-100  pen 3 Units      -- SubQ 3 times daily with meals 04/08/20 1233    04/03/20 1813  insulin aspart U-100 pen 0-5 Units      -- SubQ Before meals & nightly PRN 04/03/20 1813        Hold Oral hypoglycemics while patient is in the hospital.          1 PPD X 25+ YRs Chronic Tobacco Use Disorder  Smoking cessation counseling performed. Dangers of cigarette smoking were reviewed with patient in detail and patient was encouraged to quit. Nicotine replacement options were discussed for > 3 minutes.        Anxiety And Depression  Continue Xanax and Wellbutrin use as before      Stage 2 CKD  Creatine stable for now. BMP reviewed- noted Estimated Creatinine Clearance: 124.3 mL/min (based on SCr of 0.9 mg/dL). according to latest data. Monitor UOP and serial BMP and adjust therapy as needed. Renally dose meds.                Hypertension  Chronic, controlled.  Latest blood pressure and vitals reviewed-   Temp:  [96.1 °F (35.6 °C)-97.2 °F (36.2 °C)]   Pulse:  [55-66]   Resp:  [16-22]   BP: (116-124)/(67-79)   SpO2:  [93 %-96 %] .   Home meds for hypertension were reviewed and noted below. Hospital anti-hypertensive changes were made as shown below.  Hypertension Medications             furosemide (LASIX) 40 MG tablet Take 1 tablet (40 mg total) by mouth 2 (two) times daily.    hydrALAZINE (APRESOLINE) 10 MG tablet Take 10 mg by mouth every 12 (twelve) hours.    lisinopril (PRINIVIL,ZESTRIL) 20 MG tablet Take 1 tablet (20 mg total) by mouth 2 (two) times daily.    metoprolol tartrate (LOPRESSOR) 25 MG tablet TAKE 1 TABLET BY MOUTH 2 TIMES DAILY.    spironolactone (ALDACTONE) 25 MG tablet Take 1 tablet (25 mg total) by mouth every morning.      Hospital Medications             lisinopriL tablet 10 mg 10 mg, Oral, Daily    metoprolol tartrate (LOPRESSOR) tablet 25 mg 25 mg, Oral, 2 times daily, Hold for HR &lt;60 or BP &lt;90/60    spironolactone tablet 25 mg 25 mg, Oral, Every morning        Will utilize p.r.n. blood pressure  medication only if patient's blood pressure greater than  180/110 and he develops symptoms such as worsening chest pain or shortness of breath.      Atrial Fibrillation With H/O RVR  Patient with Persistent atrial fibrillation which is controlled currently with Beta Blocker, Amiodarone and Digoxin. [id number] score 5. Anticoagulation indicated. Anticoagulation done with Dabigatran.        Coronary Artery Disease With H/O Stenting  Patient with known CAD s/p CABG. Will continue and monitor for S/Sx of angina/ACS. Continue to monitor on telemetry.        JULY on CPAP  Continue supplemental oxygen to maintain pulse ox above 92%.  Unable to use CPAP per hospital policy at this time due to COVID-19.      Morbid obesity  Body mass index is 41.7 kg/m². Morbid obesity complicates all aspects of disease management from diagnostic modalities to treatment. Weight loss encouraged and health benefits explained to patient.            VTE Risk Mitigation (From admission, onward)         Ordered     dabigatran etexilate capsule 75 mg  2 times daily      04/03/20 1813     IP VTE HIGH RISK PATIENT  Once      04/03/20 1813     Place OMI hose  Until discontinued      04/03/20 1813     Place sequential compression device  Until discontinued      04/03/20 1813                      Rajinder Spence MD  Department of Hospital Medicine   [facility]

## 2020-04-11 LAB
POCT GLUCOSE: 248 MG/DL (ref 70–110)
POCT GLUCOSE: 259 MG/DL (ref 70–110)

## 2020-04-11 PROCEDURE — 25000242 PHARM REV CODE 250 ALT 637 W/ HCPCS: Performed by: HOSPITALIST

## 2020-04-11 PROCEDURE — 27000221 HC OXYGEN, UP TO 24 HOURS

## 2020-04-11 PROCEDURE — 97535 SELF CARE MNGMENT TRAINING: CPT

## 2020-04-11 PROCEDURE — 63600175 PHARM REV CODE 636 W HCPCS: Performed by: NURSE PRACTITIONER

## 2020-04-11 PROCEDURE — 94640 AIRWAY INHALATION TREATMENT: CPT

## 2020-04-11 PROCEDURE — 12000002 HC ACUTE/MED SURGE SEMI-PRIVATE ROOM

## 2020-04-11 PROCEDURE — 97110 THERAPEUTIC EXERCISES: CPT

## 2020-04-11 PROCEDURE — 25000003 PHARM REV CODE 250: Performed by: HOSPITALIST

## 2020-04-11 PROCEDURE — 97530 THERAPEUTIC ACTIVITIES: CPT | Performed by: PHYSICAL THERAPIST

## 2020-04-11 PROCEDURE — 94761 N-INVAS EAR/PLS OXIMETRY MLT: CPT

## 2020-04-11 PROCEDURE — 97110 THERAPEUTIC EXERCISES: CPT | Performed by: PHYSICAL THERAPIST

## 2020-04-11 PROCEDURE — 25000003 PHARM REV CODE 250: Performed by: INTERNAL MEDICINE

## 2020-04-11 PROCEDURE — 63600175 PHARM REV CODE 636 W HCPCS: Performed by: HOSPITALIST

## 2020-04-11 RX ORDER — HYDRALAZINE HYDROCHLORIDE 20 MG/ML
10 INJECTION INTRAMUSCULAR; INTRAVENOUS EVERY 8 HOURS PRN
Status: DISCONTINUED | OUTPATIENT
Start: 2020-04-11 | End: 2020-04-22 | Stop reason: HOSPADM

## 2020-04-11 RX ADMIN — Medication 400 MG: at 08:04

## 2020-04-11 RX ADMIN — PRAVASTATIN SODIUM 80 MG: 40 TABLET ORAL at 09:04

## 2020-04-11 RX ADMIN — PREDNISONE 60 MG: 20 TABLET ORAL at 08:04

## 2020-04-11 RX ADMIN — IPRATROPIUM BROMIDE AND ALBUTEROL SULFATE 3 ML: .5; 3 SOLUTION RESPIRATORY (INHALATION) at 12:04

## 2020-04-11 RX ADMIN — INSULIN ASPART 5 UNITS: 100 INJECTION, SOLUTION INTRAVENOUS; SUBCUTANEOUS at 05:04

## 2020-04-11 RX ADMIN — SPIRONOLACTONE 25 MG: 25 TABLET ORAL at 08:04

## 2020-04-11 RX ADMIN — DABIGATRAN ETEXILATE MESYLATE 75 MG: 75 CAPSULE ORAL at 08:04

## 2020-04-11 RX ADMIN — ASPIRIN 81 MG: 81 TABLET, COATED ORAL at 09:04

## 2020-04-11 RX ADMIN — IPRATROPIUM BROMIDE AND ALBUTEROL SULFATE 3 ML: .5; 3 SOLUTION RESPIRATORY (INHALATION) at 07:04

## 2020-04-11 RX ADMIN — SERTRALINE HYDROCHLORIDE 100 MG: 50 TABLET ORAL at 08:04

## 2020-04-11 RX ADMIN — INSULIN ASPART 3 UNITS: 100 INJECTION, SOLUTION INTRAVENOUS; SUBCUTANEOUS at 06:04

## 2020-04-11 RX ADMIN — BUPROPION HYDROCHLORIDE 150 MG: 150 TABLET, EXTENDED RELEASE ORAL at 08:04

## 2020-04-11 RX ADMIN — METOPROLOL TARTRATE 25 MG: 25 TABLET, FILM COATED ORAL at 09:04

## 2020-04-11 RX ADMIN — AMIODARONE HYDROCHLORIDE 200 MG: 200 TABLET ORAL at 08:04

## 2020-04-11 RX ADMIN — IPRATROPIUM BROMIDE AND ALBUTEROL SULFATE 3 ML: .5; 3 SOLUTION RESPIRATORY (INHALATION) at 01:04

## 2020-04-11 RX ADMIN — INSULIN ASPART 5 UNITS: 100 INJECTION, SOLUTION INTRAVENOUS; SUBCUTANEOUS at 08:04

## 2020-04-11 RX ADMIN — GABAPENTIN 300 MG: 300 CAPSULE ORAL at 09:04

## 2020-04-11 RX ADMIN — ALPRAZOLAM 0.5 MG: 0.25 TABLET ORAL at 08:04

## 2020-04-11 RX ADMIN — DABIGATRAN ETEXILATE MESYLATE 75 MG: 75 CAPSULE ORAL at 09:04

## 2020-04-11 RX ADMIN — METOPROLOL TARTRATE 25 MG: 25 TABLET, FILM COATED ORAL at 08:04

## 2020-04-11 RX ADMIN — ALPRAZOLAM 0.5 MG: 0.25 TABLET ORAL at 10:04

## 2020-04-11 RX ADMIN — LISINOPRIL 10 MG: 10 TABLET ORAL at 08:04

## 2020-04-11 RX ADMIN — GABAPENTIN 300 MG: 300 CAPSULE ORAL at 08:04

## 2020-04-11 RX ADMIN — HYDRALAZINE HYDROCHLORIDE 10 MG: 20 INJECTION INTRAMUSCULAR; INTRAVENOUS at 10:04

## 2020-04-11 RX ADMIN — AMIODARONE HYDROCHLORIDE 200 MG: 200 TABLET ORAL at 09:04

## 2020-04-11 NOTE — SUBJECTIVE & OBJECTIVE
Interval History:  Patient seen and examined.  Patient's agitation appears to have improved.  Patient remains afebrile overnight.  Hypoxemia and wheezing appear to be improved with steroids and nebulized albuterol.    Review of Systems   Constitutional: Negative for chills, fatigue and fever.   Respiratory: Positive for cough and shortness of breath.    Cardiovascular: Negative for chest pain and leg swelling.   Gastrointestinal: Negative for abdominal pain, nausea and vomiting.   Musculoskeletal: Negative for back pain.   Neurological: Negative for weakness.   Psychiatric/Behavioral: Positive for behavioral problems. The patient is not nervous/anxious.    All other systems reviewed and are negative.    Objective:     Vital Signs (Most Recent):  Temp: 98.4 °F (36.9 °C) (04/10/20 2013)  Pulse: 67 (04/10/20 2013)  Resp: (!) 42 (04/10/20 2013)  BP: (!) 160/84 (04/10/20 2013)  SpO2: 96 % (04/10/20 1940) Vital Signs (24h Range):  Temp:  [97.5 °F (36.4 °C)-98.8 °F (37.1 °C)] 98.4 °F (36.9 °C)  Pulse:  [58-68] 67  Resp:  [18-42] 42  SpO2:  [95 %-98 %] 96 %  BP: (136-160)/(71-84) 160/84     Weight: 135.6 kg (299 lb)  Body mass index is 41.7 kg/m².    Intake/Output Summary (Last 24 hours) at 4/10/2020 2147  Last data filed at 4/10/2020 1800  Gross per 24 hour   Intake 840 ml   Output 475 ml   Net 365 ml      Physical Exam   Constitutional: He appears well-developed and well-nourished.   HENT:   Head: Normocephalic and atraumatic.   Eyes: EOM are normal.   Pulmonary/Chest: No respiratory distress. He has wheezes.   Increased effort and wheezing.   Abdominal: He exhibits no distension.   Obesity noted.   Musculoskeletal: Normal range of motion. He exhibits no edema.   Neurological:   L sided weakness chronic.   Skin: No rash noted. No pallor.   Psychiatric: He has a normal mood and affect. His behavior is normal.   Nursing note and vitals reviewed.    VIRTUAL TELENOTE    Patient physical exam and history were performed via - 2  way video televisit.  Physical exam findings were other evidence primarily by visualization through 2 way video or by conversation with the patient's bedside nurse.  I was present throughout the entire tele visit.    The attending portion of this evaluation, treatment, and documentation was performed per Rajinder Spence MD via audiovisual.      Significant Labs: All pertinent labs within the past 24 hours have been reviewed.    Significant Imaging: I have reviewed all pertinent imaging results/findings within the past 24 hours.

## 2020-04-11 NOTE — ASSESSMENT & PLAN NOTE
- COVID-19 testing   - Infection Control notified 4/3/2020    - Isolation:   - Airborne, Contact and Droplet Precautions  - Cohort patients into COVID units  - N95 masks must be fit tested, wear eye protection  - 20 second hand hygiene              - Limit visitors per hospital policy              - Consolidating lab draws, nursing care, provider visits, and interventions      - Management:  Supplemental O2 to maintain SpO2 >92%  Continuous/intermittent Pulse Ox  Albuterol INHALER PRN (avoid nebulization of secretions)  Avoiding BiPAP to prevent aerosolization (including home BiPAP)    Advance Care Planning -patient full code for now    Discussed that can likely go home once off O2 and amble to ambulate near baseline

## 2020-04-11 NOTE — ASSESSMENT & PLAN NOTE
Patient's FSGs are controlled on current hypoglycemics.   Last A1c reviewed-   Lab Results   Component Value Date    HGBA1C 7.8 (H) 04/03/2018     Most recent fingerstick glucose reviewed-   Recent Labs   Lab 04/11/20  1145 04/11/20  1623   POCTGLUCOSE 248* 259*     Current correctional scale  Low  Maintain anti-hyperglycemic dose as follows-   Antihyperglycemics (From admission, onward)    Start     Stop Route Frequency Ordered    04/11/20 0900  insulin detemir U-100 pen 15 Units      -- SubQ Daily 04/10/20 2150    04/11/20 0715  insulin aspart U-100 pen 5 Units      -- SubQ 3 times daily with meals 04/10/20 2150    04/03/20 1813  insulin aspart U-100 pen 0-5 Units      -- SubQ Before meals & nightly PRN 04/03/20 1813        Hold Oral hypoglycemics while patient is in the hospital.

## 2020-04-11 NOTE — PLAN OF CARE
Attempt was made to contact patient's relative, Yady Petersen, in regards to recent vital signs, labs, findings, and recommendations by the primary team as it pertains to the current admission for COVID-19.  However, was unable to reach this point of contact at the listed numbers.  Will attempt this call again Monday.

## 2020-04-11 NOTE — ASSESSMENT & PLAN NOTE
Patient's FSGs are controlled on current hypoglycemics.   Last A1c reviewed-   Lab Results   Component Value Date    HGBA1C 7.8 (H) 04/03/2018     Most recent fingerstick glucose reviewed-   Recent Labs   Lab 04/10/20  0610 04/10/20  1142 04/10/20  1637   POCTGLUCOSE 252* 235* 242*     Current correctional scale  Low  Maintain anti-hyperglycemic dose as follows-   Antihyperglycemics (From admission, onward)    Start     Stop Route Frequency Ordered    04/09/20 0900  insulin detemir U-100 pen 12 Units      -- SubQ Daily 04/08/20 1233    04/08/20 1645  insulin aspart U-100 pen 3 Units      -- SubQ 3 times daily with meals 04/08/20 1233    04/03/20 1813  insulin aspart U-100 pen 0-5 Units      -- SubQ Before meals & nightly PRN 04/03/20 1813        Hold Oral hypoglycemics while patient is in the hospital.

## 2020-04-11 NOTE — PLAN OF CARE
OT goals remain appropriate. Continue with POC.     Problem: Occupational Therapy Goal  Goal: Occupational Therapy Goal  Description  Goals to be met by: 5/5/2020     Patient will increase functional independence with ADLs by performing:    LE Dressing with Contact Guard Assistance and Assistive Devices as needed.  Grooming while EOB with Stand-by Assistance.  Toileting from toilet with Minimal Assistance for hygiene and clothing management.   Supine to sit with Minimal Assistance.  Toilet transfer to bedside commode with Minimal Assistance.  Upper extremity exercise program x10 reps per handout, with supervision.     Outcome: Ongoing, Progressing

## 2020-04-11 NOTE — HOSPITAL COURSE
Patient was admitted for acute on chronic respiratory failure likely secondary to COVID 19 superimposed on COPD and obesity hypoventilation.  Patient was started on appropriate management of COVID19 including supplemental oxygen, nebulized bronchodilators, and he improved slowly over the course of his hospitalization.  Given his concomitant COPD, the patient was started on oral corticosteroids.  He continued requiring supplemental oxygen via NC.  At discharge, he was on 3 LPM.  He did have episodes of hyperglycemia and hypoglycemia and his insulin dose was adjusted over the course of his hospitalization.  Patient had intermittent episodes of confusion and agitation with impulsivity, and was given intermittent antipsychotic for behavioral disturbances.  After hospital day 3-4, the patient's behavior improved. His atrial fibrillation was controlled with beta blocker and amiodarone.  He remained in the hospital secondary to issues related to his disposition and insurance status.  Case management worked on getting patient into rehab or LTAC for further care.  Repeat COVID screen 4/17 was positive.  On 4/21, it was negative.  On 4/22, it was positive again.  Eventually, [facility] [place].    Physical Exam   Constitutional: He is oriented to person, place, and time. He is cooperative. He does not appear ill. No distress. Nasal cannula in place.   HENT:   Mouth/Throat: Oropharynx is clear and moist.   Eyes: Right eye exhibits no discharge. Left eye exhibits no discharge.   Neck: No tracheal deviation present.   Pulmonary/Chest: Effort normal.

## 2020-04-11 NOTE — PT/OT/SLP PROGRESS
Physical Therapy Treatment    Patient Name:  Jose Leon   MRN:  [id number]    Recommendations:     Discharge Recommendations:  nursing facility, skilled   Discharge Equipment Recommendations: (unclear at this time)   Barriers to discharge: as previous    Assessment:     Jose Leon is a 59 y.o. male admitted with a medical diagnosis of Acute respiratory disease due to COVID-19 virus.  He presents with the following impairments/functional limitations:  weakness, impaired endurance, impaired functional mobilty, gait instability, impaired balance .    Rehab Prognosis: Fair; patient would benefit from acute skilled PT services to address these deficits and reach maximum level of function.    Recent Surgery: * No surgery found *      Plan:     During this hospitalization, patient to be seen 6 x/week to address the identified rehab impairments via gait training, therapeutic activities, therapeutic exercises and progress toward the following goals:    · Plan of Care Expires:  05/05/20    Subjective     Chief Complaint: No complaints of pain  Patient/Family Comments/goals:   Pain/Comfort:  · Pain Rating 1: 0/10      Objective:     Communicated with nursing prior to session.  Patient found supine with bed alarm, telemetry, oxygen, peripheral IV upon PT entry to room.     General Precautions: Standard, airborne, contact, droplet, fall   Orthopedic Precautions:    Braces:       Functional Mobility:  · Bed Mobility:     · Sit to Supine: minimum assistance  · Transfers:     · Sit to Stand:  moderate assistance with rolling walker  · Gait: Attempted one step forward with the right LE using a FWW and CGA.      AM-PAC 6 CLICK MOBILITY  Turning over in bed (including adjusting bedclothes, sheets and blankets)?: 2  Sitting down on and standing up from a chair with arms (e.g., wheelchair, bedside commode, etc.): 2  Moving from lying on back to sitting on the side of the bed?: 2  Moving to and from a bed to a chair (including a  wheelchair)?: 2  Need to walk in hospital room?: 2  Climbing 3-5 steps with a railing?: 1  Basic Mobility Total Score: 11       Therapeutic Activities and Exercises:  Quad sets 2x 10, ankle pumps 2 x 10, gluteal sets 2 x 10, heel slides 2  x 10, supine abd/add 2 x 10  Patient t/f supine to sit with min A. In sitting x 5 min. Patient perform sit to stand t/f with mod A x 2 repetitions.  Patient able to advance his right LE forward but unable to advance the left LE.    Patient left supine with call button in reach, bed alarm on and nursing notified..    GOALS:   Multidisciplinary Problems     Physical Therapy Goals        Problem: Physical Therapy Goal    Goal Priority Disciplines Outcome Goal Variances Interventions   Physical Therapy Goal     PT, PT/OT Ongoing, Progressing     Description:  Goals to be met by: 2020    Patient will increase functional independence with mobility by performin. Supine to sit with Stand-by Assistance  2. Sit to supine with Stand-by Assistance  3. Sit to stand transfer with Stand-by Assistance  4. Bed to chair transfer with Contact Guard Assistance using Rolling Walker  5. Gait  x 50 feet with Minimal Assistance using Rolling Walker.                       Time Tracking:     PT Received On: 20  PT Start Time: 930     PT Stop Time: 958  PT Total Time (min): 28 min     Billable Minutes: Therapeutic Activity 13 and Therapeutic Exercise 15    Treatment Type: Treatment  PT/PTA: PT     PTA Visit Number: 0     Kvng Rodrigues, PT  2020

## 2020-04-11 NOTE — PLAN OF CARE
04/10/20 1940   Patient Assessment/Suction   Level of Consciousness (AVPU) alert   Respiratory Effort Normal;Unlabored   Expansion/Accessory Muscles/Retractions expansion symmetric   All Lung Fields Breath Sounds diminished   Rhythm/Pattern, Respiratory pattern regular   Cough Frequency no cough   PRE-TX-O2   O2 Device (Oxygen Therapy) nasal cannula   Flow (L/min) 3   SpO2 96 %   Pulse 68   Resp 20   Aerosol Therapy   $ Aerosol Therapy Charges Aerosol Treatment   Respiratory Treatment Status (SVN) given   Treatment Route (SVN) mask   Patient Position (SVN) HOB elevated   Post Treatment Assessment (SVN) breath sounds unchanged   Signs of Intolerance (SVN) none   Breath Sounds Post-Respiratory Treatment   Post-treatment Heart Rate (beats/min) 66   Post-treatment Resp Rate (breaths/min) 18

## 2020-04-11 NOTE — SUBJECTIVE & OBJECTIVE
Interval History: Having some watery diarrhea.  Down to 1L NC now.  States he can barely walk.  Other vital ok.    Review of Systems   Constitutional: Negative for chills, fatigue and fever.   Respiratory: Positive for shortness of breath. Negative for cough.    Cardiovascular: Negative for chest pain and leg swelling.   Gastrointestinal: Positive for diarrhea. Negative for abdominal pain, nausea and vomiting.   Musculoskeletal: Negative for back pain.   Neurological: Negative for weakness.   Psychiatric/Behavioral: Positive for behavioral problems. The patient is not nervous/anxious.    All other systems reviewed and are negative.    Objective:     Vital Signs (Most Recent):  Temp: 97.6 °F (36.4 °C) (04/11/20 0917)  Pulse: 88 (04/11/20 1330)  Resp: 18 (04/11/20 1330)  BP: (!) 152/69 (04/11/20 0917)  SpO2: (!) 93 % (04/11/20 0709) Vital Signs (24h Range):  Temp:  [96 °F (35.6 °C)-98.4 °F (36.9 °C)] 97.6 °F (36.4 °C)  Pulse:  [53-88] 88  Resp:  [14-42] 18  SpO2:  [93 %-98 %] 93 %  BP: (138-160)/(69-85) 152/69     Weight: 135.6 kg (299 lb)  Body mass index is 41.7 kg/m².    Intake/Output Summary (Last 24 hours) at 4/11/2020 1727  Last data filed at 4/10/2020 1800  Gross per 24 hour   Intake 240 ml   Output --   Net 240 ml      Physical Exam   Constitutional: He appears well-developed and well-nourished.   HENT:   Head: Normocephalic and atraumatic.   Eyes: EOM are normal.   Pulmonary/Chest: No respiratory distress. He has no wheezes.   Breathing comfortably on NC   Abdominal: He exhibits no distension.   Obesity noted.   Musculoskeletal: Normal range of motion. He exhibits no edema.   Neurological:   L sided weakness chronic.   Skin: No rash noted. No pallor.   Psychiatric: He has a normal mood and affect. His behavior is normal.   Nursing note and vitals reviewed.    PATIENT WAS SEEN AS A VIDEO VISIT WITH NURSE ASSIST DURING THE VISIT. PHYSICAL EXAM FINDINGS ARE AS VIEWED BY MYSELF VIA VIDEO OR AS REPORTED BY NURSE  IF SPECIFIED AS SUCH, EXAM NOT DONE PERSONALLY BY MYSELF AT BEDSIDE.    Significant Labs: All pertinent labs within the past 24 hours have been reviewed.    Significant Imaging: I have reviewed all pertinent imaging results/findings within the past 24 hours.

## 2020-04-11 NOTE — PROGRESS NOTES
[facility] Medicine  Progress Note    Patient Name: Jose Leon  MRN: [id number]  Patient Class: IP- Inpatient   Admission Date: 4/3/2020  Length of Stay: 8 days  Attending Physician: Pete Delaney MD  Primary Care Provider: Josh Giordano MD        Subjective:     Principal Problem:Acute respiratory disease due to COVID-19 virus        HPI:  Patient is a 59-year-old morbidly obese male with past medical history significant for multiple medical problems including hypertension, hyperlipidemia, coronary artery disease status post coronary artery stent placement, obstructive sleep apnea (on CPAP), chronic combined systolic and diastolic congestive heart failure, history of TIA, long-term anticoagulation with Pradaxa and paroxysmal atrial fibrillation is being admitted to Hospital Medicine from [facility] Emergency Room under inpatient status for worsening shortness of breath and right lower quadrant abdominal pain.  Patient presented to the emergency room with complaint of profound generalized weakness associated with diarrhea and vomiting for 1 day.  If patient feels dizzy and has also been experiencing nonradiating right lower quadrant abdominal pain.  Patient denies any hematemesis, melena or bleeding per rectum.  No recent travel or sick contact history reported.  Patient'sCOVID 19 test is positive in the emergency room.  Patient was recently discharged from [facility].  In the emergency room patient was noted to be hypoxic with exertion and minimal level around 85%.  Presently requiring 3 L per minute supplemental oxygen via nasal cannula.        Overview/Hospital Course:  Patient was admitted for acute on chronic respiratory failure likely secondary to COVID 19 superimposed on COPD and obesity hypoventilation.  Patient was started on appropriate management of COVID19 including supplemental oxygen, nebulized bronchodilators, and improved  slowly over the course of his hospitalization.  Given his concomitant COPD, the patient was started on oral corticosteroids.  He did have episodes of hyperglycemia and hypoglycemia and his insulin dose was adjusted over the course of his hospitalization.  Patient had intermittent episodes of confusion and agitation with impulsivity, and was given intermittent antipsychotic for behavioral.  After hospital day 3-4, the patient's behavior improved.  He remained in the hospital secondary to issues related to his disposition and insurance status.    Interval History: Having some watery diarrhea.  Down to 1L NC now.  States he can barely walk.  Other vital ok.    Review of Systems   Constitutional: Negative for chills, fatigue and fever.   Respiratory: Positive for shortness of breath. Negative for cough.    Cardiovascular: Negative for chest pain and leg swelling.   Gastrointestinal: Positive for diarrhea. Negative for abdominal pain, nausea and vomiting.   Musculoskeletal: Negative for back pain.   Neurological: Negative for weakness.   Psychiatric/Behavioral: Positive for behavioral problems. The patient is not nervous/anxious.    All other systems reviewed and are negative.    Objective:     Vital Signs (Most Recent):  Temp: 97.6 °F (36.4 °C) (04/11/20 0917)  Pulse: 88 (04/11/20 1330)  Resp: 18 (04/11/20 1330)  BP: (!) 152/69 (04/11/20 0917)  SpO2: (!) 93 % (04/11/20 0709) Vital Signs (24h Range):  Temp:  [96 °F (35.6 °C)-98.4 °F (36.9 °C)] 97.6 °F (36.4 °C)  Pulse:  [53-88] 88  Resp:  [14-42] 18  SpO2:  [93 %-98 %] 93 %  BP: (138-160)/(69-85) 152/69     Weight: 135.6 kg (299 lb)  Body mass index is 41.7 kg/m².    Intake/Output Summary (Last 24 hours) at 4/11/2020 1727  Last data filed at 4/10/2020 1800  Gross per 24 hour   Intake 240 ml   Output --   Net 240 ml      Physical Exam   Constitutional: He appears well-developed and well-nourished.   HENT:   Head: Normocephalic and atraumatic.   Eyes: EOM are normal.    Pulmonary/Chest: No respiratory distress. He has no wheezes.   Breathing comfortably on NC   Abdominal: He exhibits no distension.   Obesity noted.   Musculoskeletal: Normal range of motion. He exhibits no edema.   Neurological:   L sided weakness chronic.   Skin: No rash noted. No pallor.   Psychiatric: He has a normal mood and affect. His behavior is normal.   Nursing note and vitals reviewed.    PATIENT WAS SEEN AS A VIDEO VISIT WITH NURSE ASSIST DURING THE VISIT. PHYSICAL EXAM FINDINGS ARE AS VIEWED BY MYSELF VIA VIDEO OR AS REPORTED BY NURSE IF SPECIFIED AS SUCH, EXAM NOT DONE PERSONALLY BY MYSELF AT BEDSIDE.    Significant Labs: All pertinent labs within the past 24 hours have been reviewed.    Significant Imaging: I have reviewed all pertinent imaging results/findings within the past 24 hours.      Assessment/Plan:      * Acute respiratory disease due to COVID-19 virus  - COVID-19 testing   - Infection Control notified 4/3/2020    - Isolation:   - Airborne, Contact and Droplet Precautions  - Cohort patients into COVID units  - N95 masks must be fit tested, wear eye protection  - 20 second hand hygiene              - Limit visitors per hospital policy              - Consolidating lab draws, nursing care, provider visits, and interventions      - Management:  Supplemental O2 to maintain SpO2 >92%  Continuous/intermittent Pulse Ox  Albuterol INHALER PRN (avoid nebulization of secretions)  Avoiding BiPAP to prevent aerosolization (including home BiPAP)    Advance Care Planning -patient full code for now    Discussed that can likely go home once off O2 and amble to ambulate near baseline                   COPD exacerbation  Patient's COPD is uncontrolled due to continued dyspnea, use of accessory muscles for breathing and worsening of baseline hypoxia currently.  Patient is currently off COPD Pathway. Continue scheduled inhalers Steroids, Antibiotics and Supplemental oxygen and monitor respiratory status  closely.         Type 2 DM On Insulin  Patient's FSGs are controlled on current hypoglycemics.   Last A1c reviewed-   Lab Results   Component Value Date    HGBA1C 7.8 (H) 04/03/2018     Most recent fingerstick glucose reviewed-   Recent Labs   Lab 04/11/20  1145 04/11/20  1623   POCTGLUCOSE 248* 259*     Current correctional scale  Low  Maintain anti-hyperglycemic dose as follows-   Antihyperglycemics (From admission, onward)    Start     Stop Route Frequency Ordered    04/11/20 0900  insulin detemir U-100 pen 15 Units      -- SubQ Daily 04/10/20 2150    04/11/20 0715  insulin aspart U-100 pen 5 Units      -- SubQ 3 times daily with meals 04/10/20 2150    04/03/20 1813  insulin aspart U-100 pen 0-5 Units      -- SubQ Before meals & nightly PRN 04/03/20 1813        Hold Oral hypoglycemics while patient is in the hospital.          1 PPD X 25+ YRs Chronic Tobacco Use Disorder  Smoking cessation counseling performed. Dangers of cigarette smoking were reviewed with patient in detail and patient was encouraged to quit. Nicotine replacement options were discussed for > 3 minutes.        Anxiety And Depression  Continue Xanax and Wellbutrin use as before      Stage 2 CKD  Creatine stable for now. BMP reviewed- noted Estimated Creatinine Clearance: 111.8 mL/min (based on SCr of 1 mg/dL). according to latest data. Monitor UOP and serial BMP and adjust therapy as needed. Renally dose meds.                Hypertension  Chronic, controlled.  Latest blood pressure and vitals reviewed-   Temp:  [96 °F (35.6 °C)-98.4 °F (36.9 °C)]   Pulse:  [53-88]   Resp:  [14-42]   BP: (138-160)/(69-85)   SpO2:  [93 %-98 %] .   Home meds for hypertension were reviewed and noted below. Hospital anti-hypertensive changes were made as shown below.  Hypertension Medications             furosemide (LASIX) 40 MG tablet Take 1 tablet (40 mg total) by mouth 2 (two) times daily.    hydrALAZINE (APRESOLINE) 10 MG tablet Take 10 mg by mouth every 12  (twelve) hours.    lisinopril (PRINIVIL,ZESTRIL) 20 MG tablet Take 1 tablet (20 mg total) by mouth 2 (two) times daily.    metoprolol tartrate (LOPRESSOR) 25 MG tablet TAKE 1 TABLET BY MOUTH 2 TIMES DAILY.    spironolactone (ALDACTONE) 25 MG tablet Take 1 tablet (25 mg total) by mouth every morning.      Hospital Medications             lisinopriL tablet 10 mg 10 mg, Oral, Daily    metoprolol tartrate (LOPRESSOR) tablet 25 mg 25 mg, Oral, 2 times daily, Hold for HR &lt;60 or BP &lt;90/60    spironolactone tablet 25 mg 25 mg, Oral, Every morning        Will utilize p.r.n. blood pressure medication only if patient's blood pressure greater than  180/110 and he develops symptoms such as worsening chest pain or shortness of breath.      Atrial Fibrillation With H/O RVR  Patient with Persistent atrial fibrillation which is controlled currently with Beta Blocker, Amiodarone and Digoxin. [id number] score 5. Anticoagulation indicated. Anticoagulation done with Dabigatran.        Coronary Artery Disease With H/O Stenting  Patient with known CAD s/p CABG. Will continue and monitor for S/Sx of angina/ACS. Continue to monitor on telemetry.        JULY on CPAP  Continue supplemental oxygen to maintain pulse ox above 92%.  Unable to use CPAP per hospital policy at this time due to COVID-19.      Morbid obesity  Body mass index is 41.7 kg/m². Morbid obesity complicates all aspects of disease management from diagnostic modalities to treatment. Weight loss encouraged and health benefits explained to patient.            VTE Risk Mitigation (From admission, onward)         Ordered     dabigatran etexilate capsule 75 mg  2 times daily      04/03/20 1813     IP VTE HIGH RISK PATIENT  Once      04/03/20 1813     Place OMI hose  Until discontinued      04/03/20 1813     Place sequential compression device  Until discontinued      04/03/20 1813                      Pete Delaney MD  Department of Hospital Medicine   [facility]

## 2020-04-11 NOTE — PT/OT/SLP PROGRESS
Occupational Therapy   Treatment    Name: Jose Leon  MRN: [id number]  Admitting Diagnosis:  Acute respiratory disease due to COVID-19 virus       Recommendations:     Discharge Recommendations: nursing facility, skilled  Discharge Equipment Recommendations:  (TBD)  Barriers to discharge:  None    Assessment:     Jose Leon is a 59 y.o. male with a medical diagnosis of Acute respiratory disease due to COVID-19 virus.  He presents with increased independence with balance at EOB. Although pt still required intermittent mod A with static sitting balance, pt required less than assistance when compared to performance during initial evaluation. Pt also required frequent verbal cues as reminders to bear weight through L UE while seated EOB for assistance with trunk balance and to increase proprioceptive input through L UE. Patient able to perform oral hygiene while EOB but unable to multi-task with maintaining trunk control. The more the patient focused on task performance with brushing his teeth, the less he focused on sustaining trunk control requiring Mod A to correct L leaning posture. Patient still limited with PROM in L UE, noting rigidity with shldr and elbow flex. Pt c/o pain when L shldr was ranged pass 110*. Patient was able to bridge himself to HOB while supine using R UE to hold onto bedrail and R LE to push on bed.      Performance deficits affecting function are weakness, impaired endurance, impaired functional mobilty, decreased upper extremity function, decreased lower extremity function, decreased coordination, impaired balance, impaired self care skills, impaired fine motor, decreased safety awareness, decreased ROM.     Rehab Prognosis:  Good; patient would benefit from acute skilled OT services to address these deficits and reach maximum level of function.       Plan:     Patient to be seen 3 x/week to address the above listed problems via self-care/home management, therapeutic activities,  therapeutic exercises  · Plan of Care Expires: 05/05/20  · Plan of Care Reviewed with: patient    Subjective     Pain/Comfort:  · Pain Rating 1: (did not rate)  · Location - Side 1: Bilateral  · Location - Orientation 1: lower  · Location 1: back  · Pain Addressed 1: Distraction, Reposition  · Pain Rating Post-Intervention 1: (did not rate)    Objective:     Communicated with: nurse Henry prior to session.  Patient found HOB elevated with telemetry, bed alarm, oxygen(AVASYS) upon OT entry to room.    General Precautions: Standard, airborne, contact, droplet, fall   Orthopedic Precautions:N/A   Braces:       Occupational Performance:     Bed Mobility:    · Patient completed Scooting/Bridging with contact guard assistance  · Patient completed Supine to Sit with moderate assistance support trunk  · Patient completed Sit to Supine with minimum assistance to assist B LE into bed    Functional Mobility/Transfers:  · Patient completed Sit <> Stand Transfer with moderate assistance  with  rolling walker   · Functional Mobility: FAIR minus with static sitting; required intermittent Mod A and verbal cues to maintain EOB balance mostly when performing grooming tasks while seated.    Activities of Daily Living:  · Grooming: Intermittent Mod A mostly with static sitting to brush his teeth. Frequent verbal cues needs to re-adjust his trunk to midline as pt would gradually lean to L during task performance       [facility] 6 Click ADL: 16    Treatment & Education:  Patient instructed on L UE weight bearing during EOB activities to improve balance and facilitate proprioceptive input through L UE.   OT performed passive and active ROM of L UE in all directions. Noted increased rigidity with shldr and elbow flexion.  Performed dynamic trunk control exercises while seated EOB. Patient asked to lean to L and R side, then attempt to re-adjust back to midline. Patient performed 2x10 on each side.     Patient left HOB elevated with all lines  intact, call button in reach and nurse notifiedEducation:      GOALS:   Multidisciplinary Problems     Occupational Therapy Goals        Problem: Occupational Therapy Goal    Goal Priority Disciplines Outcome Interventions   Occupational Therapy Goal     OT, PT/OT Ongoing, Progressing    Description:  Goals to be met by: 5/5/2020     Patient will increase functional independence with ADLs by performing:    LE Dressing with Contact Guard Assistance and Assistive Devices as needed.  Grooming while EOB with Stand-by Assistance.  Toileting from toilet with Minimal Assistance for hygiene and clothing management.   Supine to sit with Minimal Assistance.  Toilet transfer to bedside commode with Minimal Assistance.  Upper extremity exercise program x10 reps per handout, with supervision.                      Time Tracking:     OT Date of Treatment: 04/11/20  OT Start Time: 1120  OT Stop Time: 1150  OT Total Time (min): 30 min    Billable Minutes:Self Care/Home Management 20  Therapeutic Exercise 10    Gerry Grant, TOREY  4/11/2020

## 2020-04-11 NOTE — PROGRESS NOTES
[facility] Hospital Medicine  Progress Note    Patient Name: Jose Leon  MRN: [id number]  Patient Class: IP- Inpatient   Admission Date: 4/3/2020  Length of Stay: 7 days  Attending Physician: Rajinder Spence MD  Primary Care Provider: Josh Giordano MD        Subjective:     Principal Problem:Acute respiratory disease due to COVID-19 virus        HPI:  Patient is a 59-year-old morbidly obese male with past medical history significant for multiple medical problems including hypertension, hyperlipidemia, coronary artery disease status post coronary artery stent placement, obstructive sleep apnea (on CPAP), chronic combined systolic and diastolic congestive heart failure, history of TIA, long-term anticoagulation with Pradaxa and paroxysmal atrial fibrillation is being admitted to Hospital Medicine from [facility] Emergency Room under inpatient status for worsening shortness of breath and right lower quadrant abdominal pain.  Patient presented to the emergency room with complaint of profound generalized weakness associated with diarrhea and vomiting for 1 day.  If patient feels dizzy and has also been experiencing nonradiating right lower quadrant abdominal pain.  Patient denies any hematemesis, melena or bleeding per rectum.  No recent travel or sick contact history reported.  Patient'sCOVID 19 test is positive in the emergency room.  Patient was recently discharged from [facility].  In the emergency room patient was noted to be hypoxic with exertion and minimal level around 85%.  Presently requiring 3 L per minute supplemental oxygen via nasal cannula.        Overview/Hospital Course:  Patient was admitted for acute on chronic respiratory failure likely secondary to COVID 19 superimposed on COPD and obesity hypoventilation.  Patient was started on appropriate management of COVID19 including supplemental oxygen, nebulized bronchodilators, and  improved slowly over the course of his hospitalization.  Given his concomitant COPD, the patient was started on oral corticosteroids.  He did have episodes of hyperglycemia and hypoglycemia and his insulin dose was adjusted over the course of his hospitalization.  Patient had intermittent episodes of confusion and agitation with impulsivity, and was given intermittent antipsychotic for behavioral.  After hospital day 3-4, the patient's behavior improved.  He remained in the hospital secondary to issues related to his disposition and insurance status.    Interval History:  Patient seen and examined.  Patient's agitation appears to have improved.  Patient remains afebrile overnight.  Hypoxemia and wheezing appear to be improved with steroids and nebulized albuterol.    Review of Systems   Constitutional: Negative for chills, fatigue and fever.   Respiratory: Positive for cough and shortness of breath.    Cardiovascular: Negative for chest pain and leg swelling.   Gastrointestinal: Negative for abdominal pain, nausea and vomiting.   Musculoskeletal: Negative for back pain.   Neurological: Negative for weakness.   Psychiatric/Behavioral: Positive for behavioral problems. The patient is not nervous/anxious.    All other systems reviewed and are negative.    Objective:     Vital Signs (Most Recent):  Temp: 98.4 °F (36.9 °C) (04/10/20 2013)  Pulse: 67 (04/10/20 2013)  Resp: (!) 42 (04/10/20 2013)  BP: (!) 160/84 (04/10/20 2013)  SpO2: 96 % (04/10/20 1940) Vital Signs (24h Range):  Temp:  [97.5 °F (36.4 °C)-98.8 °F (37.1 °C)] 98.4 °F (36.9 °C)  Pulse:  [58-68] 67  Resp:  [18-42] 42  SpO2:  [95 %-98 %] 96 %  BP: (136-160)/(71-84) 160/84     Weight: 135.6 kg (299 lb)  Body mass index is 41.7 kg/m².    Intake/Output Summary (Last 24 hours) at 4/10/2020 2147  Last data filed at 4/10/2020 1800  Gross per 24 hour   Intake 840 ml   Output 475 ml   Net 365 ml      Physical Exam   Constitutional: He appears well-developed and  well-nourished.   HENT:   Head: Normocephalic and atraumatic.   Eyes: EOM are normal.   Pulmonary/Chest: No respiratory distress. He has wheezes.   Increased effort and wheezing.   Abdominal: He exhibits no distension.   Obesity noted.   Musculoskeletal: Normal range of motion. He exhibits no edema.   Neurological:   L sided weakness chronic.   Skin: No rash noted. No pallor.   Psychiatric: He has a normal mood and affect. His behavior is normal.   Nursing note and vitals reviewed.    VIRTUAL TELENOTE    Patient physical exam and history were performed via - 2 way video televisit.  Physical exam findings were other evidence primarily by visualization through 2 way video or by conversation with the patient's bedside nurse.  I was present throughout the entire tele visit.    The attending portion of this evaluation, treatment, and documentation was performed per Rajinder Spence MD via audiovisual.      Significant Labs: All pertinent labs within the past 24 hours have been reviewed.    Significant Imaging: I have reviewed all pertinent imaging results/findings within the past 24 hours.      Assessment/Plan:      * Acute respiratory disease due to COVID-19 virus  - COVID-19 testing   - Infection Control notified 4/3/2020    - Isolation:   - Airborne, Contact and Droplet Precautions  - Cohort patients into COVID units  - N95 masks must be fit tested, wear eye protection  - 20 second hand hygiene              - Limit visitors per hospital policy              - Consolidating lab draws, nursing care, provider visits, and interventions      - Management:  Supplemental O2 to maintain SpO2 >92%  Continuous/intermittent Pulse Ox  Albuterol INHALER PRN (avoid nebulization of secretions)  Avoiding BiPAP to prevent aerosolization (including home BiPAP)    Advance Care Planning -patient full code for now                   COPD exacerbation  Patient's COPD is uncontrolled due to continued dyspnea, use of accessory muscles for  breathing and worsening of baseline hypoxia currently.  Patient is currently off COPD Pathway. Continue scheduled inhalers Steroids, Antibiotics and Supplemental oxygen and monitor respiratory status closely.         Type 2 DM On Insulin  Patient's FSGs are controlled on current hypoglycemics.   Last A1c reviewed-   Lab Results   Component Value Date    HGBA1C 7.8 (H) 04/03/2018     Most recent fingerstick glucose reviewed-   Recent Labs   Lab 04/10/20  0610 04/10/20  1142 04/10/20  1637   POCTGLUCOSE 252* 235* 242*     Current correctional scale  Low  Maintain anti-hyperglycemic dose as follows-   Antihyperglycemics (From admission, onward)    Start     Stop Route Frequency Ordered    04/09/20 0900  insulin detemir U-100 pen 12 Units      -- SubQ Daily 04/08/20 1233    04/08/20 1645  insulin aspart U-100 pen 3 Units      -- SubQ 3 times daily with meals 04/08/20 1233    04/03/20 1813  insulin aspart U-100 pen 0-5 Units      -- SubQ Before meals & nightly PRN 04/03/20 1813        Hold Oral hypoglycemics while patient is in the hospital.          1 PPD X 25+ YRs Chronic Tobacco Use Disorder  Smoking cessation counseling performed. Dangers of cigarette smoking were reviewed with patient in detail and patient was encouraged to quit. Nicotine replacement options were discussed for > 3 minutes.        Anxiety And Depression  Continue Xanax and Wellbutrin use as before      Stage 2 CKD  Creatine stable for now. BMP reviewed- noted Estimated Creatinine Clearance: 111.8 mL/min (based on SCr of 1 mg/dL). according to latest data. Monitor UOP and serial BMP and adjust therapy as needed. Renally dose meds.                Hypertension  Chronic, controlled.  Latest blood pressure and vitals reviewed-   Temp:  [96.1 °F (35.6 °C)-97.2 °F (36.2 °C)]   Pulse:  [55-66]   Resp:  [16-22]   BP: (116-124)/(67-79)   SpO2:  [93 %-96 %] .   Home meds for hypertension were reviewed and noted below. Hospital anti-hypertensive changes were  made as shown below.  Hypertension Medications             furosemide (LASIX) 40 MG tablet Take 1 tablet (40 mg total) by mouth 2 (two) times daily.    hydrALAZINE (APRESOLINE) 10 MG tablet Take 10 mg by mouth every 12 (twelve) hours.    lisinopril (PRINIVIL,ZESTRIL) 20 MG tablet Take 1 tablet (20 mg total) by mouth 2 (two) times daily.    metoprolol tartrate (LOPRESSOR) 25 MG tablet TAKE 1 TABLET BY MOUTH 2 TIMES DAILY.    spironolactone (ALDACTONE) 25 MG tablet Take 1 tablet (25 mg total) by mouth every morning.      Hospital Medications             lisinopriL tablet 10 mg 10 mg, Oral, Daily    metoprolol tartrate (LOPRESSOR) tablet 25 mg 25 mg, Oral, 2 times daily, Hold for HR <60 or BP <90/60    spironolactone tablet 25 mg 25 mg, Oral, Every morning        Will utilize p.r.n. blood pressure medication only if patient's blood pressure greater than  180/110 and he develops symptoms such as worsening chest pain or shortness of breath.      Atrial Fibrillation With H/O RVR  Patient with Persistent atrial fibrillation which is controlled currently with Beta Blocker, Amiodarone and Digoxin. [id number] score 5. Anticoagulation indicated. Anticoagulation done with Dabigatran.        Coronary Artery Disease With H/O Stenting  Patient with known CAD s/p CABG. Will continue and monitor for S/Sx of angina/ACS. Continue to monitor on telemetry.        JULY on CPAP  Continue supplemental oxygen to maintain pulse ox above 92%.  Unable to use CPAP per hospital policy at this time due to COVID-19.      Morbid obesity  Body mass index is 41.7 kg/m². Morbid obesity complicates all aspects of disease management from diagnostic modalities to treatment. Weight loss encouraged and health benefits explained to patient.          VTE Risk Mitigation (From admission, onward)         Ordered     dabigatran etexilate capsule 75 mg  2 times daily      04/03/20 1813     IP VTE HIGH RISK PATIENT  Once      04/03/20 1813     Place OMI hose   Until discontinued      04/03/20 1813     Place sequential compression device  Until discontinued      04/03/20 1813                      Rajinder Spence MD  Department of Hospital Medicine   [facility]

## 2020-04-11 NOTE — ASSESSMENT & PLAN NOTE
Chronic, controlled.  Latest blood pressure and vitals reviewed-   Temp:  [96 °F (35.6 °C)-98.4 °F (36.9 °C)]   Pulse:  [53-88]   Resp:  [14-42]   BP: (138-160)/(69-85)   SpO2:  [93 %-98 %] .   Home meds for hypertension were reviewed and noted below. Hospital anti-hypertensive changes were made as shown below.  Hypertension Medications             furosemide (LASIX) 40 MG tablet Take 1 tablet (40 mg total) by mouth 2 (two) times daily.    hydrALAZINE (APRESOLINE) 10 MG tablet Take 10 mg by mouth every 12 (twelve) hours.    lisinopril (PRINIVIL,ZESTRIL) 20 MG tablet Take 1 tablet (20 mg total) by mouth 2 (two) times daily.    metoprolol tartrate (LOPRESSOR) 25 MG tablet TAKE 1 TABLET BY MOUTH 2 TIMES DAILY.    spironolactone (ALDACTONE) 25 MG tablet Take 1 tablet (25 mg total) by mouth every morning.      Hospital Medications             lisinopriL tablet 10 mg 10 mg, Oral, Daily    metoprolol tartrate (LOPRESSOR) tablet 25 mg 25 mg, Oral, 2 times daily, Hold for HR &lt;60 or BP &lt;90/60    spironolactone tablet 25 mg 25 mg, Oral, Every morning        Will utilize p.r.n. blood pressure medication only if patient's blood pressure greater than  180/110 and he develops symptoms such as worsening chest pain or shortness of breath.

## 2020-04-11 NOTE — NURSING
"Pt is  AAOX4 yet confused at times. Pt states he has "a lot of money" in his possession and wants his son to come get it. Charge nurse notified and money sent to security. VSS. Remains afebrile. Pt remains free of pain and injury. Bed low, breaks locked, call light in reach. Will monitor.     "

## 2020-04-12 LAB
POCT GLUCOSE: 246 MG/DL (ref 70–110)
POCT GLUCOSE: 264 MG/DL (ref 70–110)
POCT GLUCOSE: 315 MG/DL (ref 70–110)

## 2020-04-12 PROCEDURE — 12000002 HC ACUTE/MED SURGE SEMI-PRIVATE ROOM

## 2020-04-12 PROCEDURE — 63600175 PHARM REV CODE 636 W HCPCS: Performed by: HOSPITALIST

## 2020-04-12 PROCEDURE — 94640 AIRWAY INHALATION TREATMENT: CPT

## 2020-04-12 PROCEDURE — C9399 UNCLASSIFIED DRUGS OR BIOLOG: HCPCS | Performed by: HOSPITALIST

## 2020-04-12 PROCEDURE — 27000221 HC OXYGEN, UP TO 24 HOURS

## 2020-04-12 PROCEDURE — 25000003 PHARM REV CODE 250: Performed by: HOSPITALIST

## 2020-04-12 PROCEDURE — 25000003 PHARM REV CODE 250: Performed by: INTERNAL MEDICINE

## 2020-04-12 PROCEDURE — 25000242 PHARM REV CODE 250 ALT 637 W/ HCPCS: Performed by: HOSPITALIST

## 2020-04-12 PROCEDURE — 94761 N-INVAS EAR/PLS OXIMETRY MLT: CPT

## 2020-04-12 RX ORDER — PREDNISONE 20 MG/1
40 TABLET ORAL DAILY
Status: DISCONTINUED | OUTPATIENT
Start: 2020-04-13 | End: 2020-04-22 | Stop reason: HOSPADM

## 2020-04-12 RX ADMIN — Medication 400 MG: at 09:04

## 2020-04-12 RX ADMIN — INSULIN ASPART 3 UNITS: 100 INJECTION, SOLUTION INTRAVENOUS; SUBCUTANEOUS at 06:04

## 2020-04-12 RX ADMIN — BUPROPION HYDROCHLORIDE 150 MG: 150 TABLET, EXTENDED RELEASE ORAL at 06:04

## 2020-04-12 RX ADMIN — AMIODARONE HYDROCHLORIDE 200 MG: 200 TABLET ORAL at 09:04

## 2020-04-12 RX ADMIN — INSULIN ASPART 5 UNITS: 100 INJECTION, SOLUTION INTRAVENOUS; SUBCUTANEOUS at 04:04

## 2020-04-12 RX ADMIN — DABIGATRAN ETEXILATE MESYLATE 75 MG: 75 CAPSULE ORAL at 09:04

## 2020-04-12 RX ADMIN — IPRATROPIUM BROMIDE AND ALBUTEROL SULFATE 3 ML: .5; 3 SOLUTION RESPIRATORY (INHALATION) at 07:04

## 2020-04-12 RX ADMIN — METOPROLOL TARTRATE 25 MG: 25 TABLET, FILM COATED ORAL at 09:04

## 2020-04-12 RX ADMIN — SERTRALINE HYDROCHLORIDE 100 MG: 50 TABLET ORAL at 09:04

## 2020-04-12 RX ADMIN — IPRATROPIUM BROMIDE AND ALBUTEROL SULFATE 3 ML: .5; 3 SOLUTION RESPIRATORY (INHALATION) at 01:04

## 2020-04-12 RX ADMIN — ALPRAZOLAM 0.5 MG: 0.25 TABLET ORAL at 09:04

## 2020-04-12 RX ADMIN — INSULIN ASPART 5 UNITS: 100 INJECTION, SOLUTION INTRAVENOUS; SUBCUTANEOUS at 06:04

## 2020-04-12 RX ADMIN — DABIGATRAN ETEXILATE MESYLATE 75 MG: 75 CAPSULE ORAL at 08:04

## 2020-04-12 RX ADMIN — SPIRONOLACTONE 25 MG: 25 TABLET ORAL at 09:04

## 2020-04-12 RX ADMIN — GABAPENTIN 300 MG: 300 CAPSULE ORAL at 09:04

## 2020-04-12 RX ADMIN — INSULIN ASPART 4 UNITS: 100 INJECTION, SOLUTION INTRAVENOUS; SUBCUTANEOUS at 04:04

## 2020-04-12 RX ADMIN — PREDNISONE 60 MG: 20 TABLET ORAL at 09:04

## 2020-04-12 RX ADMIN — GABAPENTIN 300 MG: 300 CAPSULE ORAL at 08:04

## 2020-04-12 RX ADMIN — LISINOPRIL 10 MG: 10 TABLET ORAL at 09:04

## 2020-04-12 RX ADMIN — PRAVASTATIN SODIUM 80 MG: 40 TABLET ORAL at 08:04

## 2020-04-12 RX ADMIN — INSULIN ASPART 2 UNITS: 100 INJECTION, SOLUTION INTRAVENOUS; SUBCUTANEOUS at 12:04

## 2020-04-12 RX ADMIN — AMIODARONE HYDROCHLORIDE 200 MG: 200 TABLET ORAL at 08:04

## 2020-04-12 RX ADMIN — ALPRAZOLAM 0.5 MG: 0.25 TABLET ORAL at 08:04

## 2020-04-12 RX ADMIN — ASPIRIN 81 MG: 81 TABLET, COATED ORAL at 08:04

## 2020-04-12 RX ADMIN — METOPROLOL TARTRATE 25 MG: 25 TABLET, FILM COATED ORAL at 08:04

## 2020-04-12 RX ADMIN — INSULIN ASPART 5 UNITS: 100 INJECTION, SOLUTION INTRAVENOUS; SUBCUTANEOUS at 12:04

## 2020-04-12 RX ADMIN — INSULIN DETEMIR 15 UNITS: 100 INJECTION, SOLUTION SUBCUTANEOUS at 09:04

## 2020-04-12 RX ADMIN — IPRATROPIUM BROMIDE AND ALBUTEROL SULFATE 3 ML: .5; 3 SOLUTION RESPIRATORY (INHALATION) at 12:04

## 2020-04-12 NOTE — ASSESSMENT & PLAN NOTE
Patient with Persistent atrial fibrillation which is controlled currently with Beta Blocker, Amiodarone and Digoxin. [id number] score 5. Anticoagulation indicated. Anticoagulation done with Dabigatran.

## 2020-04-12 NOTE — SUBJECTIVE & OBJECTIVE
Interval History: Feeling better. O2 via NC not sitting on face when seen.  Asking when he can go home.    Review of Systems   Constitutional: Negative for chills, fatigue and fever.   Respiratory: Positive for shortness of breath (improving). Negative for cough.    Cardiovascular: Negative for chest pain and leg swelling.   Gastrointestinal: Negative for abdominal pain, diarrhea, nausea and vomiting.   Musculoskeletal: Negative for back pain.   Neurological: Negative for weakness.   Psychiatric/Behavioral: Positive for behavioral problems. The patient is not nervous/anxious.    All other systems reviewed and are negative.    Objective:     Vital Signs (Most Recent):  Temp: 97.6 °F (36.4 °C) (04/12/20 0900)  Pulse: (!) 56 (04/12/20 1348)  Resp: 20 (04/12/20 1348)  BP: (!) 169/80 (04/12/20 0900)  SpO2: 95 % (04/12/20 1348) Vital Signs (24h Range):  Temp:  [96.5 °F (35.8 °C)-97.6 °F (36.4 °C)] 97.6 °F (36.4 °C)  Pulse:  [54-85] 56  Resp:  [18-22] 20  SpO2:  [93 %-97 %] 95 %  BP: (132-215)/() 169/80     Weight: 135.6 kg (298 lb 15.1 oz)  Body mass index is 41.69 kg/m².  No intake or output data in the 24 hours ending 04/12/20 1456   Physical Exam   Constitutional: He is oriented to person, place, and time. He appears well-developed and well-nourished.   HENT:   Head: Normocephalic and atraumatic.   Eyes: EOM are normal.   Cardiovascular: Normal rate and regular rhythm.   Pulmonary/Chest: Effort normal. No respiratory distress. He has no wheezes.   Breathing comfortably.  NC sitting on side of face.   Abdominal: Soft. Bowel sounds are normal. He exhibits no distension. There is no tenderness. There is no guarding.   Obesity noted.   Musculoskeletal: Normal range of motion. He exhibits no edema.   Neurological: He is alert and oriented to person, place, and time. No cranial nerve deficit or sensory deficit. He exhibits normal muscle tone.   L sided weakness chronic.   Skin: Skin is warm and dry. No rash noted. No  pallor.   Psychiatric: He has a normal mood and affect. His behavior is normal.   Nursing note and vitals reviewed.      Significant Labs: All pertinent labs within the past 24 hours have been reviewed.    Significant Imaging: I have reviewed all pertinent imaging results/findings within the past 24 hours.

## 2020-04-12 NOTE — RESPIRATORY THERAPY
04/11/20 1946   Patient Assessment/Suction   Level of Consciousness (AVPU) alert   Respiratory Effort Normal;Unlabored   Expansion/Accessory Muscles/Retractions expansion symmetric;no retractions;no use of accessory muscles   All Lung Fields Breath Sounds crackles, coarse   Cough Frequency infrequent   Cough Type nonproductive   PRE-TX-O2   O2 Device (Oxygen Therapy) nasal cannula   Flow (L/min) 3   Oxygen Concentration (%) 32   SpO2 (!) 93 %   Pulse Oximetry Type Continuous   Pulse 85   Resp 18   Aerosol Therapy   $ Aerosol Therapy Charges Aerosol Treatment   Respiratory Treatment Status (SVN) given   Treatment Route (SVN) mask;oxygen   Patient Position (SVN) HOB elevated   Signs of Intolerance (SVN) none   Breath Sounds Post-Respiratory Treatment   Post-treatment Heart Rate (beats/min) 88   Post-treatment Resp Rate (breaths/min) 20   Ready to Wean/Extubation Screen   FIO2<=50 (chart decimal) 0.32

## 2020-04-12 NOTE — ASSESSMENT & PLAN NOTE
Body mass index is 41.69 kg/m². Morbid obesity complicates all aspects of disease management from diagnostic modalities to treatment. Weight loss encouraged and health benefits explained to patient.

## 2020-04-12 NOTE — PLAN OF CARE
Pt in bed alert and oriented. No c/o pain or discomfort. Morse tele monitoring in use. Elevated BP, received order for prn hydralazine. Admin pm meds. Tolerated well. PIV SL. Will continue to monitor. Safety maintained. Call light within reach.

## 2020-04-12 NOTE — RESPIRATORY THERAPY
04/12/20 0754   Patient Assessment/Suction   Expansion/Accessory Muscles/Retractions no use of accessory muscles   All Lung Fields Breath Sounds coarse;diminished   ASHLEY Breath Sounds coarse   LLL Breath Sounds coarse   Rhythm/Pattern, Respiratory pattern regular;depth regular;unlabored   PRE-TX-O2   O2 Device (Oxygen Therapy) nasal cannula   $ Is the patient on Low Flow Oxygen? Yes   Flow (L/min) 3   SpO2 96 %   Pulse Oximetry Type Continuous   $ Pulse Oximetry - Multiple Charge Pulse Oximetry - Multiple   Pulse 78   Resp 20   Aerosol Therapy   $ Aerosol Therapy Charges Aerosol Treatment   Respiratory Treatment Status (SVN) given   Treatment Route (SVN) mask   Patient Position (SVN) HOB elevated   Signs of Intolerance (SVN) none   Breath Sounds Post-Respiratory Treatment   Throughout All Fields Post-Treatment All Fields   Throughout All Fields Post-Treatment no change   Post-treatment Heart Rate (beats/min) 80   Post-treatment Resp Rate (breaths/min) 18   Wound Care   Skin Temperature warm

## 2020-04-12 NOTE — PROGRESS NOTES
"[facility]  Adult Nutrition  Progress Note    SUMMARY   Intervention: carbohydrate modified diet     Recommendations    Recommendation/Intervention: 1.) Continue with 2000 diabetic diet, texture per SLP (mechanical soft) 2.) Recommend boost glucose control TID  Goals: 1.) pt will meet >/= 75% of EEN during admit  Nutrition Goal Status: new  Communication of RD Recs: (second sign)     1. Acute respiratory failure with hypoxia    2. Dizziness    3. CHF (congestive heart failure)      Past Medical History:   Diagnosis Date    a A H/O Medical Noncompliance     H/O Chronic Noncompliance With His CHF Diet    a Cardiac Diastolic Dysfunction     Dr. Aure Washington    a Chronic Anticoagulation With Pradaxa     Dr. Aure Washington    a Coronary Artery Disease With H/O Stenting     Dr. Aure Washington; Was Hospitalized At [facility] 3/8/17-3/17/17 For CHF Exacerbatioin Due To "Dietary Discrepancies" With LCST Negative There    a Nonsustained Ventricular Tachycardia (NSVT)     a Paroxysmal Atrial Fibrillation With H/O RVR     Dr. Aure Washington; On Chronic Eliquis    a Syncopal Episode     [facility] 4/3/17-4/7/17 Stay For This: Was Likely Due To NSVT, And His Medications Were Adjusted    a Systolic CHF With EF 35-45%     Dr. Aure Washington; Was Hospitalized At [facility] 3/8/17-3/17/17 For CHF Exacerbatioin Due To "Dietary Discrepancies" With LCST Negative There    b Hypertension     b Proteinuria     04/2014 Referred To Dr. Leroy Allison; 4/1/14 Bilateral Renal U/S = Normal; On Lisinopril 20 Mg Daily    b Stage 2 CKD     c Hypercholesterolemia With Low HDL     d Type 2 DM On Insulin     ** 12/4/18 Referred To DM EDU; 1/11/18 Referred To Dr. Dipika Rodriguez And Re-Referred To [facility] EDU; 12/28/17 HgA1c = 12.0;" 7/5/17 Referred To DM EDU    f Morbid Obesity     i 1 PPD X 25+ YRs Chronic Tobacco Use Disorder     7/5/17 Increased Wellbutrin-XL To 300 Mg Daily; 6/8/17 RXd Wellbutrin- Mg Daily X 4 Months    i JULY On CPAP     Dr." "Marion ngo Chronic Left Groin Pain     l Chronic Left Shoulder Pain     Dr. MARTINA martinez Chronic Recurrent Low Back Pain 12/04/2018    Dr. Llamas Is His Pain Management Neurologist; 5/219/18 Referred To Dr. Ismael martinez Left 5-7th Rib FXs 04/2016 4/23/16 [facility] Left Rib XRays = Questionable Nondisplaced Left 5-7th Rib FXs With Normal Lung Fields    l Right Shouder SX 5/26/16 Due To Work Related Injury     Dr. Mora At [facility]; Dr. MARTINA hatch H/O Transient Ischemic Attack In 2013     n Anxiety And Depression 12/04/2018    RTC In 6 Weeks; 12/4/18 Added Wellbutrin- Mg qAM; 5/29/18 Increased 100 Mg Zoloft To 100 Mg Bid    n Continuous Benzodiazepine (Xanax) Use 12/04/2018 5/29/18 I Am Weaning Him Off Of This By Decreasing 1 Mg Bid To 0.5 Mg Bid PRN, And Will Wean Further Next OV    n H/O ETOH Abuse, Quit In 09/2014     q Bilateral Lower Extremity Venous Stasis Ulcers     2/28/18 [facility]    q Chronic Bilateral Lower Extremity Edema     2/28/18 Added Metolazone 10 Mg qAM On MWF And Referred Back To Dr. Washington; On Lasix 40 Mg Bid; He Wears Bilateral Compressin Hose Stockings    q Disability Examination 7/15/16     For CHF, PAF, DM2, And JULY On CPAP    Wellness Visit 11/6/2017          Reason for Assessment    Reason For Assessment: length of stay  General Information Comments: admits with covid-19, SOB. PO intake fair with 25%-50% intake x5 days. No significant weight loss noted per chart review. Per RN, alert and oriented though is confused at times. Tolerating diet    Nutrition Risk Screen    Nutrition Risk Screen: no indicators present    Nutrition/Diet History    Food Allergies: NKFA  Factors Affecting Nutritional Intake: decreased appetite    Anthropometrics    Temp: 97.6 °F (36.4 °C)  Height Method: Stated  Height: 5' 11"  Height (inches): 71 in  Weight Method: Bed Scale  Weight: 135.6 kg (298 " lb 15.1 oz)  Weight (lb): 298.95 lb  Ideal Body Weight (IBW), Male: 172 lb  % Ideal Body Weight, Male (lb): 173.81 %  BMI (Calculated): 41.7  BMI Grade: greater than 40 - morbid obesity  Weight Loss: unintentional  Usual Body Weight (UBW), kg: (!) 146.36 kg(September 2019)  % Usual Body Weight: 92.84  % Weight Change From Usual Weight: -7.35 %       Lab/Procedures/Meds    Pertinent Labs Reviewed: reviewed  BMP  Lab Results   Component Value Date     04/10/2020    K 3.6 04/10/2020    CL 97 04/10/2020    CO2 28 04/10/2020    BUN 20 04/10/2020    CREATININE 1.0 04/10/2020    CALCIUM 8.9 04/10/2020    ANIONGAP 13 04/10/2020    ESTGFRAFRICA >60 04/10/2020    EGFRNONAA >60 04/10/2020     Lab Results   Component Value Date    ALBUMIN 2.6 (L) 04/10/2020     Lab Results   Component Value Date    CALCIUM 8.9 04/10/2020    PHOS 2.3 (L) 04/07/2020     Recent Labs   Lab 04/11/20  1623   POCTGLUCOSE 259*       Pertinent Medications Reviewed: reviewed  Scheduled Meds:   albuterol-ipratropium  3 mL Nebulization Q6H    ALPRAZolam  0.5 mg Oral BID    amiodarone  200 mg Oral BID    aspirin  81 mg Oral QHS    buPROPion  150 mg Oral QAM    dabigatran etexilate  75 mg Oral BID    gabapentin  300 mg Oral BID    insulin aspart U-100  5 Units Subcutaneous TIDWM    insulin detemir U-100  15 Units Subcutaneous Daily    lisinopriL  10 mg Oral Daily    magnesium oxide  400 mg Oral Daily    metoprolol tartrate  25 mg Oral BID    pravastatin  80 mg Oral QHS    predniSONE  60 mg Oral Daily    sertraline  100 mg Oral Daily    spironolactone  25 mg Oral QAM         Estimated/Assessed Needs  Weight Used For Calorie Calculations: 135.6 kg (298 lb 15.1 oz)  Energy Calorie Requirements (kcal): 2200 kcals/day  Energy Need Method: [place]- Jeor  Weight Used For Protein Calculations: 78.2 kg (172 lb 5.7 oz)(ideal body weight)  Estimated Fluid Requirement Method: RDA Method  RDA Method (mL): 2200  CHO Requirement: 250 g  max      Nutrition Prescription Ordered    Current Diet Order: 2000 diabetic, mechanical soft diet    Evaluation of Received Nutrient/Fluid Intake    Other Fluid (mL): 720  Tolerance: tolerating  % Intake of Estimated Energy Needs: 25 - 50 %  % Meal Intake: 25 - 50 % x5 days    Nutrition Risk    Level of Risk/Frequency of Follow-up: (x2 weekly)     Assessment and Plan    Type 2 DM On Insulin    Related to (etiology):   Excessive energy intake    Signs and Symptoms (as evidenced by):   BMI 41 kg/m^2    Interventions/Recommendations (treatment strategy):  Continue with diabetic diet     Nutrition Diagnosis Status:   New           Monitor and Evaluation    Food and Nutrient Intake: energy intake, food and beverage intake  Food and Nutrient Adminstration: diet order  Anthropometric Measurements: weight, weight change, body mass index  Biochemical Data, Medical Tests and Procedures: electrolyte and renal panel, glucose/endocrine profile, lipid profile  Nutrition-Focused Physical Findings: overall appearance     Nutrition Follow-Up    RD Follow-up?: Yes      Nutrition discharge planning: diabetic diet

## 2020-04-12 NOTE — ASSESSMENT & PLAN NOTE
Chronic, controlled.  Latest blood pressure and vitals reviewed-   Temp:  [96.5 °F (35.8 °C)-97.6 °F (36.4 °C)]   Pulse:  [54-85]   Resp:  [18-22]   BP: (132-215)/()   SpO2:  [93 %-97 %] .   Home meds for hypertension were reviewed and noted below. Hospital anti-hypertensive changes were made as shown below.  Hypertension Medications             furosemide (LASIX) 40 MG tablet Take 1 tablet (40 mg total) by mouth 2 (two) times daily.    hydrALAZINE (APRESOLINE) 10 MG tablet Take 10 mg by mouth every 12 (twelve) hours.    lisinopril (PRINIVIL,ZESTRIL) 20 MG tablet Take 1 tablet (20 mg total) by mouth 2 (two) times daily.    metoprolol tartrate (LOPRESSOR) 25 MG tablet TAKE 1 TABLET BY MOUTH 2 TIMES DAILY.    spironolactone (ALDACTONE) 25 MG tablet Take 1 tablet (25 mg total) by mouth every morning.      Hospital Medications             lisinopriL tablet 10 mg 10 mg, Oral, Daily    metoprolol tartrate (LOPRESSOR) tablet 25 mg 25 mg, Oral, 2 times daily, Hold for HR &lt;60 or BP &lt;90/60    spironolactone tablet 25 mg 25 mg, Oral, Every morning        Will utilize p.r.n. blood pressure medication only if patient's blood pressure greater than  180/110 and he develops symptoms such as worsening chest pain or shortness of breath.

## 2020-04-12 NOTE — ASSESSMENT & PLAN NOTE
Patient's FSGs are controlled on current hypoglycemics.   Last A1c reviewed-   Lab Results   Component Value Date    HGBA1C 7.8 (H) 04/03/2018     Most recent fingerstick glucose reviewed-   Recent Labs   Lab 04/11/20  1623 04/12/20  0306 04/12/20  1130   POCTGLUCOSE 259* 264* 246*     Current correctional scale  Low  Maintain anti-hyperglycemic dose as follows-   Antihyperglycemics (From admission, onward)    Start     Stop Route Frequency Ordered    04/11/20 0900  insulin detemir U-100 pen 15 Units      -- SubQ Daily 04/10/20 2150 04/11/20 0715  insulin aspart U-100 pen 5 Units      -- SubQ 3 times daily with meals 04/10/20 2150    04/03/20 1813  insulin aspart U-100 pen 0-5 Units      -- SubQ Before meals & nightly PRN 04/03/20 1813        Hold Oral hypoglycemics while patient is in the hospital.

## 2020-04-12 NOTE — ASSESSMENT & PLAN NOTE
Related to (etiology):   Excessive energy intake    Signs and Symptoms (as evidenced by):   BMI 41 kg/m^2    Interventions/Recommendations (treatment strategy):  Continue with diabetic diet     Nutrition Diagnosis Status:   New

## 2020-04-13 LAB
POCT GLUCOSE: 257 MG/DL (ref 70–110)
POCT GLUCOSE: 278 MG/DL (ref 70–110)
POCT GLUCOSE: 305 MG/DL (ref 70–110)

## 2020-04-13 PROCEDURE — 25000003 PHARM REV CODE 250: Performed by: INTERNAL MEDICINE

## 2020-04-13 PROCEDURE — 27000221 HC OXYGEN, UP TO 24 HOURS

## 2020-04-13 PROCEDURE — 94640 AIRWAY INHALATION TREATMENT: CPT

## 2020-04-13 PROCEDURE — 63600175 PHARM REV CODE 636 W HCPCS: Performed by: INTERNAL MEDICINE

## 2020-04-13 PROCEDURE — 12000002 HC ACUTE/MED SURGE SEMI-PRIVATE ROOM

## 2020-04-13 PROCEDURE — 94761 N-INVAS EAR/PLS OXIMETRY MLT: CPT

## 2020-04-13 PROCEDURE — 25000242 PHARM REV CODE 250 ALT 637 W/ HCPCS: Performed by: HOSPITALIST

## 2020-04-13 PROCEDURE — 25000003 PHARM REV CODE 250: Performed by: HOSPITALIST

## 2020-04-13 PROCEDURE — 97530 THERAPEUTIC ACTIVITIES: CPT | Mod: CQ

## 2020-04-13 RX ADMIN — IPRATROPIUM BROMIDE AND ALBUTEROL SULFATE 3 ML: .5; 3 SOLUTION RESPIRATORY (INHALATION) at 12:04

## 2020-04-13 RX ADMIN — LISINOPRIL 10 MG: 10 TABLET ORAL at 08:04

## 2020-04-13 RX ADMIN — INSULIN ASPART 3 UNITS: 100 INJECTION, SOLUTION INTRAVENOUS; SUBCUTANEOUS at 05:04

## 2020-04-13 RX ADMIN — GABAPENTIN 300 MG: 300 CAPSULE ORAL at 08:04

## 2020-04-13 RX ADMIN — METOPROLOL TARTRATE 25 MG: 25 TABLET, FILM COATED ORAL at 08:04

## 2020-04-13 RX ADMIN — DABIGATRAN ETEXILATE MESYLATE 75 MG: 75 CAPSULE ORAL at 08:04

## 2020-04-13 RX ADMIN — IPRATROPIUM BROMIDE AND ALBUTEROL SULFATE 3 ML: .5; 3 SOLUTION RESPIRATORY (INHALATION) at 07:04

## 2020-04-13 RX ADMIN — INSULIN ASPART 5 UNITS: 100 INJECTION, SOLUTION INTRAVENOUS; SUBCUTANEOUS at 12:04

## 2020-04-13 RX ADMIN — PREDNISONE 40 MG: 20 TABLET ORAL at 08:04

## 2020-04-13 RX ADMIN — SPIRONOLACTONE 25 MG: 25 TABLET ORAL at 08:04

## 2020-04-13 RX ADMIN — AMIODARONE HYDROCHLORIDE 200 MG: 200 TABLET ORAL at 08:04

## 2020-04-13 RX ADMIN — ALPRAZOLAM 0.5 MG: 0.25 TABLET ORAL at 08:04

## 2020-04-13 RX ADMIN — SERTRALINE HYDROCHLORIDE 100 MG: 50 TABLET ORAL at 08:04

## 2020-04-13 RX ADMIN — ASPIRIN 81 MG: 81 TABLET, COATED ORAL at 08:04

## 2020-04-13 RX ADMIN — INSULIN ASPART 5 UNITS: 100 INJECTION, SOLUTION INTRAVENOUS; SUBCUTANEOUS at 08:04

## 2020-04-13 RX ADMIN — Medication 400 MG: at 08:04

## 2020-04-13 RX ADMIN — INSULIN ASPART 2 UNITS: 100 INJECTION, SOLUTION INTRAVENOUS; SUBCUTANEOUS at 08:04

## 2020-04-13 RX ADMIN — INSULIN DETEMIR 15 UNITS: 100 INJECTION, SOLUTION SUBCUTANEOUS at 08:04

## 2020-04-13 RX ADMIN — BUPROPION HYDROCHLORIDE 150 MG: 150 TABLET, EXTENDED RELEASE ORAL at 06:04

## 2020-04-13 RX ADMIN — INSULIN ASPART 5 UNITS: 100 INJECTION, SOLUTION INTRAVENOUS; SUBCUTANEOUS at 05:04

## 2020-04-13 RX ADMIN — PRAVASTATIN SODIUM 80 MG: 40 TABLET ORAL at 08:04

## 2020-04-13 RX ADMIN — IPRATROPIUM BROMIDE AND ALBUTEROL SULFATE 3 ML: .5; 3 SOLUTION RESPIRATORY (INHALATION) at 06:04

## 2020-04-13 NOTE — CARE UPDATE
04/13/20 0659   Patient Assessment/Suction   Level of Consciousness (AVPU) alert   Respiratory Effort Normal;Unlabored   Expansion/Accessory Muscles/Retractions no use of accessory muscles;no retractions;expansion symmetric   All Lung Fields Breath Sounds diminished   Rhythm/Pattern, Respiratory depth regular;pattern regular;unlabored   Cough Frequency infrequent   Cough Type good;nonproductive   PRE-TX-O2   O2 Device (Oxygen Therapy) nasal cannula   $ Is the patient on Low Flow Oxygen? Yes   Flow (L/min) 3   SpO2 (!) 94 %   Pulse Oximetry Type Continuous   $ Pulse Oximetry - Multiple Charge Pulse Oximetry - Multiple   Pulse 65   Resp 16   Aerosol Therapy   $ Aerosol Therapy Charges Aerosol Treatment   Daily Review of Necessity (SVN) completed   Respiratory Treatment Status (SVN) given   Treatment Route (SVN) mask   Patient Position (SVN) HOB elevated   Post Treatment Assessment (SVN) breath sounds unchanged   Signs of Intolerance (SVN) none   Breath Sounds Post-Respiratory Treatment   Throughout All Fields Post-Treatment All Fields   Throughout All Fields Post-Treatment no change   Post-treatment Heart Rate (beats/min) 68   Post-treatment Resp Rate (breaths/min) 16   Equipment Change   $ RT Equipment Treatment nebuilzer       Aerosol treatments q6 with Duoneb. Nebulizer and filter changed out this morning.

## 2020-04-13 NOTE — ASSESSMENT & PLAN NOTE
Patient's FSGs are controlled on current hypoglycemics.   Last A1c reviewed-   Lab Results   Component Value Date    HGBA1C 7.8 (H) 04/03/2018     Most recent fingerstick glucose reviewed-   Recent Labs   Lab 04/12/20  1130 04/12/20  1604 04/13/20  0855   POCTGLUCOSE 246* 315* 257*     Current correctional scale  Low  Maintain anti-hyperglycemic dose as follows-   Antihyperglycemics (From admission, onward)    Start     Stop Route Frequency Ordered    04/11/20 0900  insulin detemir U-100 pen 15 Units      -- SubQ Daily 04/10/20 2150    04/11/20 0715  insulin aspart U-100 pen 5 Units      -- SubQ 3 times daily with meals 04/10/20 2150    04/03/20 1813  insulin aspart U-100 pen 0-5 Units      -- SubQ Before meals & nightly PRN 04/03/20 1813        Hold Oral hypoglycemics while patient is in the hospital.

## 2020-04-13 NOTE — ASSESSMENT & PLAN NOTE
Chronic, controlled.  Latest blood pressure and vitals reviewed-   Temp:  [96.4 °F (35.8 °C)-98.2 °F (36.8 °C)]   Pulse:  [56-80]   Resp:  [16-20]   BP: (139-181)/()   SpO2:  [93 %-100 %] .   Home meds for hypertension were reviewed and noted below. Hospital anti-hypertensive changes were made as shown below.  Hypertension Medications             furosemide (LASIX) 40 MG tablet Take 1 tablet (40 mg total) by mouth 2 (two) times daily.    hydrALAZINE (APRESOLINE) 10 MG tablet Take 10 mg by mouth every 12 (twelve) hours.    lisinopril (PRINIVIL,ZESTRIL) 20 MG tablet Take 1 tablet (20 mg total) by mouth 2 (two) times daily.    metoprolol tartrate (LOPRESSOR) 25 MG tablet TAKE 1 TABLET BY MOUTH 2 TIMES DAILY.    spironolactone (ALDACTONE) 25 MG tablet Take 1 tablet (25 mg total) by mouth every morning.      Hospital Medications             lisinopriL tablet 10 mg 10 mg, Oral, Daily    metoprolol tartrate (LOPRESSOR) tablet 25 mg 25 mg, Oral, 2 times daily, Hold for HR &lt;60 or BP &lt;90/60    spironolactone tablet 25 mg 25 mg, Oral, Every morning        Will utilize p.r.n. blood pressure medication only if patient's blood pressure greater than  180/110 and he develops symptoms such as worsening chest pain or shortness of breath.

## 2020-04-13 NOTE — PLAN OF CARE
Problem: Physical Therapy Goal  Goal: Physical Therapy Goal  Description  Goals to be met by: 2020    Patient will increase functional independence with mobility by performin. Supine to sit with Stand-by Assistance  2. Sit to supine with Stand-by Assistance  3. Sit to stand transfer with Stand-by Assistance  4. Bed to chair transfer with Contact Guard Assistance using Rolling Walker  5. Gait  x 50 feet with Minimal Assistance using Rolling Walker.      Outcome: Ongoing, Progressing   Therapeutic activity : bed mobility , transfers EOB, sit to stand with rw and Max A x 2.

## 2020-04-13 NOTE — PLAN OF CARE
Call was placed to patient's relative, Yady Petersen, and discussed recent vital signs, labs, findings, and recommendations by the primary team as it pertains to the current admission for COVID-19.  Patient had no further questions following our discussion.  Will call again with an update tomorrow.

## 2020-04-13 NOTE — NURSING
Pt is  AAOX4. POC reviewed with pt. Pt verbalized understanding. VSS. Remains afebrile. Pt remains free of pain and injury. Bed low, breaks locked, call light in reach. Will monitor.

## 2020-04-13 NOTE — PLAN OF CARE
04/12/20 1912   Patient Assessment/Suction   Level of Consciousness (AVPU) alert   Respiratory Effort Normal;Unlabored   Expansion/Accessory Muscles/Retractions expansion symmetric   All Lung Fields Breath Sounds diminished   Rhythm/Pattern, Respiratory pattern regular   Cough Frequency no cough   PRE-TX-O2   O2 Device (Oxygen Therapy) nasal cannula   Flow (L/min) 2   SpO2 100 %   Pulse Oximetry Type Continuous   Pulse 80   Resp 20   Aerosol Therapy   $ Aerosol Therapy Charges Aerosol Treatment   Respiratory Treatment Status (SVN) given   Treatment Route (SVN) mask   Patient Position (SVN) HOB elevated   Post Treatment Assessment (SVN) breath sounds unchanged   Signs of Intolerance (SVN) none   Breath Sounds Post-Respiratory Treatment   Post-treatment Heart Rate (beats/min) 84   Post-treatment Resp Rate (breaths/min) 20

## 2020-04-13 NOTE — PROGRESS NOTES
[facility] Hospital Medicine  Progress Note    Patient Name: Jose Leon  MRN: [id number]  Patient Class: IP- Inpatient   Admission Date: 4/3/2020  Length of Stay: 10 days  Attending Physician: Jhoyn Palomo MD  Primary Care Provider: Josh Giordano MD        Subjective:     Principal Problem:Acute respiratory disease due to COVID-19 virus        HPI:  Patient is a 59-year-old morbidly obese male with past medical history significant for multiple medical problems including hypertension, hyperlipidemia, coronary artery disease status post coronary artery stent placement, obstructive sleep apnea (on CPAP), chronic combined systolic and diastolic congestive heart failure, history of TIA, long-term anticoagulation with Pradaxa and paroxysmal atrial fibrillation is being admitted to Hospital Medicine from [facility] Emergency Room under inpatient status for worsening shortness of breath and right lower quadrant abdominal pain.  Patient presented to the emergency room with complaint of profound generalized weakness associated with diarrhea and vomiting for 1 day.  If patient feels dizzy and has also been experiencing nonradiating right lower quadrant abdominal pain.  Patient denies any hematemesis, melena or bleeding per rectum.  No recent travel or sick contact history reported.  Patient'sCOVID 19 test is positive in the emergency room.  Patient was recently discharged from [facility].  In the emergency room patient was noted to be hypoxic with exertion and minimal level around 85%.  Presently requiring 3 L per minute supplemental oxygen via nasal cannula.        Overview/Hospital Course:  Patient was admitted for acute on chronic respiratory failure likely secondary to COVID 19 superimposed on COPD and obesity hypoventilation.  Patient was started on appropriate management of COVID19 including supplemental oxygen, nebulized bronchodilators, and improved  slowly over the course of his hospitalization.  Given his concomitant COPD, the patient was started on oral corticosteroids.  He did have episodes of hyperglycemia and hypoglycemia and his insulin dose was adjusted over the course of his hospitalization.  Patient had intermittent episodes of confusion and agitation with impulsivity, and was given intermittent antipsychotic for behavioral.  After hospital day 3-4, the patient's behavior improved.  He remained in the hospital secondary to issues related to his disposition and insurance status.    Interval History: Afebrile.  On 3 L/min supplemental oxygen. Feeling better. No SOB or CP.     Review of Systems   Constitutional: Negative for chills, fatigue and fever.   Respiratory: Positive for shortness of breath (improving). Negative for cough.    Cardiovascular: Negative for chest pain and leg swelling.   Gastrointestinal: Negative for abdominal pain, diarrhea, nausea and vomiting.   Musculoskeletal: Negative for back pain.   Neurological: Negative for weakness.   Psychiatric/Behavioral: Positive for behavioral problems. The patient is not nervous/anxious.    All other systems reviewed and are negative.    Objective:     Vital Signs (Most Recent):  Temp: 98.2 °F (36.8 °C) (04/13/20 0852)  Pulse: 76 (04/13/20 0852)  Resp: 18 (04/13/20 0852)  BP: 139/78 (04/13/20 0852)  SpO2: 97 % (04/13/20 0852) Vital Signs (24h Range):  Temp:  [96.4 °F (35.8 °C)-98.2 °F (36.8 °C)] 98.2 °F (36.8 °C)  Pulse:  [56-80] 76  Resp:  [16-20] 18  SpO2:  [93 %-100 %] 97 %  BP: (139-181)/() 139/78     Weight: 135.6 kg (298 lb 15.1 oz)  Body mass index is 41.69 kg/m².  No intake or output data in the 24 hours ending 04/13/20 0923   Physical Exam   Constitutional: He is oriented to person, place, and time. He appears well-developed and well-nourished.   HENT:   Head: Normocephalic and atraumatic.   Eyes: EOM are normal.   Cardiovascular: Normal rate and regular rhythm.   Pulmonary/Chest:  Effort normal. No respiratory distress. He has no wheezes.   Breathing comfortably.  NC sitting on side of face.   Abdominal: Soft. Bowel sounds are normal. He exhibits no distension. There is no tenderness. There is no guarding.   Obesity noted.   Musculoskeletal: Normal range of motion. He exhibits no edema.   Neurological: He is alert and oriented to person, place, and time. No cranial nerve deficit or sensory deficit. He exhibits normal muscle tone.   L sided weakness chronic.   Skin: Skin is warm and dry. No rash noted. No pallor.   Psychiatric: He has a normal mood and affect. His behavior is normal.   Nursing note and vitals reviewed.      Significant Labs:   Lab Results   Component Value Date    WBC 8.81 04/10/2020    HGB 12.4 (L) 04/10/2020    HCT 42.6 04/10/2020    MCV 80 (L) 04/10/2020     04/10/2020     BMP  Lab Results   Component Value Date     04/10/2020    K 3.6 04/10/2020    CL 97 04/10/2020    CO2 28 04/10/2020    BUN 20 04/10/2020    CREATININE 1.0 04/10/2020    CALCIUM 8.9 04/10/2020    ANIONGAP 13 04/10/2020    ESTGFRAFRICA >60 04/10/2020    EGFRNONAA >60 04/10/2020     Microbiology Results (last 7 days)     Procedure Component Value Units Date/Time    Blood culture (site 2) [[id number]] Collected:  04/03/20 1900    Order Status:  Completed Specimen:  Blood Updated:  04/08/20 0612     Blood Culture, Routine No Growth after 4 days.     Narrative:       Site # 2, aerobic only    Blood culture (site 1) [[id number]] Collected:  04/03/20 1900    Order Status:  Completed Specimen:  Blood Updated:  04/08/20 0612     Blood Culture, Routine No Growth after 4 days.     Narrative:       Site # 1, aerobic and anaerobic        Significant Imaging:   CXR (04-):   Borderline heart size.  Loop recorder in place.  Mild interstitial, chronic.  New intrapulmonary masses or infiltrates are not seen.    CT abdomen and pelvis with contrast:  1. No evidence for appendicitis.  2. Urinary bladder wall  thickening could relate to under distension.  Outlet obstruction or cystitis also possible in the appropriate clinical setting.  3. Interstitial lung disease with some degree of chronicity.  Consider dedicated CT chest when clinically feasible.  4. Enlarged heart.  5. Remote L1 compression fracture.  Additional compression fractures at the lowest 2 lumbar levels, age indeterminate but new since 2019 comparison.        Assessment/Plan:      * Acute respiratory disease due to COVID-19 virus  - COVID-19 testing   - Infection Control notified 4/3/2020    - Isolation:   - Airborne, Contact and Droplet Precautions  - Cohort patients into COVID units  - N95 masks must be fit tested, wear eye protection  - 20 second hand hygiene              - Limit visitors per hospital policy              - Consolidating lab draws, nursing care, provider visits, and interventions      - Management:  Supplemental O2 to maintain SpO2 >92%  Continuous/intermittent Pulse Ox  Albuterol INHALER PRN (avoid nebulization of secretions)  Avoiding BiPAP to prevent aerosolization (including home BiPAP)    Advance Care Planning -patient full code for now    Discussed that can likely go home once off O2 and amble to ambulate near baseline                   COPD exacerbation  Patient's COPD is uncontrolled due to continued dyspnea, use of accessory muscles for breathing and worsening of baseline hypoxia currently.  Patient is currently off COPD Pathway. Continue scheduled inhalers Steroids, Antibiotics and Supplemental oxygen and monitor respiratory status closely.         Type 2 DM On Insulin  Patient's FSGs are controlled on current hypoglycemics.   Last A1c reviewed-   Lab Results   Component Value Date    HGBA1C 7.8 (H) 04/03/2018     Most recent fingerstick glucose reviewed-   Recent Labs   Lab 04/12/20  1130 04/12/20  1604 04/13/20  0855   POCTGLUCOSE 246* 315* 257*     Current correctional scale  Low  Maintain anti-hyperglycemic dose as follows-    Antihyperglycemics (From admission, onward)    Start     Stop Route Frequency Ordered    04/11/20 0900  insulin detemir U-100 pen 15 Units      -- SubQ Daily 04/10/20 2150    04/11/20 0715  insulin aspart U-100 pen 5 Units      -- SubQ 3 times daily with meals 04/10/20 2150    04/03/20 1813  insulin aspart U-100 pen 0-5 Units      -- SubQ Before meals & nightly PRN 04/03/20 1813        Hold Oral hypoglycemics while patient is in the hospital.          1 PPD X 25+ YRs Chronic Tobacco Use Disorder  Smoking cessation counseling performed. Dangers of cigarette smoking were reviewed with patient in detail and patient was encouraged to quit. Nicotine replacement options were discussed for > 3 minutes.        Anxiety And Depression  Continue Xanax and Wellbutrin use as before      Stage 2 CKD  Creatine stable for now. BMP reviewed- noted Estimated Creatinine Clearance: 111.8 mL/min (based on SCr of 1 mg/dL). according to latest data. Monitor UOP and serial BMP and adjust therapy as needed. Renally dose meds.                Hypertension  Chronic, controlled.  Latest blood pressure and vitals reviewed-   Temp:  [96.4 °F (35.8 °C)-98.2 °F (36.8 °C)]   Pulse:  [56-80]   Resp:  [16-20]   BP: (139-181)/()   SpO2:  [93 %-100 %] .   Home meds for hypertension were reviewed and noted below. Hospital anti-hypertensive changes were made as shown below.  Hypertension Medications             furosemide (LASIX) 40 MG tablet Take 1 tablet (40 mg total) by mouth 2 (two) times daily.    hydrALAZINE (APRESOLINE) 10 MG tablet Take 10 mg by mouth every 12 (twelve) hours.    lisinopril (PRINIVIL,ZESTRIL) 20 MG tablet Take 1 tablet (20 mg total) by mouth 2 (two) times daily.    metoprolol tartrate (LOPRESSOR) 25 MG tablet TAKE 1 TABLET BY MOUTH 2 TIMES DAILY.    spironolactone (ALDACTONE) 25 MG tablet Take 1 tablet (25 mg total) by mouth every morning.      Hospital Medications             lisinopriL tablet 10 mg 10 mg, Oral, Daily     metoprolol tartrate (LOPRESSOR) tablet 25 mg 25 mg, Oral, 2 times daily, Hold for HR &lt;60 or BP &lt;90/60    spironolactone tablet 25 mg 25 mg, Oral, Every morning        Will utilize p.r.n. blood pressure medication only if patient's blood pressure greater than  180/110 and he develops symptoms such as worsening chest pain or shortness of breath.      Atrial Fibrillation With H/O RVR  Patient with Persistent atrial fibrillation which is controlled currently with Beta Blocker, Amiodarone and Digoxin. [id number] score 5. Anticoagulation indicated. Anticoagulation done with Dabigatran.        Coronary Artery Disease With H/O Stenting  Patient with known CAD s/p CABG. Will continue and monitor for S/Sx of angina/ACS. Continue to monitor on telemetry.        JULY on CPAP  Continue supplemental oxygen to maintain pulse ox above 92%.  Unable to use CPAP per hospital policy at this time due to COVID-19.      Morbid obesity  Body mass index is 41.69 kg/m². Morbid obesity complicates all aspects of disease management from diagnostic modalities to treatment. Weight loss encouraged and health benefits explained to patient.      Discussed with .  Awaiting placement at skilled nursing facility/[facility].      VTE Risk Mitigation (From admission, onward)         Ordered     dabigatran etexilate capsule 75 mg  2 times daily      04/03/20 1813     IP VTE HIGH RISK PATIENT  Once      04/03/20 1813     Place OMI hose  Until discontinued      04/03/20 1813     Place sequential compression device  Until discontinued      04/03/20 1813                      Jhony Palomo MD  Department of Hospital Medicine   [facility]

## 2020-04-13 NOTE — NURSING
POC discussed with patient, pt. Needs some reinforcement with verbalized understanding. Brief in place, changed per orders. Telesitter at bedside. Aferible throughout shift. O2@3L NC. VS addressed. Repositioned per orders. Will continue to monitor.

## 2020-04-13 NOTE — SUBJECTIVE & OBJECTIVE
Interval History: Afebrile.  On 3 L/min supplemental oxygen. Feeling better. No SOB or CP.     Review of Systems   Constitutional: Negative for chills, fatigue and fever.   Respiratory: Positive for shortness of breath (improving). Negative for cough.    Cardiovascular: Negative for chest pain and leg swelling.   Gastrointestinal: Negative for abdominal pain, diarrhea, nausea and vomiting.   Musculoskeletal: Negative for back pain.   Neurological: Negative for weakness.   Psychiatric/Behavioral: Positive for behavioral problems. The patient is not nervous/anxious.    All other systems reviewed and are negative.    Objective:     Vital Signs (Most Recent):  Temp: 98.2 °F (36.8 °C) (04/13/20 0852)  Pulse: 76 (04/13/20 0852)  Resp: 18 (04/13/20 0852)  BP: 139/78 (04/13/20 0852)  SpO2: 97 % (04/13/20 0852) Vital Signs (24h Range):  Temp:  [96.4 °F (35.8 °C)-98.2 °F (36.8 °C)] 98.2 °F (36.8 °C)  Pulse:  [56-80] 76  Resp:  [16-20] 18  SpO2:  [93 %-100 %] 97 %  BP: (139-181)/() 139/78     Weight: 135.6 kg (298 lb 15.1 oz)  Body mass index is 41.69 kg/m².  No intake or output data in the 24 hours ending 04/13/20 0923   Physical Exam   Constitutional: He is oriented to person, place, and time. He appears well-developed and well-nourished.   HENT:   Head: Normocephalic and atraumatic.   Eyes: EOM are normal.   Cardiovascular: Normal rate and regular rhythm.   Pulmonary/Chest: Effort normal. No respiratory distress. He has no wheezes.   Breathing comfortably.  NC sitting on side of face.   Abdominal: Soft. Bowel sounds are normal. He exhibits no distension. There is no tenderness. There is no guarding.   Obesity noted.   Musculoskeletal: Normal range of motion. He exhibits no edema.   Neurological: He is alert and oriented to person, place, and time. No cranial nerve deficit or sensory deficit. He exhibits normal muscle tone.   L sided weakness chronic.   Skin: Skin is warm and dry. No rash noted. No pallor.    Psychiatric: He has a normal mood and affect. His behavior is normal.   Nursing note and vitals reviewed.      Significant Labs:   Lab Results   Component Value Date    WBC 8.81 04/10/2020    HGB 12.4 (L) 04/10/2020    HCT 42.6 04/10/2020    MCV 80 (L) 04/10/2020     04/10/2020     BMP  Lab Results   Component Value Date     04/10/2020    K 3.6 04/10/2020    CL 97 04/10/2020    CO2 28 04/10/2020    BUN 20 04/10/2020    CREATININE 1.0 04/10/2020    CALCIUM 8.9 04/10/2020    ANIONGAP 13 04/10/2020    ESTGFRAFRICA >60 04/10/2020    EGFRNONAA >60 04/10/2020     Microbiology Results (last 7 days)     Procedure Component Value Units Date/Time    Blood culture (site 2) [[id number]] Collected:  04/03/20 1900    Order Status:  Completed Specimen:  Blood Updated:  04/08/20 0612     Blood Culture, Routine No Growth after 4 days.     Narrative:       Site # 2, aerobic only    Blood culture (site 1) [[id number]] Collected:  04/03/20 1900    Order Status:  Completed Specimen:  Blood Updated:  04/08/20 0612     Blood Culture, Routine No Growth after 4 days.     Narrative:       Site # 1, aerobic and anaerobic        Significant Imaging:   CXR (04-):   Borderline heart size.  Loop recorder in place.  Mild interstitial, chronic.  New intrapulmonary masses or infiltrates are not seen.    CT abdomen and pelvis with contrast:  1. No evidence for appendicitis.  2. Urinary bladder wall thickening could relate to under distension.  Outlet obstruction or cystitis also possible in the appropriate clinical setting.  3. Interstitial lung disease with some degree of chronicity.  Consider dedicated CT chest when clinically feasible.  4. Enlarged heart.  5. Remote L1 compression fracture.  Additional compression fractures at the lowest 2 lumbar levels, age indeterminate but new since 2019 comparison.

## 2020-04-13 NOTE — PLAN OF CARE
Referrals sent to [facility] ([facility] taking positive SNF pts ) and [facility] rehab for review. Lisa Glass LCSW     Per Zoe at [facility]- they are able to accept the pt and they had him back in January- they just need one negative Covid test. CM following.      04/13/20 1017   Post-Acute Status   Post-Acute Authorization Placement

## 2020-04-13 NOTE — ASSESSMENT & PLAN NOTE
Patient with Persistent atrial fibrillation which is controlled currently with Beta Blocker, Amiodarone and Digoxin. [id number] score 5. Anticoagulation indicated. Anticoagulation done with Dabigatran.

## 2020-04-14 LAB
POCT GLUCOSE: 186 MG/DL (ref 70–110)
POCT GLUCOSE: 204 MG/DL (ref 70–110)
POCT GLUCOSE: 300 MG/DL (ref 70–110)
POCT GLUCOSE: 330 MG/DL (ref 70–110)

## 2020-04-14 PROCEDURE — 97535 SELF CARE MNGMENT TRAINING: CPT | Mod: CO

## 2020-04-14 PROCEDURE — 27000221 HC OXYGEN, UP TO 24 HOURS

## 2020-04-14 PROCEDURE — 94640 AIRWAY INHALATION TREATMENT: CPT

## 2020-04-14 PROCEDURE — 94761 N-INVAS EAR/PLS OXIMETRY MLT: CPT

## 2020-04-14 PROCEDURE — 12000002 HC ACUTE/MED SURGE SEMI-PRIVATE ROOM

## 2020-04-14 PROCEDURE — 97110 THERAPEUTIC EXERCISES: CPT | Mod: CQ

## 2020-04-14 PROCEDURE — 25000003 PHARM REV CODE 250: Performed by: INTERNAL MEDICINE

## 2020-04-14 PROCEDURE — 97530 THERAPEUTIC ACTIVITIES: CPT | Mod: CQ

## 2020-04-14 PROCEDURE — 25000242 PHARM REV CODE 250 ALT 637 W/ HCPCS: Performed by: HOSPITALIST

## 2020-04-14 PROCEDURE — 63600175 PHARM REV CODE 636 W HCPCS: Performed by: INTERNAL MEDICINE

## 2020-04-14 PROCEDURE — 25000003 PHARM REV CODE 250: Performed by: HOSPITALIST

## 2020-04-14 RX ADMIN — ASPIRIN 81 MG: 81 TABLET, COATED ORAL at 08:04

## 2020-04-14 RX ADMIN — PRAVASTATIN SODIUM 80 MG: 40 TABLET ORAL at 08:04

## 2020-04-14 RX ADMIN — METOPROLOL TARTRATE 25 MG: 25 TABLET, FILM COATED ORAL at 08:04

## 2020-04-14 RX ADMIN — INSULIN DETEMIR 15 UNITS: 100 INJECTION, SOLUTION SUBCUTANEOUS at 08:04

## 2020-04-14 RX ADMIN — IPRATROPIUM BROMIDE AND ALBUTEROL SULFATE 3 ML: .5; 3 SOLUTION RESPIRATORY (INHALATION) at 06:04

## 2020-04-14 RX ADMIN — INSULIN ASPART 2 UNITS: 100 INJECTION, SOLUTION INTRAVENOUS; SUBCUTANEOUS at 11:04

## 2020-04-14 RX ADMIN — INSULIN ASPART 4 UNITS: 100 INJECTION, SOLUTION INTRAVENOUS; SUBCUTANEOUS at 04:04

## 2020-04-14 RX ADMIN — INSULIN ASPART 5 UNITS: 100 INJECTION, SOLUTION INTRAVENOUS; SUBCUTANEOUS at 08:04

## 2020-04-14 RX ADMIN — IPRATROPIUM BROMIDE AND ALBUTEROL SULFATE 3 ML: .5; 3 SOLUTION RESPIRATORY (INHALATION) at 12:04

## 2020-04-14 RX ADMIN — GABAPENTIN 300 MG: 300 CAPSULE ORAL at 08:04

## 2020-04-14 RX ADMIN — LISINOPRIL 10 MG: 10 TABLET ORAL at 08:04

## 2020-04-14 RX ADMIN — SERTRALINE HYDROCHLORIDE 100 MG: 50 TABLET ORAL at 08:04

## 2020-04-14 RX ADMIN — IPRATROPIUM BROMIDE AND ALBUTEROL SULFATE 3 ML: .5; 3 SOLUTION RESPIRATORY (INHALATION) at 07:04

## 2020-04-14 RX ADMIN — BUPROPION HYDROCHLORIDE 150 MG: 150 TABLET, EXTENDED RELEASE ORAL at 06:04

## 2020-04-14 RX ADMIN — AMIODARONE HYDROCHLORIDE 200 MG: 200 TABLET ORAL at 08:04

## 2020-04-14 RX ADMIN — INSULIN ASPART 5 UNITS: 100 INJECTION, SOLUTION INTRAVENOUS; SUBCUTANEOUS at 11:04

## 2020-04-14 RX ADMIN — DABIGATRAN ETEXILATE MESYLATE 75 MG: 75 CAPSULE ORAL at 08:04

## 2020-04-14 RX ADMIN — INSULIN ASPART 5 UNITS: 100 INJECTION, SOLUTION INTRAVENOUS; SUBCUTANEOUS at 04:04

## 2020-04-14 RX ADMIN — SPIRONOLACTONE 25 MG: 25 TABLET ORAL at 08:04

## 2020-04-14 RX ADMIN — Medication 400 MG: at 08:04

## 2020-04-14 RX ADMIN — ALPRAZOLAM 0.5 MG: 0.25 TABLET ORAL at 08:04

## 2020-04-14 RX ADMIN — PREDNISONE 40 MG: 20 TABLET ORAL at 08:04

## 2020-04-14 NOTE — PT/OT/SLP PROGRESS
Physical Therapy Treatment    Patient Name:  Jose Leon   MRN:  [id number]    Recommendations:     Discharge Recommendations:  nursing facility, skilled   Discharge Equipment Recommendations: (unclear at this time)   Barriers to discharge: None    Assessment:     Jose Leon is a 59 y.o. male admitted with a medical diagnosis of Acute respiratory disease due to COVID-19 virus.  He presents with the following impairments/functional limitations:  weakness, impaired endurance, impaired self care skills, impaired functional mobilty, gait instability, impaired balance, decreased lower extremity function, decreased safety awareness, impaired cardiopulmonary response to activity . Tolerated treatment. Participates well in therapy. Presents with L lateral lean in standing requiring Max A to maintain upright. Performs LE exercises seated and supine. O2 attached throughout.    Rehab Prognosis: Good; patient would benefit from acute skilled PT services to address these deficits and reach maximum level of function.    Recent Surgery: * No surgery found *      Plan:     During this hospitalization, patient to be seen 6 x/week to address the identified rehab impairments via gait training, therapeutic activities, therapeutic exercises and progress toward the following goals:    · Plan of Care Expires:  05/05/20    Subjective     Chief Complaint: limited mobility due to bed alarm and [employer].  Patient/Family Comments/goals: to return home  Pain/Comfort:  · Pain Rating 1: 0/10      Objective:     Communicated with nurse Barker prior to session.  Patient found supine with bed alarm, telemetry, oxygen([employer] at bedside) upon PT entry to room.     General Precautions: Standard, airborne, contact, droplet, fall   Orthopedic Precautions:N/A   Braces:       Functional Mobility:  · Bed Mobility:     · Rolling Left:  minimum assistance  · Rolling Right: minimum assistance  · Supine to Sit: moderate assistance  · Sit to Supine: moderate  assistance  · Transfers:     · Sit to Stand:  maximal assistance and of 2 persons with rolling walker      AM-PAC 6 CLICK MOBILITY          Therapeutic Activities and Exercises:   Transferred to sitting EOB with Mod A with verbal instruction for rolling and technique.   Required extra time and CGA for upright sitting balance initially.    Performed BLE exercises seated at 10 reps each ; TKE's, Hip flexion, abd/add, AP's.   4 standing trials with rw and Max x 2 for balance ( leans L). Seated rests between each .   Able to scoot to R side HOB with Min A.   Returned supine with Mod A. Performed BLE exercises at 10 reps each : SLR's, HS, Hip abd/add ( knees flexed and extended), AP's. ( AAROM LLE), trunk rotations.   Scoot to HOB using RU and LE's.          Patient left supine with all lines intact, call button in reach, bed alarm on, nurse Mj notified and [employer] present..    GOALS:   Multidisciplinary Problems     Physical Therapy Goals        Problem: Physical Therapy Goal    Goal Priority Disciplines Outcome Goal Variances Interventions   Physical Therapy Goal     PT, PT/OT Ongoing, Progressing     Description:  Goals to be met by: 2020    Patient will increase functional independence with mobility by performin. Supine to sit with Stand-by Assistance  2. Sit to supine with Stand-by Assistance  3. Sit to stand transfer with Stand-by Assistance  4. Bed to chair transfer with Contact Guard Assistance using Rolling Walker  5. Gait  x 50 feet with Minimal Assistance using Rolling Walker.                       Time Tracking:     PT Received On: 20  PT Start Time: 1000     PT Stop Time: 1045  PT Total Time (min): 45 min     Billable Minutes: Therapeutic Activity 20min and Therapeutic Exercise 25min    Treatment Type: Treatment  PT/PTA: PTA     PTA Visit Number: 2     Sarah Sanz PTA  2020

## 2020-04-14 NOTE — PROGRESS NOTES
[facility] Hospital Medicine  Progress Note    Patient Name: Jose Leon  MRN: [id number]  Patient Class: IP- Inpatient   Admission Date: 4/3/2020  Length of Stay: 11 days  Attending Physician: Jhony Palomo MD  Primary Care Provider: Josh Giordano MD        Subjective:     Principal Problem:Acute respiratory disease due to COVID-19 virus        HPI:  Patient is a 59-year-old morbidly obese male with past medical history significant for multiple medical problems including hypertension, hyperlipidemia, coronary artery disease status post coronary artery stent placement, obstructive sleep apnea (on CPAP), chronic combined systolic and diastolic congestive heart failure, history of TIA, long-term anticoagulation with Pradaxa and paroxysmal atrial fibrillation is being admitted to Hospital Medicine from [facility] Emergency Room under inpatient status for worsening shortness of breath and right lower quadrant abdominal pain.  Patient presented to the emergency room with complaint of profound generalized weakness associated with diarrhea and vomiting for 1 day.  If patient feels dizzy and has also been experiencing nonradiating right lower quadrant abdominal pain.  Patient denies any hematemesis, melena or bleeding per rectum.  No recent travel or sick contact history reported.  Patient'sCOVID 19 test is positive in the emergency room.  Patient was [facility] discharged from [facility].  In the emergency room patient was noted to be hypoxic with exertion and minimal level around 85%.  Presently requiring 3 L per minute supplemental oxygen via nasal cannula.        Overview/Hospital Course:  Patient was admitted for acute on chronic respiratory failure likely secondary to COVID 19 superimposed on COPD and obesity hypoventilation.  Patient was started on appropriate management of COVID19 including supplemental oxygen, nebulized bronchodilators, and improved  slowly over the course of his hospitalization.  Given his concomitant COPD, the patient was started on oral corticosteroids.  He did have episodes of hyperglycemia and hypoglycemia and his insulin dose was adjusted over the course of his hospitalization.  Patient had intermittent episodes of confusion and agitation with impulsivity, and was given intermittent antipsychotic for behavioral.  After hospital day 3-4, the patient's behavior improved.  He remained in the hospital secondary to issues related to his disposition and insurance status.    Interval History: Afebrile.  On 3 L/min supplemental oxygen. Feeling better. No SOB or CP.     Review of Systems   Constitutional: Negative for chills, fatigue and fever.   Respiratory: Positive for shortness of breath (improving). Negative for cough.    Cardiovascular: Negative for chest pain and leg swelling.   Gastrointestinal: Negative for abdominal pain, diarrhea, nausea and vomiting.   Musculoskeletal: Negative for back pain.   Neurological: Negative for weakness.   Psychiatric/Behavioral: Positive for behavioral problems. The patient is not nervous/anxious.    All other systems reviewed and are negative.    Objective:     Vital Signs (Most Recent):  Temp: 97.8 °F (36.6 °C) (04/14/20 0830)  Pulse: 75 (04/14/20 0830)  Resp: 18 (04/14/20 0830)  BP: 126/87 (04/14/20 0830)  SpO2: (!) 94 % (04/14/20 0830) Vital Signs (24h Range):  Temp:  [96.7 °F (35.9 °C)-97.8 °F (36.6 °C)] 97.8 °F (36.6 °C)  Pulse:  [61-75] 75  Resp:  [18-28] 18  SpO2:  [92 %-97 %] 94 %  BP: (126-186)/(81-90) 126/87     Weight: 135.6 kg (298 lb 15.1 oz)  Body mass index is 41.69 kg/m².    Intake/Output Summary (Last 24 hours) at 4/14/2020 1026  Last data filed at 4/14/2020 0700  Gross per 24 hour   Intake 120 ml   Output --   Net 120 ml      Physical Exam   Constitutional: He is oriented to person, place, and time. He appears well-developed and well-nourished.   HENT:   Head: Normocephalic and atraumatic.    Eyes: EOM are normal.   Cardiovascular: Normal rate and regular rhythm.   Pulmonary/Chest: Effort normal. No respiratory distress. He has no wheezes.   Breathing comfortably.  NC sitting on side of face.   Abdominal: Soft. Bowel sounds are normal. He exhibits no distension. There is no tenderness. There is no guarding.   Obesity noted.   Musculoskeletal: Normal range of motion. He exhibits no edema.   Neurological: He is alert and oriented to person, place, and time. No cranial nerve deficit or sensory deficit. He exhibits normal muscle tone.   L sided weakness chronic.   Skin: Skin is warm and dry. No rash noted. No pallor.   Psychiatric: He has a normal mood and affect. His behavior is normal.   Nursing note and vitals reviewed.      Significant Labs:   Lab Results   Component Value Date    WBC 8.81 04/10/2020    HGB 12.4 (L) 04/10/2020    HCT 42.6 04/10/2020    MCV 80 (L) 04/10/2020     04/10/2020     BMP  Lab Results   Component Value Date     04/10/2020    K 3.6 04/10/2020    CL 97 04/10/2020    CO2 28 04/10/2020    BUN 20 04/10/2020    CREATININE 1.0 04/10/2020    CALCIUM 8.9 04/10/2020    ANIONGAP 13 04/10/2020    ESTGFRAFRICA >60 04/10/2020    EGFRNONAA >60 04/10/2020     Microbiology Results (last 7 days)     Procedure Component Value Units Date/Time    Blood culture (site 2) [[id number]] Collected:  04/03/20 1900    Order Status:  Completed Specimen:  Blood Updated:  04/08/20 0612     Blood Culture, Routine No Growth after 4 days.     Narrative:       Site # 2, aerobic only    Blood culture (site 1) [[id number]] Collected:  04/03/20 1900    Order Status:  Completed Specimen:  Blood Updated:  04/08/20 0612     Blood Culture, Routine No Growth after 4 days.     Narrative:       Site # 1, aerobic and anaerobic        Significant Imaging:   CXR (04-):   Borderline heart size.  Loop recorder in place.  Mild interstitial, chronic.  New intrapulmonary masses or infiltrates are not seen.    CT  abdomen and pelvis with contrast:  1. No evidence for appendicitis.  2. Urinary bladder wall thickening could relate to under distension.  Outlet obstruction or cystitis also possible in the appropriate clinical setting.  3. Interstitial lung disease with some degree of chronicity.  Consider dedicated CT chest when clinically feasible.  4. Enlarged heart.  5. Remote L1 compression fracture.  Additional compression fractures at the lowest 2 lumbar levels, age indeterminate but new since 2019 comparison.    ECHO:  · Concentric left ventricular remodeling.  · The mean diastolic gradient across the mitral valve is 1 mmHg at a heart rate of bpm.  · Severe left atrial enlargement.  · Normal left ventricular systolic function. The estimated ejection fraction is 60%.  · Indeterminate left ventricular diastolic function.  · No wall motion abnormalities.  · Moderate right atrial enlargement.  · Normal central venous pressure (3 mmHg).  · The estimated PA systolic pressure is 27 mmHg.  · Normal right ventricular systolic function.  · Mild tricuspid regurgitation.      Assessment/Plan:      * Acute respiratory disease due to COVID-19 virus  - COVID-19 testing   - Infection Control notified 4/3/2020    - Isolation:   - Airborne, Contact and Droplet Precautions  - Cohort patients into COVID units  - N95 masks must be fit tested, wear eye protection  - 20 second hand hygiene              - Limit visitors per hospital policy              - Consolidating lab draws, nursing care, provider visits, and interventions      - Management:  Supplemental O2 to maintain SpO2 >92%  Continuous/intermittent Pulse Ox  Albuterol INHALER PRN (avoid nebulization of secretions)  Avoiding BiPAP to prevent aerosolization (including home BiPAP)    Advance Care Planning -patient full code for now    Discussed that can likely go home once off O2 and amble to ambulate near baseline                   COPD exacerbation  Patient's COPD is uncontrolled due to  continued dyspnea, use of accessory muscles for breathing and worsening of baseline hypoxia currently.  Patient is currently off COPD Pathway. Continue scheduled inhalers Steroids, Antibiotics and Supplemental oxygen and monitor respiratory status closely.         Type 2 DM On Insulin  Patient's FSGs are controlled on current hypoglycemics.   Last A1c reviewed-   Lab Results   Component Value Date    HGBA1C 7.8 (H) 04/03/2018     Most recent fingerstick glucose reviewed-   Recent Labs   Lab 04/12/20  1130 04/12/20  1604 04/13/20  0855   POCTGLUCOSE 246* 315* 257*     Current correctional scale  Low  Maintain anti-hyperglycemic dose as follows-   Antihyperglycemics (From admission, onward)    Start     Stop Route Frequency Ordered    04/11/20 0900  insulin detemir U-100 pen 15 Units      -- SubQ Daily 04/10/20 2150    04/11/20 0715  insulin aspart U-100 pen 5 Units      -- SubQ 3 times daily with meals 04/10/20 2150    04/03/20 1813  insulin aspart U-100 pen 0-5 Units      -- SubQ Before meals & nightly PRN 04/03/20 1813        Hold Oral hypoglycemics while patient is in the hospital.          1 PPD X 25+ YRs Chronic Tobacco Use Disorder  Smoking cessation counseling performed. Dangers of cigarette smoking were reviewed with patient in detail and patient was encouraged to quit. Nicotine replacement options were discussed for > 3 minutes.        Anxiety And Depression  Continue Xanax and Wellbutrin use as before      Stage 2 CKD  Creatine stable for now. BMP reviewed- noted Estimated Creatinine Clearance: 111.8 mL/min (based on SCr of 1 mg/dL). according to latest data. Monitor UOP and serial BMP and adjust therapy as needed. Renally dose meds.                Hypertension  Chronic, controlled.  Latest blood pressure and vitals reviewed-   Temp:  [96.4 °F (35.8 °C)-98.2 °F (36.8 °C)]   Pulse:  [56-80]   Resp:  [16-20]   BP: (139-181)/()   SpO2:  [93 %-100 %] .   Home meds for hypertension were reviewed and noted  below. Hospital anti-hypertensive changes were made as shown below.  Hypertension Medications             furosemide (LASIX) 40 MG tablet Take 1 tablet (40 mg total) by mouth 2 (two) times daily.    hydrALAZINE (APRESOLINE) 10 MG tablet Take 10 mg by mouth every 12 (twelve) hours.    lisinopril (PRINIVIL,ZESTRIL) 20 MG tablet Take 1 tablet (20 mg total) by mouth 2 (two) times daily.    metoprolol tartrate (LOPRESSOR) 25 MG tablet TAKE 1 TABLET BY MOUTH 2 TIMES DAILY.    spironolactone (ALDACTONE) 25 MG tablet Take 1 tablet (25 mg total) by mouth every morning.      Hospital Medications             lisinopriL tablet 10 mg 10 mg, Oral, Daily    metoprolol tartrate (LOPRESSOR) tablet 25 mg 25 mg, Oral, 2 times daily, Hold for HR &lt;60 or BP &lt;90/60    spironolactone tablet 25 mg 25 mg, Oral, Every morning        Will utilize p.r.n. blood pressure medication only if patient's blood pressure greater than  180/110 and he develops symptoms such as worsening chest pain or shortness of breath.      Atrial Fibrillation With H/O RVR  Patient with Persistent atrial fibrillation which is controlled currently with Beta Blocker, Amiodarone and Digoxin. [id number] score 5. Anticoagulation indicated. Anticoagulation done with Dabigatran.        Coronary Artery Disease With H/O Stenting  Patient with known CAD s/p CABG. Will continue and monitor for S/Sx of angina/ACS. Continue to monitor on telemetry.        JULY on CPAP  Continue supplemental oxygen to maintain pulse ox above 92%.  Unable to use CPAP per hospital policy at this time due to COVID-19.      Morbid obesity  Body mass index is 41.69 kg/m². Morbid obesity complicates all aspects of disease management from diagnostic modalities to treatment. Weight loss encouraged and health benefits explained to patient.           Discussed with , awaiting skilled nursing facility placement.    VTE Risk Mitigation (From admission, onward)         Ordered     dabigatran  etexilate capsule 75 mg  2 times daily      04/03/20 1813     IP VTE HIGH RISK PATIENT  Once      04/03/20 1813     Place OMI hose  Until discontinued      04/03/20 1813     Place sequential compression device  Until discontinued      04/03/20 1813                      Jhony Palomo MD  Department of Hospital Medicine   [facility]

## 2020-04-14 NOTE — PLAN OF CARE
CM sent SNF referral to [facility] via [employer]      04/14/20 1221   Post-Acute Status   Post-Acute Authorization Placement   Post-Acute Placement Status Referrals Sent

## 2020-04-14 NOTE — PT/OT/SLP PROGRESS
Occupational Therapy   Treatment    Name: Jose Leon  MRN: [id number]  Admitting Diagnosis:  Acute respiratory disease due to COVID-19 virus       Recommendations:     Discharge Recommendations: nursing facility, skilled  Discharge Equipment Recommendations:  (TBD)  Barriers to discharge:  Decreased caregiver support    Assessment:     Jose Leon is a 59 y.o. male with a medical diagnosis of Acute respiratory disease due to COVID-19 virus.  He presents with lateral L lean sitting EOB requiring intermittent Min A to correct and Mod A to maintain L UE and hand placement in support beside him at EOB. Pt benefits from feeding assist with skilled therapy to increase independence, sequencing and cleanliness. Pt is hyper focsused and perseverating on needing to call netspend to reroute his check so that his family cannot get it and spend it, stating that they have already done this before and this is why they are not answering when he calls home the last few days. Pt requires Mod A for bed mobility and is able to scoot toward HOB along EOB with only Mod VC. Performance deficits affecting function are weakness, impaired endurance, impaired self care skills, decreased upper extremity function, decreased lower extremity function, decreased ROM, impaired cognition, decreased safety awareness, impaired coordination, impaired fine motor, impaired functional mobilty, impaired balance.     Rehab Prognosis:  Good; patient would benefit from acute skilled OT services to address these deficits and reach maximum level of function.       Plan:     Patient to be seen 3 x/week to address the above listed problems via self-care/home management, therapeutic exercises, therapeutic activities, neuromuscular re-education  · Plan of Care Expires: 05/05/20  · Plan of Care Reviewed with: patient    Subjective     Pain/Comfort:  · Pain Rating 1: 0/10    Objective:     Communicated with: Mj VOGEL prior to session.  Patient found HOB elevated  with bed alarm, peripheral IV, telemetry, oxygen upon OT entry to room.    General Precautions: Standard, airborne, contact, droplet, fall   Orthopedic Precautions:N/A   Braces: N/A     Occupational Performance:     Bed Mobility:    · Patient completed Rolling/Turning to Left with  stand by assistance  · Patient completed Rolling/Turning to Right with stand by assistance  · Patient completed Scooting along EOB toward HOB with stand by assistance  · Patient completed Supine to Sit with moderate assistance, with side rail and with leg lift and VC for initiation and completion  · Patient completed Sit to Supine with moderate assistance, with side rail and with leg lift VC for initiation.    Functional Mobility/Transfers:  Functional Mobility: FAIR; pt has LOB while sitting EOB. Sit<>stand would be safer with a second person.pt requires tactile/HOHA to use L UE as support beside him on bed while sitting. He is easily distracted with poor insight and judgement.    Activities of Daily Living:  · Feeding:  minimum assistance while seated EOB x ~25mins. pt cannot open BOOST and stated that is why he hasn't been drinking them. pt takes large bites and shovels food into his mouth before the last bite is finished requiring VC for slower pace and chewing all food before swallowing. pt is spilling food off of tray, down gown, and on chin and around lips. Pt requires assist to hold the tiny BOOST straw to the bottom of the carton (but not to hold the carton) as he is sipping, as he thought it was empty twice because he wasn't pulling any liquid through the straw.  · Toileting: dependence BM in brief.      [facility] 6 Click ADL: 15    Treatment & Education:  -MEHNAZ assisted pt in looking up the customer service number for [employer] and dialing the number for him. Pt states that he knows his account number and will be able to take care of everything once someone just helps him to call out. He thanked me for assistance.    Patient left  right sidelying with all lines intact, call button in reach, bed alarm on and RN, Tavia and Mj notifiedEducation:   that pt was on the phone and had a BM requiring attention.    GOALS:   Multidisciplinary Problems     Occupational Therapy Goals        Problem: Occupational Therapy Goal    Goal Priority Disciplines Outcome Interventions   Occupational Therapy Goal     OT, PT/OT Ongoing, Progressing    Description:  Goals to be met by: 5/5/2020     Patient will increase functional independence with ADLs by performing:    LE Dressing with Contact Guard Assistance and Assistive Devices as needed.  Grooming while EOB with Stand-by Assistance.  Toileting from toilet with Minimal Assistance for hygiene and clothing management.   Supine to sit with Minimal Assistance.  Toilet transfer to bedside commode with Minimal Assistance.  Upper extremity exercise program x10 reps per handout, with supervision.                      Time Tracking:     OT Date of Treatment: 04/14/20  OT Start Time: 1129  OT Stop Time: 1209  OT Total Time (min): 40 min    Billable Minutes:Self Care/Home Management 40 min    RAMOS Carrasco  4/14/2020

## 2020-04-14 NOTE — PLAN OF CARE
Per Berta with [facility], pt is under review with their admissions. CM following      04/14/20 1535   Post-Acute Status   Post-Acute Authorization Placement   Post-Acute Placement Status Pending Post-Acute Medical Review

## 2020-04-14 NOTE — PLAN OF CARE
Patient resting comfortably. Afebrile and no SOB noted. O2 @ 2 liters NC and saturation is 92-93%. Alert but confused. Vital signs stable. No complaints at this time. Blood glucose monitored and managed. Tele monitor in place. Tele sitter at bedside. Will continue to monitor.

## 2020-04-14 NOTE — PLAN OF CARE
Berta with [facility] is calling the pts family regarding admit for the pt. CM following. Lisa Glass     04/14/20 1051   Post-Acute Status   Post-Acute Authorization Placement

## 2020-04-14 NOTE — PLAN OF CARE
04/13/20 1945   Patient Assessment/Suction   Level of Consciousness (AVPU) alert   Respiratory Effort Unlabored   Expansion/Accessory Muscles/Retractions expansion symmetric   All Lung Fields Breath Sounds diminished   Rhythm/Pattern, Respiratory pattern regular   Cough Frequency infrequent   Cough Type nonproductive   PRE-TX-O2   O2 Device (Oxygen Therapy) nasal cannula   Flow (L/min) 2   SpO2 96 %   Pulse Oximetry Type Continuous   Pulse 63   Resp 20   Aerosol Therapy   $ Aerosol Therapy Charges Aerosol Treatment   Respiratory Treatment Status (SVN) given   Treatment Route (SVN) mask   Patient Position (SVN) HOB elevated   Post Treatment Assessment (SVN) wheezing, new onset   Signs of Intolerance (SVN) none   Breath Sounds Post-Respiratory Treatment   Post-treatment Heart Rate (beats/min) 68   Post-treatment Resp Rate (breaths/min) 20

## 2020-04-14 NOTE — PLAN OF CARE
Problem: Physical Therapy Goal  Goal: Physical Therapy Goal  Description  Goals to be met by: 2020    Patient will increase functional independence with mobility by performin. Supine to sit with Stand-by Assistance  2. Sit to supine with Stand-by Assistance  3. Sit to stand transfer with Stand-by Assistance  4. Bed to chair transfer with Contact Guard Assistance using Rolling Walker  5. Gait  x 50 feet with Minimal Assistance using Rolling Walker.      Outcome: Ongoing, Progressing   Therapeutic activity : bed mobility , transfers EOB /Sit <>stand, BLE exercises seated and standing.

## 2020-04-14 NOTE — NURSING
Pt confused, make inappropriate comments such as asking staff  to  him or go home with him. incontinent . 02 sat 97% via nasal canula @ 2L. Telesitter at bedside.

## 2020-04-14 NOTE — PLAN OF CARE
Call was placed to patient's relative, Yady Petersen, and discussed recent vital signs, labs, findings, and recommendations by the primary team as it pertains to the current admission.  Patient's family member had no further questions following our discussion.  [facility] call again with an update tomorrow.

## 2020-04-14 NOTE — SUBJECTIVE & OBJECTIVE
Interval History: Afebrile.  On 3 L/min supplemental oxygen. Feeling better. No SOB or CP.     Review of Systems   Constitutional: Negative for chills, fatigue and fever.   Respiratory: Positive for shortness of breath (improving). Negative for cough.    Cardiovascular: Negative for chest pain and leg swelling.   Gastrointestinal: Negative for abdominal pain, diarrhea, nausea and vomiting.   Musculoskeletal: Negative for back pain.   Neurological: Negative for weakness.   Psychiatric/Behavioral: Positive for behavioral problems. The patient is not nervous/anxious.    All other systems reviewed and are negative.    Objective:     Vital Signs (Most Recent):  Temp: 97.8 °F (36.6 °C) (04/14/20 0830)  Pulse: 75 (04/14/20 0830)  Resp: 18 (04/14/20 0830)  BP: 126/87 (04/14/20 0830)  SpO2: (!) 94 % (04/14/20 0830) Vital Signs (24h Range):  Temp:  [96.7 °F (35.9 °C)-97.8 °F (36.6 °C)] 97.8 °F (36.6 °C)  Pulse:  [61-75] 75  Resp:  [18-28] 18  SpO2:  [92 %-97 %] 94 %  BP: (126-186)/(81-90) 126/87     Weight: 135.6 kg (298 lb 15.1 oz)  Body mass index is 41.69 kg/m².    Intake/Output Summary (Last 24 hours) at 4/14/2020 1026  Last data filed at 4/14/2020 0700  Gross per 24 hour   Intake 120 ml   Output --   Net 120 ml      Physical Exam   Constitutional: He is oriented to person, place, and time. He appears well-developed and well-nourished.   HENT:   Head: Normocephalic and atraumatic.   Eyes: EOM are normal.   Cardiovascular: Normal rate and regular rhythm.   Pulmonary/Chest: Effort normal. No respiratory distress. He has no wheezes.   Breathing comfortably.  NC sitting on side of face.   Abdominal: Soft. Bowel sounds are normal. He exhibits no distension. There is no tenderness. There is no guarding.   Obesity noted.   Musculoskeletal: Normal range of motion. He exhibits no edema.   Neurological: He is alert and oriented to person, place, and time. No cranial nerve deficit or sensory deficit. He exhibits normal muscle tone.    L sided weakness chronic.   Skin: Skin is warm and dry. No rash noted. No pallor.   Psychiatric: He has a normal mood and affect. His behavior is normal.   Nursing note and vitals reviewed.      Significant Labs:   Lab Results   Component Value Date    WBC 8.81 04/10/2020    HGB 12.4 (L) 04/10/2020    HCT 42.6 04/10/2020    MCV 80 (L) 04/10/2020     04/10/2020     BMP  Lab Results   Component Value Date     04/10/2020    K 3.6 04/10/2020    CL 97 04/10/2020    CO2 28 04/10/2020    BUN 20 04/10/2020    CREATININE 1.0 04/10/2020    CALCIUM 8.9 04/10/2020    ANIONGAP 13 04/10/2020    ESTGFRAFRICA >60 04/10/2020    EGFRNONAA >60 04/10/2020     Microbiology Results (last 7 days)     Procedure Component Value Units Date/Time    Blood culture (site 2) [[id number]] Collected:  04/03/20 1900    Order Status:  Completed Specimen:  Blood Updated:  04/08/20 0612     Blood Culture, Routine No Growth after 4 days.     Narrative:       Site # 2, aerobic only    Blood culture (site 1) [[id number]] Collected:  04/03/20 1900    Order Status:  Completed Specimen:  Blood Updated:  04/08/20 0612     Blood Culture, Routine No Growth after 4 days.     Narrative:       Site # 1, aerobic and anaerobic        Significant Imaging:   CXR (04-):   Borderline heart size.  Loop recorder in place.  Mild interstitial, chronic.  New intrapulmonary masses or infiltrates are not seen.    CT abdomen and pelvis with contrast:  1. No evidence for appendicitis.  2. Urinary bladder wall thickening could relate to under distension.  Outlet obstruction or cystitis also possible in the appropriate clinical setting.  3. Interstitial lung disease with some degree of chronicity.  Consider dedicated CT chest when clinically feasible.  4. Enlarged heart.  5. Remote L1 compression fracture.  Additional compression fractures at the lowest 2 lumbar levels, age indeterminate but new since 2019 comparison.    ECHO:  · Concentric left ventricular  remodeling.  · The mean diastolic gradient across the mitral valve is 1 mmHg at a heart rate of bpm.  · Severe left atrial enlargement.  · Normal left ventricular systolic function. The estimated ejection fraction is 60%.  · Indeterminate left ventricular diastolic function.  · No wall motion abnormalities.  · Moderate right atrial enlargement.  · Normal central venous pressure (3 mmHg).  · The estimated PA systolic pressure is 27 mmHg.  · Normal right ventricular systolic function.  · Mild tricuspid regurgitation.

## 2020-04-14 NOTE — CARE UPDATE
04/14/20 0644   Patient Assessment/Suction   Level of Consciousness (AVPU) alert   Respiratory Effort Normal;Unlabored   Expansion/Accessory Muscles/Retractions no use of accessory muscles;no retractions;expansion symmetric   All Lung Fields Breath Sounds diminished   Rhythm/Pattern, Respiratory depth regular;pattern regular;unlabored   Cough Frequency infrequent   Cough Type good;nonproductive   PRE-TX-O2   O2 Device (Oxygen Therapy) nasal cannula   $ Is the patient on Low Flow Oxygen? Yes   Flow (L/min) 2   SpO2 (!) 94 %   Pulse Oximetry Type Continuous   $ Pulse Oximetry - Multiple Charge Pulse Oximetry - Multiple   Pulse 62   Resp 18   Aerosol Therapy   $ Aerosol Therapy Charges Aerosol Treatment   Daily Review of Necessity (SVN) completed   Respiratory Treatment Status (SVN) given   Treatment Route (SVN) mask   Patient Position (SVN) HOB elevated   Post Treatment Assessment (SVN) breath sounds unchanged   Signs of Intolerance (SVN) none   Breath Sounds Post-Respiratory Treatment   Throughout All Fields Post-Treatment All Fields   Throughout All Fields Post-Treatment no change   Post-treatment Heart Rate (beats/min) 65   Post-treatment Resp Rate (breaths/min) 18       Duoneb q6.

## 2020-04-14 NOTE — PLAN OF CARE
Increase POC to 5x/wk   -Gerry Grant OT    Problem: Occupational Therapy Goal  Goal: Occupational Therapy Goal  Description  Goals to be met by: 5/5/2020     Patient will increase functional independence with ADLs by performing:    LE Dressing with Contact Guard Assistance and Assistive Devices as needed.  Grooming while EOB with Stand-by Assistance.  Toileting from toilet with Minimal Assistance for hygiene and clothing management.   Supine to sit with Minimal Assistance.  Toilet transfer to bedside commode with Minimal Assistance.  Upper extremity exercise program x10 reps per handout, with supervision.     Outcome: Ongoing, Progressing

## 2020-04-15 LAB
POCT GLUCOSE: 177 MG/DL (ref 70–110)
POCT GLUCOSE: 326 MG/DL (ref 70–110)
POCT GLUCOSE: 428 MG/DL (ref 70–110)

## 2020-04-15 PROCEDURE — 97535 SELF CARE MNGMENT TRAINING: CPT | Mod: CO

## 2020-04-15 PROCEDURE — 12000002 HC ACUTE/MED SURGE SEMI-PRIVATE ROOM

## 2020-04-15 PROCEDURE — 25000003 PHARM REV CODE 250: Performed by: INTERNAL MEDICINE

## 2020-04-15 PROCEDURE — 25000003 PHARM REV CODE 250: Performed by: HOSPITALIST

## 2020-04-15 PROCEDURE — 97110 THERAPEUTIC EXERCISES: CPT | Mod: CO

## 2020-04-15 PROCEDURE — 63600175 PHARM REV CODE 636 W HCPCS: Performed by: INTERNAL MEDICINE

## 2020-04-15 PROCEDURE — 27000221 HC OXYGEN, UP TO 24 HOURS

## 2020-04-15 PROCEDURE — 97530 THERAPEUTIC ACTIVITIES: CPT | Mod: CQ

## 2020-04-15 PROCEDURE — 94761 N-INVAS EAR/PLS OXIMETRY MLT: CPT

## 2020-04-15 PROCEDURE — 63700000 PHARM REV CODE 250 ALT 637 W/O HCPCS: Performed by: INTERNAL MEDICINE

## 2020-04-15 PROCEDURE — 94640 AIRWAY INHALATION TREATMENT: CPT

## 2020-04-15 PROCEDURE — 97803 MED NUTRITION INDIV SUBSEQ: CPT

## 2020-04-15 PROCEDURE — 25000242 PHARM REV CODE 250 ALT 637 W/ HCPCS: Performed by: HOSPITALIST

## 2020-04-15 RX ORDER — DIPHENHYDRAMINE HCL 25 MG
25 CAPSULE ORAL EVERY 6 HOURS PRN
Status: DISCONTINUED | OUTPATIENT
Start: 2020-04-15 | End: 2020-04-22 | Stop reason: HOSPADM

## 2020-04-15 RX ADMIN — DIPHENHYDRAMINE HYDROCHLORIDE 25 MG: 25 CAPSULE ORAL at 08:04

## 2020-04-15 RX ADMIN — ALPRAZOLAM 0.5 MG: 0.25 TABLET ORAL at 08:04

## 2020-04-15 RX ADMIN — METOPROLOL TARTRATE 25 MG: 25 TABLET, FILM COATED ORAL at 08:04

## 2020-04-15 RX ADMIN — IPRATROPIUM BROMIDE AND ALBUTEROL SULFATE 3 ML: .5; 3 SOLUTION RESPIRATORY (INHALATION) at 12:04

## 2020-04-15 RX ADMIN — ASPIRIN 81 MG: 81 TABLET, COATED ORAL at 08:04

## 2020-04-15 RX ADMIN — SPIRONOLACTONE 25 MG: 25 TABLET ORAL at 09:04

## 2020-04-15 RX ADMIN — PREDNISONE 40 MG: 20 TABLET ORAL at 09:04

## 2020-04-15 RX ADMIN — GABAPENTIN 300 MG: 300 CAPSULE ORAL at 08:04

## 2020-04-15 RX ADMIN — AMIODARONE HYDROCHLORIDE 200 MG: 200 TABLET ORAL at 09:04

## 2020-04-15 RX ADMIN — INSULIN ASPART 3 UNITS: 100 INJECTION, SOLUTION INTRAVENOUS; SUBCUTANEOUS at 08:04

## 2020-04-15 RX ADMIN — INSULIN ASPART 4 UNITS: 100 INJECTION, SOLUTION INTRAVENOUS; SUBCUTANEOUS at 04:04

## 2020-04-15 RX ADMIN — DIPHENHYDRAMINE HYDROCHLORIDE 25 MG: 25 CAPSULE ORAL at 05:04

## 2020-04-15 RX ADMIN — POTASSIUM CHLORIDE 40 MEQ: 20 SOLUTION ORAL at 09:04

## 2020-04-15 RX ADMIN — PRAVASTATIN SODIUM 80 MG: 40 TABLET ORAL at 08:04

## 2020-04-15 RX ADMIN — INSULIN ASPART 5 UNITS: 100 INJECTION, SOLUTION INTRAVENOUS; SUBCUTANEOUS at 04:04

## 2020-04-15 RX ADMIN — DABIGATRAN ETEXILATE MESYLATE 75 MG: 75 CAPSULE ORAL at 08:04

## 2020-04-15 RX ADMIN — LISINOPRIL 10 MG: 10 TABLET ORAL at 09:04

## 2020-04-15 RX ADMIN — BUPROPION HYDROCHLORIDE 150 MG: 150 TABLET, EXTENDED RELEASE ORAL at 07:04

## 2020-04-15 RX ADMIN — INSULIN ASPART 5 UNITS: 100 INJECTION, SOLUTION INTRAVENOUS; SUBCUTANEOUS at 07:04

## 2020-04-15 RX ADMIN — Medication 400 MG: at 09:04

## 2020-04-15 RX ADMIN — SERTRALINE HYDROCHLORIDE 100 MG: 50 TABLET ORAL at 09:04

## 2020-04-15 RX ADMIN — IPRATROPIUM BROMIDE AND ALBUTEROL SULFATE 3 ML: .5; 3 SOLUTION RESPIRATORY (INHALATION) at 07:04

## 2020-04-15 RX ADMIN — INSULIN DETEMIR 15 UNITS: 100 INJECTION, SOLUTION SUBCUTANEOUS at 09:04

## 2020-04-15 RX ADMIN — METOPROLOL TARTRATE 25 MG: 25 TABLET, FILM COATED ORAL at 09:04

## 2020-04-15 RX ADMIN — AMIODARONE HYDROCHLORIDE 200 MG: 200 TABLET ORAL at 08:04

## 2020-04-15 RX ADMIN — DABIGATRAN ETEXILATE MESYLATE 75 MG: 75 CAPSULE ORAL at 09:04

## 2020-04-15 RX ADMIN — ALPRAZOLAM 0.5 MG: 0.25 TABLET ORAL at 09:04

## 2020-04-15 RX ADMIN — GABAPENTIN 300 MG: 300 CAPSULE ORAL at 09:04

## 2020-04-15 NOTE — PLAN OF CARE
TRISH left  for Jody Tobin at [facility] [phone] requesting call back with update on referral.       04/15/20 1106   Post-Acute Status   Post-Acute Authorization Placement   Post-Acute Placement Status Pending Post-Acute Medical Review

## 2020-04-15 NOTE — PT/OT/SLP PROGRESS
Occupational Therapy   Treatment    Name: Jose Leon  MRN: [id number]  Admitting Diagnosis:  Acute respiratory disease due to COVID-19 virus       Recommendations:     Discharge Recommendations: nursing facility, skilled  Discharge Equipment Recommendations:  (TBD)  Barriers to discharge:  Decreased caregiver support    Assessment:     Jose Leon is a 59 y.o. male with a medical diagnosis of Acute respiratory disease due to COVID-19 virus.  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 He requires repeated VC to finish each bite before speaking to avoid choking as well as keeping his L UE in a position of support to keep him in midline rather than lateral L lean occurring. Pt is mostly incontinent, though does have some sense of when he has to urinate and requires SBA for this. He participates well in therapy and is motivated. Performance deficits affecting function are weakness, impaired endurance, impaired sensation, impaired self care skills, impaired functional mobilty, gait instability, impaired balance, impaired cognition, decreased upper extremity function, decreased lower extremity function, decreased safety awareness, impaired coordination, impaired fine motor, abnormal tone, decreased ROM, impaired cardiopulmonary response to activity.     Rehab Prognosis:  Good; patient would benefit from acute skilled OT services to address these deficits and reach maximum level of function.       Plan:     Patient to be seen 5 x/week to address the above listed problems via self-care/home management, therapeutic activities, therapeutic exercises, neuromuscular re-education  · Plan of Care Expires: 05/05/20  · Plan of Care Reviewed with: patient    Subjective     Pain/Comfort:  · Pain Rating 1: 0/10    Objective:     Communicated with: RN,  Maricel prior to session.  Patient found EOB with PTA, Sarah with bed alarm, pulse ox (continuous), telemetry upon OT entry to room.    General Precautions: Standard, airborne, contact, droplet, fall   Orthopedic Precautions:N/A   Braces: N/A     Occupational Performance:     Bed Mobility:    · Patient completed Scooting along EOB toward HOB with stand by assistance and good form and safety.  · Patient completed Sit to Supine with contact guard assistance, 2 persons and with leg lift     Functional Mobility/Transfers:  · Patient completed Sit <> Stand Transfer with moderate assistance and of 2 persons  with  rolling walker and VC for hand and feet placement and sequencing.   · Functional Mobility: FAIR; pt side stepped x 2 with Mod A x 2 and max vc for foot and RW placement. L Lateral lean during all standing/stepping counteracted by DARBY with support at L torso and L UE.    Activities of Daily Living:  · Feeding: while seated at EOB, minimum assistance 2* all items need to be opened for pt, he has trouble orienting straw with proper side of cup to suck up liquid. pt spills with every bite of food and talks with mouth full of food requiring max vc for safety and to decrease choking.  · Toileting: dependence in brief for BM and most urine. pt verbalized his need to urinate and use the urinal which required Mod A to position but had alrready urinated by the time he had spoken.    [facility] 6 Click ADL: 15    Treatment & Education:  TherEx while seated EOB: sh shrugs x 10 with L sh activating 50% of time; backward sh rolls focusing on scapular retraction x 10 with DARBY providing facilitation of scapulohumeral rhythm of L sh with each rep; bicep curls x 20; spinal rotation including cervical spine x 10 each side. Slight posterior lean noted during spinal rotations.    Patient left HOB elevated with all lines intact, call button in reach, bed alarm on and Linda CARDENAS notifiedEducation:      GOALS:   Multidisciplinary  Problems     Occupational Therapy Goals        Problem: Occupational Therapy Goal    Goal Priority Disciplines Outcome Interventions   Occupational Therapy Goal     OT, PT/OT Ongoing, Progressing    Description:  Goals to be met by: 5/5/2020     Patient will increase functional independence with ADLs by performing:    LE Dressing with Contact Guard Assistance and Assistive Devices as needed.  Grooming while EOB with Stand-by Assistance.  Toileting from toilet with Minimal Assistance for hygiene and clothing management.   Supine to sit with Minimal Assistance.  Toilet transfer to bedside commode with Minimal Assistance.  Upper extremity exercise program x10 reps per handout, with supervision.                      Time Tracking:     OT Date of Treatment: 04/15/20  OT Start Time: 1208  OT Stop Time: 1251  OT Total Time (min): 43 min    Billable Minutes:Self Care/Home Management 33min  Therapeutic Exercise 10min    RAMOS Carrasco  4/15/2020

## 2020-04-15 NOTE — PLAN OF CARE
Intervention: carbohydrate modified diet      Recommendations   1.) Continue with 2000 diabetic diet, texture per SLP ( 4 carb servings/meal)  2.) change supplements to 1 x daily  3) weigh pt weekly    Goals: 1.) pt will meet >/= 75% of EEN during admit 2) glucose better controlled at f/u  Nutrition Goal Status: meeting/ New  Communication of RD Recs: (reviewed with RN, POC, sticky note)

## 2020-04-15 NOTE — PLAN OF CARE
Was able to get information regarding the pt's card acct that he wants cancelled from the Physical Therapist. It is a [employer] acct, the pt was able to give me his pin#, I was able to access his acct via phone using his social security number and cancelled his card. I could not speak with a representative due to high call volumes at this time to request a new card be mailed. I updated the pt on the above, he was extremely grateful......LELA Castellanos       04/15/20 9878   Discharge Reassessment   Assessment Type Discharge Planning Reassessment

## 2020-04-15 NOTE — PROGRESS NOTES
"[facility]  Adult Nutrition  Progress Note    SUMMARY   Intervention: carbohydrate modified diet      Recommendations   1.) Continue with 2000 diabetic diet, texture per SLP ( 4 carb servings/meal)  2.) change supplements to 1 x daily  3) weigh pt weekly    Goals: 1.) pt will meet >/= 75% of EEN during admit 2) glucose better controlled at f/u  Nutrition Goal Status: meeting/ New  Communication of RD Recs: (reviewed with RN, POC, sticky note)     1. Acute respiratory failure with hypoxia    2. Dizziness    3. CHF (congestive heart failure)            Past Medical History:   Diagnosis Date    a A H/O Medical Noncompliance       H/O Chronic Noncompliance With His CHF Diet    a Cardiac Diastolic Dysfunction       Dr. Aure Washington    a Chronic Anticoagulation With Pradaxa       Dr. Aure Washington    a Coronary Artery Disease With H/O Stenting       Dr. Aure Washington; Was Hospitalized At [facility] 3/8/17-3/17/17 For CHF Exacerbatioin Due To "Dietary Discrepancies" With LCST Negative There    a Nonsustained Ventricular Tachycardia (NSVT)      a Paroxysmal Atrial Fibrillation With H/O RVR       Dr. Aure Washington; On Chronic Eliquis    a Syncopal Episode       [facility] 4/3/17-4/7/17 Stay For This: Was Likely Due To NSVT, And His Medications Were Adjusted    a Systolic CHF With EF 35-45%       Dr. Aure Washington; Was Hospitalized At [facility] 3/8/17-3/17/17 For CHF Exacerbatioin Due To "Dietary Discrepancies" With LCST Negative There    b Hypertension      b Proteinuria       04/2014 Referred To Dr. Leroy Allison; 4/1/14 Bilateral Renal U/S = Normal; On Lisinopril 20 Mg Daily    b Stage 2 CKD      c Hypercholesterolemia With Low HDL      d Type 2 DM On Insulin       ** 12/4/18 Referred To DM EDU; 1/11/18 Referred To Dr. Dipika Rodriguez And Re-Referred To DM EDU; 12/28/17 HgA1c = 12.0;" 7/5/17 Referred To DM EDU    f Morbid Obesity      i 1 PPD X 25+ YRs Chronic Tobacco Use Disorder       7/5/17 Increased Wellbutrin-XL " To 300 Mg Daily; 6/8/17 RXd Wellbutrin- Mg Daily X 4 Months    i JULY On CPAP       Dr. Marion ngo Chronic Left Groin Pain      l Chronic Left Shoulder Pain       Dr. MARTINA martinez Chronic Recurrent Low Back Pain 12/04/2018     Dr. Llamas Is His Pain Management Neurologist; 5/219/18 Referred To Dr. Ismael martinez Left 5-7th Rib FXs 04/2016 4/23/16 [facility] Left Rib XRays = Questionable Nondisplaced Left 5-7th Rib FXs With Normal Lung Fields    l Right Shouder SX 5/26/16 Due To Work Related Injury       [place]; Dr. MARTINA hatch H/O Transient Ischemic Attack In 2013      n Anxiety And Depression 12/04/2018     RTC In 6 Weeks; 12/4/18 Added Wellbutrin- Mg qAM; 5/29/18 Increased 100 Mg Zoloft To 100 Mg Bid    n Continuous Benzodiazepine (Xanax) Use 12/04/2018 5/29/18 I Am Weaning Him Off Of This By Decreasing 1 Mg Bid To 0.5 Mg Bid PRN, And Will Wean Further Next OV    n H/O ETOH Abuse, Quit In 09/2014      q Bilateral Lower Extremity Venous Stasis Ulcers       2/28/18 [facility]    q Chronic Bilateral Lower Extremity Edema       2/28/18 Added Metolazone 10 Mg qAM On MWF And Referred Back To Dr. Washington; On Lasix 40 Mg Bid; He Wears Bilateral Compressin Hose Stockings    q Disability Examination 7/15/16       For CHF, PAF, DM2, And JULY On CPAP    Wellness Visit 11/6/2017        Reason for Assessment     Reason For Assessment: follow up  Rounds: attended, did not visit  General Information Comments: admits with covid-19, SOB. PO intake fair with 25%-50% intake x5 days. No significant weight loss noted per chart review. Per RN, alert and oriented though is confused at times. Tolerating diet  4/15/20 discussed pt with RN, he is eating very well and glucose elevate. Noted to be on steroid. Also antipsychotic behavior improving, still with episodes of confusion. Attempted to call pt, busy  "signal. DM education if appropriate at f/u, will attach handouts to pt discharge packet. NFPE not done r/t restrictions to prevent spread of COVID-19, to be done at a later date. Per Rn appears well-nourished/ obese.     Nutrition Risk Screen     Nutrition Risk Screen: no indicators present     Nutrition/Diet History     Food Allergies: NKFA  Factors Affecting Nutritional Intake: none at this time     Anthropometrics  Height Method: Stated  Height: 5' 11"  Height (inches): 71 in  Weight Method: Bed Scale  Weight: 135.6 kg (4/11/20)  Weight (lb): 298 lb  Ideal Body Weight (IBW), Male: 172 lb  % Ideal Body Weight, Male (lb): 173.81 %  BMI (Calculated): 41.7 admission  BMI Grade: greater than 40 - morbid obesity  Weight Loss: unintentional  Usual Body Weight (UBW), kg: (!) 146.36 kg(September 2019)     Lab/Procedures/Meds     Pertinent Labs Reviewed: reviewed  BMP  Lab Results   Component Value Date     04/10/2020    K 3.6 04/10/2020    CL 97 04/10/2020    CO2 28 04/10/2020    BUN 20 04/10/2020    CREATININE 1.0 04/10/2020    CALCIUM 8.9 04/10/2020    ANIONGAP 13 04/10/2020    ESTGFRAFRICA >60 04/10/2020    EGFRNONAA >60 04/10/2020     Lab Results   Component Value Date    ALBUMIN 2.6 (L) 04/10/2020     Lab Results   Component Value Date    CALCIUM 8.9 04/10/2020    PHOS 2.3 (L) 04/07/2020     Recent Labs   Lab 04/15/20  0550   POCTGLUCOSE 177*       Pertinent Medications Reviewed: reviewed  Spironolactone, KNaPhos, insulin, prednisone, zofran, KCl     Estimated/Assessed Needs  Weight Used For Calorie Calculations: 135.6 kg (298 lb 15.1 oz)  Energy Calorie Requirements (kcal): 2200 kcals/day  Energy Need Method: Door-St Jeor  Weight Used For Protein Calculations: 108 g protein ( 0.8 g protein/kg)   Estimated Fluid Requirement Method: RDA Method  RDA Method (mL): 2200  CHO Requirement: 250 g max        Nutrition Prescription Ordered     Current Diet Order: 2000 diabetic, boost glucose control TID     Evaluation " of Received Nutrient/Fluid Intake   energy needs: meeting ? Exceeding  Protein needs: meeting  Fluid needs meeting  Tolerance: tolerating  % Intake of Estimated Energy Needs: %  % Meal Intake: %     Nutrition Risk     Level of Risk/Frequency of Follow-up: low-moderate (x1 weekly)      Assessment and Plan     Type 2 DM On Insulin     Related to (etiology):   Excessive energy intake     Signs and Symptoms (as evidenced by):   BMI 41 kg/m^2     Interventions/Recommendations (treatment strategy):  above     Nutrition Diagnosis Status:   Continues           Monitor and Evaluation     Food and Nutrient Intake: energy intake, food and beverage intake  Food and Nutrient Adminstration: diet order  Anthropometric Measurements: weight, weight change, body mass index  Biochemical Data, Medical Tests and Procedures: electrolyte and renal panel, glucose/endocrine profile  Nutrition-Focused Physical Findings: overall appearance      Nutrition Follow-Up     RD Follow-up?: Yes        Nutrition discharge planning: diabetic diet

## 2020-04-15 NOTE — SUBJECTIVE & OBJECTIVE
Interval History: Afebrile.  On 2 L/min supplemental oxygen. Feeling better. No SOB or CP. Working with PT and OT.     Review of Systems   Constitutional: Negative for chills, fatigue and fever.   Respiratory: Positive for shortness of breath (improving). Negative for cough.    Cardiovascular: Negative for chest pain and leg swelling.   Gastrointestinal: Negative for abdominal pain, diarrhea, nausea and vomiting.   Musculoskeletal: Negative for back pain.   Neurological: Negative for weakness.   Psychiatric/Behavioral: Positive for behavioral problems. The patient is not nervous/anxious.    All other systems reviewed and are negative.    Objective:     Vital Signs (Most Recent):  Temp: 96.2 °F (35.7 °C) (04/15/20 0248)  Pulse: 87 (04/15/20 0703)  Resp: 20 (04/15/20 0703)  BP: 132/79 (04/15/20 0248)  SpO2: 96 % (04/15/20 0703) Vital Signs (24h Range):  Temp:  [96.2 °F (35.7 °C)-97.5 °F (36.4 °C)] 96.2 °F (35.7 °C)  Pulse:  [61-87] 87  Resp:  [16-20] 20  SpO2:  [93 %-97 %] 96 %  BP: (130-153)/(74-82) 132/79     Weight: 135.6 kg (298 lb 15.1 oz)  Body mass index is 41.69 kg/m².    Intake/Output Summary (Last 24 hours) at 4/15/2020 0842  Last data filed at 4/14/2020 1800  Gross per 24 hour   Intake 360 ml   Output --   Net 360 ml      Physical Exam   Constitutional: He is oriented to person, place, and time. He appears well-developed and well-nourished.   HENT:   Head: Normocephalic and atraumatic.   Eyes: EOM are normal.   Cardiovascular: Normal rate and regular rhythm.   Pulmonary/Chest: Effort normal. No respiratory distress. He has no wheezes.   Breathing comfortably.  NC sitting on side of face.   Abdominal: Soft. Bowel sounds are normal. He exhibits no distension. There is no tenderness. There is no guarding.   Obesity noted.   Musculoskeletal: Normal range of motion. He exhibits no edema.   Neurological: He is alert and oriented to person, place, and time. No cranial nerve deficit or sensory deficit. He exhibits  normal muscle tone.   L sided weakness chronic.   Skin: Skin is warm and dry. No rash noted. No pallor.   Psychiatric: He has a normal mood and affect. His behavior is normal.   Nursing note and vitals reviewed.      Significant Labs:   Lab Results   Component Value Date    WBC 8.81 04/10/2020    HGB 12.4 (L) 04/10/2020    HCT 42.6 04/10/2020    MCV 80 (L) 04/10/2020     04/10/2020     BMP  Lab Results   Component Value Date     04/10/2020    K 3.6 04/10/2020    CL 97 04/10/2020    CO2 28 04/10/2020    BUN 20 04/10/2020    CREATININE 1.0 04/10/2020    CALCIUM 8.9 04/10/2020    ANIONGAP 13 04/10/2020    ESTGFRAFRICA >60 04/10/2020    EGFRNONAA >60 04/10/2020     Microbiology Results (last 7 days)     ** No results found for the last 168 hours. **        Significant Imaging:   CXR (04-):   Borderline heart size.  Loop recorder in place.  Mild interstitial, chronic.  New intrapulmonary masses or infiltrates are not seen.    CT abdomen and pelvis with contrast:  1. No evidence for appendicitis.  2. Urinary bladder wall thickening could relate to under distension.  Outlet obstruction or cystitis also possible in the appropriate clinical setting.  3. Interstitial lung disease with some degree of chronicity.  Consider dedicated CT chest when clinically feasible.  4. Enlarged heart.  5. Remote L1 compression fracture.  Additional compression fractures at the lowest 2 lumbar levels, age indeterminate but new since 2019 comparison.    ECHO:  · Concentric left ventricular remodeling.  · The mean diastolic gradient across the mitral valve is 1 mmHg at a heart rate of bpm.  · Severe left atrial enlargement.  · Normal left ventricular systolic function. The estimated ejection fraction is 60%.  · Indeterminate left ventricular diastolic function.  · No wall motion abnormalities.  · Moderate right atrial enlargement.  · Normal central venous pressure (3 mmHg).  · The estimated PA systolic pressure is 27  mmHg.  · Normal right ventricular systolic function.  · Mild tricuspid regurgitation.

## 2020-04-15 NOTE — PROGRESS NOTES
[facility] Hospital Medicine  Progress Note    Patient Name: Jose Leon  MRN: [id number]  Patient Class: IP- Inpatient   Admission Date: 4/3/2020  Length of Stay: 12 days  Attending Physician: Jhony Palomo MD  Primary Care Provider: Josh Giordano MD        Subjective:     Principal Problem:Acute respiratory disease due to COVID-19 virus        HPI:  Patient is a 59-year-old morbidly obese male with past medical history significant for multiple medical problems including hypertension, hyperlipidemia, coronary artery disease status post coronary artery stent placement, obstructive sleep apnea (on CPAP), chronic combined systolic and diastolic congestive heart failure, history of TIA, long-term anticoagulation with Pradaxa and paroxysmal atrial fibrillation is being admitted to Hospital Medicine from [facility] Emergency Room under inpatient status for worsening shortness of breath and right lower quadrant abdominal pain.  Patient presented to the emergency room with complaint of profound generalized weakness associated with diarrhea and vomiting for 1 day.  If patient feels dizzy and has also been experiencing nonradiating right lower quadrant abdominal pain.  Patient denies any hematemesis, melena or bleeding per rectum.  No recent travel or sick contact history reported.  Patient'sCOVID 19 test is positive in the emergency room.  Patient was recently discharged from [facility].  In the emergency room patient was noted to be hypoxic with exertion and minimal level around 85%.  Presently requiring 3 L per minute supplemental oxygen via nasal cannula.        Overview/Hospital Course:  Patient was admitted for acute on chronic respiratory failure likely secondary to COVID 19 superimposed on COPD and obesity hypoventilation.  Patient was started on appropriate management of COVID19 including supplemental oxygen, nebulized bronchodilators, and improved  slowly over the course of his hospitalization.  Given his concomitant COPD, the patient was started on oral corticosteroids.  He did have episodes of hyperglycemia and hypoglycemia and his insulin dose was adjusted over the course of his hospitalization.  Patient had intermittent episodes of confusion and agitation with impulsivity, and was given intermittent antipsychotic for behavioral.  After hospital day 3-4, the patient's behavior improved.  He remained in the hospital secondary to issues related to his disposition and insurance status.    Interval History: Afebrile.  On 2 L/min supplemental oxygen. Feeling better. No SOB or CP. Working with PT and OT.     Review of Systems   Constitutional: Negative for chills, fatigue and fever.   Respiratory: Positive for shortness of breath (improving). Negative for cough.    Cardiovascular: Negative for chest pain and leg swelling.   Gastrointestinal: Negative for abdominal pain, diarrhea, nausea and vomiting.   Musculoskeletal: Negative for back pain.   Neurological: Negative for weakness.   Psychiatric/Behavioral: Positive for behavioral problems. The patient is not nervous/anxious.    All other systems reviewed and are negative.    Objective:     Vital Signs (Most Recent):  Temp: 96.2 °F (35.7 °C) (04/15/20 0248)  Pulse: 87 (04/15/20 0703)  Resp: 20 (04/15/20 0703)  BP: 132/79 (04/15/20 0248)  SpO2: 96 % (04/15/20 0703) Vital Signs (24h Range):  Temp:  [96.2 °F (35.7 °C)-97.5 °F (36.4 °C)] 96.2 °F (35.7 °C)  Pulse:  [61-87] 87  Resp:  [16-20] 20  SpO2:  [93 %-97 %] 96 %  BP: (130-153)/(74-82) 132/79     Weight: 135.6 kg (298 lb 15.1 oz)  Body mass index is 41.69 kg/m².    Intake/Output Summary (Last 24 hours) at 4/15/2020 0842  Last data filed at 4/14/2020 1800  Gross per 24 hour   Intake 360 ml   Output --   Net 360 ml      Physical Exam   Constitutional: He is oriented to person, place, and time. He appears well-developed and well-nourished.   HENT:   Head:  Normocephalic and atraumatic.   Eyes: EOM are normal.   Cardiovascular: Normal rate and regular rhythm.   Pulmonary/Chest: Effort normal. No respiratory distress. He has no wheezes.   Breathing comfortably.  NC sitting on side of face.   Abdominal: Soft. Bowel sounds are normal. He exhibits no distension. There is no tenderness. There is no guarding.   Obesity noted.   Musculoskeletal: Normal range of motion. He exhibits no edema.   Neurological: He is alert and oriented to person, place, and time. No cranial nerve deficit or sensory deficit. He exhibits normal muscle tone.   L sided weakness chronic.   Skin: Skin is warm and dry. No rash noted. No pallor.   Psychiatric: He has a normal mood and affect. His behavior is normal.   Nursing note and vitals reviewed.      Significant Labs:   Lab Results   Component Value Date    WBC 8.81 04/10/2020    HGB 12.4 (L) 04/10/2020    HCT 42.6 04/10/2020    MCV 80 (L) 04/10/2020     04/10/2020     BMP  Lab Results   Component Value Date     04/10/2020    K 3.6 04/10/2020    CL 97 04/10/2020    CO2 28 04/10/2020    BUN 20 04/10/2020    CREATININE 1.0 04/10/2020    CALCIUM 8.9 04/10/2020    ANIONGAP 13 04/10/2020    ESTGFRAFRICA >60 04/10/2020    EGFRNONAA >60 04/10/2020     Microbiology Results (last 7 days)     ** No results found for the last 168 hours. **        Significant Imaging:   CXR (04-):   Borderline heart size.  Loop recorder in place.  Mild interstitial, chronic.  New intrapulmonary masses or infiltrates are not seen.    CT abdomen and pelvis with contrast:  1. No evidence for appendicitis.  2. Urinary bladder wall thickening could relate to under distension.  Outlet obstruction or cystitis also possible in the appropriate clinical setting.  3. Interstitial lung disease with some degree of chronicity.  Consider dedicated CT chest when clinically feasible.  4. Enlarged heart.  5. Remote L1 compression fracture.  Additional compression fractures at  the lowest 2 lumbar levels, age indeterminate but new since 2019 comparison.    ECHO:  · Concentric left ventricular remodeling.  · The mean diastolic gradient across the mitral valve is 1 mmHg at a heart rate of bpm.  · Severe left atrial enlargement.  · Normal left ventricular systolic function. The estimated ejection fraction is 60%.  · Indeterminate left ventricular diastolic function.  · No wall motion abnormalities.  · Moderate right atrial enlargement.  · Normal central venous pressure (3 mmHg).  · The estimated PA systolic pressure is 27 mmHg.  · Normal right ventricular systolic function.  · Mild tricuspid regurgitation.      Assessment/Plan:      * Acute respiratory disease due to COVID-19 virus  - COVID-19 testing   - Infection Control notified 4/3/2020    - Isolation:   - Airborne, Contact and Droplet Precautions  - Cohort patients into COVID units  - N95 masks must be fit tested, wear eye protection  - 20 second hand hygiene              - Limit visitors per hospital policy              - Consolidating lab draws, nursing care, provider visits, and interventions      - Management:  Supplemental O2 to maintain SpO2 >92%  Continuous/intermittent Pulse Ox  Albuterol INHALER PRN (avoid nebulization of secretions)  Avoiding BiPAP to prevent aerosolization (including home BiPAP)    Advance Care Planning -patient full code for now    Discussed that can likely go home once off O2 and amble to ambulate near baseline                   COPD exacerbation  Patient's COPD is uncontrolled due to continued dyspnea, use of accessory muscles for breathing and worsening of baseline hypoxia currently.  Patient is currently off COPD Pathway. Continue scheduled inhalers Steroids, Antibiotics and Supplemental oxygen and monitor respiratory status closely.         Type 2 DM On Insulin  Patient's FSGs are controlled on current hypoglycemics.   Last A1c reviewed-   Lab Results   Component Value Date    HGBA1C 7.8 (H) 04/03/2018      Most recent fingerstick glucose reviewed-   Recent Labs   Lab 04/12/20  1130 04/12/20  1604 04/13/20  0855   POCTGLUCOSE 246* 315* 257*     Current correctional scale  Low  Maintain anti-hyperglycemic dose as follows-   Antihyperglycemics (From admission, onward)    Start     Stop Route Frequency Ordered    04/11/20 0900  insulin detemir U-100 pen 15 Units      -- SubQ Daily 04/10/20 2150    04/11/20 0715  insulin aspart U-100 pen 5 Units      -- SubQ 3 times daily with meals 04/10/20 2150    04/03/20 1813  insulin aspart U-100 pen 0-5 Units      -- SubQ Before meals & nightly PRN 04/03/20 1813        Hold Oral hypoglycemics while patient is in the hospital.          1 PPD X 25+ YRs Chronic Tobacco Use Disorder  Smoking cessation counseling performed. Dangers of cigarette smoking were reviewed with patient in detail and patient was encouraged to quit. Nicotine replacement options were discussed for > 3 minutes.        Anxiety And Depression  Continue Xanax and Wellbutrin use as before      Stage 2 CKD  Creatine stable for now. BMP reviewed- noted Estimated Creatinine Clearance: 111.8 mL/min (based on SCr of 1 mg/dL). according to latest data. Monitor UOP and serial BMP and adjust therapy as needed. Renally dose meds.                Hypertension  Chronic, controlled.  Latest blood pressure and vitals reviewed-   Temp:  [96.4 °F (35.8 °C)-98.2 °F (36.8 °C)]   Pulse:  [56-80]   Resp:  [16-20]   BP: (139-181)/()   SpO2:  [93 %-100 %] .   Home meds for hypertension were reviewed and noted below. Hospital anti-hypertensive changes were made as shown below.  Hypertension Medications             furosemide (LASIX) 40 MG tablet Take 1 tablet (40 mg total) by mouth 2 (two) times daily.    hydrALAZINE (APRESOLINE) 10 MG tablet Take 10 mg by mouth every 12 (twelve) hours.    lisinopril (PRINIVIL,ZESTRIL) 20 MG tablet Take 1 tablet (20 mg total) by mouth 2 (two) times daily.    metoprolol tartrate (LOPRESSOR) 25 MG  tablet TAKE 1 TABLET BY MOUTH 2 TIMES DAILY.    spironolactone (ALDACTONE) 25 MG tablet Take 1 tablet (25 mg total) by mouth every morning.      Hospital Medications             lisinopriL tablet 10 mg 10 mg, Oral, Daily    metoprolol tartrate (LOPRESSOR) tablet 25 mg 25 mg, Oral, 2 times daily, Hold for HR &lt;60 or BP &lt;90/60    spironolactone tablet 25 mg 25 mg, Oral, Every morning        Will utilize p.r.n. blood pressure medication only if patient's blood pressure greater than  180/110 and he develops symptoms such as worsening chest pain or shortness of breath.      Atrial Fibrillation With H/O RVR  Patient with Persistent atrial fibrillation which is controlled currently with Beta Blocker, Amiodarone and Digoxin. [id number] score 5. Anticoagulation indicated. Anticoagulation done with Dabigatran.        Coronary Artery Disease With H/O Stenting  Patient with known CAD s/p CABG. Will continue and monitor for S/Sx of angina/ACS. Continue to monitor on telemetry.        JULY on CPAP  Continue supplemental oxygen to maintain pulse ox above 92%.  Unable to use CPAP per hospital policy at this time due to COVID-19.      Morbid obesity  Body mass index is 41.69 kg/m². Morbid obesity complicates all aspects of disease management from diagnostic modalities to treatment. Weight loss encouraged and health benefits explained to patient.           Discussed with  and  who are working on placement.  VTE Risk Mitigation (From admission, onward)         Ordered     dabigatran etexilate capsule 75 mg  2 times daily      04/03/20 1813     IP VTE HIGH RISK PATIENT  Once      04/03/20 1813     Place OMI hose  Until discontinued      04/03/20 1813     Place sequential compression device  Until discontinued      04/03/20 1813                      Jhony Palomo MD  Department of Hospital Medicine   [facility]

## 2020-04-15 NOTE — NURSING
This morning at 5 AM while we were changing the diaper we noticed hives ob his left flank and left hip which according to Pt it was very itchy. NP( Abner) notified order for benadryl was given.

## 2020-04-15 NOTE — CARE UPDATE
04/15/20 0703   Patient Assessment/Suction   Level of Consciousness (AVPU) alert   Respiratory Effort Normal;Unlabored   All Lung Fields Breath Sounds diminished   PRE-TX-O2   O2 Device (Oxygen Therapy) nasal cannula   $ Is the patient on Low Flow Oxygen? Yes   Flow (L/min) 2   Oxygen Concentration (%) 28   SpO2 96 %   Pulse Oximetry Type Continuous   $ Pulse Oximetry - Multiple Charge Pulse Oximetry - Multiple   Pulse 87   Resp 20   Aerosol Therapy   $ Aerosol Therapy Charges Aerosol Treatment   Respiratory Treatment Status (SVN) given   Treatment Route (SVN) mask   Patient Position (SVN) semi-Youssef's   Post Treatment Assessment (SVN) breath sounds unchanged   Signs of Intolerance (SVN) none   Breath Sounds Post-Respiratory Treatment   Throughout All Fields Post-Treatment All Fields   Throughout All Fields Post-Treatment no change   Post-treatment Heart Rate (beats/min) 88   Post-treatment Resp Rate (breaths/min) 18

## 2020-04-15 NOTE — PLAN OF CARE
Problem: Physical Therapy Goal  Goal: Physical Therapy Goal  Description  Goals to be met by: 2020    Patient will increase functional independence with mobility by performin. Supine to sit with Stand-by Assistance  2. Sit to supine with Stand-by Assistance  3. Sit to stand transfer with Stand-by Assistance  4. Bed to chair transfer with Contact Guard Assistance using Rolling Walker  5. Gait  x 50 feet with Minimal Assistance using Rolling Walker.      Outcome: Ongoing, Progressing   Therapeutic activity to improve functional mobility : bed mobility , transfers , LE therapeutic exercises.

## 2020-04-15 NOTE — NURSING
O2 @ 2 Liter, saturation rate 92-95%, No SOB was noted. Heart monitoring in place, bradycardia rhythm was noted Tonight. BG monitored. Pt got a good night sleep.

## 2020-04-15 NOTE — PLAN OF CARE
"CM received a request from nursing that the pt is requesting to speak to a . I called and spoke to the patient and he stated that he has someone messing with his account. He has a direct deposit going in today and he does not want anyone to take his money. He does not have his card or any other info. He stated that he gave "the information" to his nurse and they " lost it." I asked if he would like me to call his son for assistance and he stated, " No I do not want him to know that I am cancelling the card." I explained that I was unable to assist due to his lack of available information and he told me to " Get it out of the computer."  The pt was difficult to understand and was insisting that I needed to cancel his bank card. I told him that I was going to call nursing and call him back.     Per the pts nurse- the pt did not give her any information and he has told everyone about this issue which she also can not assist with. I updated her that I had talked to the patient also and am unable to assist. LELA Chaudhry CM Supervisor updated. Lisa Glass LCSW       12:11- I left a message for Laverne with [facility] at [phone] regarding acceptance. CM following. Lisa Glass LCSW       04/15/20 1147   Post-Acute Status   Post-Acute Authorization Other     "

## 2020-04-15 NOTE — PLAN OF CARE
Attempt was made to contact patient's relative, Yady Petersen, in regards to recent vital signs, labs, findings, and recommendations by the primary team as it pertains to the current admission for COVID-19.  However, was unable to reach this point of contact at the listed numbers.  [facility] will attempt this call again tomorrow.

## 2020-04-15 NOTE — RESPIRATORY THERAPY
04/14/20 1959   Patient Assessment/Suction   Level of Consciousness (AVPU) alert   Respiratory Effort Normal;Unlabored   Expansion/Accessory Muscles/Retractions expansion symmetric;no retractions;no use of accessory muscles   All Lung Fields Breath Sounds diminished   Cough Frequency no cough   PRE-TX-O2   O2 Device (Oxygen Therapy) nasal cannula   Flow (L/min) 2   Oxygen Concentration (%) 28   SpO2 (!) 93 %   Pulse Oximetry Type Continuous   Pulse 80   Resp 20   Aerosol Therapy   $ Aerosol Therapy Charges Aerosol Treatment   Respiratory Treatment Status (SVN) given   Treatment Route (SVN) mask;oxygen   Patient Position (SVN) HOB elevated   Signs of Intolerance (SVN) none   Breath Sounds Post-Respiratory Treatment   Throughout All Fields Post-Treatment All Fields   Throughout All Fields Post-Treatment no change   Post-treatment Heart Rate (beats/min) 75   Post-treatment Resp Rate (breaths/min) 18   Ready to Wean/Extubation Screen   FIO2<=50 (chart decimal) 0.28

## 2020-04-15 NOTE — PT/OT/SLP PROGRESS
Physical Therapy Treatment    Patient Name:  Jose Leon   MRN:  [id number]    Recommendations:     Discharge Recommendations:  nursing facility, skilled   Discharge Equipment Recommendations: (unclear at this time)   Barriers to discharge: None    Assessment:     Jose Leon is a 59 y.o. male admitted with a medical diagnosis of Acute respiratory disease due to COVID-19 virus.  He presents with the following impairments/functional limitations:  weakness, impaired endurance, impaired self care skills, impaired functional mobilty, gait instability, decreased lower extremity function, decreased safety awareness, impaired cardiopulmonary response to activity, impaired balance . Tolerated treatment . Focused on financial situation with family and distracted. TRISH Brandt handling the matter . Participated in therapy session.    Rehab Prognosis: Good; patient would benefit from acute skilled PT services to address these deficits and reach maximum level of function.    Recent Surgery: * No surgery found *      Plan:     During this hospitalization, patient to be seen 6 x/week to address the identified rehab impairments via gait training, therapeutic activities, therapeutic exercises and progress toward the following goals:    · Plan of Care Expires:  05/05/20    Subjective     Chief Complaint: family situation outside of hospital.  Patient/Family Comments/goals: to return home  Pain/Comfort:  · Pain Rating 1: 0/10      Objective:     Communicated with nurse Maricel prior to session.  Patient found supine with bed alarm, telemetry, oxygen upon PT entry to room.     General Precautions: Standard, airborne, contact, droplet   Orthopedic Precautions:N/A   Braces: N/A     Functional Mobility:  · Bed Mobility:     · Rolling Right: minimum assistance  · Supine to Sit: moderate assistance  · Transfers:     · Sit to Stand:  maximal assistance with rolling walker      AM-PAC 6 CLICK MOBILITY          Therapeutic Activities and  Exercises:   BLE exercises at 10 reps each in supine ( AAROM LLE); SLR's, HS, Hip abd/add( knees flexed and extended), AP's, trunk rotations.    Transferred to sitting EOB with Mod A.    Sit to stand with rw and Max A. Improved standing balance with assistance to weight shift R side.    Returned to sit and set up for lunch seated EOB. OT present to assist.        Patient left seated EOB with all lines intact, call button in reach, nurse Maricel notified and OT  present..    GOALS:   Multidisciplinary Problems     Physical Therapy Goals        Problem: Physical Therapy Goal    Goal Priority Disciplines Outcome Goal Variances Interventions   Physical Therapy Goal     PT, PT/OT Ongoing, Progressing     Description:  Goals to be met by: 2020    Patient will increase functional independence with mobility by performin. Supine to sit with Stand-by Assistance  2. Sit to supine with Stand-by Assistance  3. Sit to stand transfer with Stand-by Assistance  4. Bed to chair transfer with Contact Guard Assistance using Rolling Walker  5. Gait  x 50 feet with Minimal Assistance using Rolling Walker.                       Time Tracking:     PT Received On: 04/15/20  PT Start Time: 1135     PT Stop Time: 1210  PT Total Time (min): 35 min     Billable Minutes: Therapeutic Activity 35min    Treatment Type: Treatment  PT/PTA: PTA     PTA Visit Number: 3     Sarah Sanz PTA  04/15/2020

## 2020-04-15 NOTE — PLAN OF CARE
CM received call from Laverne with [facility] at [phone]. Per Laverne, pt has been denied by their administration due to not being able to meet pt's needs.     04/15/20 1216   Discharge Assessment   Assessment Type Discharge Planning Reassessment

## 2020-04-15 NOTE — ASSESSMENT & PLAN NOTE
Related to (etiology):   Excessive energy intake    Signs and Symptoms (as evidenced by):   BMI 41 kg/m^2    Interventions/Recommendations (treatment strategy):  Continue with diabetic diet     Nutrition Diagnosis Status:   continues

## 2020-04-16 LAB
POCT GLUCOSE: 201 MG/DL (ref 70–110)
POCT GLUCOSE: 217 MG/DL (ref 70–110)
POCT GLUCOSE: 226 MG/DL (ref 70–110)
POCT GLUCOSE: 305 MG/DL (ref 70–110)

## 2020-04-16 PROCEDURE — 94640 AIRWAY INHALATION TREATMENT: CPT

## 2020-04-16 PROCEDURE — 97530 THERAPEUTIC ACTIVITIES: CPT | Mod: CQ

## 2020-04-16 PROCEDURE — 94761 N-INVAS EAR/PLS OXIMETRY MLT: CPT

## 2020-04-16 PROCEDURE — 12000002 HC ACUTE/MED SURGE SEMI-PRIVATE ROOM

## 2020-04-16 PROCEDURE — 25000242 PHARM REV CODE 250 ALT 637 W/ HCPCS: Performed by: HOSPITALIST

## 2020-04-16 PROCEDURE — 63600175 PHARM REV CODE 636 W HCPCS: Performed by: INTERNAL MEDICINE

## 2020-04-16 PROCEDURE — 97110 THERAPEUTIC EXERCISES: CPT | Mod: CQ

## 2020-04-16 PROCEDURE — 63600175 PHARM REV CODE 636 W HCPCS: Performed by: HOSPITALIST

## 2020-04-16 PROCEDURE — 27000221 HC OXYGEN, UP TO 24 HOURS

## 2020-04-16 PROCEDURE — 25000003 PHARM REV CODE 250: Performed by: HOSPITALIST

## 2020-04-16 PROCEDURE — 25000003 PHARM REV CODE 250: Performed by: INTERNAL MEDICINE

## 2020-04-16 PROCEDURE — 97535 SELF CARE MNGMENT TRAINING: CPT | Mod: CO

## 2020-04-16 RX ADMIN — BUPROPION HYDROCHLORIDE 150 MG: 150 TABLET, EXTENDED RELEASE ORAL at 06:04

## 2020-04-16 RX ADMIN — DABIGATRAN ETEXILATE MESYLATE 75 MG: 75 CAPSULE ORAL at 08:04

## 2020-04-16 RX ADMIN — INSULIN DETEMIR 15 UNITS: 100 INJECTION, SOLUTION SUBCUTANEOUS at 08:04

## 2020-04-16 RX ADMIN — ALPRAZOLAM 0.5 MG: 0.25 TABLET ORAL at 08:04

## 2020-04-16 RX ADMIN — IPRATROPIUM BROMIDE AND ALBUTEROL SULFATE 3 ML: .5; 3 SOLUTION RESPIRATORY (INHALATION) at 07:04

## 2020-04-16 RX ADMIN — SPIRONOLACTONE 25 MG: 25 TABLET ORAL at 08:04

## 2020-04-16 RX ADMIN — Medication 400 MG: at 08:04

## 2020-04-16 RX ADMIN — AMIODARONE HYDROCHLORIDE 200 MG: 200 TABLET ORAL at 08:04

## 2020-04-16 RX ADMIN — LISINOPRIL 10 MG: 10 TABLET ORAL at 08:04

## 2020-04-16 RX ADMIN — IPRATROPIUM BROMIDE AND ALBUTEROL SULFATE 3 ML: .5; 3 SOLUTION RESPIRATORY (INHALATION) at 12:04

## 2020-04-16 RX ADMIN — INSULIN ASPART 2 UNITS: 100 INJECTION, SOLUTION INTRAVENOUS; SUBCUTANEOUS at 08:04

## 2020-04-16 RX ADMIN — GABAPENTIN 300 MG: 300 CAPSULE ORAL at 08:04

## 2020-04-16 RX ADMIN — METOPROLOL TARTRATE 25 MG: 25 TABLET, FILM COATED ORAL at 08:04

## 2020-04-16 RX ADMIN — PRAVASTATIN SODIUM 80 MG: 40 TABLET ORAL at 08:04

## 2020-04-16 RX ADMIN — INSULIN ASPART 5 UNITS: 100 INJECTION, SOLUTION INTRAVENOUS; SUBCUTANEOUS at 08:04

## 2020-04-16 RX ADMIN — ASPIRIN 81 MG: 81 TABLET, COATED ORAL at 08:04

## 2020-04-16 RX ADMIN — SERTRALINE HYDROCHLORIDE 100 MG: 50 TABLET ORAL at 08:04

## 2020-04-16 RX ADMIN — INSULIN ASPART 2 UNITS: 100 INJECTION, SOLUTION INTRAVENOUS; SUBCUTANEOUS at 06:04

## 2020-04-16 RX ADMIN — PREDNISONE 40 MG: 20 TABLET ORAL at 08:04

## 2020-04-16 RX ADMIN — INSULIN ASPART 5 UNITS: 100 INJECTION, SOLUTION INTRAVENOUS; SUBCUTANEOUS at 04:04

## 2020-04-16 RX ADMIN — INSULIN ASPART 5 UNITS: 100 INJECTION, SOLUTION INTRAVENOUS; SUBCUTANEOUS at 11:04

## 2020-04-16 NOTE — PLAN OF CARE
Attempt was made to contact patient's relative, Yady Petersen, in regards to recent vital signs, labs, findings, and recommendations by the primary team as it pertains to the current admission for COVID-19.  However, was unable to reach this point of contact at the listed numbers.  [facility] will attempt this call again tomorrow.

## 2020-04-16 NOTE — PLAN OF CARE
Plan of care reviewed with pt, pt verbalized understanding. IV site clean, dry, intact, no redness or swelling noted. Pt remains free of fall/trauma. Rash to left flank noted, given Benadryl for itching this shift, relief obtained. VSS, pt remains afebrile. Blood glucose and cardiac monitoring. Purposeful q2h rounding done throughout shift, safety maintained, call light in reach, bed locked and in lowest position, side rails up x2. Will continue to monitor, observe and report any changes.

## 2020-04-16 NOTE — PLAN OF CARE
Problem: Occupational Therapy Goal  Goal: Occupational Therapy Goal  Description  Revised Goals to be met by: 5/5/2020     Patient will increase functional independence with ADLs by performing:    UE Dressing with Minimal Assistance seated EOB.  LE Dressing with Minimal Assistance and Assistive Devices as needed.  Grooming while EOB with Stand-by Assistance.  Supine to sit with Minimal Assistance for UE dressing.  Sit to stand with Minimal Assistance for LE dressing.  Upper extremity exercise program x10 reps per handout, with supervision.    Goals to be met by: 5/5/2020     Patient will increase functional independence with ADLs by performing:    LE Dressing with Contact Guard Assistance and Assistive Devices as needed.  Grooming while EOB with Stand-by Assistance.  Toileting from toilet with Minimal Assistance for hygiene and clothing management. - Discontinue (pt is incontinent)  Supine to sit with Minimal Assistance.  Toilet transfer to bedside commode with Minimal Assistance. - Discontinue (pt is incontinent)  Upper extremity exercise program x10 reps per handout, with supervision.      Outcome: Ongoing, Progressing

## 2020-04-16 NOTE — PT/OT/SLP PROGRESS
Occupational Therapy   Treatment    Name: Jose Leon  MRN: [id number]  Admitting Diagnosis:  Acute respiratory disease due to COVID-19 virus       Recommendations:     Discharge Recommendations: nursing facility, skilled  Discharge Equipment Recommendations:  other (see comments)(TBD at [facility] care)  Barriers to discharge:  Decreased caregiver support    Assessment:     Jose Leon is a 59 y.o. male with a medical diagnosis of Acute respiratory disease due to COVID-19 virus.  He presents with L georgette s/p CVA in Jan 2020 and is still distracted by financial situation with stimulus check and the possibility of his family receiving his money while he is in hospital. DARBY attempted to calm and assure him that  cancelled his netspend card as he requested. Pt is wanting to call the customer service number to check his balance on the card or hear that he has no acct. He states that nursing staff tells him that they will help him do it after shift change. Pt admitting today that he was embarrassed to have staff contiguously changing his diaper and is glad to have the hernandez, stating since his sickness with COVID-19 he has lost bowel and bladder control. Pt is pleasant and a good therapy participant despite his impulsivity, attention deficit, and impaired judgement.      Max A x 2 for static stand and side stepping this session. OTR present for direct supervision of ADRBY and providing demo and verbal and tactile cues for weight bearing through B LE, with focus on offloading the L LE to bear equal weight through B LE. Pt is delayed in understanding that his COG is skewed and he will need to listen to therapists and return demo to be successful with static stand and side stepping (and later ambulation). Pt is baffled that when he left [facility] two weeks ago he was able to ambulate with RW and no assist and now he has regressed. DARBY ed for weakness from COVID. Pt v/u.     Performance deficits affecting  "function are weakness, impaired endurance, impaired self care skills, impaired functional mobilty, gait instability, impaired balance, impaired cognition, impaired coordination, decreased safety awareness, decreased upper extremity function, decreased lower extremity function, decreased ROM, abnormal tone, impaired fine motor, impaired cardiopulmonary response to activity.     Rehab Prognosis:  Good; patient would benefit from acute skilled OT services to address these deficits and reach maximum level of function.       Plan:     Patient to be seen 5 x/week to address the above listed problems via self-care/home management, therapeutic activities, therapeutic exercises, neuromuscular re-education  · Plan of Care Expires: 05/05/20  · Plan of Care Reviewed with: patient    Subjective     Pain/Comfort:  · Pain Rating 1: 0/10    Objective:     Communicated with: RNMj prior to session.  Patient found HOB elevated with bed alarm, hernandez catheter, oxygen, pulse ox (continuous), telemetry upon OT entry to room.    General Precautions: Standard, airborne, droplet, contact, fall, respiratory   Orthopedic Precautions:N/A   Braces: N/A     Occupational Performance:     Bed Mobility:    · Patient completed Scooting/Bridging with stand by assistance  · Patient completed Supine to Sit with moderate assistance, with side rail and with leg lift. After sitting up pt with strong posterior and L lean. ~5mins to orient to midline and increase proprioception through L UE to assist with midline.   · Patient completed Sit to Supine with minimum assistance, with side rail and with leg lift     Functional Mobility/Transfers:  · Patient completed Sit <> Stand Transfer with minimum assistance  with  rolling walker and VC for hand placement. pt stating "I feel like I could just get up by myself today."   · Functional Mobility: pt is stronger every day but coordination and weight bearing (especially in standing but at EOB also) requires " reinforcement as pt cannot tell when he is maintaining midline and has strong left lean. Pt will continue to benefit from skilled therapy services to increase proprioceptive input and functional independence with ADLs.    Activities of Daily Living:  · Grooming: minimum assistance for oral hygiene with set up and seated EOB. Pt missed mouth when bringing cup of water to it for swishing and it poured down front od hid gown and to the floor.  · Upper Body Dressing: minimum assistance and max VC to lindsey gown while seated EOB.  · Toileting: dependence with brief and hernandez in place. Pt stating that he doesn't know he has even had a BM until someone tells him that it has happened and he needs to be cleaned.    Treatment & Education:  -DARBY ed pt that his placement to SNF is pending a negative COVID test which he will be given tomorrow and may have results by Saturday. He v/u.  -DARBY ed telephone management as pt wishes to call his son. After 2 demos pt could not return demo with successful call out. DARBY completed the call and pt stated son did not answer.    Patient left HOB elevated with all lines intact, call button in reach, bed alarm on and RNMj notifiedEducation:      GOALS:   Multidisciplinary Problems     Occupational Therapy Goals        Problem: Occupational Therapy Goal    Goal Priority Disciplines Outcome Interventions   Occupational Therapy Goal     OT, PT/OT Ongoing, Progressing    Description:  Revised Goals to be met by: 5/5/2020     Patient will increase functional independence with ADLs by performing:    UE Dressing with Minimal Assistance seated EOB.  LE Dressing with Minimal Assistance and Assistive Devices as needed.  Grooming while EOB with Stand-by Assistance.  Supine to sit with Minimal Assistance for UE dressing.  Sit to stand with Minimal Assistance for LE dressing.  Upper extremity exercise program x10 reps per handout, with supervision.    Goals to be met by: 5/5/2020     Patient will  increase functional independence with ADLs by performing:    LE Dressing with Contact Guard Assistance and Assistive Devices as needed.  Grooming while EOB with Stand-by Assistance.  Toileting from toilet with Minimal Assistance for hygiene and clothing management. - Discontinue (pt is incontinent)  Supine to sit with Minimal Assistance.  Toilet transfer to bedside commode with Minimal Assistance. - Discontinue (pt is incontinent)  Upper extremity exercise program x10 reps per handout, with supervision.                       Time Tracking:     OT Date of Treatment: 04/16/20  OT Start Time: 1521  OT Stop Time: 1603  OT Total Time (min): 42 min    Billable Minutes:Self Care/Home Management 42 min    RAMOS Carrasco  4/16/2020    I certify that I completed a client care conference with the BRI prior to treatment and provided close and direct client care supervision during this treatment.     LINH Patterson  4/20/2020

## 2020-04-16 NOTE — PT/OT/SLP PROGRESS
Physical Therapy Treatment    Patient Name:  Jose Leon   MRN:  [id number]    Recommendations:     Discharge Recommendations:  nursing facility, skilled   Discharge Equipment Recommendations: (unclear at this time)   Barriers to discharge: None    Assessment:     Jose Leon is a 59 y.o. male admitted with a medical diagnosis of Acute respiratory disease due to COVID-19 virus.  He presents with the following impairments/functional limitations:  weakness, impaired endurance, impaired self care skills, impaired functional mobilty, gait instability, decreased upper extremity function, decreased lower extremity function, decreased safety awareness, impaired cardiopulmonary response to activity, impaired balance . Participated in therapy. Bed mobility improved. Eager to progress ambulation but having difficulty with standing ( leaning L requiring A to weight shift R).     Rehab Prognosis: Good; patient would benefit from acute skilled PT services to address these deficits and reach maximum level of function.    Recent Surgery: * No surgery found *      Plan:     During this hospitalization, patient to be seen 6 x/week to address the identified rehab impairments via gait training, therapeutic activities, therapeutic exercises and progress toward the following goals:    · Plan of Care Expires:  05/05/20    Subjective     Chief Complaint: being confined  Patient/Family Comments/goals: to go to rehab per patient  Pain/Comfort:  · Pain Rating 1: 0/10      Objective:     Communicated with nurse Barker prior to session.  Patient found supine with bed alarm, telemetry, hernandez catheter, oxygen, pulse ox (continuous) upon PT entry to room.     General Precautions: Standard, airborne, contact, droplet, fall, respiratory   Orthopedic Precautions:N/A   Braces: N/A     Functional Mobility:  · Bed Mobility:     · Rolling Left:  contact guard assistance  · Rolling Right: contact guard assistance  · Supine to Sit: minimum  assistance  · Sit to Supine: minimum assistance  · Transfers:     · Sit to Stand:  moderate assistance with rolling walker      AM-PAC 6 CLICK MOBILITY          Therapeutic Activities and Exercises:   BLE exercises in supine at 2 olu 10 reps each ; SLR's, HS,Hip abd/add ( knees flexed and extended), AP's, trunk rotations.   Transferred to sitting EOB with Min A and extra time.   Able to scoot forward to feet flat with CGA after brief rest.   Stood with rw and Mod A x 5 trials , attempting steps with assistance required to weight shift R ( unable to step).   Performed LE exercises seated at 10 reps each .   Sit to supine with Min A.    Patient left supine with all lines intact, call button in reach, bed alarm on, nurse Mj notified and Anamaria Steele present..    GOALS:   Multidisciplinary Problems     Physical Therapy Goals        Problem: Physical Therapy Goal    Goal Priority Disciplines Outcome Goal Variances Interventions   Physical Therapy Goal     PT, PT/OT Ongoing, Progressing     Description:  Goals to be met by: 2020    Patient will increase functional independence with mobility by performin. Supine to sit with Stand-by Assistance  2. Sit to supine with Stand-by Assistance  3. Sit to stand transfer with Stand-by Assistance  4. Bed to chair transfer with Contact Guard Assistance using Rolling Walker  5. Gait  x 50 feet with Minimal Assistance using Rolling Walker.                       Time Tracking:     PT Received On: 20  PT Start Time: 1055     PT Stop Time: 1129  PT Total Time (min): 34 min     Billable Minutes: Therapeutic Activity 17min and Therapeutic Exercise 17min    Treatment Type: Treatment  PT/PTA: PTA     PTA Visit Number: 4     Sarah Sanz PTA  2020

## 2020-04-16 NOTE — NURSING
POC discussed with patient, pt. Needs some reinforcement with verbalized understanding. Brief in place, change multiple times throughout shift. Telesitter at bedside. Aferible throughout shift. O2@3L NC. VS addressed. Repositioned per orders. Will continue to monitor.

## 2020-04-16 NOTE — PLAN OF CARE
Problem: Physical Therapy Goal  Goal: Physical Therapy Goal  Description  Goals to be met by: 2020    Patient will increase functional independence with mobility by performin. Supine to sit with Stand-by Assistance  2. Sit to supine with Stand-by Assistance  3. Sit to stand transfer with Stand-by Assistance  4. Bed to chair transfer with Contact Guard Assistance using Rolling Walker  5. Gait  x 50 feet with Minimal Assistance using Rolling Walker.      Outcome: Ongoing, Progressing   Therapeutic activity to improve mobility: bed mobility, transfers, BLE exercises.

## 2020-04-16 NOTE — PROGRESS NOTES
[facility] Hospital Medicine  Progress Note    Patient Name: Jose Leon  MRN: [id number]  Patient Class: IP- Inpatient   Admission Date: 4/3/2020  Length of Stay: 13 days  Attending Physician: Jhony Palomo MD  Primary Care Provider: Josh Giordano MD        Subjective:     Principal Problem:Acute respiratory disease due to COVID-19 virus        HPI:  Patient is a 59-year-old morbidly obese male with past medical history significant for multiple medical problems including hypertension, hyperlipidemia, coronary artery disease status post coronary artery stent placement, obstructive sleep apnea (on CPAP), chronic combined systolic and diastolic congestive heart failure, history of TIA, long-term anticoagulation with Pradaxa and paroxysmal atrial fibrillation is being admitted to Hospital Medicine from [facility] Emergency Room under inpatient status for worsening shortness of breath and right lower quadrant abdominal pain.  Patient presented to the emergency room with complaint of profound generalized weakness associated with diarrhea and vomiting for 1 day.  If patient feels dizzy and has also been experiencing nonradiating right lower quadrant abdominal pain.  Patient denies any hematemesis, melena or bleeding per rectum.  No recent travel or sick contact history reported.  Patient'sCOVID 19 test is positive in the emergency room.  Patient was [facility] discharged from [facility].  In the emergency room patient was noted to be hypoxic with exertion and minimal level around 85%.  Presently requiring 3 L per minute supplemental oxygen via nasal cannula.        Overview/Hospital Course:  Patient was admitted for acute on chronic respiratory failure likely secondary to COVID 19 superimposed on COPD and obesity hypoventilation.  Patient was started on appropriate management of COVID19 including supplemental oxygen, nebulized bronchodilators, and improved  slowly over the course of his hospitalization.  Given his concomitant COPD, the patient was started on oral corticosteroids.  He did have episodes of hyperglycemia and hypoglycemia and his insulin dose was adjusted over the course of his hospitalization.  Patient had intermittent episodes of confusion and agitation with impulsivity, and was given intermittent antipsychotic for behavioral.  After hospital day 3-4, the patient's behavior improved.  He remained in the hospital secondary to issues related to his disposition and insurance status.    Interval History: Afebrile.  On 2 L/min supplemental oxygen. Feeling better. No SOB or CP. Working with PT and OT.     Review of Systems   Constitutional: Negative for chills, fatigue and fever.   Respiratory: Positive for shortness of breath (improving). Negative for cough.    Cardiovascular: Negative for chest pain and leg swelling.   Gastrointestinal: Negative for abdominal pain, diarrhea, nausea and vomiting.   Musculoskeletal: Negative for back pain.   Neurological: Negative for weakness.   Psychiatric/Behavioral: Positive for behavioral problems. The patient is not nervous/anxious.    All other systems reviewed and are negative.    Objective:     Vital Signs (Most Recent):  Temp: 96 °F (35.6 °C) (04/16/20 0328)  Pulse: 63 (04/16/20 0712)  Resp: 18 (04/16/20 0712)  BP: 126/86 (04/16/20 0328)  SpO2: 96 % (04/16/20 0712) Vital Signs (24h Range):  Temp:  [96 °F (35.6 °C)-97.7 °F (36.5 °C)] 96 °F (35.6 °C)  Pulse:  [60-74] 63  Resp:  [16-19] 18  SpO2:  [95 %-98 %] 96 %  BP: (122-158)/(71-86) 126/86     Weight: 135.6 kg (298 lb 15.1 oz)  Body mass index is 41.69 kg/m².    Intake/Output Summary (Last 24 hours) at 4/16/2020 0838  Last data filed at 4/16/2020 0600  Gross per 24 hour   Intake 660 ml   Output --   Net 660 ml      Physical Exam   Constitutional: He is oriented to person, place, and time. He appears well-developed and well-nourished.   HENT:   Head: Normocephalic  and atraumatic.   Eyes: EOM are normal.   Cardiovascular: Normal rate and regular rhythm.   Pulmonary/Chest: Effort normal. No respiratory distress. He has no wheezes.   Breathing comfortably.  NC sitting on side of face.   Abdominal: Soft. Bowel sounds are normal. He exhibits no distension. There is no tenderness. There is no guarding.   Obesity noted.   Musculoskeletal: Normal range of motion. He exhibits no edema.   Neurological: He is alert and oriented to person, place, and time. No cranial nerve deficit or sensory deficit. He exhibits normal muscle tone.   L sided weakness chronic.   Skin: Skin is warm and dry. No rash noted. No pallor.   Psychiatric: He has a normal mood and affect. His behavior is normal.   Nursing note and vitals reviewed.      Significant Labs:   Lab Results   Component Value Date    WBC 8.81 04/10/2020    HGB 12.4 (L) 04/10/2020    HCT 42.6 04/10/2020    MCV 80 (L) 04/10/2020     04/10/2020     BMP  Lab Results   Component Value Date     04/10/2020    K 3.6 04/10/2020    CL 97 04/10/2020    CO2 28 04/10/2020    BUN 20 04/10/2020    CREATININE 1.0 04/10/2020    CALCIUM 8.9 04/10/2020    ANIONGAP 13 04/10/2020    ESTGFRAFRICA >60 04/10/2020    EGFRNONAA >60 04/10/2020     Microbiology Results (last 7 days)     ** No results found for the last 168 hours. **        Significant Imaging:   CXR (04-):   Borderline heart size.  Loop recorder in place.  Mild interstitial, chronic.  New intrapulmonary masses or infiltrates are not seen.    CT abdomen and pelvis with contrast:  1. No evidence for appendicitis.  2. Urinary bladder wall thickening could relate to under distension.  Outlet obstruction or cystitis also possible in the appropriate clinical setting.  3. Interstitial lung disease with some degree of chronicity.  Consider dedicated CT chest when clinically feasible.  4. Enlarged heart.  5. Remote L1 compression fracture.  Additional compression fractures at the lowest 2  lumbar levels, age indeterminate but new since 2019 comparison.    ECHO:  · Concentric left ventricular remodeling.  · The mean diastolic gradient across the mitral valve is 1 mmHg at a heart rate of bpm.  · Severe left atrial enlargement.  · Normal left ventricular systolic function. The estimated ejection fraction is 60%.  · Indeterminate left ventricular diastolic function.  · No wall motion abnormalities.  · Moderate right atrial enlargement.  · Normal central venous pressure (3 mmHg).  · The estimated PA systolic pressure is 27 mmHg.  · Normal right ventricular systolic function.  · Mild tricuspid regurgitation.      Assessment/Plan:      * Acute respiratory disease due to COVID-19 virus  - COVID-19 testing   - Infection Control notified 4/3/2020    - Isolation:   - Airborne, Contact and Droplet Precautions  - Cohort patients into COVID units  - N95 masks must be fit tested, wear eye protection  - 20 second hand hygiene              - Limit visitors per hospital policy              - Consolidating lab draws, nursing care, provider visits, and interventions      - Management:  Supplemental O2 to maintain SpO2 >92%  Continuous/intermittent Pulse Ox  Albuterol INHALER PRN (avoid nebulization of secretions)  Avoiding BiPAP to prevent aerosolization (including home BiPAP)    Advance Care Planning -patient full code for now    Discussed that can likely go home once off O2 and amble to ambulate near baseline                   COPD exacerbation  Patient's COPD is uncontrolled due to continued dyspnea, use of accessory muscles for breathing and worsening of baseline hypoxia currently.  Patient is currently off COPD Pathway. Continue scheduled inhalers Steroids, Antibiotics and Supplemental oxygen and monitor respiratory status closely.         Type 2 DM On Insulin  Patient's FSGs are controlled on current hypoglycemics.   Last A1c reviewed-   Lab Results   Component Value Date    HGBA1C 7.8 (H) 04/03/2018     Most  recent fingerstick glucose reviewed-   Recent Labs   Lab 04/12/20  1130 04/12/20  1604 04/13/20  0855   POCTGLUCOSE 246* 315* 257*     Current correctional scale  Low  Maintain anti-hyperglycemic dose as follows-   Antihyperglycemics (From admission, onward)    Start     Stop Route Frequency Ordered    04/11/20 0900  insulin detemir U-100 pen 15 Units      -- SubQ Daily 04/10/20 2150    04/11/20 0715  insulin aspart U-100 pen 5 Units      -- SubQ 3 times daily with meals 04/10/20 2150    04/03/20 1813  insulin aspart U-100 pen 0-5 Units      -- SubQ Before meals & nightly PRN 04/03/20 1813        Hold Oral hypoglycemics while patient is in the hospital.          1 PPD X 25+ YRs Chronic Tobacco Use Disorder  Smoking cessation counseling performed. Dangers of cigarette smoking were reviewed with patient in detail and patient was encouraged to quit. Nicotine replacement options were discussed for > 3 minutes.        Anxiety And Depression  Continue Xanax and Wellbutrin use as before      Stage 2 CKD  Creatine stable for now. BMP reviewed- noted Estimated Creatinine Clearance: 111.8 mL/min (based on SCr of 1 mg/dL). according to latest data. Monitor UOP and serial BMP and adjust therapy as needed. Renally dose meds.                Hypertension  Chronic, controlled.  Latest blood pressure and vitals reviewed-   Temp:  [96.4 °F (35.8 °C)-98.2 °F (36.8 °C)]   Pulse:  [56-80]   Resp:  [16-20]   BP: (139-181)/()   SpO2:  [93 %-100 %] .   Home meds for hypertension were reviewed and noted below. Hospital anti-hypertensive changes were made as shown below.  Hypertension Medications             furosemide (LASIX) 40 MG tablet Take 1 tablet (40 mg total) by mouth 2 (two) times daily.    hydrALAZINE (APRESOLINE) 10 MG tablet Take 10 mg by mouth every 12 (twelve) hours.    lisinopril (PRINIVIL,ZESTRIL) 20 MG tablet Take 1 tablet (20 mg total) by mouth 2 (two) times daily.    metoprolol tartrate (LOPRESSOR) 25 MG tablet TAKE  1 TABLET BY MOUTH 2 TIMES DAILY.    spironolactone (ALDACTONE) 25 MG tablet Take 1 tablet (25 mg total) by mouth every morning.      Hospital Medications             lisinopriL tablet 10 mg 10 mg, Oral, Daily    metoprolol tartrate (LOPRESSOR) tablet 25 mg 25 mg, Oral, 2 times daily, Hold for HR &lt;60 or BP &lt;90/60    spironolactone tablet 25 mg 25 mg, Oral, Every morning        Will utilize p.r.n. blood pressure medication only if patient's blood pressure greater than  180/110 and he develops symptoms such as worsening chest pain or shortness of breath.      Atrial Fibrillation With H/O RVR  Patient with Persistent atrial fibrillation which is controlled currently with Beta Blocker, Amiodarone and Digoxin. [id number] score 5. Anticoagulation indicated. Anticoagulation done with Dabigatran.        Coronary Artery Disease With H/O Stenting  Patient with known CAD s/p CABG. Will continue and monitor for S/Sx of angina/ACS. Continue to monitor on telemetry.        JULY on CPAP  Continue supplemental oxygen to maintain pulse ox above 92%.  Unable to use CPAP per hospital policy at this time due to COVID-19.      Morbid obesity  Body mass index is 41.69 kg/m². Morbid obesity complicates all aspects of disease management from diagnostic modalities to treatment. Weight loss encouraged and health benefits explained to patient.          Awaiting placement, discussed with  and .   VTE Risk Mitigation (From admission, onward)         Ordered     dabigatran etexilate capsule 75 mg  2 times daily      04/03/20 1813     IP VTE HIGH RISK PATIENT  Once      04/03/20 1813     Place OMI hose  Until discontinued      04/03/20 1813     Place sequential compression device  Until discontinued      04/03/20 1813                      Jhony Palomo MD  Department of Hospital Medicine   [facility]

## 2020-04-16 NOTE — SUBJECTIVE & OBJECTIVE
Interval History: Afebrile.  On 2 L/min supplemental oxygen. Feeling better. No SOB or CP. Working with PT and OT.     Review of Systems   Constitutional: Negative for chills, fatigue and fever.   Respiratory: Positive for shortness of breath (improving). Negative for cough.    Cardiovascular: Negative for chest pain and leg swelling.   Gastrointestinal: Negative for abdominal pain, diarrhea, nausea and vomiting.   Musculoskeletal: Negative for back pain.   Neurological: Negative for weakness.   Psychiatric/Behavioral: Positive for behavioral problems. The patient is not nervous/anxious.    All other systems reviewed and are negative.    Objective:     Vital Signs (Most Recent):  Temp: 96 °F (35.6 °C) (04/16/20 0328)  Pulse: 63 (04/16/20 0712)  Resp: 18 (04/16/20 0712)  BP: 126/86 (04/16/20 0328)  SpO2: 96 % (04/16/20 0712) Vital Signs (24h Range):  Temp:  [96 °F (35.6 °C)-97.7 °F (36.5 °C)] 96 °F (35.6 °C)  Pulse:  [60-74] 63  Resp:  [16-19] 18  SpO2:  [95 %-98 %] 96 %  BP: (122-158)/(71-86) 126/86     Weight: 135.6 kg (298 lb 15.1 oz)  Body mass index is 41.69 kg/m².    Intake/Output Summary (Last 24 hours) at 4/16/2020 0838  Last data filed at 4/16/2020 0600  Gross per 24 hour   Intake 660 ml   Output --   Net 660 ml      Physical Exam   Constitutional: He is oriented to person, place, and time. He appears well-developed and well-nourished.   HENT:   Head: Normocephalic and atraumatic.   Eyes: EOM are normal.   Cardiovascular: Normal rate and regular rhythm.   Pulmonary/Chest: Effort normal. No respiratory distress. He has no wheezes.   Breathing comfortably.  NC sitting on side of face.   Abdominal: Soft. Bowel sounds are normal. He exhibits no distension. There is no tenderness. There is no guarding.   Obesity noted.   Musculoskeletal: Normal range of motion. He exhibits no edema.   Neurological: He is alert and oriented to person, place, and time. No cranial nerve deficit or sensory deficit. He exhibits normal  muscle tone.   L sided weakness chronic.   Skin: Skin is warm and dry. No rash noted. No pallor.   Psychiatric: He has a normal mood and affect. His behavior is normal.   Nursing note and vitals reviewed.      Significant Labs:   Lab Results   Component Value Date    WBC 8.81 04/10/2020    HGB 12.4 (L) 04/10/2020    HCT 42.6 04/10/2020    MCV 80 (L) 04/10/2020     04/10/2020     BMP  Lab Results   Component Value Date     04/10/2020    K 3.6 04/10/2020    CL 97 04/10/2020    CO2 28 04/10/2020    BUN 20 04/10/2020    CREATININE 1.0 04/10/2020    CALCIUM 8.9 04/10/2020    ANIONGAP 13 04/10/2020    ESTGFRAFRICA >60 04/10/2020    EGFRNONAA >60 04/10/2020     Microbiology Results (last 7 days)     ** No results found for the last 168 hours. **        Significant Imaging:   CXR (04-):   Borderline heart size.  Loop recorder in place.  Mild interstitial, chronic.  New intrapulmonary masses or infiltrates are not seen.    CT abdomen and pelvis with contrast:  1. No evidence for appendicitis.  2. Urinary bladder wall thickening could relate to under distension.  Outlet obstruction or cystitis also possible in the appropriate clinical setting.  3. Interstitial lung disease with some degree of chronicity.  Consider dedicated CT chest when clinically feasible.  4. Enlarged heart.  5. Remote L1 compression fracture.  Additional compression fractures at the lowest 2 lumbar levels, age indeterminate but new since 2019 comparison.    ECHO:  · Concentric left ventricular remodeling.  · The mean diastolic gradient across the mitral valve is 1 mmHg at a heart rate of bpm.  · Severe left atrial enlargement.  · Normal left ventricular systolic function. The estimated ejection fraction is 60%.  · Indeterminate left ventricular diastolic function.  · No wall motion abnormalities.  · Moderate right atrial enlargement.  · Normal central venous pressure (3 mmHg).  · The estimated PA systolic pressure is 27 mmHg.  · Normal  right ventricular systolic function.  · Mild tricuspid regurgitation.

## 2020-04-16 NOTE — PLAN OF CARE
CM received a call from Zoe with [phone]. Stated they are able to take pt if pt has a negative COVID test. Updated Zoe that pt will be retested tomorrow.      04/16/20 1438   Discharge Assessment   Assessment Type Discharge Planning Reassessment

## 2020-04-16 NOTE — PLAN OF CARE
CM received a call from Berta [phone] with [facility]. Berta stated that if pt is retested for COVID and test is negative could possibly admit pt. Stated pt would have be reviewed by their administration again after negative result. Last COVID test was 13 days ago- can be retested on day 14. CM following.      04/16/20 1102   Discharge Assessment   Assessment Type Discharge Planning Reassessment

## 2020-04-16 NOTE — CARE UPDATE
04/16/20 0712   Patient Assessment/Suction   Level of Consciousness (AVPU) alert   Respiratory Effort Normal;Unlabored   All Lung Fields Breath Sounds diminished   Cough Type good;nonproductive   PRE-TX-O2   O2 Device (Oxygen Therapy) nasal cannula   $ Is the patient on Low Flow Oxygen? Yes   Flow (L/min) 2   SpO2 96 %   Pulse Oximetry Type Continuous   $ Pulse Oximetry - Multiple Charge Pulse Oximetry - Multiple   Pulse 63   Resp 18   Aerosol Therapy   $ Aerosol Therapy Charges Aerosol Treatment   Respiratory Treatment Status (SVN) given   Treatment Route (SVN) mask   Patient Position (SVN) Youssef's   Post Treatment Assessment (SVN) breath sounds unchanged   Signs of Intolerance (SVN) none   Breath Sounds Post-Respiratory Treatment   Throughout All Fields Post-Treatment All Fields   Throughout All Fields Post-Treatment no change   Post-treatment Heart Rate (beats/min) 65   Post-treatment Resp Rate (breaths/min) 18

## 2020-04-16 NOTE — PLAN OF CARE
Problem: Occupational Therapy Goal  Goal: Occupational Therapy Goal  Description  Revised Goals to be met by: 5/5/2020     Patient will increase functional independence with ADLs by performing:    UE Dressing with Minimal Assistance seated EOB.  LE Dressing with Minimal Assistance and Assistive Devices as needed.  Grooming while EOB with Stand-by Assistance.  Supine to sit with Minimal Assistance for UE dressing.  Sit to stand with Minimal Assistance for LE dressing.  Upper extremity exercise program x10 reps per handout, with supervision.    Goals to be met by: 5/5/2020     Patient will increase functional independence with ADLs by performing:    LE Dressing with Contact Guard Assistance and Assistive Devices as needed.  Grooming while EOB with Stand-by Assistance.  Toileting from toilet with Minimal Assistance for hygiene and clothing management. - Discontinue (pt is incontinent)  Supine to sit with Minimal Assistance.  Toilet transfer to bedside commode with Minimal Assistance. - Discontinue (pt is incontinent)  Upper extremity exercise program x10 reps per handout, with supervision.      Outcome: Unable to Meet, Plan Revised   OT POC updated today and goals revised. Continue OT POC.  LINH Patterson  4/16/2020

## 2020-04-16 NOTE — PLAN OF CARE
04/15/20 1920   Patient Assessment/Suction   Level of Consciousness (AVPU) alert   Respiratory Effort Normal;Unlabored   Expansion/Accessory Muscles/Retractions no use of accessory muscles   All Lung Fields Breath Sounds diminished   Rhythm/Pattern, Respiratory unlabored;pattern regular   Cough Frequency infrequent   Cough Type dry;fair   PRE-TX-O2   O2 Device (Oxygen Therapy) nasal cannula   $ Is the patient on Low Flow Oxygen? Yes   Flow (L/min) 2   Oxygen Concentration (%) 28   SpO2 95 %   Pulse Oximetry Type Continuous   $ Pulse Oximetry - Multiple Charge Pulse Oximetry - Multiple   Pulse 74   Resp 19   Aerosol Therapy   $ Aerosol Therapy Charges Aerosol Treatment   Respiratory Treatment Status (SVN) given   Treatment Route (SVN) mask;oxygen   Patient Position (SVN) semi-Youssef's   Post Treatment Assessment (SVN) breath sounds unchanged   Signs of Intolerance (SVN) none   Breath Sounds Post-Respiratory Treatment   Throughout All Fields Post-Treatment All Fields   Throughout All Fields Post-Treatment no change   Post-treatment Heart Rate (beats/min) 71   Post-treatment Resp Rate (breaths/min) 18   Ready to Wean/Extubation Screen   FIO2<=50 (chart decimal) 0.28

## 2020-04-17 LAB
POCT GLUCOSE: 236 MG/DL (ref 70–110)
POCT GLUCOSE: 309 MG/DL (ref 70–110)
POCT GLUCOSE: 350 MG/DL (ref 70–110)

## 2020-04-17 PROCEDURE — 94640 AIRWAY INHALATION TREATMENT: CPT

## 2020-04-17 PROCEDURE — 12000002 HC ACUTE/MED SURGE SEMI-PRIVATE ROOM

## 2020-04-17 PROCEDURE — 97530 THERAPEUTIC ACTIVITIES: CPT

## 2020-04-17 PROCEDURE — 63600175 PHARM REV CODE 636 W HCPCS: Performed by: INTERNAL MEDICINE

## 2020-04-17 PROCEDURE — 97110 THERAPEUTIC EXERCISES: CPT

## 2020-04-17 PROCEDURE — 25000003 PHARM REV CODE 250: Performed by: INTERNAL MEDICINE

## 2020-04-17 PROCEDURE — 25000003 PHARM REV CODE 250: Performed by: HOSPITALIST

## 2020-04-17 PROCEDURE — C9399 UNCLASSIFIED DRUGS OR BIOLOG: HCPCS | Performed by: HOSPITALIST

## 2020-04-17 PROCEDURE — 25000242 PHARM REV CODE 250 ALT 637 W/ HCPCS: Performed by: HOSPITALIST

## 2020-04-17 PROCEDURE — 94761 N-INVAS EAR/PLS OXIMETRY MLT: CPT

## 2020-04-17 PROCEDURE — 27000221 HC OXYGEN, UP TO 24 HOURS

## 2020-04-17 PROCEDURE — U0002 COVID-19 LAB TEST NON-CDC: HCPCS

## 2020-04-17 PROCEDURE — 63600175 PHARM REV CODE 636 W HCPCS: Performed by: HOSPITALIST

## 2020-04-17 RX ADMIN — AMIODARONE HYDROCHLORIDE 200 MG: 200 TABLET ORAL at 08:04

## 2020-04-17 RX ADMIN — PREDNISONE 40 MG: 20 TABLET ORAL at 08:04

## 2020-04-17 RX ADMIN — GABAPENTIN 300 MG: 300 CAPSULE ORAL at 08:04

## 2020-04-17 RX ADMIN — Medication 400 MG: at 08:04

## 2020-04-17 RX ADMIN — ALPRAZOLAM 0.5 MG: 0.25 TABLET ORAL at 08:04

## 2020-04-17 RX ADMIN — DABIGATRAN ETEXILATE MESYLATE 75 MG: 75 CAPSULE ORAL at 08:04

## 2020-04-17 RX ADMIN — INSULIN ASPART 2 UNITS: 100 INJECTION, SOLUTION INTRAVENOUS; SUBCUTANEOUS at 08:04

## 2020-04-17 RX ADMIN — IPRATROPIUM BROMIDE AND ALBUTEROL SULFATE 3 ML: .5; 3 SOLUTION RESPIRATORY (INHALATION) at 07:04

## 2020-04-17 RX ADMIN — IPRATROPIUM BROMIDE AND ALBUTEROL SULFATE 3 ML: .5; 3 SOLUTION RESPIRATORY (INHALATION) at 06:04

## 2020-04-17 RX ADMIN — BUPROPION HYDROCHLORIDE 150 MG: 150 TABLET, EXTENDED RELEASE ORAL at 05:04

## 2020-04-17 RX ADMIN — ASPIRIN 81 MG: 81 TABLET, COATED ORAL at 08:04

## 2020-04-17 RX ADMIN — IPRATROPIUM BROMIDE AND ALBUTEROL SULFATE 3 ML: .5; 3 SOLUTION RESPIRATORY (INHALATION) at 01:04

## 2020-04-17 RX ADMIN — INSULIN DETEMIR 15 UNITS: 100 INJECTION, SOLUTION SUBCUTANEOUS at 08:04

## 2020-04-17 RX ADMIN — INSULIN ASPART 4 UNITS: 100 INJECTION, SOLUTION INTRAVENOUS; SUBCUTANEOUS at 06:04

## 2020-04-17 RX ADMIN — METOPROLOL TARTRATE 25 MG: 25 TABLET, FILM COATED ORAL at 08:04

## 2020-04-17 RX ADMIN — LISINOPRIL 10 MG: 10 TABLET ORAL at 08:04

## 2020-04-17 RX ADMIN — SPIRONOLACTONE 25 MG: 25 TABLET ORAL at 08:04

## 2020-04-17 RX ADMIN — SERTRALINE HYDROCHLORIDE 100 MG: 50 TABLET ORAL at 08:04

## 2020-04-17 RX ADMIN — INSULIN ASPART 5 UNITS: 100 INJECTION, SOLUTION INTRAVENOUS; SUBCUTANEOUS at 05:04

## 2020-04-17 RX ADMIN — PRAVASTATIN SODIUM 80 MG: 40 TABLET ORAL at 08:04

## 2020-04-17 RX ADMIN — INSULIN ASPART 5 UNITS: 100 INJECTION, SOLUTION INTRAVENOUS; SUBCUTANEOUS at 07:04

## 2020-04-17 RX ADMIN — IPRATROPIUM BROMIDE AND ALBUTEROL SULFATE 3 ML: .5; 3 SOLUTION RESPIRATORY (INHALATION) at 12:04

## 2020-04-17 RX ADMIN — INSULIN ASPART 5 UNITS: 100 INJECTION, SOLUTION INTRAVENOUS; SUBCUTANEOUS at 11:04

## 2020-04-17 NOTE — SUBJECTIVE & OBJECTIVE
Interval History: Afebrile.  On 2 L/min supplemental oxygen. Feeling better. No SOB or CP. Working with PT and OT.     Review of Systems   Constitutional: Negative for chills, fatigue and fever.   Respiratory: Positive for shortness of breath (improving). Negative for cough.    Cardiovascular: Negative for chest pain and leg swelling.   Gastrointestinal: Negative for abdominal pain, diarrhea, nausea and vomiting.   Musculoskeletal: Negative for back pain.   Neurological: Negative for weakness.   Psychiatric/Behavioral: Positive for behavioral problems. The patient is not nervous/anxious.    All other systems reviewed and are negative.    Objective:     Vital Signs (Most Recent):  Temp: 96.5 °F (35.8 °C) (04/17/20 0827)  Pulse: 68 (04/17/20 0827)  Resp: 18 (04/17/20 0827)  BP: (!) 118/57 (04/17/20 0827)  SpO2: 95 % (04/17/20 0827) Vital Signs (24h Range):  Temp:  [96.5 °F (35.8 °C)-97.5 °F (36.4 °C)] 96.5 °F (35.8 °C)  Pulse:  [61-70] 68  Resp:  [16-18] 18  SpO2:  [90 %-97 %] 95 %  BP: (118-163)/(57-89) 118/57     Weight: 135.6 kg (298 lb 15.1 oz)  Body mass index is 41.69 kg/m².    Intake/Output Summary (Last 24 hours) at 4/17/2020 0907  Last data filed at 4/17/2020 0600  Gross per 24 hour   Intake 720 ml   Output 350 ml   Net 370 ml      Physical Exam   Constitutional: He is oriented to person, place, and time. He appears well-developed and well-nourished.   HENT:   Head: Normocephalic and atraumatic.   Eyes: EOM are normal.   Cardiovascular: Normal rate and regular rhythm.   Pulmonary/Chest: Effort normal. No respiratory distress. He has no wheezes.   Breathing comfortably.  NC sitting on side of face.   Abdominal: Soft. Bowel sounds are normal. He exhibits no distension. There is no tenderness. There is no guarding.   Obesity noted.   Musculoskeletal: Normal range of motion. He exhibits no edema.   Neurological: He is alert and oriented to person, place, and time. No cranial nerve deficit or sensory deficit. He  exhibits normal muscle tone.   L sided weakness chronic.   Skin: Skin is warm and dry. No rash noted. No pallor.   Psychiatric: He has a normal mood and affect. His behavior is normal.   Nursing note and vitals reviewed.      Significant Labs:   Lab Results   Component Value Date    WBC 8.81 04/10/2020    HGB 12.4 (L) 04/10/2020    HCT 42.6 04/10/2020    MCV 80 (L) 04/10/2020     04/10/2020     BMP  Lab Results   Component Value Date     04/10/2020    K 3.6 04/10/2020    CL 97 04/10/2020    CO2 28 04/10/2020    BUN 20 04/10/2020    CREATININE 1.0 04/10/2020    CALCIUM 8.9 04/10/2020    ANIONGAP 13 04/10/2020    ESTGFRAFRICA >60 04/10/2020    EGFRNONAA >60 04/10/2020     Microbiology Results (last 7 days)     ** No results found for the last 168 hours. **        Significant Imaging:   CXR (04-):   Borderline heart size.  Loop recorder in place.  Mild interstitial, chronic.  New intrapulmonary masses or infiltrates are not seen.    CT abdomen and pelvis with contrast:  1. No evidence for appendicitis.  2. Urinary bladder wall thickening could relate to under distension.  Outlet obstruction or cystitis also possible in the appropriate clinical setting.  3. Interstitial lung disease with some degree of chronicity.  Consider dedicated CT chest when clinically feasible.  4. Enlarged heart.  5. Remote L1 compression fracture.  Additional compression fractures at the lowest 2 lumbar levels, age indeterminate but new since 2019 comparison.    ECHO:  · Concentric left ventricular remodeling.  · The mean diastolic gradient across the mitral valve is 1 mmHg at a heart rate of bpm.  · Severe left atrial enlargement.  · Normal left ventricular systolic function. The estimated ejection fraction is 60%.  · Indeterminate left ventricular diastolic function.  · No wall motion abnormalities.  · Moderate right atrial enlargement.  · Normal central venous pressure (3 mmHg).  · The estimated PA systolic pressure is 27  mmHg.  · Normal right ventricular systolic function.  · Mild tricuspid regurgitation.

## 2020-04-17 NOTE — PLAN OF CARE
Problem: Physical Therapy Goal  Goal: Physical Therapy Goal  Description  Goals to be met by: 2020    Patient will increase functional independence with mobility by performin. Supine to sit with Stand-by Assistance  2. Sit to supine with Stand-by Assistance  3. Sit to stand transfer with Stand-by Assistance  4. Bed to chair transfer with Contact Guard Assistance using Rolling Walker  5. Gait  x 50 feet with Minimal Assistance using Rolling Walker.      Outcome: Ongoing, Progressing   Pt seen for thera ex, EOB sitting and standing. Pt with L lateral leaning and difficulty maintaining standing. Pt to benefit from continued therapies

## 2020-04-17 NOTE — PT/OT/SLP PROGRESS
Physical Therapy Treatment    Patient Name:  Jose Leon   MRN:  [id number]    Recommendations:     Discharge Recommendations:  nursing facility, skilled, LTACH (long-term acute care hospital)   Discharge Equipment Recommendations: none   Barriers to discharge: Decreased caregiver support    Assessment:     Jose Leon is a 59 y.o. male admitted with a medical diagnosis of Acute respiratory disease due to COVID-19 virus.  He presents with the following impairments/functional limitations:  weakness, gait instability, impaired functional mobilty, impaired self care skills, impaired balance, impaired cognition, decreased upper extremity function, decreased lower extremity function, decreased safety awareness, impaired cardiopulmonary response to activity . Pt alert and completing active LE's exercises in bed. Pt assisted to sitting EOB  With difficulty maintaining upright and midline posture. Pt with strong L lateral leaning. Poor standing balance- unable to step. Pt to benefit from continued therapies.    Rehab Prognosis: Good; patient would benefit from acute skilled PT services to address these deficits and reach maximum level of function.    Recent Surgery: * No surgery found *      Plan:     During this hospitalization, patient to be seen 6 x/week to address the identified rehab impairments via gait training, therapeutic activities, therapeutic exercises and progress toward the following goals:    · Plan of Care Expires:  05/05/20    Subjective   Pt stated that he would like to be able to walk again  Stated is going to a therapy place  Pt stated had a stroke in the past  Stated his children are mad at him because they could not get his money  Chief Complaint: none  Patient/Family Comments/goals: get walking  Pain/Comfort:  · Pain Rating 1: 0/10      Objective:     Communicated with nurse [place] prior to session.  Patient found HOB elevated with bed alarm, pulse ox (continuous), telemetry upon PT entry to room.      General Precautions: Standard, airborne, droplet, respiratory   Orthopedic Precautions:N/A   Braces: N/A     Functional Mobility:  · Bed Mobility:     · Rolling Right: minimum assistance  · Scooting: minimum assistance  · Supine to Sit: moderate assistance  · Sit to Supine: minimum assistance  · Transfers:     · Sit to Stand:  moderate assistance and of 2 persons with rolling walker   · Pt with strong L lateral leaning      AM-PAC 6 CLICK MOBILITY          Therapeutic Activities and Exercises:   thera ex at EOB sitting  Standing with assist x2, attempted to step but unable  Pt able to scoot sideways in bed  Diaper and linen change while seated    Patient left HOB elevated with all lines intact, call button in reach, bed alarm on and nurse mercy present..    GOALS:   Multidisciplinary Problems     Physical Therapy Goals        Problem: Physical Therapy Goal    Goal Priority Disciplines Outcome Goal Variances Interventions   Physical Therapy Goal     PT, PT/OT Ongoing, Progressing     Description:  Goals to be met by: 2020    Patient will increase functional independence with mobility by performin. Supine to sit with Stand-by Assistance  2. Sit to supine with Stand-by Assistance  3. Sit to stand transfer with Stand-by Assistance  4. Bed to chair transfer with Contact Guard Assistance using Rolling Walker  5. Gait  x 50 feet with Minimal Assistance using Rolling Walker.                       Time Tracking:     PT Received On: 20  PT Start Time: 1445     PT Stop Time: 1511  PT Total Time (min): 26 min     Billable Minutes: Therapeutic Activity 16 and Therapeutic Exercise 10    Treatment Type: Treatment  PT/PTA: PT     PTA Visit Number: 0     Courtney Merlos, PT  2020

## 2020-04-17 NOTE — PLAN OF CARE
Zoe with [facility] has called to accept the pt once a negative test is obtained.     Berta with [facility] is accepting the pt however she wants a negative test because he has so many discharge needs and if he goes to the covid unit he will be locked down and not receive as many services. CM following. [facility] Glass, [facility]     04/17/20 0941   Post-Acute Status   Post-Acute Authorization Placement

## 2020-04-17 NOTE — PROGRESS NOTES
[facility] Hospital Medicine  Progress Note    Patient Name: Jose Leon  MRN: [id number]  Patient Class: IP- Inpatient   Admission Date: 4/3/2020  Length of Stay: 14 days  Attending Physician: Jhony Palomo MD  Primary Care Provider: Josh Giordano MD        Subjective:     Principal Problem:Acute respiratory disease due to COVID-19 virus        HPI:  Patient is a 59-year-old morbidly obese male with past medical history significant for multiple medical problems including hypertension, hyperlipidemia, coronary artery disease status post coronary artery stent placement, obstructive sleep apnea (on CPAP), chronic combined systolic and diastolic congestive heart failure, history of TIA, long-term anticoagulation with Pradaxa and paroxysmal atrial fibrillation is being admitted to Hospital Medicine from [facility] Emergency Room under inpatient status for worsening shortness of breath and right lower quadrant abdominal pain.  Patient presented to the emergency room with complaint of profound generalized weakness associated with diarrhea and vomiting for 1 day.  If patient feels dizzy and has also been experiencing nonradiating right lower quadrant abdominal pain.  Patient denies any hematemesis, melena or bleeding per rectum.  No recent travel or sick contact history reported.  Patient'sCOVID 19 test is positive in the emergency room.  Patient was recently discharged from [facility].  In the emergency room patient was noted to be hypoxic with exertion and minimal level around 85%.  Presently requiring 3 L per minute supplemental oxygen via nasal cannula.        Overview/Hospital Course:  Patient was admitted for acute on chronic respiratory failure likely secondary to COVID 19 superimposed on COPD and obesity hypoventilation.  Patient was started on appropriate management of COVID19 including supplemental oxygen, nebulized bronchodilators, and improved  slowly over the course of his hospitalization.  Given his concomitant COPD, the patient was started on oral corticosteroids.  He did have episodes of hyperglycemia and hypoglycemia and his insulin dose was adjusted over the course of his hospitalization.  Patient had intermittent episodes of confusion and agitation with impulsivity, and was given intermittent antipsychotic for behavioral.  After hospital day 3-4, the patient's behavior improved.  He remained in the hospital secondary to issues related to his disposition and insurance status.    Interval History: Afebrile.  On 2 L/min supplemental oxygen. Feeling better. No SOB or CP. Working with PT and OT.     Review of Systems   Constitutional: Negative for chills, fatigue and fever.   Respiratory: Positive for shortness of breath (improving). Negative for cough.    Cardiovascular: Negative for chest pain and leg swelling.   Gastrointestinal: Negative for abdominal pain, diarrhea, nausea and vomiting.   Musculoskeletal: Negative for back pain.   Neurological: Negative for weakness.   Psychiatric/Behavioral: Positive for behavioral problems. The patient is not nervous/anxious.    All other systems reviewed and are negative.    Objective:     Vital Signs (Most Recent):  Temp: 96.5 °F (35.8 °C) (04/17/20 0827)  Pulse: 68 (04/17/20 0827)  Resp: 18 (04/17/20 0827)  BP: (!) 118/57 (04/17/20 0827)  SpO2: 95 % (04/17/20 0827) Vital Signs (24h Range):  Temp:  [96.5 °F (35.8 °C)-97.5 °F (36.4 °C)] 96.5 °F (35.8 °C)  Pulse:  [61-70] 68  Resp:  [16-18] 18  SpO2:  [90 %-97 %] 95 %  BP: (118-163)/(57-89) 118/57     Weight: 135.6 kg (298 lb 15.1 oz)  Body mass index is 41.69 kg/m².    Intake/Output Summary (Last 24 hours) at 4/17/2020 0907  Last data filed at 4/17/2020 0600  Gross per 24 hour   Intake 720 ml   Output 350 ml   Net 370 ml      Physical Exam   Constitutional: He is oriented to person, place, and time. He appears well-developed and well-nourished.   HENT:   Head:  Normocephalic and atraumatic.   Eyes: EOM are normal.   Cardiovascular: Normal rate and regular rhythm.   Pulmonary/Chest: Effort normal. No respiratory distress. He has no wheezes.   Breathing comfortably.  NC sitting on side of face.   Abdominal: Soft. Bowel sounds are normal. He exhibits no distension. There is no tenderness. There is no guarding.   Obesity noted.   Musculoskeletal: Normal range of motion. He exhibits no edema.   Neurological: He is alert and oriented to person, place, and time. No cranial nerve deficit or sensory deficit. He exhibits normal muscle tone.   L sided weakness chronic.   Skin: Skin is warm and dry. No rash noted. No pallor.   Psychiatric: He has a normal mood and affect. His behavior is normal.   Nursing note and vitals reviewed.      Significant Labs:   Lab Results   Component Value Date    WBC 8.81 04/10/2020    HGB 12.4 (L) 04/10/2020    HCT 42.6 04/10/2020    MCV 80 (L) 04/10/2020     04/10/2020     BMP  Lab Results   Component Value Date     04/10/2020    K 3.6 04/10/2020    CL 97 04/10/2020    CO2 28 04/10/2020    BUN 20 04/10/2020    CREATININE 1.0 04/10/2020    CALCIUM 8.9 04/10/2020    ANIONGAP 13 04/10/2020    ESTGFRAFRICA >60 04/10/2020    EGFRNONAA >60 04/10/2020     Microbiology Results (last 7 days)     ** No results found for the last 168 hours. **        Significant Imaging:   CXR (04-):   Borderline heart size.  Loop recorder in place.  Mild interstitial, chronic.  New intrapulmonary masses or infiltrates are not seen.    CT abdomen and pelvis with contrast:  1. No evidence for appendicitis.  2. Urinary bladder wall thickening could relate to under distension.  Outlet obstruction or cystitis also possible in the appropriate clinical setting.  3. Interstitial lung disease with some degree of chronicity.  Consider dedicated CT chest when clinically feasible.  4. Enlarged heart.  5. Remote L1 compression fracture.  Additional compression fractures at  the lowest 2 lumbar levels, age indeterminate but new since 2019 comparison.    ECHO:  · Concentric left ventricular remodeling.  · The mean diastolic gradient across the mitral valve is 1 mmHg at a heart rate of bpm.  · Severe left atrial enlargement.  · Normal left ventricular systolic function. The estimated ejection fraction is 60%.  · Indeterminate left ventricular diastolic function.  · No wall motion abnormalities.  · Moderate right atrial enlargement.  · Normal central venous pressure (3 mmHg).  · The estimated PA systolic pressure is 27 mmHg.  · Normal right ventricular systolic function.  · Mild tricuspid regurgitation.      Assessment/Plan:      * Acute respiratory disease due to COVID-19 virus  - COVID-19 testing   - Infection Control notified 4/3/2020    - Isolation:   - Airborne, Contact and Droplet Precautions  - Cohort patients into COVID units  - N95 masks must be fit tested, wear eye protection  - 20 second hand hygiene              - Limit visitors per hospital policy              - Consolidating lab draws, nursing care, provider visits, and interventions      - Management:  Supplemental O2 to maintain SpO2 >92%  Continuous/intermittent Pulse Ox  Albuterol INHALER PRN (avoid nebulization of secretions)  Avoiding BiPAP to prevent aerosolization (including home BiPAP)    Advance Care Planning -patient full code for now    Discussed that can likely go home once off O2 and amble to ambulate near baseline                   COPD exacerbation  Patient's COPD is uncontrolled due to continued dyspnea, use of accessory muscles for breathing and worsening of baseline hypoxia currently.  Patient is currently off COPD Pathway. Continue scheduled inhalers Steroids, Antibiotics and Supplemental oxygen and monitor respiratory status closely.         Type 2 DM On Insulin  Patient's FSGs are controlled on current hypoglycemics.   Last A1c reviewed-   Lab Results   Component Value Date    HGBA1C 7.8 (H) 04/03/2018      Most recent fingerstick glucose reviewed-   Recent Labs   Lab 04/12/20  1130 04/12/20  1604 04/13/20  0855   POCTGLUCOSE 246* 315* 257*     Current correctional scale  Low  Maintain anti-hyperglycemic dose as follows-   Antihyperglycemics (From admission, onward)    Start     Stop Route Frequency Ordered    04/11/20 0900  insulin detemir U-100 pen 15 Units      -- SubQ Daily 04/10/20 2150    04/11/20 0715  insulin aspart U-100 pen 5 Units      -- SubQ 3 times daily with meals 04/10/20 2150    04/03/20 1813  insulin aspart U-100 pen 0-5 Units      -- SubQ Before meals & nightly PRN 04/03/20 1813        Hold Oral hypoglycemics while patient is in the hospital.          1 PPD X 25+ YRs Chronic Tobacco Use Disorder  Smoking cessation counseling performed. Dangers of cigarette smoking were reviewed with patient in detail and patient was encouraged to quit. Nicotine replacement options were discussed for > 3 minutes.        Anxiety And Depression  Continue Xanax and Wellbutrin use as before      Stage 2 CKD  Creatine stable for now. BMP reviewed- noted Estimated Creatinine Clearance: 111.8 mL/min (based on SCr of 1 mg/dL). according to latest data. Monitor UOP and serial BMP and adjust therapy as needed. Renally dose meds.                Hypertension  Chronic, controlled.  Latest blood pressure and vitals reviewed-   Temp:  [96.4 °F (35.8 °C)-98.2 °F (36.8 °C)]   Pulse:  [56-80]   Resp:  [16-20]   BP: (139-181)/()   SpO2:  [93 %-100 %] .   Home meds for hypertension were reviewed and noted below. Hospital anti-hypertensive changes were made as shown below.  Hypertension Medications             furosemide (LASIX) 40 MG tablet Take 1 tablet (40 mg total) by mouth 2 (two) times daily.    hydrALAZINE (APRESOLINE) 10 MG tablet Take 10 mg by mouth every 12 (twelve) hours.    lisinopril (PRINIVIL,ZESTRIL) 20 MG tablet Take 1 tablet (20 mg total) by mouth 2 (two) times daily.    metoprolol tartrate (LOPRESSOR) 25 MG  tablet TAKE 1 TABLET BY MOUTH 2 TIMES DAILY.    spironolactone (ALDACTONE) 25 MG tablet Take 1 tablet (25 mg total) by mouth every morning.      Hospital Medications             lisinopriL tablet 10 mg 10 mg, Oral, Daily    metoprolol tartrate (LOPRESSOR) tablet 25 mg 25 mg, Oral, 2 times daily, Hold for HR &lt;60 or BP &lt;90/60    spironolactone tablet 25 mg 25 mg, Oral, Every morning        Will utilize p.r.n. blood pressure medication only if patient's blood pressure greater than  180/110 and he develops symptoms such as worsening chest pain or shortness of breath.      Atrial Fibrillation With H/O RVR  Patient with Persistent atrial fibrillation which is controlled currently with Beta Blocker, Amiodarone and Digoxin. [id number] score 5. Anticoagulation indicated. Anticoagulation done with Dabigatran.        Coronary Artery Disease With H/O Stenting  Patient with known CAD s/p CABG. Will continue and monitor for S/Sx of angina/ACS. Continue to monitor on telemetry.        JULY on CPAP  Continue supplemental oxygen to maintain pulse ox above 92%.  Unable to use CPAP per hospital policy at this time due to COVID-19.      Morbid obesity  Body mass index is 41.69 kg/m². Morbid obesity complicates all aspects of disease management from diagnostic modalities to treatment. Weight loss encouraged and health benefits explained to patient.           Repeating COVID -19 results today for possible placement at skilled  VTE Risk Mitigation (From admission, onward)         Ordered     dabigatran etexilate capsule 75 mg  2 times daily      04/03/20 1813     IP VTE HIGH RISK PATIENT  Once      04/03/20 1813     Place OMI hose  Until discontinued      04/03/20 1813     Place sequential compression device  Until discontinued      04/03/20 1813                      Jhony Palomo MD  Department of Hospital Medicine   [facility]

## 2020-04-17 NOTE — PLAN OF CARE
04/16/20 1901   Patient Assessment/Suction   Level of Consciousness (AVPU) alert   Respiratory Effort Normal;Unlabored   Expansion/Accessory Muscles/Retractions no use of accessory muscles   All Lung Fields Breath Sounds diminished;coarse   ASHLEY Breath Sounds coarse;diminished   Cough Frequency infrequent   Cough Type productive;nonproductive   PRE-TX-O2   O2 Device (Oxygen Therapy) nasal cannula   $ Is the patient on Low Flow Oxygen? Yes   Flow (L/min) 2   Oxygen Concentration (%) 28   SpO2 97 %   Pulse Oximetry Type Continuous   $ Pulse Oximetry - Multiple Charge Pulse Oximetry - Multiple   Pulse 66   Resp 18   Aerosol Therapy   $ Aerosol Therapy Charges Aerosol Treatment   Respiratory Treatment Status (SVN) given   Treatment Route (SVN) mask;oxygen   Patient Position (SVN) HOB elevated   Post Treatment Assessment (SVN) breath sounds unchanged   Signs of Intolerance (SVN) none   Breath Sounds Post-Respiratory Treatment   Throughout All Fields Post-Treatment All Fields   Throughout All Fields Post-Treatment no change   Post-treatment Heart Rate (beats/min) 67   Post-treatment Resp Rate (breaths/min) 18   Ready to Wean/Extubation Screen   FIO2<=50 (chart decimal) 0.28     Duoneb Q6

## 2020-04-18 LAB
ALBUMIN SERPL BCP-MCNC: 2.7 G/DL (ref 3.5–5.2)
ALP SERPL-CCNC: 106 U/L (ref 55–135)
ALT SERPL W/O P-5'-P-CCNC: 54 U/L (ref 10–44)
ANION GAP SERPL CALC-SCNC: 9 MMOL/L (ref 8–16)
AST SERPL-CCNC: 19 U/L (ref 10–40)
BASOPHILS # BLD AUTO: 0.02 K/UL (ref 0–0.2)
BASOPHILS NFR BLD: 0.2 % (ref 0–1.9)
BILIRUB SERPL-MCNC: 0.4 MG/DL (ref 0.1–1)
BNP SERPL-MCNC: 118 PG/ML (ref 0–99)
BUN SERPL-MCNC: 24 MG/DL (ref 6–20)
CALCIUM SERPL-MCNC: 8.8 MG/DL (ref 8.7–10.5)
CHLORIDE SERPL-SCNC: 99 MMOL/L (ref 95–110)
CO2 SERPL-SCNC: 29 MMOL/L (ref 23–29)
CREAT SERPL-MCNC: 1 MG/DL (ref 0.5–1.4)
CRP SERPL-MCNC: 4.8 MG/L (ref 0–8.2)
D DIMER PPP IA.FEU-MCNC: 0.25 MG/L FEU
DIFFERENTIAL METHOD: ABNORMAL
EOSINOPHIL # BLD AUTO: 0.1 K/UL (ref 0–0.5)
EOSINOPHIL NFR BLD: 1.2 % (ref 0–8)
ERYTHROCYTE [DISTWIDTH] IN BLOOD BY AUTOMATED COUNT: 14.8 % (ref 11.5–14.5)
EST. GFR  (AFRICAN AMERICAN): >60 ML/MIN/1.73 M^2
EST. GFR  (NON AFRICAN AMERICAN): >60 ML/MIN/1.73 M^2
FERRITIN SERPL-MCNC: 513 NG/ML (ref 20–300)
GLUCOSE SERPL-MCNC: 192 MG/DL (ref 70–110)
HCT VFR BLD AUTO: 45.1 % (ref 40–54)
HGB BLD-MCNC: 13.2 G/DL (ref 14–18)
IMM GRANULOCYTES # BLD AUTO: 0.09 K/UL (ref 0–0.04)
IMM GRANULOCYTES NFR BLD AUTO: 0.8 % (ref 0–0.5)
LDH SERPL L TO P-CCNC: 250 U/L (ref 110–260)
LYMPHOCYTES # BLD AUTO: 1.6 K/UL (ref 1–4.8)
LYMPHOCYTES NFR BLD: 15 % (ref 18–48)
MAGNESIUM SERPL-MCNC: 1.8 MG/DL (ref 1.6–2.6)
MCH RBC QN AUTO: 23.6 PG (ref 27–31)
MCHC RBC AUTO-ENTMCNC: 29.3 G/DL (ref 32–36)
MCV RBC AUTO: 81 FL (ref 82–98)
MONOCYTES # BLD AUTO: 0.7 K/UL (ref 0.3–1)
MONOCYTES NFR BLD: 6.6 % (ref 4–15)
NEUTROPHILS # BLD AUTO: 8.2 K/UL (ref 1.8–7.7)
NEUTROPHILS NFR BLD: 76.2 % (ref 38–73)
NRBC BLD-RTO: 0 /100 WBC
PLATELET # BLD AUTO: 332 K/UL (ref 150–350)
PMV BLD AUTO: 11.5 FL (ref 9.2–12.9)
POCT GLUCOSE: 215 MG/DL (ref 70–110)
POCT GLUCOSE: 243 MG/DL (ref 70–110)
POCT GLUCOSE: 352 MG/DL (ref 70–110)
POTASSIUM SERPL-SCNC: 4.6 MMOL/L (ref 3.5–5.1)
PROT SERPL-MCNC: 6.2 G/DL (ref 6–8.4)
RBC # BLD AUTO: 5.6 M/UL (ref 4.6–6.2)
SARS-COV-2 RNA RESP QL NAA+PROBE: DETECTED
SODIUM SERPL-SCNC: 137 MMOL/L (ref 136–145)
WBC # BLD AUTO: 10.75 K/UL (ref 3.9–12.7)

## 2020-04-18 PROCEDURE — 83615 LACTATE (LD) (LDH) ENZYME: CPT

## 2020-04-18 PROCEDURE — 94640 AIRWAY INHALATION TREATMENT: CPT

## 2020-04-18 PROCEDURE — 82728 ASSAY OF FERRITIN: CPT

## 2020-04-18 PROCEDURE — 83880 ASSAY OF NATRIURETIC PEPTIDE: CPT

## 2020-04-18 PROCEDURE — 85379 FIBRIN DEGRADATION QUANT: CPT

## 2020-04-18 PROCEDURE — 80053 COMPREHEN METABOLIC PANEL: CPT

## 2020-04-18 PROCEDURE — 25000003 PHARM REV CODE 250: Performed by: HOSPITALIST

## 2020-04-18 PROCEDURE — 25000242 PHARM REV CODE 250 ALT 637 W/ HCPCS: Performed by: HOSPITALIST

## 2020-04-18 PROCEDURE — 99232 PR SUBSEQUENT HOSPITAL CARE,LEVL II: ICD-10-PCS | Mod: GT,,, | Performed by: INTERNAL MEDICINE

## 2020-04-18 PROCEDURE — 25000003 PHARM REV CODE 250: Performed by: INTERNAL MEDICINE

## 2020-04-18 PROCEDURE — 85025 COMPLETE CBC W/AUTO DIFF WBC: CPT

## 2020-04-18 PROCEDURE — 36415 COLL VENOUS BLD VENIPUNCTURE: CPT

## 2020-04-18 PROCEDURE — 83735 ASSAY OF MAGNESIUM: CPT

## 2020-04-18 PROCEDURE — 63600175 PHARM REV CODE 636 W HCPCS: Performed by: INTERNAL MEDICINE

## 2020-04-18 PROCEDURE — 97530 THERAPEUTIC ACTIVITIES: CPT | Mod: CQ

## 2020-04-18 PROCEDURE — 99232 SBSQ HOSP IP/OBS MODERATE 35: CPT | Mod: GT,,, | Performed by: INTERNAL MEDICINE

## 2020-04-18 PROCEDURE — 86140 C-REACTIVE PROTEIN: CPT

## 2020-04-18 PROCEDURE — 12000002 HC ACUTE/MED SURGE SEMI-PRIVATE ROOM

## 2020-04-18 PROCEDURE — 27000221 HC OXYGEN, UP TO 24 HOURS

## 2020-04-18 PROCEDURE — C9399 UNCLASSIFIED DRUGS OR BIOLOG: HCPCS | Performed by: INTERNAL MEDICINE

## 2020-04-18 PROCEDURE — 94761 N-INVAS EAR/PLS OXIMETRY MLT: CPT

## 2020-04-18 RX ADMIN — IPRATROPIUM BROMIDE AND ALBUTEROL SULFATE 3 ML: .5; 3 SOLUTION RESPIRATORY (INHALATION) at 06:04

## 2020-04-18 RX ADMIN — METOPROLOL TARTRATE 25 MG: 25 TABLET, FILM COATED ORAL at 09:04

## 2020-04-18 RX ADMIN — INSULIN ASPART 2 UNITS: 100 INJECTION, SOLUTION INTRAVENOUS; SUBCUTANEOUS at 08:04

## 2020-04-18 RX ADMIN — METOPROLOL TARTRATE 25 MG: 25 TABLET, FILM COATED ORAL at 08:04

## 2020-04-18 RX ADMIN — ASPIRIN 81 MG: 81 TABLET, COATED ORAL at 09:04

## 2020-04-18 RX ADMIN — ALPRAZOLAM 0.5 MG: 0.25 TABLET ORAL at 09:04

## 2020-04-18 RX ADMIN — INSULIN ASPART 2 UNITS: 100 INJECTION, SOLUTION INTRAVENOUS; SUBCUTANEOUS at 04:04

## 2020-04-18 RX ADMIN — IPRATROPIUM BROMIDE AND ALBUTEROL SULFATE 3 ML: .5; 3 SOLUTION RESPIRATORY (INHALATION) at 12:04

## 2020-04-18 RX ADMIN — AMIODARONE HYDROCHLORIDE 200 MG: 200 TABLET ORAL at 09:04

## 2020-04-18 RX ADMIN — INSULIN ASPART 5 UNITS: 100 INJECTION, SOLUTION INTRAVENOUS; SUBCUTANEOUS at 04:04

## 2020-04-18 RX ADMIN — INSULIN ASPART 5 UNITS: 100 INJECTION, SOLUTION INTRAVENOUS; SUBCUTANEOUS at 11:04

## 2020-04-18 RX ADMIN — INSULIN DETEMIR 3 UNITS: 100 INJECTION, SOLUTION SUBCUTANEOUS at 11:04

## 2020-04-18 RX ADMIN — ALPRAZOLAM 0.5 MG: 0.25 TABLET ORAL at 08:04

## 2020-04-18 RX ADMIN — SPIRONOLACTONE 25 MG: 25 TABLET ORAL at 08:04

## 2020-04-18 RX ADMIN — GABAPENTIN 300 MG: 300 CAPSULE ORAL at 08:04

## 2020-04-18 RX ADMIN — Medication 400 MG: at 08:04

## 2020-04-18 RX ADMIN — BUPROPION HYDROCHLORIDE 150 MG: 150 TABLET, EXTENDED RELEASE ORAL at 06:04

## 2020-04-18 RX ADMIN — INSULIN ASPART 3 UNITS: 100 INJECTION, SOLUTION INTRAVENOUS; SUBCUTANEOUS at 09:04

## 2020-04-18 RX ADMIN — IPRATROPIUM BROMIDE AND ALBUTEROL SULFATE 3 ML: .5; 3 SOLUTION RESPIRATORY (INHALATION) at 07:04

## 2020-04-18 RX ADMIN — DABIGATRAN ETEXILATE MESYLATE 75 MG: 75 CAPSULE ORAL at 09:04

## 2020-04-18 RX ADMIN — PREDNISONE 40 MG: 20 TABLET ORAL at 08:04

## 2020-04-18 RX ADMIN — GABAPENTIN 300 MG: 300 CAPSULE ORAL at 09:04

## 2020-04-18 RX ADMIN — SERTRALINE HYDROCHLORIDE 100 MG: 50 TABLET ORAL at 08:04

## 2020-04-18 RX ADMIN — LISINOPRIL 10 MG: 10 TABLET ORAL at 08:04

## 2020-04-18 RX ADMIN — DABIGATRAN ETEXILATE MESYLATE 75 MG: 75 CAPSULE ORAL at 08:04

## 2020-04-18 RX ADMIN — INSULIN ASPART 5 UNITS: 100 INJECTION, SOLUTION INTRAVENOUS; SUBCUTANEOUS at 08:04

## 2020-04-18 RX ADMIN — INSULIN DETEMIR 15 UNITS: 100 INJECTION, SOLUTION SUBCUTANEOUS at 08:04

## 2020-04-18 RX ADMIN — PRAVASTATIN SODIUM 80 MG: 40 TABLET ORAL at 09:04

## 2020-04-18 NOTE — PT/OT/SLP PROGRESS
"Physical Therapy Treatment    Patient Name:  Jose Leon   MRN:  [id number]    Recommendations:     Discharge Recommendations:  nursing facility, skilled, LTACH (long-term acute care hospital)   Discharge Equipment Recommendations: none       Assessment:     Jose Leon is a 59 y.o. male admitted with a medical diagnosis of Acute respiratory disease due to COVID-19 virus.  He presents with the following impairments/functional limitations:  weakness, impaired self care skills, impaired balance, decreased coordination, decreased safety awareness, impaired endurance, impaired functional mobilty, impaired coordination, gait instability, impaired cognition, decreased lower extremity function .    Rehab Prognosis: Good; patient would benefit from acute skilled PT services to address these deficits and reach maximum level of function.    Recent Surgery: * No surgery found *      Plan:     During this hospitalization, patient to be seen 6 x/week to address the identified rehab impairments via gait training, therapeutic activities, therapeutic exercises and progress toward the following goals:    · Plan of Care Expires:  05/05/20    Subjective     Chief Complaint: no complaints  Patient/Family Comments/goals: "I want to walk out of here", pt agreeable to therapy  Pain/Comfort:  · Pain Rating 1: 0/10      Objective:     Communicated with charge nurse Stefany prior to session.  Patient found supine with bed alarm, pulse ox (continuous), telemetry upon PT entry to room.     General Precautions: Standard, airborne, droplet, fall, contact   Orthopedic Precautions:N/A   Braces:       Functional Mobility:  · Bed Mobility:     · Rolling Right: minimum assistance  · Supine to Sit: minimum assistance  · Sit to Supine: contact guard assistance  · Transfers:     · Sit to Stand:  minimum assistance with rolling walker and pt leans to L, VC for correct hand placement      AM-PAC 6 CLICK MOBILITY          Therapeutic Activities and " Exercises:   sit<>stand min a, pt leans to L, sitting marches, laq x 10 B    Patient left HOB elevated with all lines intact, call button in reach and bed alarm on..    GOALS:   Multidisciplinary Problems     Physical Therapy Goals        Problem: Physical Therapy Goal    Goal Priority Disciplines Outcome Goal Variances Interventions   Physical Therapy Goal     PT, PT/OT Ongoing, Progressing     Description:  Goals to be met by: 2020    Patient will increase functional independence with mobility by performin. Supine to sit with Stand-by Assistance  2. Sit to supine with Stand-by Assistance  3. Sit to stand transfer with Stand-by Assistance  4. Bed to chair transfer with Contact Guard Assistance using Rolling Walker  5. Gait  x 50 feet with Minimal Assistance using Rolling Walker.                       Time Tracking:     PT Received On: 20  PT Start Time: 1053     PT Stop Time: 1103  PT Total Time (min): 10 min     Billable Minutes: Therapeutic Activity 10    Treatment Type: Treatment  PT/PTA: PTA     PTA Visit Number: 1     Melani Bazzi, PTA  2020

## 2020-04-18 NOTE — PROGRESS NOTES
[facility] Hospital Medicine  Progress Note    Patient Name: Jose Leon  MRN: [id number]  Patient Class: IP- Inpatient   Admission Date: 4/3/2020  Length of Stay: 15 days  Attending Physician: Stephanie Correa MD  Primary Care Provider: Josh Giordano MD        Subjective:     Principal Problem:Acute respiratory disease due to COVID-19 virus        HPI:  Patient is a 59-year-old morbidly obese male with past medical history significant for multiple medical problems including hypertension, hyperlipidemia, coronary artery disease status post coronary artery stent placement, obstructive sleep apnea (on CPAP), chronic combined systolic and diastolic congestive heart failure, history of TIA, long-term anticoagulation with Pradaxa and paroxysmal atrial fibrillation is being admitted to Hospital Medicine from [facility] Emergency Room under inpatient status for worsening shortness of breath and right lower quadrant abdominal pain.  Patient presented to the emergency room with complaint of profound generalized weakness associated with diarrhea and vomiting for 1 day.  If patient feels dizzy and has also been experiencing nonradiating right lower quadrant abdominal pain.  Patient denies any hematemesis, melena or bleeding per rectum.  No recent travel or sick contact history reported.  Patient'sCOVID 19 test is positive in the emergency room.  Patient was recently discharged from [facility].  In the emergency room patient was noted to be hypoxic with exertion and minimal level around 85%.  Presently requiring 3 L per minute supplemental oxygen via nasal cannula.        Overview/Hospital Course:  Patient was admitted for acute on chronic respiratory failure likely secondary to COVID 19 superimposed on COPD and obesity hypoventilation.  Patient was started on appropriate management of COVID19 including supplemental oxygen, nebulized bronchodilators, and  improved slowly over the course of his hospitalization.  Given his concomitant COPD, the patient was started on oral corticosteroids.  He did have episodes of hyperglycemia and hypoglycemia and his insulin dose was adjusted over the course of his hospitalization.  Patient had intermittent episodes of confusion and agitation with impulsivity, and was given intermittent antipsychotic for behavioral.  After hospital day 3-4, the patient's behavior improved.  He remained in the hospital secondary to issues related to his disposition and insurance status.   Managed by Dr. Palomo from 4/11-4/17.     Repeat COVID testing from 4/17/20 positive. Labs stable. Blood sugars elevated. Insulin regimen adjusted. Remains on 2 L NC. Awaiting LTAC placement.     THIS WAS A TELEMEDICINE ENCOUNTER FOR A PATIENT WITH NOVEL CORONAVIRUS.  INTERVIEW OF PATIENT (IF PERFORMED), VISUAL INSPECTION OF PATIENT, AND DISCUSSION WITH BEDSIDE NURSE DONE REMOTELY USING VIDEO CONFERENCING SOFTWARE ([employer]).    LOCATION OF PATIENT: Room 405 A AT [facility].  LOCATION OF PROVIDER:  PROVIDER'S PERSONAL RESIDENCE.  NURSING STAFF INVOLVED IN ENCOUNTER: Walt Junior RN    Interval History:   Repeat COVID testing from 4/17/20 positive. Labs stable. Blood sugars elevated. Insulin regimen adjusted. Remains on 2 L NC. Awaiting LTAC placement. No complaints. Would like to speak with his children- RN will help facilitate.    Yady Petersen listed on contact list did not answer call for update.     Review of Systems   Constitutional: Negative for chills, fatigue and fever.   Respiratory: Positive for shortness of breath (improving). Negative for cough.    Cardiovascular: Negative for chest pain and leg swelling.   Gastrointestinal: Negative for abdominal pain, diarrhea, nausea and vomiting.   Musculoskeletal: Negative for back pain.   Neurological: Positive for weakness (baseline from prior cva).   Psychiatric/Behavioral:  Negative for behavioral problems. The patient is not nervous/anxious.    All other systems reviewed and are negative.    Objective:     Vital Signs (Most Recent):  Temp: 97.6 °F (36.4 °C) (04/18/20 1539)  Pulse: 65 (04/18/20 1539)  Resp: 18 (04/18/20 1539)  BP: 124/63 (04/18/20 1539)  SpO2: (!) 94 % (04/18/20 1539) Vital Signs (24h Range):  Temp:  [96.6 °F (35.9 °C)-98.2 °F (36.8 °C)] 97.6 °F (36.4 °C)  Pulse:  [60-82] 65  Resp:  [18-20] 18  SpO2:  [92 %-99 %] 94 %  BP: (124-157)/(63-76) 124/63     Weight: 135.6 kg (298 lb 15.1 oz)  Body mass index is 41.69 kg/m².    Intake/Output Summary (Last 24 hours) at 4/18/2020 1752  Last data filed at 4/18/2020 1700  Gross per 24 hour   Intake 1440 ml   Output --   Net 1440 ml      Physical Exam   Constitutional: He is oriented to person, place, and time. He appears well-developed and well-nourished. No distress.   Seen via telemedicine with assistance of RN    Resting comfortably in bed.    HENT:   Head: Normocephalic and atraumatic.   Eyes: EOM are normal.   Cardiovascular: Normal rate and regular rhythm.   Pulmonary/Chest: Effort normal. No respiratory distress. He has no wheezes.   Breathing comfortably.  NC. Speaking in full sentences.    Abdominal: Soft. Bowel sounds are normal. He exhibits no distension. There is no tenderness. There is no guarding.   Per nursing   Musculoskeletal: He exhibits no edema.   Neurological: He is alert and oriented to person, place, and time.   L sided weakness chronic.   Skin: Skin is warm and dry. No rash noted. He is not diaphoretic.   Per nursing   Psychiatric: He has a normal mood and affect. His behavior is normal.   Nursing note and vitals reviewed.      Significant Labs:   Lab Results   Component Value Date    WBC 8.81 04/10/2020    HGB 12.4 (L) 04/10/2020    HCT 42.6 04/10/2020    MCV 80 (L) 04/10/2020     04/10/2020     BMP  Lab Results   Component Value Date     04/18/2020    K 4.6 04/18/2020    CL 99 04/18/2020     CO2 29 04/18/2020    BUN 24 (H) 04/18/2020    CREATININE 1.0 04/18/2020    CALCIUM 8.8 04/18/2020    ANIONGAP 9 04/18/2020    ESTGFRAFRICA >60 04/18/2020    EGFRNONAA >60 04/18/2020     Microbiology Results (last 7 days)     ** No results found for the last 168 hours. **        Significant Imaging:   CXR (04-):   Borderline heart size.  Loop recorder in place.  Mild interstitial, chronic.  New intrapulmonary masses or infiltrates are not seen.    CT abdomen and pelvis with contrast:  1. No evidence for appendicitis.  2. Urinary bladder wall thickening could relate to under distension.  Outlet obstruction or cystitis also possible in the appropriate clinical setting.  3. Interstitial lung disease with some degree of chronicity.  Consider dedicated CT chest when clinically feasible.  4. Enlarged heart.  5. Remote L1 compression fracture.  Additional compression fractures at the lowest 2 lumbar levels, age indeterminate but new since 2019 comparison.    ECHO:  · Concentric left ventricular remodeling.  · The mean diastolic gradient across the mitral valve is 1 mmHg at a heart rate of bpm.  · Severe left atrial enlargement.  · Normal left ventricular systolic function. The estimated ejection fraction is 60%.  · Indeterminate left ventricular diastolic function.  · No wall motion abnormalities.  · Moderate right atrial enlargement.  · Normal central venous pressure (3 mmHg).  · The estimated PA systolic pressure is 27 mmHg.  · Normal right ventricular systolic function.  · Mild tricuspid regurgitation.          Assessment/Plan:      * Acute respiratory disease due to COVID-19 virus  - COVID-19 testing   - Infection Control notified 4/3/2020   -Repeat COVID testing on 4/17/20 positive     - Isolation:   - Airborne, Contact and Droplet Precautions  - Cohort patients into COVID units  - N95 masks must be fit tested, wear eye protection  - 20 second hand hygiene              - Limit visitors per hospital policy               - Consolidating lab draws, nursing care, provider visits, and interventions      - Management:  Supplemental O2 to maintain SpO2 >92%  Continuous/intermittent Pulse Ox  Albuterol INHALER PRN (avoid nebulization of secretions)  Avoiding BiPAP to prevent aerosolization (including home BiPAP)    Advance Care Planning -patient full code for now              COPD exacerbation  Patient's COPD is uncontrolled due to continued dyspnea, use of accessory muscles for breathing and worsening of baseline hypoxia currently.  Patient is currently off COPD Pathway. Continue scheduled inhalers Steroids, Antibiotics and Supplemental oxygen and monitor respiratory status closely.         Type 2 DM On Insulin  Patient's FSGs are controlled on current hypoglycemics.   Last A1c reviewed-   Lab Results   Component Value Date    HGBA1C 7.8 (H) 04/03/2018     Most recent fingerstick glucose reviewed-   Recent Labs   Lab 04/17/20  2036 04/18/20  0513 04/18/20  1601   POCTGLUCOSE 309* 215* 243*     Current correctional scale  Low  Maintain anti-hyperglycemic dose as follows-   Antihyperglycemics (From admission, onward)    Start     Stop Route Frequency Ordered    04/19/20 0900  insulin detemir U-100 pen 18 Units      -- SubQ Daily 04/18/20 0920    04/11/20 0715  insulin aspart U-100 pen 5 Units      -- SubQ 3 times daily with meals 04/10/20 2150    04/03/20 1813  insulin aspart U-100 pen 0-5 Units      -- SubQ Before meals & nightly PRN 04/03/20 1813        Hold Oral hypoglycemics while patient is in the hospital.    Increase AM detemir to 18 units. Given additional 3 units this morning.           1 PPD X 25+ YRs Chronic Tobacco Use Disorder  Smoking cessation counseling performed. Dangers of cigarette smoking were reviewed with patient in detail and patient was encouraged to quit. Nicotine replacement options were discussed for > 3 minutes.        Anxiety And Depression  Continue Xanax and Wellbutrin use as before      Stage 2  CKD  Creatine stable for now. BMP reviewed- noted Estimated Creatinine Clearance: 111.8 mL/min (based on SCr of 1 mg/dL). according to latest data. Monitor UOP and serial BMP and adjust therapy as needed. Renally dose meds.                Hypertension associated with diabetes  Chronic, controlled.  Latest blood pressure and vitals reviewed-   Temp:  [96.6 °F (35.9 °C)-98.2 °F (36.8 °C)]   Pulse:  [60-82]   Resp:  [18-20]   BP: (124-157)/(63-76)   SpO2:  [92 %-99 %] .   Home meds for hypertension were reviewed and noted below. Hospital anti-hypertensive changes were made as shown below.  Hypertension Medications             furosemide (LASIX) 40 MG tablet Take 1 tablet (40 mg total) by mouth 2 (two) times daily.    hydrALAZINE (APRESOLINE) 10 MG tablet Take 10 mg by mouth every 12 (twelve) hours.    lisinopril (PRINIVIL,ZESTRIL) 20 MG tablet Take 1 tablet (20 mg total) by mouth 2 (two) times daily.    metoprolol tartrate (LOPRESSOR) 25 MG tablet TAKE 1 TABLET BY MOUTH 2 TIMES DAILY.    spironolactone (ALDACTONE) 25 MG tablet Take 1 tablet (25 mg total) by mouth every morning.      Hospital Medications             lisinopriL tablet 10 mg 10 mg, Oral, Daily    metoprolol tartrate (LOPRESSOR) tablet 25 mg 25 mg, Oral, 2 times daily, Hold for HR &lt;60 or BP &lt;90/60    spironolactone tablet 25 mg 25 mg, Oral, Every morning        Will utilize p.r.n. blood pressure medication only if patient's blood pressure greater than  180/110 and he develops symptoms such as worsening chest pain or shortness of breath.      Atrial Fibrillation With H/O RVR  Patient with Persistent atrial fibrillation which is controlled currently with Beta Blocker, Amiodarone and Digoxin. [id number] score 5. Anticoagulation indicated. Anticoagulation done with Dabigatran.        Coronary Artery Disease With H/O Stenting  Patient with known CAD s/p CABG. Will continue and monitor for S/Sx of angina/ACS. Continue to monitor on telemetry.        JULY on  CPAP  Continue supplemental oxygen to maintain pulse ox above 92%.  Unable to use CPAP per hospital policy at this time due to COVID-19.      Morbid obesity with BMI of 40.0-44.9, adult  Body mass index is 41.69 kg/m². Morbid obesity complicates all aspects of disease management from diagnostic modalities to treatment. Weight loss encouraged and health benefits explained to patient.            VTE Risk Mitigation (From admission, onward)         Ordered     dabigatran etexilate capsule 75 mg  2 times daily      04/03/20 1813     IP VTE HIGH RISK PATIENT  Once      04/03/20 1813     Place OMI hose  Until discontinued      04/03/20 1813     Place sequential compression device  Until discontinued      04/03/20 1813                      Stephanie oCrrea MD  Department of Hospital Medicine   [facility]

## 2020-04-18 NOTE — CARE UPDATE
04/17/20 1925   Patient Assessment/Suction   Level of Consciousness (AVPU) alert   Respiratory Effort Unlabored   Expansion/Accessory Muscles/Retractions no use of accessory muscles;no retractions   All Lung Fields Breath Sounds diminished   Rhythm/Pattern, Respiratory unlabored;no shortness of breath reported   Cough Frequency no cough   PRE-TX-O2   O2 Device (Oxygen Therapy) nasal cannula   $ Is the patient on Low Flow Oxygen? Yes   Flow (L/min) 2   SpO2 95 %   Pulse Oximetry Type Continuous   $ Pulse Oximetry - Multiple Charge Pulse Oximetry - Multiple   Pulse 61   Resp 18   Aerosol Therapy   $ Aerosol Therapy Charges Aerosol Treatment   Respiratory Treatment Status (SVN) given   Treatment Route (SVN) mask   Patient Position (SVN) HOB elevated   Post Treatment Assessment (SVN) breath sounds unchanged   Signs of Intolerance (SVN) none   Breath Sounds Post-Respiratory Treatment   Throughout All Fields Post-Treatment no change   Post-treatment Heart Rate (beats/min) 60   Post-treatment Resp Rate (breaths/min) 18

## 2020-04-18 NOTE — ASSESSMENT & PLAN NOTE
Patient with Persistent atrial fibrillation which is controlled currently with Beta Blocker, Amiodarone and Digoxin. [id number] score 5. Anticoagulation indicated. Anticoagulation done with Dabigatran.

## 2020-04-18 NOTE — PLAN OF CARE
Patient receiving aerosol tx via duoneb now and q6hr.  Hr 66 and 02 saturation 99% on room air.  02 at 2lpm at bedside.

## 2020-04-18 NOTE — PLAN OF CARE
Attempt was made to contact patient's relative, Yady Petersen, in regards to recent vital signs, labs, findings, and recommendations by the primary team as it pertains to the current admission for COVID-19. However, was unable to reach this point of contact at the listed numbers.  [facility] will attempt this call again tomorrow.

## 2020-04-18 NOTE — SUBJECTIVE & OBJECTIVE
THIS WAS A TELEMEDICINE ENCOUNTER FOR A PATIENT WITH NOVEL CORONAVIRUS.  INTERVIEW OF PATIENT (IF PERFORMED), VISUAL INSPECTION OF PATIENT, AND DISCUSSION WITH BEDSIDE NURSE DONE REMOTELY USING VIDEO CONFERENCING SOFTWARE ([employer]).    LOCATION OF PATIENT: Room 405 A AT [facility].  LOCATION OF PROVIDER:  PROVIDER'S PERSONAL RESIDENCE.  NURSING STAFF INVOLVED IN ENCOUNTER: Walt Junior RN    Interval History:   Repeat COVID testing from 4/17/20 positive. Labs stable. Blood sugars elevated. Insulin regimen adjusted. Remains on 2 L NC. Awaiting LTAC placement. No complaints. Would like to speak with his children- RN will help facilitate.    Yady Petersen listed on contact list did not answer call for update.     Review of Systems   Constitutional: Negative for chills, fatigue and fever.   Respiratory: Positive for shortness of breath (improving). Negative for cough.    Cardiovascular: Negative for chest pain and leg swelling.   Gastrointestinal: Negative for abdominal pain, diarrhea, nausea and vomiting.   Musculoskeletal: Negative for back pain.   Neurological: Positive for weakness (baseline from prior cva).   Psychiatric/Behavioral: Negative for behavioral problems. The patient is not nervous/anxious.    All other systems reviewed and are negative.    Objective:     Vital Signs (Most Recent):  Temp: 97.6 °F (36.4 °C) (04/18/20 1539)  Pulse: 65 (04/18/20 1539)  Resp: 18 (04/18/20 1539)  BP: 124/63 (04/18/20 1539)  SpO2: (!) 94 % (04/18/20 1539) Vital Signs (24h Range):  Temp:  [96.6 °F (35.9 °C)-98.2 °F (36.8 °C)] 97.6 °F (36.4 °C)  Pulse:  [60-82] 65  Resp:  [18-20] 18  SpO2:  [92 %-99 %] 94 %  BP: (124-157)/(63-76) 124/63     Weight: 135.6 kg (298 lb 15.1 oz)  Body mass index is 41.69 kg/m².    Intake/Output Summary (Last 24 hours) at 4/18/2020 1752  Last data filed at 4/18/2020 1700  Gross per 24 hour   Intake 1440 ml   Output --   Net 1440 ml      Physical Exam    Constitutional: He is oriented to person, place, and time. He appears well-developed and well-nourished. No distress.   Seen via telemedicine with assistance of RN    Resting comfortably in bed.    HENT:   Head: Normocephalic and atraumatic.   Eyes: EOM are normal.   Cardiovascular: Normal rate and regular rhythm.   Pulmonary/Chest: Effort normal. No respiratory distress. He has no wheezes.   Breathing comfortably.  NC. Speaking in full sentences.    Abdominal: Soft. Bowel sounds are normal. He exhibits no distension. There is no tenderness. There is no guarding.   Per nursing   Musculoskeletal: He exhibits no edema.   Neurological: He is alert and oriented to person, place, and time.   L sided weakness chronic.   Skin: Skin is warm and dry. No rash noted. He is not diaphoretic.   Per nursing   Psychiatric: He has a normal mood and affect. His behavior is normal.   Nursing note and vitals reviewed.      Significant Labs:   Lab Results   Component Value Date    WBC 8.81 04/10/2020    HGB 12.4 (L) 04/10/2020    HCT 42.6 04/10/2020    MCV 80 (L) 04/10/2020     04/10/2020     BMP  Lab Results   Component Value Date     04/18/2020    K 4.6 04/18/2020    CL 99 04/18/2020    CO2 29 04/18/2020    BUN 24 (H) 04/18/2020    CREATININE 1.0 04/18/2020    CALCIUM 8.8 04/18/2020    ANIONGAP 9 04/18/2020    ESTGFRAFRICA >60 04/18/2020    EGFRNONAA >60 04/18/2020     Microbiology Results (last 7 days)     ** No results found for the last 168 hours. **        Significant Imaging:   CXR (04-):   Borderline heart size.  Loop recorder in place.  Mild interstitial, chronic.  New intrapulmonary masses or infiltrates are not seen.    CT abdomen and pelvis with contrast:  1. No evidence for appendicitis.  2. Urinary bladder wall thickening could relate to under distension.  Outlet obstruction or cystitis also possible in the appropriate clinical setting.  3. Interstitial lung disease with some degree of chronicity.   Consider dedicated CT chest when clinically feasible.  4. Enlarged heart.  5. Remote L1 compression fracture.  Additional compression fractures at the lowest 2 lumbar levels, age indeterminate but new since 2019 comparison.    ECHO:  · Concentric left ventricular remodeling.  · The mean diastolic gradient across the mitral valve is 1 mmHg at a heart rate of bpm.  · Severe left atrial enlargement.  · Normal left ventricular systolic function. The estimated ejection fraction is 60%.  · Indeterminate left ventricular diastolic function.  · No wall motion abnormalities.  · Moderate right atrial enlargement.  · Normal central venous pressure (3 mmHg).  · The estimated PA systolic pressure is 27 mmHg.  · Normal right ventricular systolic function.  · Mild tricuspid regurgitation.

## 2020-04-18 NOTE — ASSESSMENT & PLAN NOTE
Patient's FSGs are controlled on current hypoglycemics.   Last A1c reviewed-   Lab Results   Component Value Date    HGBA1C 7.8 (H) 04/03/2018     Most recent fingerstick glucose reviewed-   Recent Labs   Lab 04/17/20 2036 04/18/20  0513 04/18/20  1601   POCTGLUCOSE 309* 215* 243*     Current correctional scale  Low  Maintain anti-hyperglycemic dose as follows-   Antihyperglycemics (From admission, onward)    Start     Stop Route Frequency Ordered    04/19/20 0900  insulin detemir U-100 pen 18 Units      -- SubQ Daily 04/18/20 0920    04/11/20 0715  insulin aspart U-100 pen 5 Units      -- SubQ 3 times daily with meals 04/10/20 2150    04/03/20 1813  insulin aspart U-100 pen 0-5 Units      -- SubQ Before meals & nightly PRN 04/03/20 1813        Hold Oral hypoglycemics while patient is in the hospital.    Increase AM detemir to 18 units. Given additional 3 units this morning.

## 2020-04-18 NOTE — ASSESSMENT & PLAN NOTE
- COVID-19 testing   - Infection Control notified 4/3/2020   -Repeat COVID testing on 4/17/20 positive     - Isolation:   - Airborne, Contact and Droplet Precautions  - Cohort patients into COVID units  - N95 masks must be fit tested, wear eye protection  - 20 second hand hygiene              - Limit visitors per hospital policy              - Consolidating lab draws, nursing care, provider visits, and interventions      - Management:  Supplemental O2 to maintain SpO2 >92%  Continuous/intermittent Pulse Ox  Albuterol INHALER PRN (avoid nebulization of secretions)  Avoiding BiPAP to prevent aerosolization (including home BiPAP)    Advance Care Planning -patient full code for now

## 2020-04-18 NOTE — ASSESSMENT & PLAN NOTE
Chronic, controlled.  Latest blood pressure and vitals reviewed-   Temp:  [96.6 °F (35.9 °C)-98.2 °F (36.8 °C)]   Pulse:  [60-82]   Resp:  [18-20]   BP: (124-157)/(63-76)   SpO2:  [92 %-99 %] .   Home meds for hypertension were reviewed and noted below. Hospital anti-hypertensive changes were made as shown below.  Hypertension Medications             furosemide (LASIX) 40 MG tablet Take 1 tablet (40 mg total) by mouth 2 (two) times daily.    hydrALAZINE (APRESOLINE) 10 MG tablet Take 10 mg by mouth every 12 (twelve) hours.    lisinopril (PRINIVIL,ZESTRIL) 20 MG tablet Take 1 tablet (20 mg total) by mouth 2 (two) times daily.    metoprolol tartrate (LOPRESSOR) 25 MG tablet TAKE 1 TABLET BY MOUTH 2 TIMES DAILY.    spironolactone (ALDACTONE) 25 MG tablet Take 1 tablet (25 mg total) by mouth every morning.      Hospital Medications             lisinopriL tablet 10 mg 10 mg, Oral, Daily    metoprolol tartrate (LOPRESSOR) tablet 25 mg 25 mg, Oral, 2 times daily, Hold for HR &lt;60 or BP &lt;90/60    spironolactone tablet 25 mg 25 mg, Oral, Every morning        Will utilize p.r.n. blood pressure medication only if patient's blood pressure greater than  180/110 and he develops symptoms such as worsening chest pain or shortness of breath.

## 2020-04-19 LAB
POCT GLUCOSE: 209 MG/DL (ref 70–110)
POCT GLUCOSE: 317 MG/DL (ref 70–110)
POCT GLUCOSE: 325 MG/DL (ref 70–110)

## 2020-04-19 PROCEDURE — 25000242 PHARM REV CODE 250 ALT 637 W/ HCPCS: Performed by: HOSPITALIST

## 2020-04-19 PROCEDURE — 63600175 PHARM REV CODE 636 W HCPCS: Performed by: INTERNAL MEDICINE

## 2020-04-19 PROCEDURE — 94640 AIRWAY INHALATION TREATMENT: CPT

## 2020-04-19 PROCEDURE — 25000003 PHARM REV CODE 250: Performed by: INTERNAL MEDICINE

## 2020-04-19 PROCEDURE — 27000221 HC OXYGEN, UP TO 24 HOURS

## 2020-04-19 PROCEDURE — 94761 N-INVAS EAR/PLS OXIMETRY MLT: CPT

## 2020-04-19 PROCEDURE — 25000003 PHARM REV CODE 250: Performed by: HOSPITALIST

## 2020-04-19 PROCEDURE — 12000002 HC ACUTE/MED SURGE SEMI-PRIVATE ROOM

## 2020-04-19 RX ADMIN — METOPROLOL TARTRATE 25 MG: 25 TABLET, FILM COATED ORAL at 08:04

## 2020-04-19 RX ADMIN — LISINOPRIL 10 MG: 10 TABLET ORAL at 08:04

## 2020-04-19 RX ADMIN — IPRATROPIUM BROMIDE AND ALBUTEROL SULFATE 3 ML: .5; 3 SOLUTION RESPIRATORY (INHALATION) at 12:04

## 2020-04-19 RX ADMIN — BUPROPION HYDROCHLORIDE 150 MG: 150 TABLET, EXTENDED RELEASE ORAL at 06:04

## 2020-04-19 RX ADMIN — INSULIN ASPART 4 UNITS: 100 INJECTION, SOLUTION INTRAVENOUS; SUBCUTANEOUS at 05:04

## 2020-04-19 RX ADMIN — AMIODARONE HYDROCHLORIDE 200 MG: 200 TABLET ORAL at 08:04

## 2020-04-19 RX ADMIN — GABAPENTIN 300 MG: 300 CAPSULE ORAL at 08:04

## 2020-04-19 RX ADMIN — SERTRALINE HYDROCHLORIDE 100 MG: 50 TABLET ORAL at 08:04

## 2020-04-19 RX ADMIN — ALPRAZOLAM 0.5 MG: 0.25 TABLET ORAL at 08:04

## 2020-04-19 RX ADMIN — IPRATROPIUM BROMIDE AND ALBUTEROL SULFATE 3 ML: .5; 3 SOLUTION RESPIRATORY (INHALATION) at 06:04

## 2020-04-19 RX ADMIN — PRAVASTATIN SODIUM 80 MG: 40 TABLET ORAL at 08:04

## 2020-04-19 RX ADMIN — PREDNISONE 40 MG: 20 TABLET ORAL at 08:04

## 2020-04-19 RX ADMIN — DABIGATRAN ETEXILATE MESYLATE 75 MG: 75 CAPSULE ORAL at 08:04

## 2020-04-19 RX ADMIN — SPIRONOLACTONE 25 MG: 25 TABLET ORAL at 08:04

## 2020-04-19 RX ADMIN — ASPIRIN 81 MG: 81 TABLET, COATED ORAL at 08:04

## 2020-04-19 RX ADMIN — INSULIN ASPART 5 UNITS: 100 INJECTION, SOLUTION INTRAVENOUS; SUBCUTANEOUS at 08:04

## 2020-04-19 RX ADMIN — INSULIN DETEMIR 18 UNITS: 100 INJECTION, SOLUTION SUBCUTANEOUS at 08:04

## 2020-04-19 RX ADMIN — INSULIN ASPART 5 UNITS: 100 INJECTION, SOLUTION INTRAVENOUS; SUBCUTANEOUS at 05:04

## 2020-04-19 RX ADMIN — Medication 400 MG: at 08:04

## 2020-04-19 RX ADMIN — INSULIN ASPART 5 UNITS: 100 INJECTION, SOLUTION INTRAVENOUS; SUBCUTANEOUS at 11:04

## 2020-04-19 RX ADMIN — IPRATROPIUM BROMIDE AND ALBUTEROL SULFATE 3 ML: .5; 3 SOLUTION RESPIRATORY (INHALATION) at 07:04

## 2020-04-19 NOTE — PROGRESS NOTES
[facility] Hospital Medicine  Progress Note    Patient Name: Jose Leon  MRN: [id number]  Patient Class: IP- Inpatient   Admission Date: 4/3/2020  Length of Stay: 16 days  Attending Physician: Hair Ulrich MD  Primary Care Provider: Josh Giordano MD    THIS WAS A TELEMEDICINE ENCOUNTER FOR A PATIENT WITH NOVEL CORONAVIRUS, OF WHICH THE WORLD IS CURRENTLY IN A PANDEMIC.  INTERVIEW OF PATIENT (IF PERFORMED), VISUAL INSPECTION OF PATIENT, AND DISCUSSION WITH BEDSIDE NURSE DONE REMOTELY USING VIDEO CONFERENCING SOFTWARE ([employer]).    LOCATION OF PATIENT:  MED-SURG UNIT AT [facility].  LOCATION OF PROVIDER:  PROVIDER'S PERSONAL RESIDENCE.  NURSING STAFF INVOLVED IN ENCOUNTER: Walt Junior RN      Subjective:     Principal Problem:Acute respiratory disease due to COVID-19 virus        HPI:  Patient is a 59-year-old morbidly obese male with past medical history significant for multiple medical problems including hypertension, hyperlipidemia, coronary artery disease status post coronary artery stent placement, obstructive sleep apnea (on CPAP), chronic combined systolic and diastolic congestive heart failure, history of TIA, long-term anticoagulation with Pradaxa and paroxysmal atrial fibrillation is being admitted to Hospital Medicine from [facility] Emergency Room under inpatient status for worsening shortness of breath and right lower quadrant abdominal pain.  Patient presented to the emergency room with complaint of profound generalized weakness associated with diarrhea and vomiting for 1 day.  If patient feels dizzy and has also been experiencing nonradiating right lower quadrant abdominal pain.  Patient denies any hematemesis, melena or bleeding per rectum.  No recent travel or sick contact history reported.  Patient'sCOVID 19 test is positive in the emergency room.  Patient was recently discharged from [facility]  facility.  In the emergency room patient was noted to be hypoxic with exertion and minimal level around 85%.  Presently requiring 3 L per minute supplemental oxygen via nasal cannula.        Overview/Hospital Course:  Patient was admitted for acute on chronic respiratory failure likely secondary to COVID 19 superimposed on COPD and obesity hypoventilation.  Patient was started on appropriate management of COVID19 including supplemental oxygen, nebulized bronchodilators, and improved slowly over the course of his hospitalization.  Given his concomitant COPD, the patient was started on oral corticosteroids.  He did have episodes of hyperglycemia and hypoglycemia and his insulin dose was adjusted over the course of his hospitalization.  Patient had intermittent episodes of confusion and agitation with impulsivity, and was given intermittent antipsychotic for behavioral.  After hospital day 3-4, the patient's behavior improved.  He remained in the hospital secondary to issues related to his disposition and insurance status.   Managed by Dr. Palomo from 4/11-4/17.     Repeat COVID testing from 4/17/20 positive. Labs stable. Blood sugars elevated. Insulin regimen adjusted. Remains on 2 L NC. Awaiting LTAC placement.     Interval History:  No new complaints.  Glucose levels a little less.    Review of Systems   Constitutional: Positive for fatigue. Negative for chills and fever.   Respiratory: Positive for shortness of breath. Negative for cough.    Cardiovascular: Negative for chest pain.   Gastrointestinal: Negative for abdominal pain.     Objective:     Vital Signs (Most Recent):  Temp: 96.6 °F (35.9 °C) (04/19/20 0816)  Pulse: 66 (04/19/20 1247)  Resp: 18 (04/19/20 1247)  BP: (!) 151/84 (04/19/20 0816)  SpO2: 99 % (04/19/20 1247) Vital Signs (24h Range):  Temp:  [96.6 °F (35.9 °C)-97.6 °F (36.4 °C)] 96.6 °F (35.9 °C)  Pulse:  [61-67] 66  Resp:  [16-20] 18  SpO2:  [94 %-99 %] 99 %  BP: (112-184)/(53-96) 151/84      Weight: 135.6 kg (298 lb 15.1 oz)  Body mass index is 41.69 kg/m².    Intake/Output Summary (Last 24 hours) at 4/19/2020 1430  Last data filed at 4/19/2020 1200  Gross per 24 hour   Intake 1560 ml   Output --   Net 1560 ml      Physical Exam   Constitutional: He is oriented to person, place, and time. He is cooperative. He does not appear ill. No distress. Nasal cannula in place.   HENT:   Mouth/Throat: Oropharynx is clear and moist.   Eyes: Right eye exhibits no discharge. Left eye exhibits no discharge.   Neck: No tracheal deviation present.   Pulmonary/Chest: Effort normal.   Neurological: He is alert and oriented to person, place, and time.   Skin: No rash noted. He is not diaphoretic.   Psychiatric: His behavior is normal.       Significant Labs: All pertinent labs within the past 24 hours have been reviewed.    Significant Imaging: I have reviewed all pertinent imaging results/findings within the past 24 hours.      Assessment/Plan:      * Acute respiratory disease due to COVID-19 virus  - COVID-19 testing   - Infection Control notified 4/3/2020   -Repeat COVID testing on 4/17/20 positive     - Isolation:   - Airborne, Contact and Droplet Precautions  - Cohort patients into COVID units  - N95 masks must be fit tested, wear eye protection  - 20 second hand hygiene              - Limit visitors per hospital policy              - Consolidating lab draws, nursing care, provider visits, and interventions      - Management:  Supplemental O2 to maintain SpO2 >92%  Continuous/intermittent Pulse Ox  Albuterol INHALER PRN (avoid nebulization of secretions)  Avoiding BiPAP to prevent aerosolization (including home BiPAP)    Advance Care Planning -patient full code for now    COPD exacerbation  Patient's COPD is uncontrolled due to continued dyspnea, use of accessory muscles for breathing and worsening of baseline hypoxia currently.  Patient is currently off COPD Pathway. Continue scheduled inhalers Steroids,  Antibiotics and Supplemental oxygen and monitor respiratory status closely.         Type 2 diabetes mellitus with hyperglycemia, with long-term current use of insulin  Patient's FSGs are controlled on current hypoglycemics.   Last A1c reviewed-   Lab Results   Component Value Date    HGBA1C 7.8 (H) 04/03/2018     Most recent fingerstick glucose reviewed-   Recent Labs   Lab 04/18/20  1601 04/18/20  1932 04/19/20  0432   POCTGLUCOSE 243* 352* 209*     Current correctional scale  Low  Maintain anti-hyperglycemic dose as follows-   Antihyperglycemics (From admission, onward)    Start     Stop Route Frequency Ordered    04/19/20 0900  insulin detemir U-100 pen 18 Units      -- SubQ Daily 04/18/20 0920    04/11/20 0715  insulin aspart U-100 pen 5 Units      -- SubQ 3 times daily with meals 04/10/20 2150 04/03/20 1813  insulin aspart U-100 pen 0-5 Units      -- SubQ Before meals & nightly PRN 04/03/20 1813                1 PPD X 25+ YRs Chronic Tobacco Use Disorder  Smoking cessation counseling performed. Dangers of cigarette smoking were reviewed with patient in detail and patient was encouraged to quit.        Anxiety And Depression  Stable.  Continue meds.    Psychotherapeutics (From admission, onward)    Start     Stop Route Frequency Ordered    04/04/20 0900  sertraline tablet 100 mg      -- Oral Daily 04/03/20 1813    04/04/20 0700  buPROPion TB24 tablet 150 mg      -- Oral Every morning 04/03/20 1813    04/03/20 2100  ALPRAZolam tablet 0.5 mg      -- Oral 2 times daily 04/03/20 1813            Stage 2 CKD  Creatine stable for now. BMP reviewed- noted Estimated Creatinine Clearance: 111.8 mL/min (based on SCr of 1 mg/dL). according to latest data. Monitor UOP and serial BMP and adjust therapy as needed. Renally dose meds.                Hypertension associated with diabetes  Chronic, controlled.  Latest blood pressure and vitals reviewed-   Temp:  [96.6 °F (35.9 °C)-97.6 °F (36.4 °C)]   Pulse:  [61-67]   Resp:   [16-20]   BP: (112-184)/(53-96)   SpO2:  [94 %-99 %] .   Home meds for hypertension were reviewed and noted below. Hospital anti-hypertensive changes were made as shown below.  Hypertension Medications at home            furosemide (LASIX) 40 MG tablet Take 1 tablet (40 mg total) by mouth 2 (two) times daily.    hydrALAZINE (APRESOLINE) 10 MG tablet Take 10 mg by mouth every 12 (twelve) hours.    lisinopril (PRINIVIL,ZESTRIL) 20 MG tablet Take 1 tablet (20 mg total) by mouth 2 (two) times daily.    metoprolol tartrate (LOPRESSOR) 25 MG tablet TAKE 1 TABLET BY MOUTH 2 TIMES DAILY.    spironolactone (ALDACTONE) 25 MG tablet Take 1 tablet (25 mg total) by mouth every morning.      Hospital Medications             hydrALAZINE injection 10 mg 10 mg, Intravenous, Every 8 hours PRN, 1. Consider placing patient on continuous cardiac monitor (obtain order if needed).<BR>2. Monitor BP and HR pre-administration, post-administration, then every 30 minutes x2, then every hour x2.<BR>3. Administer at a rate no faster than 5mg over 1 minute.<BR>    lisinopriL tablet 10 mg 10 mg, Oral, Daily    metoprolol tartrate (LOPRESSOR) tablet 25 mg 25 mg, Oral, 2 times daily, Hold for HR &lt;60 or BP &lt;90/60    spironolactone tablet 25 mg 25 mg, Oral, Every morning          Atrial Fibrillation With H/O RVR  Patient with Persistent atrial fibrillation which is controlled currently with Beta Blocker and Amiodarone. [id number] score 5. Anticoagulation indicated. Anticoagulation done with Dabigatran.        Coronary Artery Disease With H/O Stenting  Patient with known CAD s/p CABG. Will continue and monitor for S/Sx of angina/ACS. Continue to monitor on telemetry.        JULY on CPAP  Continue supplemental oxygen to maintain pulse ox above 92%.  Unable to use CPAP per hospital policy at this time due to COVID-19.      Morbid obesity with BMI of 40.0-44.9, adult  Body mass index is 41.69 kg/m². Morbid obesity complicates all aspects of disease  management from diagnostic modalities to treatment. Weight loss encouraged and health benefits explained to patient.            VTE Risk Mitigation (From admission, onward)         Ordered     dabigatran etexilate capsule 75 mg  2 times daily      04/03/20 1813     IP VTE HIGH RISK PATIENT  Once      04/03/20 1813     Place OMI hose  Until discontinued      04/03/20 1813     Place sequential compression device  Until discontinued      04/03/20 1813                      Hair Ulrich MD  Department of Hospital Medicine   [facility]

## 2020-04-19 NOTE — ASSESSMENT & PLAN NOTE
Smoking cessation counseling performed. Dangers of cigarette smoking were reviewed with patient in detail and patient was encouraged to quit.

## 2020-04-19 NOTE — ASSESSMENT & PLAN NOTE
Patient with Persistent atrial fibrillation which is controlled currently with Beta Blocker and Amiodarone. [id number] score 5. Anticoagulation indicated. Anticoagulation done with Dabigatran.

## 2020-04-19 NOTE — SUBJECTIVE & OBJECTIVE
Interval History:  No new complaints.  Glucose levels a little less.    Review of Systems   Constitutional: Positive for fatigue. Negative for chills and fever.   Respiratory: Positive for shortness of breath. Negative for cough.    Cardiovascular: Negative for chest pain.   Gastrointestinal: Negative for abdominal pain.     Objective:     Vital Signs (Most Recent):  Temp: 96.6 °F (35.9 °C) (04/19/20 0816)  Pulse: 66 (04/19/20 1247)  Resp: 18 (04/19/20 1247)  BP: (!) 151/84 (04/19/20 0816)  SpO2: 99 % (04/19/20 1247) Vital Signs (24h Range):  Temp:  [96.6 °F (35.9 °C)-97.6 °F (36.4 °C)] 96.6 °F (35.9 °C)  Pulse:  [61-67] 66  Resp:  [16-20] 18  SpO2:  [94 %-99 %] 99 %  BP: (112-184)/(53-96) 151/84     Weight: 135.6 kg (298 lb 15.1 oz)  Body mass index is 41.69 kg/m².    Intake/Output Summary (Last 24 hours) at 4/19/2020 1430  Last data filed at 4/19/2020 1200  Gross per 24 hour   Intake 1560 ml   Output --   Net 1560 ml      Physical Exam   Constitutional: He is oriented to person, place, and time. He is cooperative. He does not appear ill. No distress. Nasal cannula in place.   HENT:   Mouth/Throat: Oropharynx is clear and moist.   Eyes: Right eye exhibits no discharge. Left eye exhibits no discharge.   Neck: No tracheal deviation present.   Pulmonary/Chest: Effort normal.   Neurological: He is alert and oriented to person, place, and time.   Skin: No rash noted. He is not diaphoretic.   Psychiatric: His behavior is normal.       Significant Labs: All pertinent labs within the past 24 hours have been reviewed.    Significant Imaging: I have reviewed all pertinent imaging results/findings within the past 24 hours.

## 2020-04-19 NOTE — ASSESSMENT & PLAN NOTE
Patient's FSGs are controlled on current hypoglycemics.   Last A1c reviewed-   Lab Results   Component Value Date    HGBA1C 7.8 (H) 04/03/2018     Most recent fingerstick glucose reviewed-   Recent Labs   Lab 04/18/20  1601 04/18/20  1932 04/19/20  0432   POCTGLUCOSE 243* 352* 209*     Current correctional scale  Low  Maintain anti-hyperglycemic dose as follows-   Antihyperglycemics (From admission, onward)    Start     Stop Route Frequency Ordered    04/19/20 0900  insulin detemir U-100 pen 18 Units      -- SubQ Daily 04/18/20 0920    04/11/20 0715  insulin aspart U-100 pen 5 Units      -- SubQ 3 times daily with meals 04/10/20 2150    04/03/20 1813  insulin aspart U-100 pen 0-5 Units      -- SubQ Before meals & nightly PRN 04/03/20 1813

## 2020-04-19 NOTE — ASSESSMENT & PLAN NOTE
Chronic, controlled.  Latest blood pressure and vitals reviewed-   Temp:  [96.6 °F (35.9 °C)-97.6 °F (36.4 °C)]   Pulse:  [61-67]   Resp:  [16-20]   BP: (112-184)/(53-96)   SpO2:  [94 %-99 %] .   Home meds for hypertension were reviewed and noted below. Hospital anti-hypertensive changes were made as shown below.  Hypertension Medications at home            furosemide (LASIX) 40 MG tablet Take 1 tablet (40 mg total) by mouth 2 (two) times daily.    hydrALAZINE (APRESOLINE) 10 MG tablet Take 10 mg by mouth every 12 (twelve) hours.    lisinopril (PRINIVIL,ZESTRIL) 20 MG tablet Take 1 tablet (20 mg total) by mouth 2 (two) times daily.    metoprolol tartrate (LOPRESSOR) 25 MG tablet TAKE 1 TABLET BY MOUTH 2 TIMES DAILY.    spironolactone (ALDACTONE) 25 MG tablet Take 1 tablet (25 mg total) by mouth every morning.      Hospital Medications             hydrALAZINE injection 10 mg 10 mg, Intravenous, Every 8 hours PRN, 1. Consider placing patient on continuous cardiac monitor (obtain order if needed).<BR>2. Monitor BP and HR pre-administration, post-administration, then every 30 minutes x2, then every hour x2.<BR>3. Administer at a rate no faster than 5mg over 1 minute.<BR>    lisinopriL tablet 10 mg 10 mg, Oral, Daily    metoprolol tartrate (LOPRESSOR) tablet 25 mg 25 mg, Oral, 2 times daily, Hold for HR &lt;60 or BP &lt;90/60    spironolactone tablet 25 mg 25 mg, Oral, Every morning

## 2020-04-19 NOTE — PLAN OF CARE
Patient remains on aerosol tx via duoneb now and q6hr.  Hr 60 and 02 saturation 97% on nc at 2lpm.

## 2020-04-19 NOTE — ASSESSMENT & PLAN NOTE
Stable.  Continue meds.    Psychotherapeutics (From admission, onward)    Start     Stop Route Frequency Ordered    04/04/20 0900  sertraline tablet 100 mg      -- Oral Daily 04/03/20 1813 04/04/20 0700  buPROPion TB24 tablet 150 mg      -- Oral Every morning 04/03/20 1813 04/03/20 2100  ALPRAZolam tablet 0.5 mg      -- Oral 2 times daily 04/03/20 1813

## 2020-04-19 NOTE — PLAN OF CARE
Attempt was made to contact patient's relative, Yady Petersen, in regards to recent vital signs, labs, findings, and recommendations by the primary team as it pertains to the current admission for COVID-19. However, was unable to reach this point of contact at the listed numbers.  [facility] will attempt this call again tomorrow.

## 2020-04-19 NOTE — CARE UPDATE
04/18/20 1845   Patient Assessment/Suction   Level of Consciousness (AVPU) alert   Respiratory Effort Unlabored   Expansion/Accessory Muscles/Retractions no use of accessory muscles;no retractions   All Lung Fields Breath Sounds diminished   Rhythm/Pattern, Respiratory no shortness of breath reported   Cough Frequency no cough   PRE-TX-O2   O2 Device (Oxygen Therapy) nasal cannula   $ Is the patient on Low Flow Oxygen? Yes   Flow (L/min) 2   SpO2 95 %   Pulse Oximetry Type Continuous   $ Pulse Oximetry - Multiple Charge Pulse Oximetry - Multiple   Pulse 67   Resp 18   Aerosol Therapy   $ Aerosol Therapy Charges Aerosol Treatment   Respiratory Treatment Status (SVN) given   Treatment Route (SVN) mask   Patient Position (SVN) HOB elevated   Post Treatment Assessment (SVN) breath sounds improved   Signs of Intolerance (SVN) none

## 2020-04-20 LAB
POCT GLUCOSE: 184 MG/DL (ref 70–110)
POCT GLUCOSE: 285 MG/DL (ref 70–110)

## 2020-04-20 PROCEDURE — 97110 THERAPEUTIC EXERCISES: CPT

## 2020-04-20 PROCEDURE — 97530 THERAPEUTIC ACTIVITIES: CPT

## 2020-04-20 PROCEDURE — 27000221 HC OXYGEN, UP TO 24 HOURS

## 2020-04-20 PROCEDURE — 12000002 HC ACUTE/MED SURGE SEMI-PRIVATE ROOM

## 2020-04-20 PROCEDURE — 25000003 PHARM REV CODE 250: Performed by: INTERNAL MEDICINE

## 2020-04-20 PROCEDURE — 94640 AIRWAY INHALATION TREATMENT: CPT

## 2020-04-20 PROCEDURE — 63600175 PHARM REV CODE 636 W HCPCS: Performed by: INTERNAL MEDICINE

## 2020-04-20 PROCEDURE — 25000003 PHARM REV CODE 250: Performed by: HOSPITALIST

## 2020-04-20 PROCEDURE — 97535 SELF CARE MNGMENT TRAINING: CPT

## 2020-04-20 PROCEDURE — 94761 N-INVAS EAR/PLS OXIMETRY MLT: CPT

## 2020-04-20 PROCEDURE — 97530 THERAPEUTIC ACTIVITIES: CPT | Mod: CQ

## 2020-04-20 PROCEDURE — 25000242 PHARM REV CODE 250 ALT 637 W/ HCPCS: Performed by: HOSPITALIST

## 2020-04-20 PROCEDURE — C9399 UNCLASSIFIED DRUGS OR BIOLOG: HCPCS | Performed by: INTERNAL MEDICINE

## 2020-04-20 PROCEDURE — 63600175 PHARM REV CODE 636 W HCPCS: Performed by: HOSPITALIST

## 2020-04-20 RX ORDER — DOCUSATE SODIUM 100 MG/1
200 CAPSULE, LIQUID FILLED ORAL 2 TIMES DAILY
Status: DISCONTINUED | OUTPATIENT
Start: 2020-04-20 | End: 2020-04-22 | Stop reason: HOSPADM

## 2020-04-20 RX ADMIN — IPRATROPIUM BROMIDE AND ALBUTEROL SULFATE 3 ML: .5; 3 SOLUTION RESPIRATORY (INHALATION) at 01:04

## 2020-04-20 RX ADMIN — SPIRONOLACTONE 25 MG: 25 TABLET ORAL at 08:04

## 2020-04-20 RX ADMIN — BUPROPION HYDROCHLORIDE 150 MG: 150 TABLET, EXTENDED RELEASE ORAL at 06:04

## 2020-04-20 RX ADMIN — ALPRAZOLAM 0.5 MG: 0.25 TABLET ORAL at 08:04

## 2020-04-20 RX ADMIN — INSULIN ASPART 3 UNITS: 100 INJECTION, SOLUTION INTRAVENOUS; SUBCUTANEOUS at 04:04

## 2020-04-20 RX ADMIN — AMIODARONE HYDROCHLORIDE 200 MG: 200 TABLET ORAL at 08:04

## 2020-04-20 RX ADMIN — LISINOPRIL 10 MG: 10 TABLET ORAL at 08:04

## 2020-04-20 RX ADMIN — DABIGATRAN ETEXILATE MESYLATE 75 MG: 75 CAPSULE ORAL at 08:04

## 2020-04-20 RX ADMIN — METOPROLOL TARTRATE 25 MG: 25 TABLET, FILM COATED ORAL at 08:04

## 2020-04-20 RX ADMIN — GABAPENTIN 300 MG: 300 CAPSULE ORAL at 08:04

## 2020-04-20 RX ADMIN — INSULIN DETEMIR 18 UNITS: 100 INJECTION, SOLUTION SUBCUTANEOUS at 08:04

## 2020-04-20 RX ADMIN — PREDNISONE 40 MG: 20 TABLET ORAL at 08:04

## 2020-04-20 RX ADMIN — DOCUSATE SODIUM 200 MG: 100 CAPSULE, LIQUID FILLED ORAL at 08:04

## 2020-04-20 RX ADMIN — ASPIRIN 81 MG: 81 TABLET, COATED ORAL at 08:04

## 2020-04-20 RX ADMIN — INSULIN ASPART 5 UNITS: 100 INJECTION, SOLUTION INTRAVENOUS; SUBCUTANEOUS at 12:04

## 2020-04-20 RX ADMIN — Medication 400 MG: at 08:04

## 2020-04-20 RX ADMIN — INSULIN ASPART 5 UNITS: 100 INJECTION, SOLUTION INTRAVENOUS; SUBCUTANEOUS at 08:04

## 2020-04-20 RX ADMIN — SERTRALINE HYDROCHLORIDE 100 MG: 50 TABLET ORAL at 08:04

## 2020-04-20 RX ADMIN — PRAVASTATIN SODIUM 80 MG: 40 TABLET ORAL at 08:04

## 2020-04-20 RX ADMIN — INSULIN ASPART 5 UNITS: 100 INJECTION, SOLUTION INTRAVENOUS; SUBCUTANEOUS at 04:04

## 2020-04-20 RX ADMIN — IPRATROPIUM BROMIDE AND ALBUTEROL SULFATE 3 ML: .5; 3 SOLUTION RESPIRATORY (INHALATION) at 07:04

## 2020-04-20 RX ADMIN — IPRATROPIUM BROMIDE AND ALBUTEROL SULFATE 3 ML: .5; 3 SOLUTION RESPIRATORY (INHALATION) at 06:04

## 2020-04-20 NOTE — CARE UPDATE
04/20/20 0103   Patient Assessment/Suction   Level of Consciousness (AVPU) alert   Respiratory Effort Unlabored   Expansion/Accessory Muscles/Retractions no use of accessory muscles;no retractions   All Lung Fields Breath Sounds diminished   Rhythm/Pattern, Respiratory no shortness of breath reported   Cough Frequency infrequent   Cough Type good;nonproductive   PRE-TX-O2   O2 Device (Oxygen Therapy) nasal cannula   $ Is the patient on Low Flow Oxygen? Yes   Flow (L/min) 2   SpO2 96 %   Pulse Oximetry Type Continuous   $ Pulse Oximetry - Multiple Charge Pulse Oximetry - Multiple   Pulse 64   Resp 18   Aerosol Therapy   $ Aerosol Therapy Charges Aerosol Treatment   Respiratory Treatment Status (SVN) given   Treatment Route (SVN) mask   Patient Position (SVN) HOB elevated   Post Treatment Assessment (SVN) breath sounds improved   Signs of Intolerance (SVN) none   Breath Sounds Post-Respiratory Treatment   Throughout All Fields Post-Treatment aeration increased

## 2020-04-20 NOTE — ASSESSMENT & PLAN NOTE
- COVID-19 testing   - Infection Control notified 4/3/2020   -Repeat COVID testing on 4/17/20 positive (2nd test).  Will re-test him either tomorrow or next day.     - Isolation:   - Airborne, Contact and Droplet Precautions  - Cohort patients into COVID units  - N95 masks must be fit tested, wear eye protection  - 20 second hand hygiene              - Limit visitors per hospital policy              - Consolidating lab draws, nursing care, provider visits, and interventions      - Management:  Supplemental O2 to maintain SpO2 >92%  Continuous/intermittent Pulse Ox  Albuterol INHALER PRN (avoid nebulization of secretions)  Avoiding BiPAP to prevent aerosolization (including home BiPAP)    Advance Care Planning -patient full code for now

## 2020-04-20 NOTE — PT/OT/SLP PROGRESS
Occupational Therapy   Treatment    Name: Jose Leon  MRN: [id number]  Admitting Diagnosis:  Acute respiratory disease due to COVID-19 virus       Recommendations:     Discharge Recommendations: nursing facility, skilled  Discharge Equipment Recommendations:  bedside commode  Barriers to discharge:  Decreased caregiver support    Assessment:     Jose Leon is a 59 y.o. male with a medical diagnosis of Acute respiratory disease due to COVID-19 virus.  He presents with significant improvements with static/dynamic sitting balance requiring no assist to maintain balance at EOB. Patient was able to perform lateral trunk flexion exercises at EOB without requiring assistance to re-align back to midline. Patient able to don/doff socks requiring CGA and verbal cues to eliminate excessive bending to reduce fall risk.  Performance deficits affecting function are weakness, impaired endurance, decreased coordination, impaired functional mobilty, gait instability, decreased upper extremity function, decreased lower extremity function, impaired fine motor, impaired self care skills.     Rehab Prognosis:  Good; patient would benefit from acute skilled OT services to address these deficits and reach maximum level of function.       Plan:     Patient to be seen 5 x/week to address the above listed problems via self-care/home management, therapeutic activities, therapeutic exercises  · Plan of Care Expires: 05/05/20  · Plan of Care Reviewed with: patient    Subjective     Pain/Comfort:  · Pain Rating 1: 0/10  · Pain Rating 2: 0/10    Objective:     Communicated with: nurse Celaya prior to session.  Patient found HOB elevated with telemetry, pulse ox (continuous), bed alarm upon OT entry to room.    General Precautions: Standard, airborne, contact, fall, droplet   Orthopedic Precautions:N/A   Braces: N/A     Occupational Performance:     Bed Mobility:    · Patient completed Scooting/Bridging with contact guard assistance  · Patient  completed Supine to Sit with stand by assistance  · Patient completed Sit to Supine with stand by assistance     Functional Mobility/Transfers:  · Patient completed Sit <> Stand Transfer with minimum assistance  with  rolling walker and verbal cues for proper hand placement  · Functional Mobility: Fair+ with sitting balance and fair with standing balance. Pt stood for ~2 min with RW requiring CGA.     Activities of Daily Living:  · Feeding:  stand by assistance to eat lunch while seated upright in bed. Patient still required verbal cues to incorporate L UE to steady plate when scooping food with eating utensil in R UE.  · Lower Body Dressing: contact guard assistance to don/doff socks with LE positioned on bed as opposed to bending forward.      [facility] 6 Click ADL: 17    Treatment & Education:  OT ed patient on safety with walker use for functional mobility with cues for hand placement & sequencing.   OT instructed patient on alternative methods for LE dressing while seated as to avoid excessive bending and reduce fall risk.  OT instructed patient incorporating L UE with feeding and dressing activities as to improve task proficiency with self care.  Pt performed L UE elbow flexion (touching fingers to mouth & nose) x 25 reps with breaks in between sets of 5 reps.    Patient left HOB elevated with all lines intact, call button in reach, bed alarm on and nurse notifiedEducation:      GOALS:   Multidisciplinary Problems     Occupational Therapy Goals        Problem: Occupational Therapy Goal    Goal Priority Disciplines Outcome Interventions   Occupational Therapy Goal     OT, PT/OT Ongoing, Progressing    Description:  Revised Goals to be met by: 5/5/2020     Patient will increase functional independence with ADLs by performing:    UE Dressing with Minimal Assistance seated EOB.  LE Dressing with Minimal Assistance and Assistive Devices as needed.  Grooming while EOB with Stand-by Assistance.  Supine to sit with  Minimal Assistance for UE dressing.  Sit to stand with Minimal Assistance for LE dressing.  Upper extremity exercise program x10 reps per handout, with supervision.    Goals to be met by: 5/5/2020     Patient will increase functional independence with ADLs by performing:    LE Dressing with Contact Guard Assistance and Assistive Devices as needed.  Grooming while EOB with Stand-by Assistance.  Toileting from toilet with Minimal Assistance for hygiene and clothing management. - Discontinue (pt is incontinent)  Supine to sit with Minimal Assistance.  Toilet transfer to bedside commode with Minimal Assistance. - Discontinue (pt is incontinent)  Upper extremity exercise program x10 reps per handout, with supervision.                       Time Tracking:     OT Date of Treatment: 04/20/20  OT Start Time: 1124  OT Stop Time: 1207  OT Total Time (min): 43 min    Billable Minutes:Self Care/Home Management 15  Therapeutic Activity 13  Therapeutic Exercise 15    Gerry Grant, OT  4/20/2020

## 2020-04-20 NOTE — ASSESSMENT & PLAN NOTE
Creatinine Clearance at this time is 108.  Patient does not have stage 2 CKD.  He does not have CKD at all.  Will remove diagnosis.

## 2020-04-20 NOTE — ASSESSMENT & PLAN NOTE
Patient's FSGs are controlled on current hypoglycemics.   Last A1c reviewed-   Lab Results   Component Value Date    HGBA1C 7.8 (H) 04/03/2018     Most recent fingerstick glucose reviewed-   Recent Labs   Lab 04/19/20 1954 04/20/20  0521   POCTGLUCOSE 317* 184*     Current correctional scale  Low  Maintain anti-hyperglycemic dose as follows-   Antihyperglycemics (From admission, onward)    Start     Stop Route Frequency Ordered    04/19/20 0900  insulin detemir U-100 pen 18 Units      -- SubQ Daily 04/18/20 0920    04/11/20 0715  insulin aspart U-100 pen 5 Units      -- SubQ 3 times daily with meals 04/10/20 2150    04/03/20 1813  insulin aspart U-100 pen 0-5 Units      -- SubQ Before meals & nightly PRN 04/03/20 1813

## 2020-04-20 NOTE — SUBJECTIVE & OBJECTIVE
Interval History:  No new complaints.  Glucose levels improved.  Requesting stool softener.    Review of Systems   Constitutional: Negative for chills, fatigue and fever.   Respiratory: Positive for shortness of breath (with exertion). Negative for cough.    Cardiovascular: Negative for chest pain.   Gastrointestinal: Negative for abdominal pain.     Objective:     Vital Signs (Most Recent):  Temp: 98.1 °F (36.7 °C) (04/20/20 1626)  Pulse: 61 (04/20/20 1626)  Resp: 20 (04/20/20 1626)  BP: 113/74 (04/20/20 1626)  SpO2: 95 % (04/20/20 1626) Vital Signs (24h Range):  Temp:  [96.3 °F (35.7 °C)-98.1 °F (36.7 °C)] 98.1 °F (36.7 °C)  Pulse:  [60-72] 61  Resp:  [18-20] 20  SpO2:  [95 %-100 %] 95 %  BP: (111-127)/(66-78) 113/74     Weight: 128 kg (282 lb 3 oz)  Body mass index is 39.36 kg/m².    Intake/Output Summary (Last 24 hours) at 4/20/2020 1736  Last data filed at 4/20/2020 0400  Gross per 24 hour   Intake 720 ml   Output --   Net 720 ml      Physical Exam   Constitutional: He is oriented to person, place, and time. He is cooperative. He does not appear ill. No distress. Nasal cannula in place.   HENT:   Mouth/Throat: Oropharynx is clear and moist.   Eyes: Right eye exhibits no discharge. Left eye exhibits no discharge.   Neck: No tracheal deviation present.   Pulmonary/Chest: Effort normal.   Neurological: He is alert and oriented to person, place, and time.   Skin: No rash noted. He is not diaphoretic.   Psychiatric: His behavior is normal.       Significant Labs: All pertinent labs within the past 24 hours have been reviewed.    Significant Imaging: I have reviewed all pertinent imaging results/findings within the past 24 hours.

## 2020-04-20 NOTE — PLAN OF CARE
Problem: Physical Therapy Goal  Goal: Physical Therapy Goal  Description  Goals to be met by: 2020    Patient will increase functional independence with mobility by performin. Supine to sit with Stand-by Assistance  2. Sit to supine with Stand-by Assistance  3. Sit to stand transfer with Stand-by Assistance  4. Bed to chair transfer with Contact Guard Assistance using Rolling Walker  5. Gait  x 50 feet with Minimal Assistance using Rolling Walker.      Outcome: Ongoing, Progressing   Therapeutic activity : bed mobility, tranfsers with rw and min / Mod A, LE exercises.

## 2020-04-20 NOTE — PROGRESS NOTES
[facility] Hospital Medicine  Progress Note    Patient Name: Jose Leon  MRN: [id number]  Patient Class: IP- Inpatient   Admission Date: 4/3/2020  Length of Stay: 17 days  Attending Physician: Hair Ulrich MD  Primary Care Provider: Josh Giordano MD    THIS WAS A TELEMEDICINE ENCOUNTER FOR A PATIENT WITH NOVEL CORONAVIRUS, OF WHICH THE WORLD IS CURRENTLY IN A PANDEMIC.  INTERVIEW OF PATIENT (IF PERFORMED), VISUAL INSPECTION OF PATIENT, AND DISCUSSION WITH BEDSIDE NURSE DONE REMOTELY USING VIDEO CONFERENCING SOFTWARE (MST).    LOCATION OF PATIENT:  MED SURG UNIT AT [facility].  LOCATION OF PROVIDER:  PROVIDER'S PERSONAL RESIDENCE.  NURSING STAFF INVOLVED IN ENCOUNTER: William Gutierrez LPN      Subjective:     Principal Problem:Acute respiratory disease due to COVID-19 virus        HPI:  Patient is a 59-year-old morbidly obese male with past medical history significant for multiple medical problems including hypertension, hyperlipidemia, coronary artery disease status post coronary artery stent placement, obstructive sleep apnea (on CPAP), chronic combined systolic and diastolic congestive heart failure, history of TIA, long-term anticoagulation with Pradaxa and paroxysmal atrial fibrillation is being admitted to Hospital Medicine from [facility] Emergency Room under inpatient status for worsening shortness of breath and right lower quadrant abdominal pain.  Patient presented to the emergency room with complaint of profound generalized weakness associated with diarrhea and vomiting for 1 day.  If patient feels dizzy and has also been experiencing nonradiating right lower quadrant abdominal pain.  Patient denies any hematemesis, melena or bleeding per rectum.  No recent travel or sick contact history reported.  Patient'sCOVID 19 test is positive in the emergency room.  Patient was recently discharged from [facility]  facility.  In the emergency room patient was noted to be hypoxic with exertion and minimal level around 85%.  Presently requiring 3 L per minute supplemental oxygen via nasal cannula.        Overview/Hospital Course:  Patient was admitted for acute on chronic respiratory failure likely secondary to COVID 19 superimposed on COPD and obesity hypoventilation.  Patient was started on appropriate management of COVID19 including supplemental oxygen, nebulized bronchodilators, and improved slowly over the course of his hospitalization.  Given his concomitant COPD, the patient was started on oral corticosteroids.  He did have episodes of hyperglycemia and hypoglycemia and his insulin dose was adjusted over the course of his hospitalization.  Patient had intermittent episodes of confusion and agitation with impulsivity, and was given intermittent antipsychotic for behavioral.  After hospital day 3-4, the patient's behavior improved.  He remained in the hospital secondary to issues related to his disposition and insurance status.   Managed by Dr. Palomo from 4/11-4/17.     Repeat COVID testing from 4/17/20 positive. Labs stable. Blood sugars elevated. Insulin regimen adjusted. Remains on 2 L NC. Awaiting LTAC placement.     Interval History:  No new complaints.  Glucose levels improved.  Requesting stool softener.    Review of Systems   Constitutional: Negative for chills, fatigue and fever.   Respiratory: Positive for shortness of breath (with exertion). Negative for cough.    Cardiovascular: Negative for chest pain.   Gastrointestinal: Negative for abdominal pain.     Objective:     Vital Signs (Most Recent):  Temp: 98.1 °F (36.7 °C) (04/20/20 1626)  Pulse: 61 (04/20/20 1626)  Resp: 20 (04/20/20 1626)  BP: 113/74 (04/20/20 1626)  SpO2: 95 % (04/20/20 1626) Vital Signs (24h Range):  Temp:  [96.3 °F (35.7 °C)-98.1 °F (36.7 °C)] 98.1 °F (36.7 °C)  Pulse:  [60-72] 61  Resp:  [18-20] 20  SpO2:  [95 %-100 %] 95 %  BP:  (111-127)/(66-78) 113/74     Weight: 128 kg (282 lb 3 oz)  Body mass index is 39.36 kg/m².    Intake/Output Summary (Last 24 hours) at 4/20/2020 1736  Last data filed at 4/20/2020 0400  Gross per 24 hour   Intake 720 ml   Output --   Net 720 ml      Physical Exam   Constitutional: He is oriented to person, place, and time. He is cooperative. He does not appear ill. No distress. Nasal cannula in place.   HENT:   Mouth/Throat: Oropharynx is clear and moist.   Eyes: Right eye exhibits no discharge. Left eye exhibits no discharge.   Neck: No tracheal deviation present.   Pulmonary/Chest: Effort normal.   Neurological: He is alert and oriented to person, place, and time.   Skin: No rash noted. He is not diaphoretic.   Psychiatric: His behavior is normal.       Significant Labs: All pertinent labs within the past 24 hours have been reviewed.    Significant Imaging: I have reviewed all pertinent imaging results/findings within the past 24 hours.      Assessment/Plan:      * Acute respiratory disease due to COVID-19 virus  - COVID-19 testing   - Infection Control notified 4/3/2020   -Repeat COVID testing on 4/17/20 positive (2nd test).  Will re-test him either tomorrow or next day.     - Isolation:   - Airborne, Contact and Droplet Precautions  - Cohort patients into COVID units  - N95 masks must be fit tested, wear eye protection  - 20 second hand hygiene              - Limit visitors per hospital policy              - Consolidating lab draws, nursing care, provider visits, and interventions      - Management:  Supplemental O2 to maintain SpO2 >92%  Continuous/intermittent Pulse Ox  Albuterol INHALER PRN (avoid nebulization of secretions)  Avoiding BiPAP to prevent aerosolization (including home BiPAP)    Advance Care Planning -patient full code for now    COPD exacerbation  Patient's COPD is uncontrolled due to continued dyspnea, use of accessory muscles for breathing and worsening of baseline hypoxia currently.  Patient  is currently off COPD Pathway. Continue scheduled inhalers Steroids, Antibiotics and Supplemental oxygen and monitor respiratory status closely.         Type 2 diabetes mellitus with hyperglycemia, with long-term current use of insulin  Patient's FSGs are controlled on current hypoglycemics.   Last A1c reviewed-   Lab Results   Component Value Date    HGBA1C 7.8 (H) 04/03/2018     Most recent fingerstick glucose reviewed-   Recent Labs   Lab 04/19/20  1954 04/20/20  0521   POCTGLUCOSE 317* 184*     Current correctional scale  Low  Maintain anti-hyperglycemic dose as follows-   Antihyperglycemics (From admission, onward)    Start     Stop Route Frequency Ordered    04/19/20 0900  insulin detemir U-100 pen 18 Units      -- SubQ Daily 04/18/20 0920    04/11/20 0715  insulin aspart U-100 pen 5 Units      -- SubQ 3 times daily with meals 04/10/20 2150    04/03/20 1813  insulin aspart U-100 pen 0-5 Units      -- SubQ Before meals & nightly PRN 04/03/20 1813                1 PPD X 25+ YRs Chronic Tobacco Use Disorder  Smoking cessation counseling performed. Dangers of cigarette smoking were reviewed with patient in detail and patient was encouraged to quit.        Anxiety And Depression  Stable.  Continue meds.    Psychotherapeutics (From admission, onward)    Start     Stop Route Frequency Ordered    04/04/20 0900  sertraline tablet 100 mg      -- Oral Daily 04/03/20 1813    04/04/20 0700  buPROPion TB24 tablet 150 mg      -- Oral Every morning 04/03/20 1813    04/03/20 2100  ALPRAZolam tablet 0.5 mg      -- Oral 2 times daily 04/03/20 1813            Stage 2 CKD  Creatinine Clearance at this time is 108.  Patient does not have stage 2 CKD.  He does not have CKD at all.  Will remove diagnosis.            Hypertension associated with diabetes  Chronic, controlled.  Latest blood pressure and vitals reviewed-   Temp:  [96.3 °F (35.7 °C)-98.1 °F (36.7 °C)]   Pulse:  [60-72]   Resp:  [18-20]   BP: (111-127)/(66-78)   SpO2:   [95 %-100 %] .   Home meds for hypertension were reviewed and noted below. Hospital anti-hypertensive changes were made as shown below.  Hypertension Medications at home            furosemide (LASIX) 40 MG tablet Take 1 tablet (40 mg total) by mouth 2 (two) times daily.    hydrALAZINE (APRESOLINE) 10 MG tablet Take 10 mg by mouth every 12 (twelve) hours.    lisinopril (PRINIVIL,ZESTRIL) 20 MG tablet Take 1 tablet (20 mg total) by mouth 2 (two) times daily.    metoprolol tartrate (LOPRESSOR) 25 MG tablet TAKE 1 TABLET BY MOUTH 2 TIMES DAILY.    spironolactone (ALDACTONE) 25 MG tablet Take 1 tablet (25 mg total) by mouth every morning.      Hospital Medications             hydrALAZINE injection 10 mg 10 mg, Intravenous, Every 8 hours PRN, 1. Consider placing patient on continuous cardiac monitor (obtain order if needed).2. Monitor BP and HR pre-administration, post-administration, then every 30 minutes x2, then every hour x2.3. Administer at a rate no faster than 5mg over 1 minute.    lisinopriL tablet 10 mg 10 mg, Oral, Daily    metoprolol tartrate (LOPRESSOR) tablet 25 mg 25 mg, Oral, 2 times daily, Hold for HR <60 or BP <90/60    spironolactone tablet 25 mg 25 mg, Oral, Every morning          Atrial Fibrillation With H/O RVR  Patient with Persistent atrial fibrillation which is controlled currently with Beta Blocker and Amiodarone. [id number] score 5. Anticoagulation indicated. Anticoagulation done with Dabigatran.        Coronary Artery Disease With H/O Stenting  Patient with known CAD s/p CABG. Will continue and monitor for S/Sx of angina/ACS. Continue to monitor on telemetry.        JULY on CPAP  Continue supplemental oxygen to maintain pulse ox above 92%.  Unable to use CPAP per hospital policy at this time due to COVID-19.      Morbid obesity with BMI of 40.0-44.9, adult  Body mass index is 41.69 kg/m². Morbid obesity complicates all aspects of disease management from diagnostic modalities to treatment. Weight  loss encouraged and health benefits explained to patient.          VTE Risk Mitigation (From admission, onward)         Ordered     dabigatran etexilate capsule 75 mg  2 times daily      04/03/20 1813     IP VTE HIGH RISK PATIENT  Once      04/03/20 1813     Place OMI hose  Until discontinued      04/03/20 1813     Place sequential compression device  Until discontinued      04/03/20 1813                      Hair Ulrich MD  Department of Hospital Medicine   [facility]

## 2020-04-20 NOTE — PLAN OF CARE
I reviewed most recent labs, vital signs, studies/tests, Consultant Physician/TU findings/impressions and recommendations, as well as the plan of care/recommendations and impressions of the Primary Hospitalist/Team with family member, Yady Petersen, via telephone.  All questions in regards to most recent plan of care for patient were answered to the family member's satisfaction.

## 2020-04-20 NOTE — PLAN OF CARE
OT goals remain appropriate. Continue with revised POC.  -Gerry Grant OT    Problem: Occupational Therapy Goal  Goal: Occupational Therapy Goal  Description  Revised Goals to be met by: 5/5/2020     Patient will increase functional independence with ADLs by performing:    UE Dressing with Minimal Assistance seated EOB.  LE Dressing with Minimal Assistance and Assistive Devices as needed.  Grooming while EOB with Stand-by Assistance.  Supine to sit with Minimal Assistance for UE dressing.  Sit to stand with Minimal Assistance for LE dressing.  Upper extremity exercise program x10 reps per handout, with supervision.    Goals to be met by: 5/5/2020     Patient will increase functional independence with ADLs by performing:    LE Dressing with Contact Guard Assistance and Assistive Devices as needed.  Grooming while EOB with Stand-by Assistance.  Toileting from toilet with Minimal Assistance for hygiene and clothing management. - Discontinue (pt is incontinent)  Supine to sit with Minimal Assistance.  Toilet transfer to bedside commode with Minimal Assistance. - Discontinue (pt is incontinent)  Upper extremity exercise program x10 reps per handout, with supervision.      Outcome: Ongoing, Progressing

## 2020-04-20 NOTE — ASSESSMENT & PLAN NOTE
Patient with Persistent atrial fibrillation which is controlled currently with Beta Blocker and Amiodarone. [id number] score 5. Anticoagulation indicated. Anticoagulation done with Dabigatran.

## 2020-04-20 NOTE — CARE UPDATE
04/20/20 0646   Patient Assessment/Suction   Level of Consciousness (AVPU) alert   Respiratory Effort Normal;Unlabored   All Lung Fields Breath Sounds diminished   PRE-TX-O2   O2 Device (Oxygen Therapy) nasal cannula   $ Is the patient on Low Flow Oxygen? Yes   Flow (L/min) 2   Oxygen Concentration (%) 28   SpO2 97 %   Pulse Oximetry Type Continuous   $ Pulse Oximetry - Multiple Charge Pulse Oximetry - Multiple   Pulse 61   Resp 18   Aerosol Therapy   $ Aerosol Therapy Charges Aerosol Treatment   Respiratory Treatment Status (SVN) given   Treatment Route (SVN) mask   Patient Position (SVN) HOB elevated   Post Treatment Assessment (SVN) breath sounds unchanged   Signs of Intolerance (SVN) none   Breath Sounds Post-Respiratory Treatment   Throughout All Fields Post-Treatment All Fields   Throughout All Fields Post-Treatment aeration increased   Post-treatment Heart Rate (beats/min) 62   Post-treatment Resp Rate (breaths/min) 18   Patient receives duoneb Q6, tolerated well via mask, NC 2L, resting comfortably, vitals as charted.

## 2020-04-20 NOTE — PT/OT/SLP PROGRESS
Physical Therapy Treatment    Patient Name:  Jose Leon   MRN:  [id number]    Recommendations:     Discharge Recommendations:  nursing facility, skilled, LTACH (long-term acute care hospital)   Discharge Equipment Recommendations: none   Barriers to discharge: None    Assessment:     Jose Leon is a 59 y.o. male admitted with a medical diagnosis of Acute respiratory disease due to COVID-19 virus.  He presents with the following impairments/functional limitations:  weakness, impaired endurance, impaired functional mobilty, gait instability, decreased upper extremity function, decreased lower extremity function, impaired cardiopulmonary response to activity, impaired balance . Tolerated treatment well. Mobility and balance improved requiring less assistance to complete.    Rehab Prognosis: Good; patient would benefit from acute skilled PT services to address these deficits and reach maximum level of function.    Recent Surgery: * No surgery found *      Plan:     During this hospitalization, patient to be seen 6 x/week to address the identified rehab impairments via gait training, therapeutic activities, therapeutic exercises and progress toward the following goals:    · Plan of Care Expires:  05/05/20    Subjective     Chief Complaint: being in hospital  Patient/Family Comments/goals: to be able to walk  Pain/Comfort:  · Pain Rating 1: 0/10      Objective:     Communicated with nurse Celaya prior to session.  Patient found supine with bed alarm, telemetry, pulse ox (continuous), oxygen upon PT entry to room.     General Precautions: Standard, airborne, contact, droplet, fall   Orthopedic Precautions:N/A   Braces:       Functional Mobility:  · Bed Mobility:     · Rolling Left:  contact guard assistance  · Rolling Right: contact guard assistance  · Supine to Sit: minimum assistance  · Sit to Supine: minimum assistance  · Transfers:     · Sit to Stand:  moderate assistance with rolling walker      AM-PAC 6 CLICK  MOBILITY          Therapeutic Activities and Exercises:   Rolled R<>L with CGA and verbal instruction for technique. CNA present to change diaper.   Transferred to sitting EOB with Min A.   Sit to stand with rw and Min / Mod A x 4 trials. Balance improved , more upright.   BLE exercises ( AAROM LLE)  At 10 reps each : SLR's, HS, Hip abd/add. AP's.      Patient left supine with all lines intact, call button in reach, bed alarm on, nurse Yomi notified and OT present..    GOALS:   Multidisciplinary Problems     Physical Therapy Goals        Problem: Physical Therapy Goal    Goal Priority Disciplines Outcome Goal Variances Interventions   Physical Therapy Goal     PT, PT/OT Ongoing, Progressing     Description:  Goals to be met by: 2020    Patient will increase functional independence with mobility by performin. Supine to sit with Stand-by Assistance  2. Sit to supine with Stand-by Assistance  3. Sit to stand transfer with Stand-by Assistance  4. Bed to chair transfer with Contact Guard Assistance using Rolling Walker  5. Gait  x 50 feet with Minimal Assistance using Rolling Walker.                       Time Tracking:     PT Received On: 20  PT Start Time: 1059     PT Stop Time: 1133  PT Total Time (min): 34 min     Billable Minutes: Therapeutic Activity 34min    Treatment Type: Treatment  PT/PTA: PTA     PTA Visit Number: 2     Sarah Sanz PTA  2020

## 2020-04-20 NOTE — PLAN OF CARE
Per Berta with [facility]- they do not have a bed for the pt at this time. Lisa Glass, [facility]    04/20/20 1522   Post-Acute Status   Post-Acute Authorization Placement   Post-Acute Placement Status Pending Post-Acute Medical Review

## 2020-04-20 NOTE — ASSESSMENT & PLAN NOTE
Chronic, controlled.  Latest blood pressure and vitals reviewed-   Temp:  [96.3 °F (35.7 °C)-98.1 °F (36.7 °C)]   Pulse:  [60-72]   Resp:  [18-20]   BP: (111-127)/(66-78)   SpO2:  [95 %-100 %] .   Home meds for hypertension were reviewed and noted below. Hospital anti-hypertensive changes were made as shown below.  Hypertension Medications at home            furosemide (LASIX) 40 MG tablet Take 1 tablet (40 mg total) by mouth 2 (two) times daily.    hydrALAZINE (APRESOLINE) 10 MG tablet Take 10 mg by mouth every 12 (twelve) hours.    lisinopril (PRINIVIL,ZESTRIL) 20 MG tablet Take 1 tablet (20 mg total) by mouth 2 (two) times daily.    metoprolol tartrate (LOPRESSOR) 25 MG tablet TAKE 1 TABLET BY MOUTH 2 TIMES DAILY.    spironolactone (ALDACTONE) 25 MG tablet Take 1 tablet (25 mg total) by mouth every morning.      Hospital Medications             hydrALAZINE injection 10 mg 10 mg, Intravenous, Every 8 hours PRN, 1. Consider placing patient on continuous cardiac monitor (obtain order if needed).<BR>2. Monitor BP and HR pre-administration, post-administration, then every 30 minutes x2, then every hour x2.<BR>3. Administer at a rate no faster than 5mg over 1 minute.<BR>    lisinopriL tablet 10 mg 10 mg, Oral, Daily    metoprolol tartrate (LOPRESSOR) tablet 25 mg 25 mg, Oral, 2 times daily, Hold for HR &lt;60 or BP &lt;90/60    spironolactone tablet 25 mg 25 mg, Oral, Every morning

## 2020-04-21 LAB — POCT GLUCOSE: 249 MG/DL (ref 70–110)

## 2020-04-21 PROCEDURE — 97110 THERAPEUTIC EXERCISES: CPT

## 2020-04-21 PROCEDURE — 25000242 PHARM REV CODE 250 ALT 637 W/ HCPCS: Performed by: HOSPITALIST

## 2020-04-21 PROCEDURE — 25000003 PHARM REV CODE 250: Performed by: HOSPITALIST

## 2020-04-21 PROCEDURE — 27000221 HC OXYGEN, UP TO 24 HOURS

## 2020-04-21 PROCEDURE — 97535 SELF CARE MNGMENT TRAINING: CPT

## 2020-04-21 PROCEDURE — U0002 COVID-19 LAB TEST NON-CDC: HCPCS

## 2020-04-21 PROCEDURE — 25000003 PHARM REV CODE 250: Performed by: INTERNAL MEDICINE

## 2020-04-21 PROCEDURE — C9399 UNCLASSIFIED DRUGS OR BIOLOG: HCPCS | Performed by: INTERNAL MEDICINE

## 2020-04-21 PROCEDURE — 63600175 PHARM REV CODE 636 W HCPCS: Performed by: HOSPITALIST

## 2020-04-21 PROCEDURE — 99900035 HC TECH TIME PER 15 MIN (STAT)

## 2020-04-21 PROCEDURE — 97530 THERAPEUTIC ACTIVITIES: CPT | Mod: CQ

## 2020-04-21 PROCEDURE — 11000001 HC ACUTE MED/SURG PRIVATE ROOM

## 2020-04-21 PROCEDURE — 94761 N-INVAS EAR/PLS OXIMETRY MLT: CPT

## 2020-04-21 PROCEDURE — 63600175 PHARM REV CODE 636 W HCPCS: Performed by: INTERNAL MEDICINE

## 2020-04-21 PROCEDURE — 94640 AIRWAY INHALATION TREATMENT: CPT

## 2020-04-21 RX ADMIN — DOCUSATE SODIUM 200 MG: 100 CAPSULE, LIQUID FILLED ORAL at 09:04

## 2020-04-21 RX ADMIN — ALPRAZOLAM 0.5 MG: 0.25 TABLET ORAL at 09:04

## 2020-04-21 RX ADMIN — IPRATROPIUM BROMIDE AND ALBUTEROL SULFATE 3 ML: .5; 3 SOLUTION RESPIRATORY (INHALATION) at 07:04

## 2020-04-21 RX ADMIN — DABIGATRAN ETEXILATE MESYLATE 75 MG: 75 CAPSULE ORAL at 08:04

## 2020-04-21 RX ADMIN — DABIGATRAN ETEXILATE MESYLATE 75 MG: 75 CAPSULE ORAL at 09:04

## 2020-04-21 RX ADMIN — INSULIN ASPART 5 UNITS: 100 INJECTION, SOLUTION INTRAVENOUS; SUBCUTANEOUS at 05:04

## 2020-04-21 RX ADMIN — SERTRALINE HYDROCHLORIDE 100 MG: 50 TABLET ORAL at 09:04

## 2020-04-21 RX ADMIN — PRAVASTATIN SODIUM 80 MG: 40 TABLET ORAL at 08:04

## 2020-04-21 RX ADMIN — INSULIN ASPART 5 UNITS: 100 INJECTION, SOLUTION INTRAVENOUS; SUBCUTANEOUS at 12:04

## 2020-04-21 RX ADMIN — INSULIN ASPART 2 UNITS: 100 INJECTION, SOLUTION INTRAVENOUS; SUBCUTANEOUS at 05:04

## 2020-04-21 RX ADMIN — AMIODARONE HYDROCHLORIDE 200 MG: 200 TABLET ORAL at 08:04

## 2020-04-21 RX ADMIN — METOPROLOL TARTRATE 25 MG: 25 TABLET, FILM COATED ORAL at 09:04

## 2020-04-21 RX ADMIN — LISINOPRIL 10 MG: 10 TABLET ORAL at 09:04

## 2020-04-21 RX ADMIN — ASPIRIN 81 MG: 81 TABLET, COATED ORAL at 08:04

## 2020-04-21 RX ADMIN — PREDNISONE 40 MG: 20 TABLET ORAL at 09:04

## 2020-04-21 RX ADMIN — METOPROLOL TARTRATE 25 MG: 25 TABLET, FILM COATED ORAL at 08:04

## 2020-04-21 RX ADMIN — INSULIN DETEMIR 18 UNITS: 100 INJECTION, SOLUTION SUBCUTANEOUS at 09:04

## 2020-04-21 RX ADMIN — AMIODARONE HYDROCHLORIDE 200 MG: 200 TABLET ORAL at 09:04

## 2020-04-21 RX ADMIN — GABAPENTIN 300 MG: 300 CAPSULE ORAL at 08:04

## 2020-04-21 RX ADMIN — Medication 400 MG: at 09:04

## 2020-04-21 RX ADMIN — DOCUSATE SODIUM 200 MG: 100 CAPSULE, LIQUID FILLED ORAL at 08:04

## 2020-04-21 RX ADMIN — GABAPENTIN 300 MG: 300 CAPSULE ORAL at 09:04

## 2020-04-21 RX ADMIN — INSULIN ASPART 5 UNITS: 100 INJECTION, SOLUTION INTRAVENOUS; SUBCUTANEOUS at 09:04

## 2020-04-21 RX ADMIN — ALPRAZOLAM 0.5 MG: 0.25 TABLET ORAL at 11:04

## 2020-04-21 RX ADMIN — BUPROPION HYDROCHLORIDE 150 MG: 150 TABLET, EXTENDED RELEASE ORAL at 07:04

## 2020-04-21 RX ADMIN — IPRATROPIUM BROMIDE AND ALBUTEROL SULFATE 3 ML: .5; 3 SOLUTION RESPIRATORY (INHALATION) at 12:04

## 2020-04-21 RX ADMIN — SPIRONOLACTONE 25 MG: 25 TABLET ORAL at 09:04

## 2020-04-21 NOTE — PLAN OF CARE
Date Status/Notes Created By   · 4/21/2020 11:25:27 AM Note: Unfortunately we cannot meet this pts needs  [email or web address] [email or web address]  · 4/21/2020 10:19:54 AM New: Please review for acceptance. Per our Post acute team you are accepting Covid positive patients AND have bed availability. Thanks !  Lisa Glass  ·    Per [facility]- they are not able to meet the pts needs. Lisa Glass LCSW     I called [facility] at [phone] and updated that I have been attempting to send a fax to [phone] all day. She asked me to e-mail a packet to her at [email or web address], which I did. CM following. Lisa Glass       04/21/20 1141   Post-Acute Status   Post-Acute Authorization Placement   Post-Acute Placement Status Referrals Sent

## 2020-04-21 NOTE — CARE UPDATE
04/21/20 0025   Aerosol Therapy   $ Aerosol Therapy Charges Refused  (pt is finally resting per RN)

## 2020-04-21 NOTE — PROGRESS NOTES
[facility] Hospital Medicine  Progress Note    Patient Name: Jose Leon  MRN: [id number]  Patient Class: IP- Inpatient   Admission Date: 4/3/2020  Length of Stay: 18 days  Attending Physician: Hair Ulrich MD  Primary Care Provider: Josh Giordano MD    THIS WAS A TELEMEDICINE ENCOUNTER FOR A PATIENT WITH NOVEL CORONAVIRUS, OF WHICH THE WORLD IS CURRENTLY IN A PANDEMIC.  INTERVIEW OF PATIENT (IF PERFORMED), VISUAL INSPECTION OF PATIENT, AND DISCUSSION WITH BEDSIDE NURSE DONE REMOTELY USING VIDEO CONFERENCING SOFTWARE (VivaRay).    LOCATION OF PATIENT:  MED SURG UNIT AT [facility].  LOCATION OF PROVIDER:  PROVIDER'S PERSONAL RESIDENCE.  NURSING STAFF INVOLVED IN ENCOUNTER: William Gutierrez LPN      Subjective:     Principal Problem:Acute respiratory disease due to COVID-19 virus        HPI:  Patient is a 59-year-old morbidly obese male with past medical history significant for multiple medical problems including hypertension, hyperlipidemia, coronary artery disease status post coronary artery stent placement, obstructive sleep apnea (on CPAP), chronic combined systolic and diastolic congestive heart failure, history of TIA, long-term anticoagulation with Pradaxa and paroxysmal atrial fibrillation is being admitted to Hospital Medicine from [facility] Emergency Room under inpatient status for worsening shortness of breath and right lower quadrant abdominal pain.  Patient presented to the emergency room with complaint of profound generalized weakness associated with diarrhea and vomiting for 1 day.  If patient feels dizzy and has also been experiencing nonradiating right lower quadrant abdominal pain.  Patient denies any hematemesis, melena or bleeding per rectum.  No recent travel or sick contact history reported.  Patient'sCOVID 19 test is positive in the emergency room.  Patient was recently discharged from [facility]  facility.  In the emergency room patient was noted to be hypoxic with exertion and minimal level around 85%.  Presently requiring 3 L per minute supplemental oxygen via nasal cannula.        Overview/Hospital Course:  Patient was admitted for acute on chronic respiratory failure likely secondary to COVID 19 superimposed on COPD and obesity hypoventilation.  Patient was started on appropriate management of COVID19 including supplemental oxygen, nebulized bronchodilators, and improved slowly over the course of his hospitalization.  Given his concomitant COPD, the patient was started on oral corticosteroids.  He did have episodes of hyperglycemia and hypoglycemia and his insulin dose was adjusted over the course of his hospitalization.  Patient had intermittent episodes of confusion and agitation with impulsivity, and was given intermittent antipsychotic for behavioral.  After hospital day 3-4, the patient's behavior improved.  He remained in the hospital secondary to issues related to his disposition and insurance status.   Managed by Dr. Palomo from 4/11-4/17.     Repeat COVID testing from 4/17/20 positive. Labs stable. Blood sugars elevated. Insulin regimen adjusted. Remains on 2 L NC. Awaiting LTAC placement.     Interval History:  No new complaints.  No changes in status.    Review of Systems   Constitutional: Negative for chills, fatigue and fever.   Respiratory: Positive for shortness of breath (with exertion). Negative for cough.    Cardiovascular: Negative for chest pain.   Gastrointestinal: Negative for abdominal pain.     Objective:     Vital Signs (Most Recent):  Temp: 98.2 °F (36.8 °C) (04/21/20 0809)  Pulse: 65 (04/21/20 1210)  Resp: 18 (04/21/20 1210)  BP: (!) 142/76 (04/21/20 0809)  SpO2: 99 % (04/21/20 1210) Vital Signs (24h Range):  Temp:  [96.9 °F (36.1 °C)-98.2 °F (36.8 °C)] 98.2 °F (36.8 °C)  Pulse:  [61-68] 65  Resp:  [16-20] 18  SpO2:  [95 %-99 %] 99 %  BP: (113-164)/(74-97) 142/76     Weight:  128 kg (282 lb 3 oz)  Body mass index is 39.36 kg/m².    Intake/Output Summary (Last 24 hours) at 4/21/2020 1315  Last data filed at 4/21/2020 0600  Gross per 24 hour   Intake 450 ml   Output --   Net 450 ml      Physical Exam   Constitutional: He is oriented to person, place, and time. He is cooperative. He does not appear ill. No distress. Nasal cannula in place.   HENT:   Mouth/Throat: Oropharynx is clear and moist.   Eyes: Right eye exhibits no discharge. Left eye exhibits no discharge.   Neck: No tracheal deviation present.   Pulmonary/Chest: Effort normal.   Neurological: He is alert and oriented to person, place, and time.   Skin: No rash noted. He is not diaphoretic.   Psychiatric: His behavior is normal.       Significant Labs: All pertinent labs within the past 24 hours have been reviewed.    Significant Imaging: I have reviewed all pertinent imaging results/findings within the past 24 hours.      Assessment/Plan:      * Acute respiratory disease due to COVID-19 virus  - COVID-19 testing   - Infection Control notified 4/3/2020   -Repeat COVID testing on 4/17/20 positive (2nd test).    -3rd test collected 4/21/20.  Await results.    - Isolation:   - Airborne, Contact and Droplet Precautions  - Cohort patients into COVID units  - N95 masks must be fit tested, wear eye protection  - 20 second hand hygiene              - Limit visitors per hospital policy              - Consolidating lab draws, nursing care, provider visits, and interventions      - Management:  Supplemental O2 to maintain SpO2 >92%  Continuous/intermittent Pulse Ox  Albuterol INHALER PRN (avoid nebulization of secretions)  Avoiding BiPAP to prevent aerosolization (including home BiPAP)    Advance Care Planning -patient full code for now    COPD exacerbation  Patient's COPD is uncontrolled due to continued dyspnea, use of accessory muscles for breathing and worsening of baseline hypoxia currently.  Patient is currently off COPD Pathway.  Continue scheduled inhalers Steroids, Antibiotics and Supplemental oxygen and monitor respiratory status closely.         Type 2 diabetes mellitus with hyperglycemia, with long-term current use of insulin  Patient's FSGs are controlled on current hypoglycemics.   Last A1c reviewed-   Lab Results   Component Value Date    HGBA1C 7.8 (H) 04/03/2018     Most recent fingerstick glucose reviewed-   Recent Labs   Lab 04/20/20  1623   POCTGLUCOSE 285*     Current correctional scale  Low  Maintain anti-hyperglycemic dose as follows-   Antihyperglycemics (From admission, onward)    Start     Stop Route Frequency Ordered    04/19/20 0900  insulin detemir U-100 pen 18 Units      -- SubQ Daily 04/18/20 0920    04/11/20 0715  insulin aspart U-100 pen 5 Units      -- SubQ 3 times daily with meals 04/10/20 2150    04/03/20 1813  insulin aspart U-100 pen 0-5 Units      -- SubQ Before meals & nightly PRN 04/03/20 1813                1 PPD X 25+ YRs Chronic Tobacco Use Disorder  Smoking cessation counseling performed. Dangers of cigarette smoking were reviewed with patient in detail and patient was encouraged to quit.        Anxiety And Depression  Stable.  Continue meds.    Psychotherapeutics (From admission, onward)    Start     Stop Route Frequency Ordered    04/04/20 0900  sertraline tablet 100 mg      -- Oral Daily 04/03/20 1813    04/04/20 0700  buPROPion TB24 tablet 150 mg      -- Oral Every morning 04/03/20 1813    04/03/20 2100  ALPRAZolam tablet 0.5 mg      -- Oral 2 times daily 04/03/20 1813            Hypertension associated with diabetes  Chronic, controlled.  Latest blood pressure and vitals reviewed-   Temp:  [96.9 °F (36.1 °C)-98.2 °F (36.8 °C)]   Pulse:  [61-68]   Resp:  [16-20]   BP: (113-164)/(74-97)   SpO2:  [95 %-99 %] .   Home meds for hypertension were reviewed and noted below. Hospital anti-hypertensive changes were made as shown below.  Hypertension Medications at home            furosemide (LASIX) 40 MG  tablet Take 1 tablet (40 mg total) by mouth 2 (two) times daily.    hydrALAZINE (APRESOLINE) 10 MG tablet Take 10 mg by mouth every 12 (twelve) hours.    lisinopril (PRINIVIL,ZESTRIL) 20 MG tablet Take 1 tablet (20 mg total) by mouth 2 (two) times daily.    metoprolol tartrate (LOPRESSOR) 25 MG tablet TAKE 1 TABLET BY MOUTH 2 TIMES DAILY.    spironolactone (ALDACTONE) 25 MG tablet Take 1 tablet (25 mg total) by mouth every morning.      Hospital Medications             hydrALAZINE injection 10 mg 10 mg, Intravenous, Every 8 hours PRN, 1. Consider placing patient on continuous cardiac monitor (obtain order if needed).<BR>2. Monitor BP and HR pre-administration, post-administration, then every 30 minutes x2, then every hour x2.<BR>3. Administer at a rate no faster than 5mg over 1 minute.<BR>    lisinopriL tablet 10 mg 10 mg, Oral, Daily    metoprolol tartrate (LOPRESSOR) tablet 25 mg 25 mg, Oral, 2 times daily, Hold for HR &lt;60 or BP &lt;90/60    spironolactone tablet 25 mg 25 mg, Oral, Every morning          Atrial Fibrillation With H/O RVR  Patient with Persistent atrial fibrillation which is controlled currently with Beta Blocker and Amiodarone. [id number] score 5. Anticoagulation indicated. Anticoagulation done with Dabigatran.        Coronary Artery Disease With H/O Stenting  Patient with known CAD s/p CABG. Will continue and monitor for S/Sx of angina/ACS. Continue to monitor on telemetry.        JULY on CPAP  Continue supplemental oxygen to maintain pulse ox above 92%.  Unable to use CPAP per hospital policy at this time due to COVID-19.      Morbid obesity with BMI of 40.0-44.9, adult  Body mass index is 41.69 kg/m². Morbid obesity complicates all aspects of disease management from diagnostic modalities to treatment. Weight loss encouraged and health benefits explained to patient.            VTE Risk Mitigation (From admission, onward)         Ordered     dabigatran etexilate capsule 75 mg  2 times daily       04/03/20 1813     IP VTE HIGH RISK PATIENT  Once      04/03/20 1813     Place OMI hose  Until discontinued      04/03/20 1813     Place sequential compression device  Until discontinued      04/03/20 1813                      Hair Ulrich MD  Department of Hospital Medicine   [facility]

## 2020-04-21 NOTE — PLAN OF CARE
Problem: Physical Therapy Goal  Goal: Physical Therapy Goal  Description  Goals to be met by: 2020    Patient will increase functional independence with mobility by performin. Supine to sit with Stand-by Assistance  2. Sit to supine with Stand-by Assistance  3. Sit to stand transfer with Stand-by Assistance  4. Bed to chair transfer with Contact Guard Assistance using Rolling Walker  5. Gait  x 50 feet with Minimal Assistance using Rolling Walker.      Outcome: Ongoing, Progressing   Therapeutic activity : bed mobility, transfers, LE exercises.

## 2020-04-21 NOTE — PLAN OF CARE
New referrals sent to [facility], [facility], [facility] and [facility] via [employer]. Lisa Glass, LCSW     · 4/21/2020 10:19:55 AM New: Please review for acceptance. Per our Post acute team you are accepting Covid positive patients AND have bed availability. Thanks !  Lisa Glass    Per [facility]- [phone]- they can only accept after a negative covid test.     Per [facility] [phone] their correct fax is [phone]. Referral resent.     I called [facility] at [phone] and left a message for a return call once admissions is out of morning meeting.     Per [facility]- They would need a negative covid test and 3 days symptom free.        04/21/20 1020   Post-Acute Status   Post-Acute Authorization Placement

## 2020-04-21 NOTE — SUBJECTIVE & OBJECTIVE
Interval History:  No new complaints.  No changes in status.    Review of Systems   Constitutional: Negative for chills, fatigue and fever.   Respiratory: Positive for shortness of breath (with exertion). Negative for cough.    Cardiovascular: Negative for chest pain.   Gastrointestinal: Negative for abdominal pain.     Objective:     Vital Signs (Most Recent):  Temp: 98.2 °F (36.8 °C) (04/21/20 0809)  Pulse: 65 (04/21/20 1210)  Resp: 18 (04/21/20 1210)  BP: (!) 142/76 (04/21/20 0809)  SpO2: 99 % (04/21/20 1210) Vital Signs (24h Range):  Temp:  [96.9 °F (36.1 °C)-98.2 °F (36.8 °C)] 98.2 °F (36.8 °C)  Pulse:  [61-68] 65  Resp:  [16-20] 18  SpO2:  [95 %-99 %] 99 %  BP: (113-164)/(74-97) 142/76     Weight: 128 kg (282 lb 3 oz)  Body mass index is 39.36 kg/m².    Intake/Output Summary (Last 24 hours) at 4/21/2020 1315  Last data filed at 4/21/2020 0600  Gross per 24 hour   Intake 450 ml   Output --   Net 450 ml      Physical Exam   Constitutional: He is oriented to person, place, and time. He is cooperative. He does not appear ill. No distress. Nasal cannula in place.   HENT:   Mouth/Throat: Oropharynx is clear and moist.   Eyes: Right eye exhibits no discharge. Left eye exhibits no discharge.   Neck: No tracheal deviation present.   Pulmonary/Chest: Effort normal.   Neurological: He is alert and oriented to person, place, and time.   Skin: No rash noted. He is not diaphoretic.   Psychiatric: His behavior is normal.       Significant Labs: All pertinent labs within the past 24 hours have been reviewed.    Significant Imaging: I have reviewed all pertinent imaging results/findings within the past 24 hours.

## 2020-04-21 NOTE — ASSESSMENT & PLAN NOTE
Chronic, controlled.  Latest blood pressure and vitals reviewed-   Temp:  [96.9 °F (36.1 °C)-98.2 °F (36.8 °C)]   Pulse:  [61-68]   Resp:  [16-20]   BP: (113-164)/(74-97)   SpO2:  [95 %-99 %] .   Home meds for hypertension were reviewed and noted below. Hospital anti-hypertensive changes were made as shown below.  Hypertension Medications at home            furosemide (LASIX) 40 MG tablet Take 1 tablet (40 mg total) by mouth 2 (two) times daily.    hydrALAZINE (APRESOLINE) 10 MG tablet Take 10 mg by mouth every 12 (twelve) hours.    lisinopril (PRINIVIL,ZESTRIL) 20 MG tablet Take 1 tablet (20 mg total) by mouth 2 (two) times daily.    metoprolol tartrate (LOPRESSOR) 25 MG tablet TAKE 1 TABLET BY MOUTH 2 TIMES DAILY.    spironolactone (ALDACTONE) 25 MG tablet Take 1 tablet (25 mg total) by mouth every morning.      Hospital Medications             hydrALAZINE injection 10 mg 10 mg, Intravenous, Every 8 hours PRN, 1. Consider placing patient on continuous cardiac monitor (obtain order if needed).<BR>2. Monitor BP and HR pre-administration, post-administration, then every 30 minutes x2, then every hour x2.<BR>3. Administer at a rate no faster than 5mg over 1 minute.<BR>    lisinopriL tablet 10 mg 10 mg, Oral, Daily    metoprolol tartrate (LOPRESSOR) tablet 25 mg 25 mg, Oral, 2 times daily, Hold for HR &lt;60 or BP &lt;90/60    spironolactone tablet 25 mg 25 mg, Oral, Every morning

## 2020-04-21 NOTE — ASSESSMENT & PLAN NOTE
- COVID-19 testing   - Infection Control notified 4/3/2020   -Repeat COVID testing on 4/17/20 positive (2nd test).    -3rd test collected 4/21/20.  Await results.    - Isolation:   - Airborne, Contact and Droplet Precautions  - Cohort patients into COVID units  - N95 masks must be fit tested, wear eye protection  - 20 second hand hygiene              - Limit visitors per hospital policy              - Consolidating lab draws, nursing care, provider visits, and interventions      - Management:  Supplemental O2 to maintain SpO2 >92%  Continuous/intermittent Pulse Ox  Albuterol INHALER PRN (avoid nebulization of secretions)  Avoiding BiPAP to prevent aerosolization (including home BiPAP)    Advance Care Planning -patient full code for now

## 2020-04-21 NOTE — PT/OT/SLP PROGRESS
Physical Therapy Treatment    Patient Name:  Jose Leon   MRN:  [id number]    Recommendations:     Discharge Recommendations:  nursing facility, skilled, LTACH (long-term acute care hospital)   Discharge Equipment Recommendations: none   Barriers to discharge: None    Assessment:     Jose Leon is a 59 y.o. male admitted with a medical diagnosis of Acute respiratory disease due to COVID-19 virus.  He presents with the following impairments/functional limitations:  weakness, impaired endurance, impaired self care skills, impaired functional mobilty, impaired balance, decreased upper extremity function, decreased lower extremity function, decreased safety awareness, impaired cardiopulmonary response to activity . Tolerated treatment well. Eager to progress mobility. Requires verbal instruction for safety with activity, anxious to walk and mobilize.    Rehab Prognosis: Good; patient would benefit from acute skilled PT services to address these deficits and reach maximum level of function.    Recent Surgery: * No surgery found *      Plan:     During this hospitalization, patient to be seen 6 x/week to address the identified rehab impairments via gait training, therapeutic activities, therapeutic exercises and progress toward the following goals:    · Plan of Care Expires:  05/05/20    Subjective     Chief Complaint: being in hospital, wants out of the room.  Patient/Family Comments/goals: to go to rehab  Pain/Comfort:  · Pain Rating 1: 0/10      Objective:     Communicated with nurse Celaya prior to session.  Patient found supine with bed alarm, telemetry, pulse ox (continuous), oxygen upon PT entry to room.     General Precautions: Standard, airborne, contact, droplet, fall   Orthopedic Precautions:N/A   Braces: N/A     Functional Mobility:  · Bed Mobility:     · Rolling Right: minimum assistance  · Supine to Sit: minimum assistance  · Transfers:     · Sit to Stand:  moderate assistance with rolling walker  · Bed  to Chair: maximal assistance with  no AD  using  Squat Pivot      AM-PAC 6 CLICK MOBILITY          Therapeutic Activities and Exercises:   Transferred to EOB with Min with verbal cues for safe technique ( used handrail on bed ).    Sit to stand x 2 trials with rw and Mod A. Attempted SPT to chair, unable to step.   Squat pivot to chair form bed with Max A. Sit to stand with rw and Mod A for alarm pad placement.    Performed LE exercises at 10 reps each ; TKE's, hip abd/add, marches, AP's,    Sit to stand form w/c x 10 using bed rails to assist.             Patient left up in chair with all lines intact, call button in reach, chair alarm on, nurse Yomi notified and Anamaria Steele present..    GOALS:   Multidisciplinary Problems     Physical Therapy Goals        Problem: Physical Therapy Goal    Goal Priority Disciplines Outcome Goal Variances Interventions   Physical Therapy Goal     PT, PT/OT Ongoing, Progressing     Description:  Goals to be met by: 2020    Patient will increase functional independence with mobility by performin. Supine to sit with Stand-by Assistance  2. Sit to supine with Stand-by Assistance  3. Sit to stand transfer with Stand-by Assistance  4. Bed to chair transfer with Contact Guard Assistance using Rolling Walker  5. Gait  x 50 feet with Minimal Assistance using Rolling Walker.                       Time Tracking:     PT Received On: 20  PT Start Time: 1040     PT Stop Time: 1110  PT Total Time (min): 30 min     Billable Minutes: Therapeutic Activity 30min    Treatment Type: Treatment  PT/PTA: PTA     PTA Visit Number: 3     Sarah Sanz PTA  2020

## 2020-04-21 NOTE — ASSESSMENT & PLAN NOTE
Patient's FSGs are controlled on current hypoglycemics.   Last A1c reviewed-   Lab Results   Component Value Date    HGBA1C 7.8 (H) 04/03/2018     Most recent fingerstick glucose reviewed-   Recent Labs   Lab 04/20/20  1623   POCTGLUCOSE 285*     Current correctional scale  Low  Maintain anti-hyperglycemic dose as follows-   Antihyperglycemics (From admission, onward)    Start     Stop Route Frequency Ordered    04/19/20 0900  insulin detemir U-100 pen 18 Units      -- SubQ Daily 04/18/20 0920    04/11/20 0715  insulin aspart U-100 pen 5 Units      -- SubQ 3 times daily with meals 04/10/20 2150    04/03/20 1813  insulin aspart U-100 pen 0-5 Units      -- SubQ Before meals & nightly PRN 04/03/20 1813

## 2020-04-21 NOTE — PT/OT/SLP PROGRESS
Occupational Therapy   Treatment    Name: Jose Leon  MRN: [id number]  Admitting Diagnosis:  Acute respiratory disease due to COVID-19 virus       Recommendations:     Discharge Recommendations: nursing facility, skilled  Discharge Equipment Recommendations:  bedside commode  Barriers to discharge:  Decreased caregiver support    Assessment:     Jose Leon is a 59 y.o. male with a medical diagnosis of Acute respiratory disease due to COVID-19 virus.  He presents with good activity endurance with B UE exercises. Noted some L hand weakness with maintaining grasping of theraband. Patient L hand grasp improved after OT tieing knots in theraband for better grasp. Patient continues to be very motivated with participation. Performance deficits affecting function are weakness, decreased coordination, decreased lower extremity function, decreased upper extremity function, impaired self care skills.     Rehab Prognosis:  Good; patient would benefit from acute skilled OT services to address these deficits and reach maximum level of function.       Plan:     Patient to be seen 5 x/week to address the above listed problems via self-care/home management, therapeutic activities, therapeutic exercises  · Plan of Care Expires: 05/05/20  · Plan of Care Reviewed with: patient    Subjective     Pain/Comfort:  · Pain Rating 1: 0/10  · Pain Rating Post-Intervention 1: 0/10    Objective:     Communicated with: nurse Celaya prior to session.  Patient found seated in a w/c with oxygen, pulse ox (continuous), telemetry upon OT entry to room.    General Precautions: Standard, airborne, contact, droplet, fall   Orthopedic Precautions:N/A   Braces: N/A     Occupational Performance:     Activities of Daily Living:  · Feeding:  supervision with eating lunch while seated in w/c.      [facility] 6 Click ADL: 17    Treatment & Education:  Patient performed B UE exercises:  -Elbow flexion 3 x 10 with medium grade theraband  .  OT issued rubber squeeze  ball to pt & educated pt on B hand resistive gross grasp HEP. Pt performed 3 x 5 reps each hand with cues for proper form.      Patient left seated in w/c with all lines intact, call button in reach and nurse notifiedEducation:      GOALS:   Multidisciplinary Problems     Occupational Therapy Goals        Problem: Occupational Therapy Goal    Goal Priority Disciplines Outcome Interventions   Occupational Therapy Goal     OT, PT/OT Ongoing, Progressing    Description:  Revised Goals to be met by: 5/5/2020     Patient will increase functional independence with ADLs by performing:    UE Dressing with Minimal Assistance seated EOB.  LE Dressing with Minimal Assistance and Assistive Devices as needed.  Grooming while EOB with Stand-by Assistance.  Supine to sit with Minimal Assistance for UE dressing.  Sit to stand with Minimal Assistance for LE dressing.  Upper extremity exercise program x10 reps per handout, with supervision.    Goals to be met by: 5/5/2020     Patient will increase functional independence with ADLs by performing:    LE Dressing with Contact Guard Assistance and Assistive Devices as needed.  Grooming while EOB with Stand-by Assistance.  Toileting from toilet with Minimal Assistance for hygiene and clothing management. - Discontinue (pt is incontinent)  Supine to sit with Minimal Assistance.  Toilet transfer to bedside commode with Minimal Assistance. - Discontinue (pt is incontinent)  Upper extremity exercise program x10 reps per handout, with supervision.                       Time Tracking:     OT Date of Treatment: 04/21/20  OT Start Time: 1148  OT Stop Time: 1218  OT Total Time (min): 30 min    Billable Minutes:Self Care/Home Management 10  Therapeutic Exercise 20    Gerry Grant, TOREY  4/21/2020

## 2020-04-21 NOTE — PLAN OF CARE
Plan of care reviewed with pt, pt verbalized understanding. IV site clean, dry, intact, no redness or swelling noted. Pt remains free of fall/trauma. VSS, pt remains afebrile. Cardiac monitoring. Purposeful rounding done throughout shift, safety maintained, call light in reach, bed locked and in lowest position, side rails up x2. Will continue to monitor, observe and report any changes.   Yes

## 2020-04-21 NOTE — RESPIRATORY THERAPY
04/21/20 0730   Patient Assessment/Suction   Level of Consciousness (AVPU) alert   Respiratory Effort Normal;Unlabored   Expansion/Accessory Muscles/Retractions no use of accessory muscles;no retractions;expansion symmetric   All Lung Fields Breath Sounds diminished   Rhythm/Pattern, Respiratory unlabored;pattern regular;depth regular   Cough Frequency no cough   PRE-TX-O2   O2 Device (Oxygen Therapy) nasal cannula   $ Is the patient on Low Flow Oxygen? Yes   Flow (L/min) 2   SpO2 98 %   Pulse Oximetry Type Continuous   $ Pulse Oximetry - Multiple Charge Pulse Oximetry - Multiple   Pulse 62   Resp 16   Positioning HOB elevated 30 degrees   Aerosol Therapy   $ Aerosol Therapy Charges Aerosol Treatment   Respiratory Treatment Status (SVN) given   Treatment Route (SVN) mask   Patient Position (SVN) Youssef's   Post Treatment Assessment (SVN) breath sounds unchanged   Signs of Intolerance (SVN) none   Breath Sounds Post-Respiratory Treatment   Throughout All Fields Post-Treatment All Fields   Throughout All Fields Post-Treatment no change   Post-treatment Heart Rate (beats/min) 61   Post-treatment Resp Rate (breaths/min) 18

## 2020-04-22 VITALS
BODY MASS INDEX: 39.51 KG/M2 | HEIGHT: 71 IN | WEIGHT: 282.19 LBS | TEMPERATURE: 98 F | HEART RATE: 68 BPM | SYSTOLIC BLOOD PRESSURE: 134 MMHG | OXYGEN SATURATION: 99 % | DIASTOLIC BLOOD PRESSURE: 67 MMHG | RESPIRATION RATE: 20 BRPM

## 2020-04-22 LAB
POCT GLUCOSE: 164 MG/DL (ref 70–110)
POCT GLUCOSE: 246 MG/DL (ref 70–110)
POCT GLUCOSE: 247 MG/DL (ref 70–110)
SARS-COV-2 RNA RESP QL NAA+PROBE: NOT DETECTED

## 2020-04-22 PROCEDURE — 94640 AIRWAY INHALATION TREATMENT: CPT

## 2020-04-22 PROCEDURE — 25000003 PHARM REV CODE 250: Performed by: HOSPITALIST

## 2020-04-22 PROCEDURE — 63600175 PHARM REV CODE 636 W HCPCS: Performed by: INTERNAL MEDICINE

## 2020-04-22 PROCEDURE — 27000221 HC OXYGEN, UP TO 24 HOURS

## 2020-04-22 PROCEDURE — 25000242 PHARM REV CODE 250 ALT 637 W/ HCPCS: Performed by: HOSPITALIST

## 2020-04-22 PROCEDURE — 94761 N-INVAS EAR/PLS OXIMETRY MLT: CPT

## 2020-04-22 PROCEDURE — U0002 COVID-19 LAB TEST NON-CDC: HCPCS

## 2020-04-22 PROCEDURE — 25000003 PHARM REV CODE 250: Performed by: INTERNAL MEDICINE

## 2020-04-22 PROCEDURE — 97530 THERAPEUTIC ACTIVITIES: CPT | Mod: CQ

## 2020-04-22 RX ORDER — GABAPENTIN 300 MG/1
300 CAPSULE ORAL 2 TIMES DAILY
Qty: 60 CAPSULE | Refills: 11 | Status: ON HOLD
Start: 2020-04-22 | End: 2020-10-26 | Stop reason: HOSPADM

## 2020-04-22 RX ORDER — PREDNISONE 20 MG/1
40 TABLET ORAL DAILY
Status: ON HOLD
Start: 2020-04-23 | End: 2020-05-16 | Stop reason: HOSPADM

## 2020-04-22 RX ORDER — SERTRALINE HYDROCHLORIDE 100 MG/1
100 TABLET, FILM COATED ORAL DAILY
Qty: 30 TABLET | Refills: 11
Start: 2020-04-23 | End: 2023-10-08 | Stop reason: CLARIF

## 2020-04-22 RX ORDER — LISINOPRIL 10 MG/1
10 TABLET ORAL DAILY
Qty: 90 TABLET | Refills: 3 | Status: ON HOLD
Start: 2020-04-23 | End: 2020-10-26 | Stop reason: HOSPADM

## 2020-04-22 RX ORDER — INSULIN GLARGINE 100 [IU]/ML
18 INJECTION, SOLUTION SUBCUTANEOUS DAILY
Qty: 30 ML | Refills: 3 | Status: ON HOLD
Start: 2020-04-22 | End: 2020-05-16 | Stop reason: SDUPTHER

## 2020-04-22 RX ORDER — ACETAMINOPHEN 500 MG
1000 TABLET ORAL EVERY 6 HOURS PRN
Refills: 0
Start: 2020-04-22 | End: 2023-10-19 | Stop reason: CLARIF

## 2020-04-22 RX ORDER — IPRATROPIUM BROMIDE AND ALBUTEROL SULFATE 2.5; .5 MG/3ML; MG/3ML
3 SOLUTION RESPIRATORY (INHALATION) EVERY 6 HOURS
Qty: 1 BOX | Refills: 0 | Status: ON HOLD
Start: 2020-04-22 | End: 2020-05-16 | Stop reason: SDUPTHER

## 2020-04-22 RX ORDER — INSULIN ASPART 100 [IU]/ML
5 INJECTION, SOLUTION INTRAVENOUS; SUBCUTANEOUS
Refills: 0 | Status: ON HOLD
Start: 2020-04-22 | End: 2020-05-16 | Stop reason: SDUPTHER

## 2020-04-22 RX ORDER — DOCUSATE SODIUM 100 MG/1
200 CAPSULE, LIQUID FILLED ORAL 2 TIMES DAILY
Refills: 0 | Status: ON HOLD
Start: 2020-04-22 | End: 2023-10-13 | Stop reason: SDUPTHER

## 2020-04-22 RX ADMIN — DABIGATRAN ETEXILATE MESYLATE 75 MG: 75 CAPSULE ORAL at 08:04

## 2020-04-22 RX ADMIN — DOCUSATE SODIUM 200 MG: 100 CAPSULE, LIQUID FILLED ORAL at 08:04

## 2020-04-22 RX ADMIN — AMIODARONE HYDROCHLORIDE 200 MG: 200 TABLET ORAL at 08:04

## 2020-04-22 RX ADMIN — ALPRAZOLAM 0.5 MG: 0.25 TABLET ORAL at 08:04

## 2020-04-22 RX ADMIN — INSULIN ASPART 5 UNITS: 100 INJECTION, SOLUTION INTRAVENOUS; SUBCUTANEOUS at 04:04

## 2020-04-22 RX ADMIN — INSULIN ASPART 5 UNITS: 100 INJECTION, SOLUTION INTRAVENOUS; SUBCUTANEOUS at 11:04

## 2020-04-22 RX ADMIN — LISINOPRIL 10 MG: 10 TABLET ORAL at 08:04

## 2020-04-22 RX ADMIN — METOPROLOL TARTRATE 25 MG: 25 TABLET, FILM COATED ORAL at 08:04

## 2020-04-22 RX ADMIN — GABAPENTIN 300 MG: 300 CAPSULE ORAL at 08:04

## 2020-04-22 RX ADMIN — PRAVASTATIN SODIUM 80 MG: 40 TABLET ORAL at 08:04

## 2020-04-22 RX ADMIN — IPRATROPIUM BROMIDE AND ALBUTEROL SULFATE 3 ML: .5; 3 SOLUTION RESPIRATORY (INHALATION) at 12:04

## 2020-04-22 RX ADMIN — Medication 400 MG: at 08:04

## 2020-04-22 RX ADMIN — BUPROPION HYDROCHLORIDE 150 MG: 150 TABLET, EXTENDED RELEASE ORAL at 08:04

## 2020-04-22 RX ADMIN — INSULIN ASPART 5 UNITS: 100 INJECTION, SOLUTION INTRAVENOUS; SUBCUTANEOUS at 08:04

## 2020-04-22 RX ADMIN — SPIRONOLACTONE 25 MG: 25 TABLET ORAL at 08:04

## 2020-04-22 RX ADMIN — INSULIN DETEMIR 18 UNITS: 100 INJECTION, SOLUTION SUBCUTANEOUS at 08:04

## 2020-04-22 RX ADMIN — IPRATROPIUM BROMIDE AND ALBUTEROL SULFATE 3 ML: .5; 3 SOLUTION RESPIRATORY (INHALATION) at 07:04

## 2020-04-22 RX ADMIN — PREDNISONE 40 MG: 20 TABLET ORAL at 08:04

## 2020-04-22 RX ADMIN — ACETAMINOPHEN 1000 MG: 500 TABLET ORAL at 04:04

## 2020-04-22 RX ADMIN — ASPIRIN 81 MG: 81 TABLET, COATED ORAL at 08:04

## 2020-04-22 RX ADMIN — SERTRALINE HYDROCHLORIDE 100 MG: 50 TABLET ORAL at 08:04

## 2020-04-22 NOTE — PLAN OF CARE
Patient's nurse can call report to Lisa at [phone] in 20 mins.  Per Berta with [facility], Lisa will give instructions on transportation for patient.  SW informed RN Myranda.       04/22/20 0956   Post-Acute Status   Post-Acute Authorization Placement   Post-Acute Placement Status Set-up Complete

## 2020-04-22 NOTE — CARE UPDATE
04/21/20 1932   Patient Assessment/Suction   Level of Consciousness (AVPU) alert   Respiratory Effort Unlabored;Normal   Expansion/Accessory Muscles/Retractions no retractions;no use of accessory muscles   All Lung Fields Breath Sounds diminished   Rhythm/Pattern, Respiratory unlabored;pattern regular   Cough Frequency no cough   PRE-TX-O2   O2 Device (Oxygen Therapy) nasal cannula   Flow (L/min) 3   Oxygen Concentration (%) 32   SpO2 100 %   Pulse Oximetry Type Continuous   $ Pulse Oximetry - Multiple Charge Pulse Oximetry - Multiple   Pulse 61   Resp 20   Aerosol Therapy   $ Aerosol Therapy Charges Aerosol Treatment   Daily Review of Necessity (SVN) completed   Respiratory Treatment Status (SVN) given   Treatment Route (SVN) mask   Patient Position (SVN) Youssef's   Post Treatment Assessment (SVN) breath sounds unchanged   Signs of Intolerance (SVN) none   Breath Sounds Post-Respiratory Treatment   Throughout All Fields Post-Treatment All Fields   Throughout All Fields Post-Treatment no change   Post-treatment Heart Rate (beats/min) 60   Post-treatment Resp Rate (breaths/min) 20   Ready to Wean/Extubation Screen   FIO2<=50 (chart decimal) 0.32   Patient has tolerated the nebulizer treatment well.

## 2020-04-22 NOTE — ASSESSMENT & PLAN NOTE
Chronic, controlled.  Latest blood pressure and vitals reviewed-   Temp:  [96.8 °F (36 °C)-98.5 °F (36.9 °C)]   Pulse:  [60-67]   Resp:  [18-20]   BP: (134-164)/(71-99)   SpO2:  [93 %-100 %] .   Home meds for hypertension were reviewed and noted below. Hospital anti-hypertensive changes were made as shown below.  Hypertension Medications at home            furosemide (LASIX) 40 MG tablet Take 1 tablet (40 mg total) by mouth 2 (two) times daily.    hydrALAZINE (APRESOLINE) 10 MG tablet Take 10 mg by mouth every 12 (twelve) hours.    lisinopril (PRINIVIL,ZESTRIL) 20 MG tablet Take 1 tablet (20 mg total) by mouth 2 (two) times daily.    metoprolol tartrate (LOPRESSOR) 25 MG tablet TAKE 1 TABLET BY MOUTH 2 TIMES DAILY.    spironolactone (ALDACTONE) 25 MG tablet Take 1 tablet (25 mg total) by mouth every morning.      Hospital Medications             hydrALAZINE injection 10 mg 10 mg, Intravenous, Every 8 hours PRN, 1. Consider placing patient on continuous cardiac monitor (obtain order if needed).<BR>2. Monitor BP and HR pre-administration, post-administration, then every 30 minutes x2, then every hour x2.<BR>3. Administer at a rate no faster than 5mg over 1 minute.<BR>    lisinopriL tablet 10 mg 10 mg, Oral, Daily    metoprolol tartrate (LOPRESSOR) tablet 25 mg 25 mg, Oral, 2 times daily, Hold for HR &lt;60 or BP &lt;90/60    spironolactone tablet 25 mg 25 mg, Oral, Every morning

## 2020-04-22 NOTE — PLAN OF CARE
JONA sent patient information to [facility] via [facility] system for authorization and acceptance.       04/22/20 1058   Post-Acute Status   Post-Acute Authorization Placement   Post-Acute Placement Status Referrals Sent     11:00am Per Sera, patient needs 2 negative COVID-19 results.  JONA informed 2nd test pending.  Sera verbalized understanding.  JONA sent patient information to [facility] rehab via [facility] for authorization and acceptance.JULIA Dubose

## 2020-04-22 NOTE — PLAN OF CARE
JONA spoke to oZe with [facility], they can accept patient today with 2nd negative COVID-19 results due to patient respiratory issues.       04/22/20 1258   Post-Acute Status   Post-Acute Authorization Placement   Post-Acute Placement Status Awaiting Internal Medical Clearance

## 2020-04-22 NOTE — SUBJECTIVE & OBJECTIVE
Interval History:  No new complaints.  3rd COVID test collected yesterday is negative.    Review of Systems   Constitutional: Negative for chills, fatigue and fever.   Respiratory: Positive for shortness of breath (with exertion). Negative for cough.    Cardiovascular: Negative for chest pain.   Gastrointestinal: Negative for abdominal pain.     Objective:     Vital Signs (Most Recent):  Temp: 97.6 °F (36.4 °C) (04/22/20 0827)  Pulse: 67 (04/22/20 1245)  Resp: 18 (04/22/20 1245)  BP: 134/71 (04/22/20 0827)  SpO2: 100 % (04/22/20 1245) Vital Signs (24h Range):  Temp:  [96.8 °F (36 °C)-98.5 °F (36.9 °C)] 97.6 °F (36.4 °C)  Pulse:  [60-67] 67  Resp:  [18-20] 18  SpO2:  [93 %-100 %] 100 %  BP: (134-164)/(71-99) 134/71     Weight: 128 kg (282 lb 3 oz)  Body mass index is 39.36 kg/m².    Intake/Output Summary (Last 24 hours) at 4/22/2020 1500  Last data filed at 4/22/2020 1200  Gross per 24 hour   Intake 1750 ml   Output --   Net 1750 ml      Physical Exam   Constitutional: He is oriented to person, place, and time. He is cooperative. He does not appear ill. No distress. Nasal cannula in place.   HENT:   Mouth/Throat: Oropharynx is clear and moist.   Eyes: Right eye exhibits no discharge. Left eye exhibits no discharge.   Neck: No tracheal deviation present.   Pulmonary/Chest: Effort normal.   Neurological: He is alert and oriented to person, place, and time.   Skin: No rash noted. He is not diaphoretic.   Psychiatric: His behavior is normal.       Significant Labs: All pertinent labs within the past 24 hours have been reviewed.    Significant Imaging: I have reviewed all pertinent imaging results/findings within the past 24 hours.

## 2020-04-22 NOTE — CARE UPDATE
04/22/20 0033   Patient Assessment/Suction   Level of Consciousness (AVPU) alert   Respiratory Effort Unlabored;Normal   Expansion/Accessory Muscles/Retractions no retractions;no use of accessory muscles   All Lung Fields Breath Sounds diminished   Rhythm/Pattern, Respiratory unlabored;pattern regular   Cough Frequency no cough   PRE-TX-O2   O2 Device (Oxygen Therapy) nasal cannula   Flow (L/min) 3   SpO2 100 %   Pulse 60   Resp 18   Aerosol Therapy   $ Aerosol Therapy Charges Aerosol Treatment   Daily Review of Necessity (SVN) completed   Respiratory Treatment Status (SVN) given   Treatment Route (SVN) mask   Patient Position (SVN) Youssef's   Post Treatment Assessment (SVN) breath sounds unchanged   Signs of Intolerance (SVN) none   Breath Sounds Post-Respiratory Treatment   Throughout All Fields Post-Treatment All Fields   Throughout All Fields Post-Treatment no change   Post-treatment Heart Rate (beats/min) 61   Post-treatment Resp Rate (breaths/min) 18

## 2020-04-22 NOTE — PROGRESS NOTES
[facility] Hospital Medicine  Progress Note    Patient Name: Jose Leon  MRN: [id number]  Patient Class: IP- Inpatient   Admission Date: 4/3/2020  Length of Stay: 19 days  Attending Physician: Hair Ulrich MD  Primary Care Provider: Josh Giordano MD    THIS PATIENT VISIT WAS PERFORMED USING TELEMEDICINE USING THE SECURE [employer] SOFTWARE PLATFORM WITH 2-WAY AUDIO/VIDEO.  THE PROVIDER WAS LOCATED OFF-SITE AND THE PATIENT WAS LOCATED IN THE HOSPITAL.  THE AFOREMENTIONED VIDEO SOFTWARE WAS UTILIZED TO DOCUMENT THE RELEVANT HISTORY AND PHYSICAL EXAM.      Subjective:     Principal Problem:Acute respiratory disease due to COVID-19 virus        HPI:  Patient is a 59-year-old morbidly obese male with past medical history significant for multiple medical problems including hypertension, hyperlipidemia, coronary artery disease status post coronary artery stent placement, obstructive sleep apnea (on CPAP), chronic combined systolic and diastolic congestive heart failure, history of TIA, long-term anticoagulation with Pradaxa and paroxysmal atrial fibrillation is being admitted to Hospital Medicine from [facility] Emergency Room under inpatient status for worsening shortness of breath and right lower quadrant abdominal pain.  Patient presented to the emergency room with complaint of profound generalized weakness associated with diarrhea and vomiting for 1 day.  If patient feels dizzy and has also been experiencing nonradiating right lower quadrant abdominal pain.  Patient denies any hematemesis, melena or bleeding per rectum.  No recent travel or sick contact history reported.  Patient'sCOVID 19 test is positive in the emergency room.  Patient was recently discharged from [facility].  In the emergency room patient was noted to be hypoxic with exertion and minimal level around 85%.  Presently requiring 3 L per minute supplemental oxygen via nasal  cannula.        Overview/Hospital Course:  Patient was admitted for acute on chronic respiratory failure likely secondary to COVID 19 superimposed on COPD and obesity hypoventilation.  Patient was started on appropriate management of COVID19 including supplemental oxygen, nebulized bronchodilators, and improved slowly over the course of his hospitalization.  Given his concomitant COPD, the patient was started on oral corticosteroids.  He did have episodes of hyperglycemia and hypoglycemia and his insulin dose was adjusted over the course of his hospitalization.  Patient had intermittent episodes of confusion and agitation with impulsivity, and was given intermittent antipsychotic for behavioral.  After hospital day 3-4, the patient's behavior improved.  He remained in the hospital secondary to issues related to his disposition and insurance status.   Managed by Dr. Palomo from 4/11-4/17.     Repeat COVID testing from 4/17/20 positive. Labs stable. Blood sugars elevated. Insulin regimen adjusted. Remains on 2 L NC. Awaiting LTAC placement.     Interval History:  No new complaints.  3rd COVID test collected yesterday is negative.    Review of Systems   Constitutional: Negative for chills, fatigue and fever.   Respiratory: Positive for shortness of breath (with exertion). Negative for cough.    Cardiovascular: Negative for chest pain.   Gastrointestinal: Negative for abdominal pain.     Objective:     Vital Signs (Most Recent):  Temp: 97.6 °F (36.4 °C) (04/22/20 0827)  Pulse: 67 (04/22/20 1245)  Resp: 18 (04/22/20 1245)  BP: 134/71 (04/22/20 0827)  SpO2: 100 % (04/22/20 1245) Vital Signs (24h Range):  Temp:  [96.8 °F (36 °C)-98.5 °F (36.9 °C)] 97.6 °F (36.4 °C)  Pulse:  [60-67] 67  Resp:  [18-20] 18  SpO2:  [93 %-100 %] 100 %  BP: (134-164)/(71-99) 134/71     Weight: 128 kg (282 lb 3 oz)  Body mass index is 39.36 kg/m².    Intake/Output Summary (Last 24 hours) at 4/22/2020 1500  Last data filed at 4/22/2020 1200  Gross  per 24 hour   Intake 1750 ml   Output --   Net 1750 ml      Physical Exam   Constitutional: He is oriented to person, place, and time. He is cooperative. He does not appear ill. No distress. Nasal cannula in place.   HENT:   Mouth/Throat: Oropharynx is clear and moist.   Eyes: Right eye exhibits no discharge. Left eye exhibits no discharge.   Neck: No tracheal deviation present.   Pulmonary/Chest: Effort normal.   Neurological: He is alert and oriented to person, place, and time.   Skin: No rash noted. He is not diaphoretic.   Psychiatric: His behavior is normal.       Significant Labs: All pertinent labs within the past 24 hours have been reviewed.    Significant Imaging: I have reviewed all pertinent imaging results/findings within the past 24 hours.      Assessment/Plan:      * Acute respiratory disease due to COVID-19 virus  - COVID-19 testing   - Infection Control notified 4/3/2020   -Repeat COVID testing on 4/17/20 positive (2nd test).    -3rd test collected 4/21/20 --> negative.  - will get 4th collection today 4/22/20.    - Isolation:   - Airborne, Contact and Droplet Precautions  - Cohort patients into COVID units  - N95 masks must be fit tested, wear eye protection  - 20 second hand hygiene              - Limit visitors per hospital policy              - Consolidating lab draws, nursing care, provider visits, and interventions      - Management:  Supplemental O2 to maintain SpO2 >92%  Continuous/intermittent Pulse Ox  Albuterol INHALER PRN (avoid nebulization of secretions)  Avoiding BiPAP to prevent aerosolization (including home BiPAP)    Advance Care Planning -patient full code for now    COPD exacerbation  Patient's COPD is uncontrolled due to continued dyspnea, use of accessory muscles for breathing and worsening of baseline hypoxia currently.  Patient is currently off COPD Pathway. Continue scheduled inhalers Steroids, Antibiotics and Supplemental oxygen and monitor respiratory status closely.          Type 2 diabetes mellitus with hyperglycemia, with long-term current use of insulin  Patient's FSGs are controlled on current hypoglycemics.   Last A1c reviewed-   Lab Results   Component Value Date    HGBA1C 7.8 (H) 04/03/2018     Most recent fingerstick glucose reviewed-   Recent Labs   Lab 04/21/20  1739 04/22/20  0616 04/22/20  1153   POCTGLUCOSE 249* 246* 164*     Current correctional scale  Low  Maintain anti-hyperglycemic dose as follows-   Antihyperglycemics (From admission, onward)    Start     Stop Route Frequency Ordered    04/19/20 0900  insulin detemir U-100 pen 18 Units      -- SubQ Daily 04/18/20 0920    04/11/20 0715  insulin aspart U-100 pen 5 Units      -- SubQ 3 times daily with meals 04/10/20 2150    04/03/20 1813  insulin aspart U-100 pen 0-5 Units      -- SubQ Before meals & nightly PRN 04/03/20 1813                1 PPD X 25+ YRs Chronic Tobacco Use Disorder  Smoking cessation counseling performed. Dangers of cigarette smoking were reviewed with patient in detail and patient was encouraged to quit.        Anxiety And Depression  Stable.  Continue meds.    Psychotherapeutics (From admission, onward)    Start     Stop Route Frequency Ordered    04/04/20 0900  sertraline tablet 100 mg      -- Oral Daily 04/03/20 1813    04/04/20 0700  buPROPion TB24 tablet 150 mg      -- Oral Every morning 04/03/20 1813    04/03/20 2100  ALPRAZolam tablet 0.5 mg      -- Oral 2 times daily 04/03/20 1813            Hypertension associated with diabetes  Chronic, controlled.  Latest blood pressure and vitals reviewed-   Temp:  [96.8 °F (36 °C)-98.5 °F (36.9 °C)]   Pulse:  [60-67]   Resp:  [18-20]   BP: (134-164)/(71-99)   SpO2:  [93 %-100 %] .   Home meds for hypertension were reviewed and noted below. Hospital anti-hypertensive changes were made as shown below.  Hypertension Medications at home            furosemide (LASIX) 40 MG tablet Take 1 tablet (40 mg total) by mouth 2 (two) times daily.    hydrALAZINE  (APRESOLINE) 10 MG tablet Take 10 mg by mouth every 12 (twelve) hours.    lisinopril (PRINIVIL,ZESTRIL) 20 MG tablet Take 1 tablet (20 mg total) by mouth 2 (two) times daily.    metoprolol tartrate (LOPRESSOR) 25 MG tablet TAKE 1 TABLET BY MOUTH 2 TIMES DAILY.    spironolactone (ALDACTONE) 25 MG tablet Take 1 tablet (25 mg total) by mouth every morning.      Hospital Medications             hydrALAZINE injection 10 mg 10 mg, Intravenous, Every 8 hours PRN, 1. Consider placing patient on continuous cardiac monitor (obtain order if needed).<BR>2. Monitor BP and HR pre-administration, post-administration, then every 30 minutes x2, then every hour x2.<BR>3. Administer at a rate no faster than 5mg over 1 minute.<BR>    lisinopriL tablet 10 mg 10 mg, Oral, Daily    metoprolol tartrate (LOPRESSOR) tablet 25 mg 25 mg, Oral, 2 times daily, Hold for HR &lt;60 or BP &lt;90/60    spironolactone tablet 25 mg 25 mg, Oral, Every morning          Atrial Fibrillation With H/O RVR  Patient with Persistent atrial fibrillation which is controlled currently with Beta Blocker and Amiodarone. [id number] score 5. Anticoagulation indicated. Anticoagulation done with Dabigatran.        Coronary Artery Disease With H/O Stenting  Patient with known CAD s/p CABG. Will continue and monitor for S/Sx of angina/ACS. Continue to monitor on telemetry.        JULY on CPAP  Continue supplemental oxygen to maintain pulse ox above 92%.  Unable to use CPAP per hospital policy at this time due to COVID-19.      Morbid obesity with BMI of 40.0-44.9, adult  Body mass index is 41.69 kg/m². Morbid obesity complicates all aspects of disease management from diagnostic modalities to treatment. Weight loss encouraged and health benefits explained to patient.            VTE Risk Mitigation (From admission, onward)         Ordered     dabigatran etexilate capsule 75 mg  2 times daily      04/03/20 1813     IP VTE HIGH RISK PATIENT  Once      04/03/20 1813     Place  OMI hose  Until discontinued      04/03/20 1813     Place sequential compression device  Until discontinued      04/03/20 1813                      Hair Ulrich MD  Department of Hospital Medicine   [facility]

## 2020-04-22 NOTE — ASSESSMENT & PLAN NOTE
Patient's FSGs are controlled on current hypoglycemics.   Last A1c reviewed-   Lab Results   Component Value Date    HGBA1C 7.8 (H) 04/03/2018     Most recent fingerstick glucose reviewed-   Recent Labs   Lab 04/21/20  1739 04/22/20  0616 04/22/20  1153   POCTGLUCOSE 249* 246* 164*     Current correctional scale  Low  Maintain anti-hyperglycemic dose as follows-   Antihyperglycemics (From admission, onward)    Start     Stop Route Frequency Ordered    04/19/20 0900  insulin detemir U-100 pen 18 Units      -- SubQ Daily 04/18/20 0920    04/11/20 0715  insulin aspart U-100 pen 5 Units      -- SubQ 3 times daily with meals 04/10/20 2150    04/03/20 1813  insulin aspart U-100 pen 0-5 Units      -- SubQ Before meals & nightly PRN 04/03/20 1813

## 2020-04-22 NOTE — NURSING
Report called to [facility], spoke with SHAYNA Abdi. Pt will leave facility with IV to right fa inplace per request of receiving facility. VSS. Pt and family aware of transfer. Called [facility] for pt transport.

## 2020-04-22 NOTE — PLAN OF CARE
JONA [facility].  JONA informed MD.  JONA sent patient information to [facility] via [facility] system for acceptance.        04/22/20 5439   Post-Acute Status   Post-Acute Authorization Placement   Post-Acute Placement Status Pending Post-Acute Clinical Review

## 2020-04-22 NOTE — ASSESSMENT & PLAN NOTE
- COVID-19 testing   - Infection Control notified 4/3/2020   -Repeat COVID testing on 4/17/20 positive (2nd test).    -3rd test collected 4/21/20 --> negative.  - will get 4th collection today 4/22/20.    - Isolation:   - Airborne, Contact and Droplet Precautions  - Cohort patients into COVID units  - N95 masks must be fit tested, wear eye protection  - 20 second hand hygiene              - Limit visitors per hospital policy              - Consolidating lab draws, nursing care, provider visits, and interventions      - Management:  Supplemental O2 to maintain SpO2 >92%  Continuous/intermittent Pulse Ox  Albuterol INHALER PRN (avoid nebulization of secretions)  Avoiding BiPAP to prevent aerosolization (including home BiPAP)    Advance Care Planning -patient full code for now

## 2020-04-22 NOTE — PLAN OF CARE
Patient receiving aerosol tx via duoneb now and q6hr.  Hr 63 and 02 saturation 97% on room air.  02 at 3lpm at bedside but pt. Had 02 off.

## 2020-04-22 NOTE — PLAN OF CARE
A&O x2, incontinent of urine and stool, Patient is resting comfortably in bed, Afib on tele, O2 via NC continued.  Will continue to monitor patient closely.

## 2020-04-22 NOTE — PLAN OF CARE
Pt awake and alert. Resp even and nonlabored. Pt denies sob/cp. Pt has slurred speech but able to communicate needs. O2 @ 3L nc ongoing. VSS, pt afebrile. Airborne precautions in place. SR up x 2, bed low, cb in reach.

## 2020-04-22 NOTE — PLAN OF CARE
Pt cleared for discharge to [facility], per cm.       04/22/20 8038   Final Note   Assessment Type Final Discharge Note   Anticipated Discharge Disposition Long Term

## 2020-04-22 NOTE — PLAN OF CARE
Attempt was made to contact patient's relative, Yady Petersen, in regards to recent vital signs, labs, findings, and recommendations by the primary team as it pertains to the current admission for COVID-19. However, was unable to reach this point of contact at the listed numbers.  [facility] will attempt this call again tomorrow.

## 2020-04-22 NOTE — PT/OT/SLP PROGRESS
Physical Therapy Treatment    Patient Name:  Jose Leon   MRN:  [id number]    Recommendations:     Discharge Recommendations:  nursing facility, skilled, LTACH (long-term acute care hospital)   Discharge Equipment Recommendations: none   Barriers to discharge: None    Assessment:     Jose Leon is a 59 y.o. male admitted with a medical diagnosis of Acute respiratory disease due to COVID-19 virus.  He presents with the following impairments/functional limitations:  weakness, impaired endurance, impaired self care skills, impaired functional mobilty, impaired balance, decreased upper extremity function, decreased lower extremity function, decreased safety awareness, impaired cardiopulmonary response to activity . Tolerated treatment well. Presents with increased fatigue and decreased sitting and standing balance,  improved with time but required Max A. Reports not getting enough sleep last night.    Rehab Prognosis: Good; patient would benefit from acute skilled PT services to address these deficits and reach maximum level of function.    Recent Surgery: * No surgery found *      Plan:     During this hospitalization, patient to be seen 6 x/week to address the identified rehab impairments via therapeutic activities, gait training, therapeutic exercises and progress toward the following goals:    · Plan of Care Expires:  05/05/20    Subjective     Chief Complaint: hungry .  Patient/Family Comments/goals: to be able to walk  Pain/Comfort:  · Pain Rating 1: 0/10      Objective:     Communicated with nurse Sanchez prior to session.  Patient found supine with bed alarm, oxygen, pulse ox (continuous), telemetry upon PT entry to room.     General Precautions: Standard, airborne, contact, droplet, fall   Orthopedic Precautions:N/A   Braces:       Functional Mobility:  · Bed Mobility:     · Rolling Left:  minimum assistance  · Rolling Right: minimum assistance  · Supine to Sit: moderate assistance  · Sit to Supine: moderate  assistance  · Transfers:     · Sit to Stand:  maximal assistance and of 2 persons with rolling walker      AM-PAC 6 CLICK MOBILITY          Therapeutic Activities and Exercises:   Transferred to sitting EOB with Mod A.   Extra time and assistance to sit upright , leaning L and posterior.   3 standing trials with rw and Max A x 2 with seated rests between each ,leaning heavily L side.    Returned supine for diaper change. Rolled R<> L with Min A.    Patient left supine with all lines intact, call button in reach, bed alarm on, nurse Laura notified and Anamaria Steele present..    GOALS:   Multidisciplinary Problems     Physical Therapy Goals        Problem: Physical Therapy Goal    Goal Priority Disciplines Outcome Goal Variances Interventions   Physical Therapy Goal     PT, PT/OT Ongoing, Progressing     Description:  Goals to be met by: 2020    Patient will increase functional independence with mobility by performin. Supine to sit with Stand-by Assistance  2. Sit to supine with Stand-by Assistance  3. Sit to stand transfer with Stand-by Assistance  4. Bed to chair transfer with Contact Guard Assistance using Rolling Walker  5. Gait  x 50 feet with Minimal Assistance using Rolling Walker.                       Time Tracking:     PT Received On: 20  PT Start Time: 1115     PT Stop Time: 1140  PT Total Time (min): 25 min     Billable Minutes: Therapeutic Activity 25min    Treatment Type: Treatment  PT/PTA: PTA     PTA Visit Number: 4     Sarah Sanz PTA  2020

## 2020-04-23 ENCOUNTER — OUTPATIENT CASE MANAGEMENT (OUTPATIENT)
Dept: ADMINISTRATIVE | Facility: OTHER | Age: 60
End: 2020-04-23

## 2020-04-23 LAB — SARS-COV-2 RNA RESP QL NAA+PROBE: DETECTED

## 2020-04-23 NOTE — NURSING
Patient resting comfortably in bed, A&Ox2, night time medication given to patient, patient had a void x1 and a stool x1 bed pad and incontinence brief changed, skin cream applied to breakdown in groin.   Patient changed into his personal shorts in preparation for discharge.  Vital signs assessed before discharge.  Discharged at 2130 to transport.

## 2020-05-07 NOTE — DISCHARGE SUMMARY
[facility] Hospital Medicine  Discharge Summary      Patient Name: Jose Leon  MRN: [id number]  Admission Date: 4/3/2020  Hospital Length of Stay: 19 days  Discharge Date and Time: 4/22/2020  9:00 PM  Attending Physician: No att. providers found   Discharging Provider: Hair Ulrich MD  Primary Care Provider: Josh Giordano MD      HPI:   Patient is a 59-year-old morbidly obese male with past medical history significant for multiple medical problems including hypertension, hyperlipidemia, coronary artery disease status post coronary artery stent placement, obstructive sleep apnea (on CPAP), chronic combined systolic and diastolic congestive heart failure, history of TIA, long-term anticoagulation with Pradaxa and paroxysmal atrial fibrillation is being admitted to Hospital Medicine from [facility] Emergency Room under inpatient status for worsening shortness of breath and right lower quadrant abdominal pain.  Patient presented to the emergency room with complaint of profound generalized weakness associated with diarrhea and vomiting for 1 day.  If patient feels dizzy and has also been experiencing nonradiating right lower quadrant abdominal pain.  Patient denies any hematemesis, melena or bleeding per rectum.  No recent travel or sick contact history reported.  Patient'sCOVID 19 test is positive in the emergency room.  Patient was recently discharged from [facility].  In the emergency room patient was noted to be hypoxic with exertion and minimal level around 85%.  Presently requiring 3 L per minute supplemental oxygen via nasal cannula.        * No surgery found *      Hospital Course:   Patient was admitted for acute on chronic respiratory failure likely secondary to COVID 19 superimposed on COPD and obesity hypoventilation.  Patient was started on appropriate management of COVID19 including supplemental oxygen, nebulized bronchodilators, and  he improved slowly over the course of his hospitalization.  Given his concomitant COPD, the patient was started on oral corticosteroids.  He continued requiring supplemental oxygen via NC.  At discharge, he was on 3 LPM.  He did have episodes of hyperglycemia and hypoglycemia and his insulin dose was adjusted over the course of his hospitalization.  Patient had intermittent episodes of confusion and agitation with impulsivity, and was given intermittent antipsychotic for behavioral disturbances.  After hospital day 3-4, the patient's behavior improved. His atrial fibrillation was controlled with beta blocker and amiodarone.  He remained in the hospital secondary to issues related to his disposition and insurance status.  Case management worked on getting patient into rehab or LTAC for further care.  Repeat COVID screen 4/17 was positive.  On 4/21, it was negative.  On 4/22, it was positive again.  Eventually, [facility] [place].      Consults:   Consults (From admission, onward)        Status Ordering Provider     Inpatient consult to Social Work/Case Management  Once     Provider:  (Not yet assigned)    Completed JESSA SANCHEZ          No new Assessment & Plan notes have been filed under this hospital service since the last note was generated.  Service: Hospital Medicine    Final Active Diagnoses:    Diagnosis Date Noted POA    PRINCIPAL PROBLEM:  Acute respiratory disease due to COVID-19 virus [U07.1, J06.9] 04/03/2020 Yes    Type 2 diabetes mellitus with hyperglycemia, with long-term current use of insulin [E11.65, Z79.4]  Not Applicable    Anxiety And Depression [F41.1]  Yes    1 PPD X 25+ YRs Chronic Tobacco Use Disorder [Z72.0]  Yes    Coronary Artery Disease With H/O Stenting [I25.10]  Yes    Atrial Fibrillation With H/O RVR [I48.91]  Yes    Hypertension associated with diabetes [E11.59, I10]  Yes    Morbid obesity with BMI of 40.0-44.9, adult [E66.01, Z68.41]  02/27/2013 Not Applicable      Problems Resolved During this Admission:    Diagnosis Date Noted Date Resolved POA    Acute delirium [R41.0] 04/06/2020 04/08/2020 Yes    Right lower quadrant abdominal pain [R10.31] 04/03/2020 04/06/2020 Yes       Discharged Condition: good    Disposition: Long Term Acute Care    Follow Up:  Follow-up Information     [facility].    Specialty:  LTAC  Why:  LTAC  Contact information:  [address] [facility] [id number] [phone]                 Patient Instructions:      Diet diabetic     Activity as tolerated       Significant Diagnostic Studies:   Lab Results   Component Value Date    WBC 10.75 04/18/2020    HGB 13.2 (L) 04/18/2020    HCT 45.1 04/18/2020    MCV 81 (L) 04/18/2020     04/18/2020       BMP  Lab Results   Component Value Date     04/18/2020    K 4.6 04/18/2020    CL 99 04/18/2020    CO2 29 04/18/2020    BUN 24 (H) 04/18/2020    CREATININE 1.0 04/18/2020    CALCIUM 8.8 04/18/2020    ANIONGAP 9 04/18/2020    ESTGFRAFRICA >60 04/18/2020    EGFRNONAA >60 04/18/2020     ]  D-Dimer   Date Value Ref Range Status   04/18/2020 0.25 <0.50 mg/L FEU Final     Comment:     The quantitative D-dimer assay should be used as an aid in   the diagnosis of deep vein thrombosis and pulmonary embolism  in patients with the appropriate presentation and clinical  history. The upper limit of the reference interval and the clinical   cut off   point are identical. Causes of a positive (>0.50 mg/L FEU) D-Dimer   test  include, but are not limited to: DVT, PE, DIC, thrombolytic   therapy, anticoagulant therapy, recent surgery, trauma, or   pregnancy, disseminated malignancy, aortic aneurysm, cirrhosis,  and severe infection. False negative results may occur in   patients with distal DVT.       Procalcitonin   Date Value Ref Range Status   04/03/2020 0.03 <0.25 ng/mL Final     Comment:     A concentration < 0.25 ng/mL represents a low risk  bacterial   infection.  Procalcitonin may not be accurate among patients with localized   infection, recent trauma or major surgery, immunosuppressed state,   invasive fungal infection, renal dysfunction. Decisions regarding   initiation or continuation of antibiotic therapy should not be based   solely on procalcitonin levels.         Pending Diagnostic Studies:     None         Medications:  Reconciled Home Medications:      Medication List      START taking these medications    acetaminophen 500 MG tablet  Commonly known as:  TYLENOL  Take 2 tablets (1,000 mg total) by mouth every 6 (six) hours as needed for Pain.     albuterol-ipratropium 2.5 mg-0.5 mg/3 mL nebulizer solution  Commonly known as:  DUO-NEB  Take 3 mLs by nebulization every 6 (six) hours. Rescue     docusate sodium 100 MG capsule  Commonly known as:  COLACE  Take 2 capsules (200 mg total) by mouth 2 (two) times daily.     insulin aspart U-100 100 unit/mL (3 mL) Inpn pen  Commonly known as:  NovoLOG  Inject 5 Units into the skin 3 (three) times daily with meals.     predniSONE 20 MG tablet  Commonly known as:  DELTASONE  Take 2 tablets (40 mg total) by mouth once daily.        CHANGE how you take these medications    gabapentin 300 MG capsule  Commonly known as:  NEURONTIN  Take 1 capsule (300 mg total) by mouth 2 (two) times daily.  What changed:    · how much to take  · how to take this  · when to take this  · additional instructions     insulin glargine 100 units/mL (3mL) SubQ pen  Commonly known as:  BASAGLAR KWIKPEN U-100 INSULIN  Inject 18 Units into the skin once daily.  What changed:  See the new instructions.     lisinopriL 10 MG tablet  Take 1 tablet (10 mg total) by mouth once daily.  What changed:    · medication strength  · how much to take  · when to take this     metoprolol tartrate 25 MG tablet  Commonly known as:  LOPRESSOR  TAKE 1 TABLET BY MOUTH 2 TIMES DAILY.  What changed:    · how much to take  · how to take this  · when to  "take this     sertraline 100 MG tablet  Commonly known as:  ZOLOFT  Take 1 tablet (100 mg total) by mouth once daily.  What changed:  when to take this        CONTINUE taking these medications    ALPRAZolam 0.5 MG tablet  Commonly known as:  XANAX  TAKE ONE TABLET BY MOUTH TWICE DAILY AS NEEDED FOR ANXIETY     amiodarone 200 MG Tab  Commonly known as:  PACERONE  200 mg 2 (two) times daily.     aspirin 81 MG EC tablet  Commonly known as:  ECOTRIN  Take 81 mg by mouth every evening.     buPROPion 150 MG TB24 tablet  Commonly known as:  WELLBUTRIN XL  Take 1 tablet (150 mg total) by mouth every morning.     magnesium oxide 400 mg (241.3 mg magnesium) tablet  Commonly known as:  MAG-OX  Take 1 tablet (400 mg total) by mouth once daily.     PRADAXA 75 mg Cap  Generic drug:  dabigatran etexilate  Take 75 mg by mouth 2 (two) times daily. "Do NOT break, chew, or open capsules."     pravastatin 80 MG tablet  Commonly known as:  PRAVACHOL  TAKE 1 TABLET BY MOUTH AT BEDTIME     spironolactone 25 MG tablet  Commonly known as:  ALDACTONE  Take 1 tablet (25 mg total) by mouth every morning.        STOP taking these medications    digoxin 250 mcg tablet  Commonly known as:  LANOXIN     dofetilide 250 MCG Cap  Commonly known as:  TIKOSYN     furosemide 40 MG tablet  Commonly known as:  LASIX     hydrALAZINE 10 MG tablet  Commonly known as:  APRESOLINE     lancets Misc     metFORMIN 1000 MG tablet  Commonly known as:  GLUCOPHAGE     MYRBETRIQ 50 mg Tb24  Generic drug:  mirabegron     ONETOUCH ULTRA BLUE TEST STRIP Strp  Generic drug:  blood sugar diagnostic     simvastatin 20 MG tablet  Commonly known as:  ZOCOR     TRUEPLUS PEN NEEDLE 32 gauge x 5/32" Ndle  Generic drug:  pen needle, diabetic     UNABLE TO FIND     zolpidem 10 mg Tab  Commonly known as:  AMBIEN            Indwelling Lines/Drains at time of discharge:   Lines/Drains/Airways     None                 Time spent on the discharge of patient: 37 minutes  Patient was seen " and examined on the date of discharge and determined to be suitable for discharge.         Hair Ulrich MD  Department of Hospital Medicine  [facility]

## 2020-05-14 ENCOUNTER — HOSPITAL ENCOUNTER (OUTPATIENT)
Facility: HOSPITAL | Age: 60
Discharge: HOME HEALTH | End: 2020-05-16
Attending: EMERGENCY MEDICINE | Admitting: INTERNAL MEDICINE
Payer: MEDICARE

## 2020-05-14 DIAGNOSIS — I25.10 CORONARY ARTERY DISEASE INVOLVING NATIVE CORONARY ARTERY OF NATIVE HEART WITHOUT ANGINA PECTORIS: ICD-10-CM

## 2020-05-14 DIAGNOSIS — R53.1 WEAKNESS: ICD-10-CM

## 2020-05-14 DIAGNOSIS — U07.1 COVID-19 VIRUS DETECTED: ICD-10-CM

## 2020-05-14 DIAGNOSIS — I50.32 CHRONIC DIASTOLIC CONGESTIVE HEART FAILURE: ICD-10-CM
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
## 2020-05-14 DIAGNOSIS — S32.000A COMPRESSION FRACTURE OF LUMBAR VERTEBRA, INITIAL ENCOUNTER, UNSPECIFIED LUMBAR VERTEBRAL LEVEL: Primary | ICD-10-CM

## 2020-05-14 PROBLEM — S32.040A COMPRESSION FRACTURE OF L4 VERTEBRA: Status: ACTIVE | Noted: 2020-05-14

## 2020-05-14 LAB
ALBUMIN SERPL BCP-MCNC: 3.4 G/DL (ref 3.5–5.2)
ALP SERPL-CCNC: 118 U/L (ref 55–135)
ALT SERPL W/O P-5'-P-CCNC: 21 U/L (ref 10–44)
ANION GAP SERPL CALC-SCNC: 7 MMOL/L (ref 8–16)
AST SERPL-CCNC: 14 U/L (ref 10–40)
BACTERIA #/AREA URNS HPF: NEGATIVE /HPF
BASOPHILS # BLD AUTO: 0.09 K/UL (ref 0–0.2)
BASOPHILS NFR BLD: 1.4 % (ref 0–1.9)
BILIRUB SERPL-MCNC: 0.6 MG/DL (ref 0.1–1)
BILIRUB UR QL STRIP: NEGATIVE
BNP SERPL-MCNC: 374 PG/ML (ref 0–99)
BUN SERPL-MCNC: 16 MG/DL (ref 6–20)
CALCIUM SERPL-MCNC: 8.6 MG/DL (ref 8.7–10.5)
CHLORIDE SERPL-SCNC: 107 MMOL/L (ref 95–110)
CLARITY UR: CLEAR
CO2 SERPL-SCNC: 25 MMOL/L (ref 23–29)
COLOR UR: YELLOW
CREAT SERPL-MCNC: 0.9 MG/DL (ref 0.5–1.4)
DIFFERENTIAL METHOD: ABNORMAL
EOSINOPHIL # BLD AUTO: 0.3 K/UL (ref 0–0.5)
EOSINOPHIL NFR BLD: 4.1 % (ref 0–8)
ERYTHROCYTE [DISTWIDTH] IN BLOOD BY AUTOMATED COUNT: 18 % (ref 11.5–14.5)
EST. GFR  (AFRICAN AMERICAN): >60 ML/MIN/1.73 M^2
EST. GFR  (NON AFRICAN AMERICAN): >60 ML/MIN/1.73 M^2
GLUCOSE SERPL-MCNC: 111 MG/DL (ref 70–110)
GLUCOSE SERPL-MCNC: 177 MG/DL (ref 70–110)
GLUCOSE SERPL-MCNC: 93 MG/DL (ref 70–110)
GLUCOSE UR QL STRIP: ABNORMAL
HCT VFR BLD AUTO: 40.8 % (ref 40–54)
HGB BLD-MCNC: 12.4 G/DL (ref 14–18)
HGB UR QL STRIP: NEGATIVE
HYALINE CASTS #/AREA URNS LPF: 1 /LPF
IMM GRANULOCYTES # BLD AUTO: 0.03 K/UL (ref 0–0.04)
IMM GRANULOCYTES NFR BLD AUTO: 0.5 % (ref 0–0.5)
INR PPP: 1.2
KETONES UR QL STRIP: NEGATIVE
LEUKOCYTE ESTERASE UR QL STRIP: NEGATIVE
LYMPHOCYTES # BLD AUTO: 0.8 K/UL (ref 1–4.8)
LYMPHOCYTES NFR BLD: 13.2 % (ref 18–48)
MAGNESIUM SERPL-MCNC: 1.7 MG/DL (ref 1.6–2.6)
MCH RBC QN AUTO: 23.8 PG (ref 27–31)
MCHC RBC AUTO-ENTMCNC: 30.4 G/DL (ref 32–36)
MCV RBC AUTO: 79 FL (ref 82–98)
MICROSCOPIC COMMENT: NORMAL
MONOCYTES # BLD AUTO: 0.4 K/UL (ref 0.3–1)
MONOCYTES NFR BLD: 6.9 % (ref 4–15)
NEUTROPHILS # BLD AUTO: 4.7 K/UL (ref 1.8–7.7)
NEUTROPHILS NFR BLD: 73.9 % (ref 38–73)
NITRITE UR QL STRIP: NEGATIVE
NRBC BLD-RTO: 0 /100 WBC
PH UR STRIP: 7 [PH] (ref 5–8)
PLATELET # BLD AUTO: 209 K/UL (ref 150–350)
PMV BLD AUTO: 11.5 FL (ref 9.2–12.9)
POTASSIUM SERPL-SCNC: 4.5 MMOL/L (ref 3.5–5.1)
PROT SERPL-MCNC: 7 G/DL (ref 6–8.4)
PROT UR QL STRIP: ABNORMAL
PROTHROMBIN TIME: 14.7 SEC (ref 10.6–14.8)
RBC # BLD AUTO: 5.2 M/UL (ref 4.6–6.2)
RBC #/AREA URNS HPF: 2 /HPF (ref 0–4)
SARS-COV-2 RDRP RESP QL NAA+PROBE: POSITIVE
SODIUM SERPL-SCNC: 139 MMOL/L (ref 136–145)
SP GR UR STRIP: 1.02 (ref 1–1.03)
SQUAMOUS #/AREA URNS HPF: 1 /HPF
TROPONIN I SERPL DL<=0.01 NG/ML-MCNC: <0.03 NG/ML
URN SPEC COLLECT METH UR: ABNORMAL
UROBILINOGEN UR STRIP-ACNC: NEGATIVE EU/DL
WBC # BLD AUTO: 6.37 K/UL (ref 3.9–12.7)
WBC #/AREA URNS HPF: 1 /HPF (ref 0–5)

## 2020-05-14 PROCEDURE — 96375 TX/PRO/DX INJ NEW DRUG ADDON: CPT

## 2020-05-14 PROCEDURE — 63600175 PHARM REV CODE 636 W HCPCS: Performed by: EMERGENCY MEDICINE

## 2020-05-14 PROCEDURE — G0378 HOSPITAL OBSERVATION PER HR: HCPCS

## 2020-05-14 PROCEDURE — 96374 THER/PROPH/DIAG INJ IV PUSH: CPT

## 2020-05-14 PROCEDURE — 99285 EMERGENCY DEPT VISIT HI MDM: CPT | Mod: 25,CS

## 2020-05-14 PROCEDURE — 85610 PROTHROMBIN TIME: CPT

## 2020-05-14 PROCEDURE — 84484 ASSAY OF TROPONIN QUANT: CPT

## 2020-05-14 PROCEDURE — 93005 ELECTROCARDIOGRAM TRACING: CPT | Performed by: INTERNAL MEDICINE

## 2020-05-14 PROCEDURE — 25000003 PHARM REV CODE 250: Performed by: INTERNAL MEDICINE

## 2020-05-14 PROCEDURE — 81001 URINALYSIS AUTO W/SCOPE: CPT

## 2020-05-14 PROCEDURE — 85025 COMPLETE CBC W/AUTO DIFF WBC: CPT

## 2020-05-14 PROCEDURE — 83880 ASSAY OF NATRIURETIC PEPTIDE: CPT

## 2020-05-14 PROCEDURE — 80053 COMPREHEN METABOLIC PANEL: CPT

## 2020-05-14 PROCEDURE — U0002 COVID-19 LAB TEST NON-CDC: HCPCS

## 2020-05-14 PROCEDURE — 94761 N-INVAS EAR/PLS OXIMETRY MLT: CPT

## 2020-05-14 PROCEDURE — 83735 ASSAY OF MAGNESIUM: CPT

## 2020-05-14 RX ORDER — MORPHINE SULFATE 2 MG/ML
2 INJECTION, SOLUTION INTRAMUSCULAR; INTRAVENOUS EVERY 4 HOURS PRN
Status: DISCONTINUED | OUTPATIENT
Start: 2020-05-14 | End: 2020-05-16 | Stop reason: HOSPADM

## 2020-05-14 RX ORDER — DOCUSATE SODIUM 100 MG/1
200 CAPSULE, LIQUID FILLED ORAL 2 TIMES DAILY
Status: DISCONTINUED | OUTPATIENT
Start: 2020-05-14 | End: 2020-05-16 | Stop reason: HOSPADM

## 2020-05-14 RX ORDER — IBUPROFEN 200 MG
16 TABLET ORAL
Status: DISCONTINUED | OUTPATIENT
Start: 2020-05-14 | End: 2020-05-16 | Stop reason: HOSPADM

## 2020-05-14 RX ORDER — SERTRALINE HYDROCHLORIDE 50 MG/1
100 TABLET, FILM COATED ORAL DAILY
Status: DISCONTINUED | OUTPATIENT
Start: 2020-05-14 | End: 2020-05-16 | Stop reason: HOSPADM

## 2020-05-14 RX ORDER — GABAPENTIN 300 MG/1
300 CAPSULE ORAL 2 TIMES DAILY
Status: DISCONTINUED | OUTPATIENT
Start: 2020-05-14 | End: 2020-05-16 | Stop reason: HOSPADM

## 2020-05-14 RX ORDER — MORPHINE SULFATE 4 MG/ML
2 INJECTION, SOLUTION INTRAMUSCULAR; INTRAVENOUS
Status: COMPLETED | OUTPATIENT
Start: 2020-05-14 | End: 2020-05-14

## 2020-05-14 RX ORDER — AMOXICILLIN 250 MG
1 CAPSULE ORAL 2 TIMES DAILY
Status: DISCONTINUED | OUTPATIENT
Start: 2020-05-14 | End: 2020-05-14

## 2020-05-14 RX ORDER — ONDANSETRON 2 MG/ML
4 INJECTION INTRAMUSCULAR; INTRAVENOUS
Status: COMPLETED | OUTPATIENT
Start: 2020-05-14 | End: 2020-05-14

## 2020-05-14 RX ORDER — BUPROPION HYDROCHLORIDE 150 MG/1
150 TABLET ORAL EVERY MORNING
Status: DISCONTINUED | OUTPATIENT
Start: 2020-05-14 | End: 2020-05-16 | Stop reason: HOSPADM

## 2020-05-14 RX ORDER — ASPIRIN 81 MG/1
81 TABLET ORAL NIGHTLY
Status: DISCONTINUED | OUTPATIENT
Start: 2020-05-14 | End: 2020-05-16 | Stop reason: HOSPADM

## 2020-05-14 RX ORDER — LANOLIN ALCOHOL/MO/W.PET/CERES
400 CREAM (GRAM) TOPICAL DAILY
Status: DISCONTINUED | OUTPATIENT
Start: 2020-05-15 | End: 2020-05-16 | Stop reason: HOSPADM

## 2020-05-14 RX ORDER — GLUCAGON 1 MG
1 KIT INJECTION
Status: DISCONTINUED | OUTPATIENT
Start: 2020-05-14 | End: 2020-05-16 | Stop reason: HOSPADM

## 2020-05-14 RX ORDER — AMIODARONE HYDROCHLORIDE 200 MG/1
200 TABLET ORAL 2 TIMES DAILY
Status: DISCONTINUED | OUTPATIENT
Start: 2020-05-14 | End: 2020-05-16 | Stop reason: HOSPADM

## 2020-05-14 RX ORDER — INSULIN ASPART 100 [IU]/ML
0-5 INJECTION, SOLUTION INTRAVENOUS; SUBCUTANEOUS
Status: DISCONTINUED | OUTPATIENT
Start: 2020-05-14 | End: 2020-05-16 | Stop reason: HOSPADM

## 2020-05-14 RX ORDER — PRAVASTATIN SODIUM 40 MG/1
80 TABLET ORAL NIGHTLY
Status: DISCONTINUED | OUTPATIENT
Start: 2020-05-14 | End: 2020-05-16 | Stop reason: HOSPADM

## 2020-05-14 RX ORDER — SPIRONOLACTONE 25 MG/1
25 TABLET ORAL EVERY MORNING
Status: DISCONTINUED | OUTPATIENT
Start: 2020-05-14 | End: 2020-05-16 | Stop reason: HOSPADM

## 2020-05-14 RX ORDER — HYDROCODONE BITARTRATE AND ACETAMINOPHEN 5; 325 MG/1; MG/1
1 TABLET ORAL EVERY 6 HOURS PRN
Status: DISCONTINUED | OUTPATIENT
Start: 2020-05-14 | End: 2020-05-16 | Stop reason: HOSPADM

## 2020-05-14 RX ORDER — ALPRAZOLAM 0.5 MG/1
0.5 TABLET ORAL 2 TIMES DAILY
Status: DISCONTINUED | OUTPATIENT
Start: 2020-05-14 | End: 2020-05-16 | Stop reason: HOSPADM

## 2020-05-14 RX ORDER — IBUPROFEN 200 MG
24 TABLET ORAL
Status: DISCONTINUED | OUTPATIENT
Start: 2020-05-14 | End: 2020-05-16 | Stop reason: HOSPADM

## 2020-05-14 RX ORDER — ONDANSETRON 2 MG/ML
4 INJECTION INTRAMUSCULAR; INTRAVENOUS EVERY 8 HOURS PRN
Status: DISCONTINUED | OUTPATIENT
Start: 2020-05-14 | End: 2020-05-16 | Stop reason: HOSPADM

## 2020-05-14 RX ORDER — SODIUM CHLORIDE 0.9 % (FLUSH) 0.9 %
10 SYRINGE (ML) INJECTION
Status: DISCONTINUED | OUTPATIENT
Start: 2020-05-14 | End: 2020-05-16 | Stop reason: HOSPADM

## 2020-05-14 RX ORDER — BUPROPION HYDROCHLORIDE 150 MG/1
150 TABLET ORAL EVERY MORNING
Status: DISCONTINUED | OUTPATIENT
Start: 2020-05-15 | End: 2020-05-14

## 2020-05-14 RX ORDER — DABIGATRAN ETEXILATE 75 MG/1
75 CAPSULE ORAL 2 TIMES DAILY
Status: DISCONTINUED | OUTPATIENT
Start: 2020-05-14 | End: 2020-05-16 | Stop reason: HOSPADM

## 2020-05-14 RX ORDER — ACETAMINOPHEN 500 MG
1000 TABLET ORAL EVERY 6 HOURS PRN
Status: DISCONTINUED | OUTPATIENT
Start: 2020-05-14 | End: 2020-05-16 | Stop reason: HOSPADM

## 2020-05-14 RX ORDER — METOPROLOL TARTRATE 25 MG/1
25 TABLET, FILM COATED ORAL 2 TIMES DAILY
Status: DISCONTINUED | OUTPATIENT
Start: 2020-05-14 | End: 2020-05-16 | Stop reason: HOSPADM

## 2020-05-14 RX ORDER — BENZONATATE 100 MG/1
100 CAPSULE ORAL 3 TIMES DAILY PRN
Status: DISCONTINUED | OUTPATIENT
Start: 2020-05-14 | End: 2020-05-16 | Stop reason: HOSPADM

## 2020-05-14 RX ORDER — LISINOPRIL 10 MG/1
10 TABLET ORAL DAILY
Status: DISCONTINUED | OUTPATIENT
Start: 2020-05-14 | End: 2020-05-16 | Stop reason: HOSPADM

## 2020-05-14 RX ORDER — INSULIN ASPART 100 [IU]/ML
5 INJECTION, SOLUTION INTRAVENOUS; SUBCUTANEOUS
Status: DISCONTINUED | OUTPATIENT
Start: 2020-05-14 | End: 2020-05-16 | Stop reason: HOSPADM

## 2020-05-14 RX ADMIN — BUPROPION HYDROCHLORIDE 150 MG: 150 TABLET, FILM COATED, EXTENDED RELEASE ORAL at 03:05

## 2020-05-14 RX ADMIN — ALPRAZOLAM 0.5 MG: 0.5 TABLET ORAL at 09:05

## 2020-05-14 RX ADMIN — AMIODARONE HYDROCHLORIDE 200 MG: 200 TABLET ORAL at 09:05

## 2020-05-14 RX ADMIN — GABAPENTIN 300 MG: 300 CAPSULE ORAL at 09:05

## 2020-05-14 RX ADMIN — SPIRONOLACTONE 25 MG: 25 TABLET ORAL at 03:05

## 2020-05-14 RX ADMIN — ONDANSETRON 4 MG: 2 INJECTION INTRAMUSCULAR; INTRAVENOUS at 11:05

## 2020-05-14 RX ADMIN — ASPIRIN 81 MG: 81 TABLET, COATED ORAL at 09:05

## 2020-05-14 RX ADMIN — LISINOPRIL 10 MG: 10 TABLET ORAL at 03:05

## 2020-05-14 RX ADMIN — METOPROLOL TARTRATE 25 MG: 25 TABLET, FILM COATED ORAL at 09:05

## 2020-05-14 RX ADMIN — SERTRALINE HYDROCHLORIDE 100 MG: 50 TABLET ORAL at 03:05

## 2020-05-14 RX ADMIN — DOCUSATE SODIUM 200 MG: 100 CAPSULE, LIQUID FILLED ORAL at 09:05

## 2020-05-14 RX ADMIN — DABIGATRAN ETEXILATE MESYLATE 75 MG: 75 CAPSULE ORAL at 09:05

## 2020-05-14 RX ADMIN — HYDROCODONE BITARTRATE AND ACETAMINOPHEN 1 TABLET: 5; 325 TABLET ORAL at 03:05

## 2020-05-14 RX ADMIN — PRAVASTATIN SODIUM 80 MG: 40 TABLET ORAL at 09:05

## 2020-05-14 RX ADMIN — MORPHINE SULFATE 2 MG: 4 INJECTION INTRAVENOUS at 11:05

## 2020-05-14 NOTE — UM SECONDARY REVIEW
EHR RECOMMENDATIONS OUTCOME: Per Dr. Florencia Harris MD, with [employer] EHR, recommendation for 05/14/2020 is Outpatient. EHR recommendation right faxed to medical records.

## 2020-05-14 NOTE — SUBJECTIVE & OBJECTIVE
"Past Medical History:   Diagnosis Date    a A H/O Medical Noncompliance     H/O Chronic Noncompliance With His CHF Diet    a Cardiac Diastolic Dysfunction     Dr. Aure Washington    a Chronic Anticoagulation With Pradaxa     Dr. Aure Washington    a Coronary Artery Disease With H/O Stenting     Dr. Aure Washington; Was Hospitalized At [facility] 3/8/17-3/17/17 For CHF Exacerbatioin Due To "Dietary Discrepancies" With LCST Negative There    a Nonsustained Ventricular Tachycardia (NSVT)     a Paroxysmal Atrial Fibrillation With H/O RVR     Dr. Aure Washington; On Chronic Eliquis    a Syncopal Episode     [facility] 4/3/17-4/7/17 Stay For This: Was Likely Due To NSVT, And His Medications Were Adjusted    a Systolic CHF With EF 35-45%     Dr. Aure Washington; Was Hospitalized At [facility] 3/8/17-3/17/17 For CHF Exacerbatioin Due To "Dietary Discrepancies" With LCST Negative There    b Hypertension     b Proteinuria     04/2014 Referred To Dr. Leroy Allison; 4/1/14 Bilateral Renal U/S = Normal; On Lisinopril 20 Mg Daily    b Stage 2 CKD     c Hypercholesterolemia With Low HDL     d Type 2 DM On Insulin     ** 12/4/18 Referred To [facility]; 1/11/18 Referred To Dr. Dipika Rodriguez And Re-Referred To [facility]; 12/28/17 HgA1c = 12.0;" 7/5/17 Referred To [facility]    f Morbid Obesity     i 1 PPD X 25+ YRs Chronic Tobacco Use Disorder     7/5/17 Increased Wellbutrin-XL To 300 Mg Daily; 6/8/17 RXd Wellbutrin- Mg Daily X 4 Months    i JULY On CPAP     Dr. Marion ngo Chronic Left Groin Pain     l Chronic Left Shoulder Pain     Dr. MARTINA martinez Chronic Recurrent Low Back Pain 12/04/2018    Dr. Llamas Is His Pain Management Neurologist; 5/219/18 Referred To Dr. Ismael martinez Left 5-7th Rib FXs 04/2016 4/23/16 [facility] Left Rib XRays = Questionable Nondisplaced Left 5-7th Rib FXs With Normal Lung Fields    l Right Shouder SX 5/26/16 Due To Work Related Injury     Dr. Mora At [facility]; Dr. MARTINA hatch H/O Transient Ischemic " Attack In 2013     n Anxiety And Depression 12/04/2018    RTC In 6 Weeks; 12/4/18 Added Wellbutrin- Mg qAM; 5/29/18 Increased 100 Mg Zoloft To 100 Mg Bid    n Continuous Benzodiazepine (Xanax) Use 12/04/2018 5/29/18 I Am Weaning Him Off Of This By Decreasing 1 Mg Bid To 0.5 Mg Bid PRN, And Will Wean Further Next OV    n H/O ETOH Abuse, Quit In 09/2014     q Bilateral Lower Extremity Venous Stasis Ulcers     2/28/18 [facility]    q Chronic Bilateral Lower Extremity Edema     2/28/18 Added Metolazone 10 Mg qAM On MWF And Referred Back To Dr. Washington; On Lasix 40 Mg Bid; He Wears Bilateral Compressin Hose Stockings    q Disability Examination 7/15/16     For CHF, PAF, DM2, And JULY On CPAP    Wellness Visit 11/6/2017        Past Surgical History:   Procedure Laterality Date    CARDIAC SURGERY      coronary stent    COLONOSCOPY N/A 12/19/2017    Procedure: COLONOSCOPY;  Surgeon: Dave Allen MD;  Location: [facility];  Service: Endoscopy;  Laterality: N/A;    DIABETES MANAGEMENT LABS      heart stent      INCISION AND DRAINAGE OF WOUND      on stomach    SHOULDER SURGERY         Review of patient's allergies indicates:   Allergen Reactions    Atorvastatin      Other reaction(s): Generalized Myalgias    Effexor [venlafaxine] Other (See Comments)     Tremulousness       No current facility-administered medications on file prior to encounter.      Current Outpatient Medications on File Prior to Encounter   Medication Sig    acetaminophen (TYLENOL) 500 MG tablet Take 2 tablets (1,000 mg total) by mouth every 6 (six) hours as needed for Pain.    ALPRAZolam (XANAX) 0.5 MG tablet TAKE ONE TABLET BY MOUTH TWICE DAILY AS NEEDED FOR ANXIETY    amiodarone (PACERONE) 200 MG Tab 200 mg 2 (two) times daily.     albuterol-ipratropium (DUO-NEB) 2.5 mg-0.5 mg/3 mL nebulizer solution Take 3 mLs by nebulization every 6 (six) hours. Rescue     "aspirin (ECOTRIN) 81 MG EC tablet Take 81 mg by mouth every evening.     buPROPion (WELLBUTRIN XL) 150 MG TB24 tablet Take 1 tablet (150 mg total) by mouth every morning.    dabigatran etexilate (PRADAXA) 75 mg Cap Take 75 mg by mouth 2 (two) times daily. "Do NOT break, chew, or open capsules."    docusate sodium (COLACE) 100 MG capsule Take 2 capsules (200 mg total) by mouth 2 (two) times daily.    gabapentin (NEURONTIN) 300 MG capsule Take 1 capsule (300 mg total) by mouth 2 (two) times daily.    insulin (BASAGLAR KWIKPEN U-100 INSULIN) glargine 100 units/mL (3mL) SubQ pen Inject 18 Units into the skin once daily.    insulin aspart U-100 (NOVOLOG) 100 unit/mL (3 mL) InPn pen Inject 5 Units into the skin 3 (three) times daily with meals.    lisinopriL 10 MG tablet Take 1 tablet (10 mg total) by mouth once daily.    magnesium oxide (MAG-OX) 400 mg tablet Take 1 tablet (400 mg total) by mouth once daily.    metoprolol tartrate (LOPRESSOR) 25 MG tablet TAKE 1 TABLET BY MOUTH 2 TIMES DAILY. (Patient taking differently: TAKE 1 TABLET BY MOUTH DAILY.)    pravastatin (PRAVACHOL) 80 MG tablet TAKE 1 TABLET BY MOUTH AT BEDTIME    predniSONE (DELTASONE) 20 MG tablet Take 2 tablets (40 mg total) by mouth once daily.    sertraline (ZOLOFT) 100 MG tablet Take 1 tablet (100 mg total) by mouth once daily.    spironolactone (ALDACTONE) 25 MG tablet Take 1 tablet (25 mg total) by mouth every morning.     Family History     Problem Relation (Age of Onset)    Arthritis Mother, Father    Asthma Father    COPD Father    Diabetes Mother, Sister, Maternal Uncle    Heart disease Mother, Father    Hyperlipidemia Mother, Father    Hypertension Mother, Father    Stroke Father        Tobacco Use    Smoking status: Former Smoker     Packs/day: 0.25     Types: Cigarettes     Start date: 1/1/1981     Last attempt to quit: 9/24/2016     Years since quitting: 3.6    Smokeless tobacco: Never Used    Tobacco comment: every now and " again with drinks   Substance and Sexual Activity    Alcohol use: Yes     Comment: occas    Drug use: No    Sexual activity: Yes     Partners: Female     Birth control/protection: None       Objective:     Vital Signs (Most Recent):  Temp: 99 °F (37.2 °C) (05/14/20 0847)  Pulse: 70 (05/14/20 1300)  Resp: 20 (05/14/20 0945)  BP: (!) 149/69 (05/14/20 1300)  SpO2: (!) 94 % (05/14/20 1300) Vital Signs (24h Range):  Temp:  [99 °F (37.2 °C)] 99 °F (37.2 °C)  Pulse:  [70-89] 70  Resp:  [19-20] 20  SpO2:  [93 %-98 %] 94 %  BP: (141-179)/(65-88) 149/69        There is no height or weight on file to calculate BMI.    Physical Exam   Constitutional: He is oriented to person, place, and time. He appears well-developed and well-nourished. No distress.   HENT:   Head: Normocephalic and atraumatic.   Mouth/Throat: No oropharyngeal exudate.   Eyes: Pupils are equal, round, and reactive to light. Conjunctivae are normal. No scleral icterus.   Neck: Neck supple. No thyromegaly present.   Cardiovascular: Normal rate, regular rhythm and normal heart sounds.   No murmur heard.  Pulmonary/Chest: Effort normal and breath sounds normal. No tachypnea. No respiratory distress. He has no wheezes. He has no rales.   Abdominal: Soft. Bowel sounds are normal. He exhibits no distension. There is no tenderness.   Musculoskeletal: He exhibits edema (left upper extremity (chronic)) and tenderness (midline of the lower back ). He exhibits no deformity.   Neurological: He is alert and oriented to person, place, and time. No sensory deficit.   Chronic left sided weakness due to prior CVA    Skin: Skin is warm. Capillary refill takes less than 2 seconds. No rash noted.   Psychiatric: He has a normal mood and affect. His behavior is normal.   Nursing note and vitals reviewed.        CRANIAL NERVES     CN III, IV, VI   Pupils are equal, round, and reactive to light.       Significant Labs:   CBC:   Recent Labs   Lab 05/14/20  0908   WBC 6.37   HGB  12.4*   HCT 40.8        CMP:   Recent Labs   Lab 05/14/20  0908      K 4.5      CO2 25   *   BUN 16   CREATININE 0.9   CALCIUM 8.6*   PROT 7.0   ALBUMIN 3.4*   BILITOT 0.6   ALKPHOS 118   AST 14   ALT 21   ANIONGAP 7*   EGFRNONAA >60.0     Magnesium:   Recent Labs   Lab 05/14/20  0908   MG 1.7       Significant Imaging: I have reviewed all pertinent imaging results/findings within the past 24 hours.     Imaging Results          CT Head Without Contrast (Final result)  Result time 05/14/20 10:51:45    Final result by Sabrina Nunez IV, MD (05/14/20 10:51:45)                 Narrative:    [facility] MANDATED QUALITY DATA - CT RADIATION 436    All CT scans at this facility utilize dose modulation, iterative  reconstruction, and/or weight based dosing when appropriate to reduce  radiation dose to as low as reasonably achievable.    CT of the brain without contrast    HISTORY: Dizziness.    A focal area of encephalomalacia/gliosis involves the right hemisphere  posteriorly incorporating portions of the temporal and parietal lobes.  A similar but smaller area involves the left frontal lobe and  subcortical white matter. These likely represent areas of remote  cortical infarction. There are changes of decreased CT attenuation of  the subcortical and periventricular white matter of both hemispheres  compatible with chronic deep white matter ischemia.    There are no findings of acute hemorrhage or infarction. There is no  evidence of intra-axial mass or mass effect.    Prominence of the ventricular system and cortical sulci is a  reflection of age-related involutional changes. There are no  extra-axial collections.    No acute osseous abnormality is identified. Scattered mucosal  thickening is noted within the paranasal sinuses.    Calcified plaque formation is observed within the intracranial  segments of the internal carotid arteries bilaterally.    There is a lipomatous lesion within the soft tissues  of the scalp  along the right side of the calvarium in the frontal region.    IMPRESSION:    No acute intracranial abnormality.    Regions of remote hemispheric infarction, right larger than left.    Involutional changes and chronic small vessel ischemic changes.    Electronically Signed by Sabrina Nunez M.D. on 5/14/2020 10:59 AM                             X-Ray Lumbar Spine 2 Or 3 Views (Final result)  Result time 05/14/20 10:17:42   Procedure changed from X-Ray Lumbar Spine Complete 5 View     Final result by Koko Govea MD (05/14/20 10:17:42)                 Narrative:    XR LUMBAR SPINE 2 OR 3 VIEWS    CLINICAL HISTORY:  59 years Male Low back pain, minor trauma    COMPARISON: February 7, 2019 lumbar spine radiography    FINDINGS: Lumbar spine alignment is within normal limits. L1  compression fracture status post vertebroplasty, stable from prior.  New superior endplate irregularity of L4 and new mild superior  endplate depression of L5. Lower lumbar facet arthropathy. Paraspinal  soft tissues are unremarkable.    Impression:    Superior endplate depression of L5 and superior endplate irregularity  of L4, concerning for fracture if there is history of trauma.  Schmorl's node development could give a similar appearance. Please  correlate with focal tenderness.    Electronically Signed by Koko FARLEY on 5/14/2020 10:22 AM                             X-Ray Chest AP Portable (Final result)  Result time 05/14/20 10:15:42    Final result by Koko Govea MD (05/14/20 10:15:42)                 Narrative:    XR CHEST AP PORTABLE    CLINICAL HISTORY:  59 years Male CHF    COMPARISON: April 3, 2020    FINDINGS: Mild enlargement of the cardiac silhouette and prominence of  central pulmonary vascularity is stable from prior. No confluent  airspace disease. No pleural fluid or pneumothorax. Loop recorder  projects over the left chest. No acute osseous abnormality.    IMPRESSION:    Stable chest  radiograph demonstrating mild cardiomegaly and central  pulmonary vascular prominence.    Electronically Signed by Koko FARLEY on 5/14/2020 10:19 AM

## 2020-05-14 NOTE — ED PROVIDER NOTES
"Encounter Date: 5/14/2020       History     Chief Complaint   Patient presents with    Fall     pt states fell last pm. + hx recent multiple falls. aaox4. pt denies loc, head hit     Patient has reported fall multiple falls since discharge from [facility] following admission for corona virus states that he is globally weak having difficulty standing and walking which is causing the fall this morning he fell struck his back against a TV has had worsened pain since that time his unable to stand secondary to the pain he denies any significant shortness of breath states his cough had improved denies any further fever chills denies any abdominal pain states his appetite has been good denies any nausea vomiting or diarrhea no hematuria        Review of patient's allergies indicates:   Allergen Reactions    Atorvastatin      Other reaction(s): Generalized Myalgias    Effexor [venlafaxine] Other (See Comments)     Tremulousness     Past Medical History:   Diagnosis Date    a A H/O Medical Noncompliance     H/O Chronic Noncompliance With His CHF Diet    a Cardiac Diastolic Dysfunction     Dr. Aure Washington    a Chronic Anticoagulation With Pradaxa     Dr. Aure Washington    a Coronary Artery Disease With H/O Stenting     Dr. Aure [place]; Was Hospitalized At [facility] 3/8/17-3/17/17 For CHF Exacerbatioin Due To "Dietary Discrepancies" With LCST Negative There    a Nonsustained Ventricular Tachycardia (NSVT)     a Paroxysmal Atrial Fibrillation With H/O RVR     Dr. Auer [place]; On Chronic Eliquis    a Syncopal Episode     [facility] 4/3/17-4/7/17 Stay For This: Was Likely Due To NSVT, And His Medications Were Adjusted    a Systolic CHF With EF 35-45%     Dr. Arue Washington; Was Hospitalized At [facility] 3/8/17-3/17/17 For CHF Exacerbatioin Due To "Dietary Discrepancies" With LCST Negative There    b Hypertension     b Proteinuria     04/2014 Referred To Dr. Leroy Allison; 4/1/14 Bilateral Renal U/S = Normal; On Lisinopril 20 Mg Daily    b Stage 2 " "CKD     c Hypercholesterolemia With Low HDL     d Type 2 DM On Insulin     ** 12/4/18 Referred To [facility] ED[facility]; 1/11/18 Referred To Dr. Dipika Rodriguez And Re-Referred To [facility]; 12/28/17 HgA1c = 12.0;" 7/5/17 Referred To [facility]    f Morbid Obesity     i 1 PPD X 25+ YRs Chronic Tobacco Use Disorder     7/5/17 Increased Wellbutrin-XL To 300 Mg Daily; 6/8/17 RXd Wellbutrin- Mg Daily X 4 Months    i JULY On CPAP     Dr. Marion ngo Chronic Left Groin Pain     l Chronic Left Shoulder Pain     Dr. MARTINA martinez Chronic Recurrent Low Back Pain 12/04/2018    Dr. Llamas Is His Pain Management Neurologist; 5/219/18 Referred To Dr. Ismael martinez Left 5-7th Rib FXs 04/2016 4/23/16 [facility] Left Rib XRays = Questionable Nondisplaced Left 5-7th Rib FXs With Normal Lung Fields    l Right Shouder SX 5/26/16 Due To Work Related Injury     Dr. Mora At [facility]; Dr. MARTINA hatch H/O Transient Ischemic Attack In 2013     n Anxiety And Depression 12/04/2018    RTC In 6 Weeks; 12/4/18 Added Wellbutrin- Mg qAM; 5/29/18 Increased 100 Mg Zoloft To 100 Mg Bid    n Continuous Benzodiazepine (Xanax) Use 12/04/2018 5/29/18 I Am Weaning Him Off Of This By Decreasing 1 Mg Bid To 0.5 Mg Bid PRN, And Will Wean Further Next OV    n H/O ETOH Abuse, Quit In 09/2014     q Bilateral Lower Extremity Venous Stasis Ulcers     2/28/18 [facility]    q Chronic Bilateral Lower Extremity Edema     2/28/18 Added Metolazone 10 Mg qAM On MWF And Referred Back To Dr. Washington; On Lasix 40 Mg Bid; He Wears Bilateral Compressin Hose Stockings    q Disability Examination 7/15/16     For CHF, PAF, DM2, And JULY On CPAP    Wellness Visit 11/6/2017      Past Surgical History:   Procedure Laterality Date    CARDIAC SURGERY      coronary stent    COLONOSCOPY N/A 12/19/2017    Procedure: COLONOSCOPY;  Surgeon: Dave Allen MD;  Location: [facility];  " Service: Endoscopy;  Laterality: N/A;    DIABETES MANAGEMENT LABS      heart stent      INCISION AND DRAINAGE OF WOUND      on stomach    SHOULDER SURGERY       Family History   Problem Relation Age of Onset    Heart disease Mother     Arthritis Mother     Diabetes Mother     Hyperlipidemia Mother     Hypertension Mother     Heart disease Father     Arthritis Father     Asthma Father     COPD Father     Hyperlipidemia Father     Hypertension Father     Stroke Father     Diabetes Sister     Diabetes Maternal Uncle      Social History     Tobacco Use    Smoking status: Former Smoker     Packs/day: 0.25     Types: Cigarettes     Start date: 1/1/1981     Last attempt to quit: 9/24/2016     Years since quitting: 3.6    Smokeless tobacco: Never Used    Tobacco comment: every now and again with drinks   Substance Use Topics    Alcohol use: Yes     Comment: occas    Drug use: No     Review of Systems   Constitutional: Positive for fatigue.   HENT: Negative.    Eyes: Negative.    Respiratory: Negative for cough and shortness of breath.    Cardiovascular: Negative for chest pain and leg swelling.   Gastrointestinal: Negative.    Endocrine: Negative.    Genitourinary: Negative.    Musculoskeletal: Positive for back pain.   Skin: Negative.    Allergic/Immunologic: Negative.    Neurological: Positive for weakness. Negative for seizures, syncope, speech difficulty and headaches.   Hematological: Negative.    Psychiatric/Behavioral: Negative.        Physical Exam     Initial Vitals [05/14/20 0847]   BP Pulse Resp Temp SpO2   (!) 152/85 83 20 99 °F (37.2 °C) 98 %      MAP       --         Physical Exam    Constitutional: He appears well-developed and well-nourished.   HENT:   Head: Normocephalic and atraumatic.   Right Ear: External ear normal.   Left Ear: External ear normal.   Mouth/Throat: Oropharynx is clear and moist.   Eyes: Conjunctivae and EOM are normal. Pupils are equal, round, and reactive to  light.   Neck: Normal range of motion. Neck supple.   Cardiovascular: Normal rate, regular rhythm and normal heart sounds.   Pulmonary/Chest: Breath sounds normal.   Abdominal: Soft. Bowel sounds are normal. He exhibits no distension. There is no tenderness.   Musculoskeletal: Normal range of motion. He exhibits tenderness. He exhibits no edema.   Pain and tenderness across the lumbar area no palpable step-off or deformity   Neurological: He is alert and oriented to person, place, and time. He has normal strength. GCS score is 15. GCS eye subscore is 4. GCS verbal subscore is 5. GCS motor subscore is 6.   Skin: Skin is warm and dry. Capillary refill takes less than 2 seconds.   Psychiatric: He has a normal mood and affect. His behavior is normal.         ED Course   Procedures  Labs Reviewed   CBC W/ AUTO DIFFERENTIAL - Abnormal; Notable for the following components:       Result Value    Hemoglobin 12.4 (*)     Mean Corpuscular Volume 79 (*)     Mean Corpuscular Hemoglobin 23.8 (*)     Mean Corpuscular Hemoglobin Conc 30.4 (*)     RDW 18.0 (*)     Lymph # 0.8 (*)     Gran% 73.9 (*)     Lymph% 13.2 (*)     All other components within normal limits   COMPREHENSIVE METABOLIC PANEL - Abnormal; Notable for the following components:    Glucose 177 (*)     Calcium 8.6 (*)     Albumin 3.4 (*)     Anion Gap 7 (*)     All other components within normal limits   B-TYPE NATRIURETIC PEPTIDE - Abnormal; Notable for the following components:     (*)     All other components within normal limits   SARS-COV-2 RNA AMPLIFICATION, QUAL - Abnormal; Notable for the following components:    SARS-CoV-2 RNA, Amplification, Qual Positive (*)     All other components within normal limits   TROPONIN I   PROTIME-INR   MAGNESIUM   URINALYSIS, REFLEX TO URINE CULTURE        ECG Results          EKG 12-lead (In process)  Result time 05/14/20 09:27:38    In process by Interface, Lab In [facility] (05/14/20 09:27:38)                 Narrative:     Test Reason : R06.02,    Vent. Rate : 077 BPM     Atrial Rate : 153 BPM     P-R Int : 000 ms          QRS Dur : 092 ms      QT Int : 418 ms       P-R-T Axes : 000 -54 109 degrees     QTc Int : 473 ms    Atrial fibrillation  Left axis deviation  Low voltage QRS  Inferior infarct ,age undetermined  Possible Anterolateral infarct (cited on or before 22-DEC-2018)  Abnormal ECG  When compared with ECG of 03-APR-2020 13:11,  Nonspecific T wave abnormality no longer evident in Inferior leads    Referred By: [email or web address]   SELF           Confirmed By:                             Imaging Results          CT Head Without Contrast (Final result)  Result time 05/14/20 10:51:45    Final result by Sabrina Nunez IV, MD (05/14/20 10:51:45)                 Narrative:    CMS MANDATED QUALITY DATA - CT RADIATION 436    All CT scans at this facility utilize dose modulation, iterative  reconstruction, and/or weight based dosing when appropriate to reduce  radiation dose to as low as reasonably achievable.    CT of the brain without contrast    HISTORY: Dizziness.    A focal area of encephalomalacia/gliosis involves the right hemisphere  posteriorly incorporating portions of the temporal and parietal lobes.  A similar but smaller area involves the left frontal lobe and  subcortical white matter. These likely represent areas of remote  cortical infarction. There are changes of decreased CT attenuation of  the subcortical and periventricular white matter of both hemispheres  compatible with chronic deep white matter ischemia.    There are no findings of acute hemorrhage or infarction. There is no  evidence of intra-axial mass or mass effect.    Prominence of the ventricular system and cortical sulci is a  reflection of age-related involutional changes. There are no  extra-axial collections.    No acute osseous abnormality is identified. Scattered mucosal  thickening is noted within the paranasal sinuses.    Calcified plaque formation is  observed within the intracranial  segments of the internal carotid arteries bilaterally.    There is a lipomatous lesion within the soft tissues of the scalp  along the right side of the calvarium in the frontal region.    IMPRESSION:    No acute intracranial abnormality.    Regions of remote hemispheric infarction, right larger than left.    Involutional changes and chronic small vessel ischemic changes.    Electronically Signed by Sabrina Nunez M.D. on 5/14/2020 10:59 AM                             X-Ray Lumbar Spine 2 Or 3 Views (Final result)  Result time 05/14/20 10:17:42   Procedure changed from X-Ray Lumbar Spine Complete 5 View     Final result by Koko Govea MD (05/14/20 10:17:42)                 Narrative:    XR LUMBAR SPINE 2 OR 3 VIEWS    CLINICAL HISTORY:  59 years Male Low back pain, minor trauma    COMPARISON: February 7, 2019 lumbar spine radiography    FINDINGS: Lumbar spine alignment is within normal limits. L1  compression fracture status post vertebroplasty, stable from prior.  New superior endplate irregularity of L4 and new mild superior  endplate depression of L5. Lower lumbar facet arthropathy. Paraspinal  soft tissues are unremarkable.    Impression:    Superior endplate depression of L5 and superior endplate irregularity  of L4, concerning for fracture if there is history of trauma.  Schmorl's node development could give a similar appearance. Please  correlate with focal tenderness.    Electronically Signed by Koko FARLEY on 5/14/2020 10:22 AM                             X-Ray Chest AP Portable (Final result)  Result time 05/14/20 10:15:42    Final result by Koko Govea MD (05/14/20 10:15:42)                 Narrative:    XR CHEST AP PORTABLE    CLINICAL HISTORY:  59 years Male CHF    COMPARISON: April 3, 2020    FINDINGS: Mild enlargement of the cardiac silhouette and prominence of  central pulmonary vascularity is stable from prior. No confluent  airspace disease. No  pleural fluid or pneumothorax. Loop recorder  projects over the left chest. No acute osseous abnormality.    IMPRESSION:    Stable chest radiograph demonstrating mild cardiomegaly and central  pulmonary vascular prominence.    Electronically Signed by Koko FARLEY on 5/14/2020 10:19 AM                               Medical Decision Making:   ED Management:  Patient has evidence of acute endplate fracture given his profound weakness and falls and he pain control will admit for further evaluation treatment of spoken to Dr. Judd he will evaluate patient emergency department                                 Clinical Impression:       ICD-10-CM ICD-9-CM   1. Compression fracture of lumbar vertebra, initial encounter, unspecified lumbar vertebral level S32.000A 805.4   2. Fall, initial encounter W19.XXXA E888.9   3. COVID-19 virus detected U07.1    4. Weakness R53.1 780.79             ED Disposition Condition    Admit                           John Crabtree MD  05/14/20 1201

## 2020-05-14 NOTE — H&P
[facility]  History & Physical    Patient Name: Jose Leon  MRN: [id number]  Admission Date: 5/14/2020  Attending Physician: Hari Judd MD  Primary Care Provider: Josh Giordano MD         Patient information was obtained from patient, past medical records and ER records.     Subjective:     Principal Problem:Compression fracture of L4 vertebra    Chief Complaint:   Chief Complaint   Patient presents with    Fall     pt states fell last pm. + hx recent multiple falls. aaox4. pt denies loc, head hit        HPI: Patient is a 59 year male with multiple medical comorbidities including but not limited to morbid obesity, prior CVA with residual left-sided weakness,  chronic diastolic congestive heart failure and recent prolonged stay for COVID-19 respiratory failure who was discharged from long-term acute care center 3 days ago presents to the ED with chief complaint of fall and generalized weakness.    Patient endorses generalized weakness since his discharge from the hospital on 04/22/2020.  He has been released from LT[facility] 3 days ago.  Since then he reports not taking his medications.  Earlier today he suffered a fall and struck his back against a television.  He endorses acute on chronic lower back pain.  Pain is worse with any sort of movement.  Relieved after receiving pain medications in the ED.  It is described as sharp in nature with no radiation.  States that his left leg gave away leading to the fall.  He denies head or neck trauma or loss of consciousness.  No chest pain or palpitations surrounding the fall.  He also denies any recent fever, chills, shortness of breath, cough, abdominal pain, nausea/vomiting or diarrhea/constipation.  He has prior history of L1 compression fracture status post kyphoplasty.    In the ED:  Hemodynamically stable.  Repeat COVID-19 testing positive.  Imaging revealed a new compression fracture involving L4 and L5.  Admitted for pain  "control.    Rest of the 10 point review of systems is negative except as mentioned above.    Past Medical History:   Diagnosis Date    a A H/O Medical Noncompliance     H/O Chronic Noncompliance With His CHF Diet    a Cardiac Diastolic Dysfunction     Dr. Aure Washington    a Chronic Anticoagulation With Pradaxa     Dr. Aure Washington    a Coronary Artery Disease With H/O Stenting     Dr. Aure Washington; Was Hospitalized At [facility] 3/8/17-3/17/17 For CHF Exacerbatioin Due To "Dietary Discrepancies" With LCST Negative There    a Nonsustained Ventricular Tachycardia (NSVT)     a Paroxysmal Atrial Fibrillation With H/O RVR     Dr. Aure Washington; On Chronic Eliquis    a Syncopal Episode     [facility] 4/3/17-4/7/17 Stay For This: Was Likely Due To NSVT, And His Medications Were Adjusted    a Systolic CHF With EF 35-45%     Dr. Aure Washington; Was Hospitalized At [facility] 3/8/17-3/17/17 For CHF Exacerbatioin Due To "Dietary Discrepancies" With LCST Negative There    b Hypertension     b Proteinuria     04/2014 Referred To Dr. Leroy Allison; 4/1/14 Bilateral Renal U/S = Normal; On Lisinopril 20 Mg Daily    b Stage 2 CKD     c Hypercholesterolemia With Low HDL     d Type 2 DM On Insulin     ** 12/4/18 Referred To [facility]; 1/11/18 Referred To Dr. Dipika Rodriguez And Re-Referred To [facility]; 12/28/17 HgA1c = 12.0;" 7/5/17 Referred To [facility]    f Morbid Obesity     i 1 PPD X 25+ YRs Chronic Tobacco Use Disorder     7/5/17 Increased Wellbutrin-XL To 300 Mg Daily; 6/8/17 RXd Wellbutrin- Mg Daily X 4 Months    i JULY On CPAP     Dr. Marion ngo Chronic Left Groin Pain     l Chronic Left Shoulder Pain     Dr. MARTINA martinez Chronic Recurrent Low Back Pain 12/04/2018    Dr. Llamas Is His Pain Management Neurologist; 5/219/18 Referred To Dr. Ismael martinez Left 5-7th Rib FXs 04/2016 4/23/16 [facility] Left Rib XRays = Questionable Nondisplaced Left 5-7th Rib FXs With Normal Lung Fields    l Right Shouder SX 5/26/16 Due To " Work Related Injury     Dr[facility] Mora At [facility]; Dr. MARTINA hatch H/O Transient Ischemic Attack In 2013     n Anxiety And Depression 12/04/2018    RTC In 6 Weeks; 12/4/18 Added Wellbutrin- Mg qAM; 5/29/18 Increased 100 Mg Zoloft To 100 Mg Bid    n Continuous Benzodiazepine (Xanax) Use 12/04/2018 5/29/18 I Am Weaning Him Off Of This By Decreasing 1 Mg Bid To 0.5 Mg Bid PRN, And Will Wean Further Next OV    n H/O ETOH Abuse, Quit In 09/2014     q Bilateral Lower Extremity Venous Stasis Ulcers     2/28/18 [facility]    q Chronic Bilateral Lower Extremity Edema     2/28/18 Added Metolazone 10 Mg qAM On MWF And Referred Back To Dr. Washington; On Lasix 40 Mg Bid; He Wears Bilateral Compressin Hose Stockings    q Disability Examination 7/15/16     For CHF, PAF, DM2, And JULY On CPAP    Wellness Visit 11/6/2017        Past Surgical History:   Procedure Laterality Date    CARDIAC SURGERY      coronary stent    COLONOSCOPY N/A 12/19/2017    Procedure: COLONOSCOPY;  Surgeon: Dave Allen MD;  Location: [facility];  Service: Endoscopy;  Laterality: N/A;    DIABETES MANAGEMENT LABS      heart stent      INCISION AND DRAINAGE OF WOUND      on stomach    SHOULDER SURGERY         Review of patient's allergies indicates:   Allergen Reactions    Atorvastatin      Other reaction(s): Generalized Myalgias    Effexor [venlafaxine] Other (See Comments)     Tremulousness       No current facility-administered medications on file prior to encounter.      Current Outpatient Medications on File Prior to Encounter   Medication Sig    acetaminophen (TYLENOL) 500 MG tablet Take 2 tablets (1,000 mg total) by mouth every 6 (six) hours as needed for Pain.    ALPRAZolam (XANAX) 0.5 MG tablet TAKE ONE TABLET BY MOUTH TWICE DAILY AS NEEDED FOR ANXIETY    amiodarone (PACERONE) 200 MG Tab 200 mg 2 (two) times daily.     albuterol-ipratropium (DUO-NEB)  "2.5 mg-0.5 mg/3 mL nebulizer solution Take 3 mLs by nebulization every 6 (six) hours. Rescue    aspirin (ECOTRIN) 81 MG EC tablet Take 81 mg by mouth every evening.     buPROPion (WELLBUTRIN XL) 150 MG TB24 tablet Take 1 tablet (150 mg total) by mouth every morning.    dabigatran etexilate (PRADAXA) 75 mg Cap Take 75 mg by mouth 2 (two) times daily. "Do NOT break, chew, or open capsules."    docusate sodium (COLACE) 100 MG capsule Take 2 capsules (200 mg total) by mouth 2 (two) times daily.    gabapentin (NEURONTIN) 300 MG capsule Take 1 capsule (300 mg total) by mouth 2 (two) times daily.    insulin (BASAGLAR KWIKPEN U-100 INSULIN) glargine 100 units/mL (3mL) SubQ pen Inject 18 Units into the skin once daily.    insulin aspart U-100 (NOVOLOG) 100 unit/mL (3 mL) InPn pen Inject 5 Units into the skin 3 (three) times daily with meals.    lisinopriL 10 MG tablet Take 1 tablet (10 mg total) by mouth once daily.    magnesium oxide (MAG-OX) 400 mg tablet Take 1 tablet (400 mg total) by mouth once daily.    metoprolol tartrate (LOPRESSOR) 25 MG tablet TAKE 1 TABLET BY MOUTH 2 TIMES DAILY. (Patient taking differently: TAKE 1 TABLET BY MOUTH DAILY.)    pravastatin (PRAVACHOL) 80 MG tablet TAKE 1 TABLET BY MOUTH AT BEDTIME    predniSONE (DELTASONE) 20 MG tablet Take 2 tablets (40 mg total) by mouth once daily.    sertraline (ZOLOFT) 100 MG tablet Take 1 tablet (100 mg total) by mouth once daily.    spironolactone (ALDACTONE) 25 MG tablet Take 1 tablet (25 mg total) by mouth every morning.     Family History     Problem Relation (Age of Onset)    Arthritis Mother, Father    Asthma Father    COPD Father    Diabetes Mother, Sister, Maternal Uncle    Heart disease Mother, Father    Hyperlipidemia Mother, Father    Hypertension Mother, Father    Stroke Father        Tobacco Use    Smoking status: Former Smoker     Packs/day: 0.25     Types: Cigarettes     Start date: 1/1/1981     Last attempt to quit: 9/24/2016     " Years since quitting: 3.6    Smokeless tobacco: Never Used    Tobacco comment: every now and again with drinks   Substance and Sexual Activity    Alcohol use: Yes     Comment: occas    Drug use: No    Sexual activity: Yes     Partners: Female     Birth control/protection: None       Objective:     Vital Signs (Most Recent):  Temp: 99 °F (37.2 °C) (05/14/20 0847)  Pulse: 70 (05/14/20 1300)  Resp: 20 (05/14/20 0945)  BP: (!) 149/69 (05/14/20 1300)  SpO2: (!) 94 % (05/14/20 1300) Vital Signs (24h Range):  Temp:  [99 °F (37.2 °C)] 99 °F (37.2 °C)  Pulse:  [70-89] 70  Resp:  [19-20] 20  SpO2:  [93 %-98 %] 94 %  BP: (141-179)/(65-88) 149/69        There is no height or weight on file to calculate BMI.    Physical Exam   Constitutional: He is oriented to person, place, and time. He appears well-developed and well-nourished. No distress.   HENT:   Head: Normocephalic and atraumatic.   Mouth/Throat: No oropharyngeal exudate.   Eyes: Pupils are equal, round, and reactive to light. Conjunctivae are normal. No scleral icterus.   Neck: Neck supple. No thyromegaly present.   Cardiovascular: Normal rate, regular rhythm and normal heart sounds.   No murmur heard.  Pulmonary/Chest: Effort normal and breath sounds normal. No tachypnea. No respiratory distress. He has no wheezes. He has no rales.   Abdominal: Soft. Bowel sounds are normal. He exhibits no distension. There is no tenderness.   Musculoskeletal: He exhibits edema (left upper extremity (chronic)) and tenderness (midline of the lower back ). He exhibits no deformity.   Neurological: He is alert and oriented to person, place, and time. No sensory deficit.   Chronic left sided weakness due to prior CVA    Skin: Skin is warm. Capillary refill takes less than 2 seconds. No rash noted.   Psychiatric: He has a normal mood and affect. His behavior is normal.   Nursing note and vitals reviewed.        CRANIAL NERVES     CN III, IV, VI   Pupils are equal, round, and reactive to  light.       Significant Labs:   CBC:   Recent Labs   Lab 05/14/20  0908   WBC 6.37   HGB 12.4*   HCT 40.8        CMP:   Recent Labs   Lab 05/14/20  0908      K 4.5      CO2 25   *   BUN 16   CREATININE 0.9   CALCIUM 8.6*   PROT 7.0   ALBUMIN 3.4*   BILITOT 0.6   ALKPHOS 118   AST 14   ALT 21   ANIONGAP 7*   EGFRNONAA >60.0     Magnesium:   Recent Labs   Lab 05/14/20  0908   MG 1.7       Significant Imaging: I have reviewed all pertinent imaging results/findings within the past 24 hours.     Imaging Results          CT Head Without Contrast (Final result)  Result time 05/14/20 10:51:45    Final result by Sabrina Nunez IV, MD (05/14/20 10:51:45)                 Narrative:    CMS MANDATED QUALITY DATA - CT RADIATION 436    All CT scans at this facility utilize dose modulation, iterative  reconstruction, and/or weight based dosing when appropriate to reduce  radiation dose to as low as reasonably achievable.    CT of the brain without contrast    HISTORY: Dizziness.    A focal area of encephalomalacia/gliosis involves the right hemisphere  posteriorly incorporating portions of the temporal and parietal lobes.  A similar but smaller area involves the left frontal lobe and  subcortical white matter. These likely represent areas of remote  cortical infarction. There are changes of decreased CT attenuation of  the subcortical and periventricular white matter of both hemispheres  compatible with chronic deep white matter ischemia.    There are no findings of acute hemorrhage or infarction. There is no  evidence of intra-axial mass or mass effect.    Prominence of the ventricular system and cortical sulci is a  reflection of age-related involutional changes. There are no  extra-axial collections.    No acute osseous abnormality is identified. Scattered mucosal  thickening is noted within the paranasal sinuses.    Calcified plaque formation is observed within the intracranial  segments of the  internal carotid arteries bilaterally.    There is a lipomatous lesion within the soft tissues of the scalp  along the right side of the calvarium in the frontal region.    IMPRESSION:    No acute intracranial abnormality.    Regions of remote hemispheric infarction, right larger than left.    Involutional changes and chronic small vessel ischemic changes.    Electronically Signed by Sabrina Nunez M.D. on 5/14/2020 10:59 AM                             X-Ray Lumbar Spine 2 Or 3 Views (Final result)  Result time 05/14/20 10:17:42   Procedure changed from X-Ray Lumbar Spine Complete 5 View     Final result by Koko Govea MD (05/14/20 10:17:42)                 Narrative:    XR LUMBAR SPINE 2 OR 3 VIEWS    CLINICAL HISTORY:  59 years Male Low back pain, minor trauma    COMPARISON: February 7, 2019 lumbar spine radiography    FINDINGS: Lumbar spine alignment is within normal limits. L1  compression fracture status post vertebroplasty, stable from prior.  New superior endplate irregularity of L4 and new mild superior  endplate depression of L5. Lower lumbar facet arthropathy. Paraspinal  soft tissues are unremarkable.    Impression:    Superior endplate depression of L5 and superior endplate irregularity  of L4, concerning for fracture if there is history of trauma.  Schmorl's node development could give a similar appearance. Please  correlate with focal tenderness.    Electronically Signed by Koko FARLEY on 5/14/2020 10:22 AM                             X-Ray Chest AP Portable (Final result)  Result time 05/14/20 10:15:42    Final result by Koko Govea MD (05/14/20 10:15:42)                 Narrative:    XR CHEST AP PORTABLE    CLINICAL HISTORY:  59 years Male CHF    COMPARISON: April 3, 2020    FINDINGS: Mild enlargement of the cardiac silhouette and prominence of  central pulmonary vascularity is stable from prior. No confluent  airspace disease. No pleural fluid or pneumothorax. Loop  recorder  projects over the left chest. No acute osseous abnormality.    IMPRESSION:    Stable chest radiograph demonstrating mild cardiomegaly and central  pulmonary vascular prominence.    Electronically Signed by Koko FARLEY on 5/14/2020 10:19 AM                                Assessment/Plan:     Active Hospital Problems    Diagnosis  POA    *Compression fracture of L4 vertebra [S32.040A]  Yes    Fall [W19.XXXA]  Yes    COVID-19 virus detected [U07.1]  Yes    Paroxysmal Atrial Fibrillation With H/O RVR [I48.0]  Yes    Chronic Bilateral Lower Extremity Edema [R60.0]  Yes    Chronic diastolic congestive heart failure [I50.32]  Yes    Morbid obesity with BMI of 40.0-44.9, adult [E66.01, Z68.41]  Not Applicable      Resolved Hospital Problems   No resolved problems to display.       Plan:  New L4-L5 compression fracture. Prior hx of L1 compression fx s/p kyphoplasty   Pain control   Will obtain TLSO brace   Fall precautions   PT/OT consult   Hx of frequent falls likely due to gait instability from prior CVA and recent hospital stay   Asymptomatic from COVID-19 standpoint; maintain appropriate isolation precautions  Home meds for chronic medical conditions     VTE Risk Mitigation (From admission, onward)         Ordered     dabigatran etexilate capsule 75 mg  2 times daily      05/14/20 1510     IP VTE HIGH RISK PATIENT  Once      05/14/20 1510     Place sequential compression device  Until discontinued      05/14/20 1510                   Hari Judd MD  Department of Hospital Medicine   [facility]

## 2020-05-14 NOTE — HPI
Patient is a 59 year male with multiple medical comorbidities including but not limited to morbid obesity, prior CVA with residual left-sided weakness,  chronic diastolic congestive heart failure and recent prolonged stay for COVID-19 respiratory failure who was discharged from [facility] 3 days ago presents to the ED with chief complaint of fall and generalized weakness.    Patient endorses generalized weakness since his discharge from the hospital on 04/22/2020.  He has been released from LTAC 3 days ago.  Since then he reports not taking his medications.  Earlier today he suffered a fall and struck his back against a television.  He endorses acute on chronic lower back pain.  Pain is worse with any sort of movement.  Relieved after receiving pain medications in the ED.  It is described as sharp in nature with no radiation.  States that his left leg gave away leading to the fall.  He denies head or neck trauma or loss of consciousness.  No chest pain or palpitations surrounding the fall.  He also denies any recent fever, chills, shortness of breath, cough, abdominal pain, nausea/vomiting or diarrhea/constipation.  He has prior history of L1 compression fracture status post kyphoplasty.    In the ED:  Hemodynamically stable.  Repeat COVID-19 testing positive.  Imaging revealed a new compression fracture involving L4 and L5.  Admitted for pain control.    Rest of the 10 point review of systems is negative except as mentioned above.

## 2020-05-15 LAB
GLUCOSE SERPL-MCNC: 110 MG/DL (ref 70–110)
GLUCOSE SERPL-MCNC: 110 MG/DL (ref 70–110)
GLUCOSE SERPL-MCNC: 116 MG/DL (ref 70–110)
GLUCOSE SERPL-MCNC: 122 MG/DL (ref 70–110)
GLUCOSE SERPL-MCNC: 132 MG/DL (ref 70–110)

## 2020-05-15 PROCEDURE — 25000003 PHARM REV CODE 250: Performed by: INTERNAL MEDICINE

## 2020-05-15 PROCEDURE — 94761 N-INVAS EAR/PLS OXIMETRY MLT: CPT

## 2020-05-15 PROCEDURE — G0378 HOSPITAL OBSERVATION PER HR: HCPCS | Mod: CS

## 2020-05-15 PROCEDURE — C9399 UNCLASSIFIED DRUGS OR BIOLOG: HCPCS | Performed by: INTERNAL MEDICINE

## 2020-05-15 PROCEDURE — 63600175 PHARM REV CODE 636 W HCPCS: Performed by: INTERNAL MEDICINE

## 2020-05-15 PROCEDURE — 96372 THER/PROPH/DIAG INJ SC/IM: CPT | Mod: 59

## 2020-05-15 PROCEDURE — 96376 TX/PRO/DX INJ SAME DRUG ADON: CPT

## 2020-05-15 RX ADMIN — ASPIRIN 81 MG: 81 TABLET, COATED ORAL at 09:05

## 2020-05-15 RX ADMIN — ALPRAZOLAM 0.5 MG: 0.5 TABLET ORAL at 09:05

## 2020-05-15 RX ADMIN — DABIGATRAN ETEXILATE MESYLATE 75 MG: 75 CAPSULE ORAL at 09:05

## 2020-05-15 RX ADMIN — MORPHINE SULFATE 2 MG: 2 INJECTION, SOLUTION INTRAMUSCULAR; INTRAVENOUS at 09:05

## 2020-05-15 RX ADMIN — SPIRONOLACTONE 25 MG: 25 TABLET ORAL at 06:05

## 2020-05-15 RX ADMIN — BUPROPION HYDROCHLORIDE 150 MG: 150 TABLET, FILM COATED, EXTENDED RELEASE ORAL at 06:05

## 2020-05-15 RX ADMIN — METOPROLOL TARTRATE 25 MG: 25 TABLET, FILM COATED ORAL at 09:05

## 2020-05-15 RX ADMIN — GABAPENTIN 300 MG: 300 CAPSULE ORAL at 09:05

## 2020-05-15 RX ADMIN — SERTRALINE HYDROCHLORIDE 100 MG: 50 TABLET ORAL at 09:05

## 2020-05-15 RX ADMIN — DOCUSATE SODIUM 200 MG: 100 CAPSULE, LIQUID FILLED ORAL at 09:05

## 2020-05-15 RX ADMIN — PRAVASTATIN SODIUM 80 MG: 40 TABLET ORAL at 09:05

## 2020-05-15 RX ADMIN — LISINOPRIL 10 MG: 10 TABLET ORAL at 09:05

## 2020-05-15 RX ADMIN — AMIODARONE HYDROCHLORIDE 200 MG: 200 TABLET ORAL at 09:05

## 2020-05-15 RX ADMIN — INSULIN DETEMIR 10 UNITS: 100 INJECTION, SOLUTION SUBCUTANEOUS at 09:05

## 2020-05-15 RX ADMIN — MAGNESIUM OXIDE 400 MG: 400 TABLET ORAL at 09:05

## 2020-05-15 NOTE — PT/OT/SLP PROGRESS
Physical Therapy      Patient Name:  Jose Leon   MRN:  [id number]    Patient not seen today secondary to (awaiting clarification for use of TLSO). Will follow-up 5/15/2020.    Anjali Abdi PT
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
## 2020-05-15 NOTE — PROGRESS NOTES
[facility] Medicine  Progress Note    Patient name: Jose Leon  MRN: [id number]  Admit Date: 5/14/2020   LOS: 0 days     SUBJECTIVE:     Principal problem: Compression fracture of L4 vertebra    Interval History:  No acute overnight events reported.  Patient endorsing persistent lower back pain with no significant change when compared to admission.  He is not ambulating much due to fear of pain.  Otherwise denies chest pain, shortness of breath or cough.      Scheduled Meds:   ALPRAZolam  0.5 mg Oral BID    amiodarone  200 mg Oral BID    aspirin  81 mg Oral QHS    buPROPion  150 mg Oral QAM    dabigatran etexilate  75 mg Oral BID    docusate sodium  200 mg Oral BID    gabapentin  300 mg Oral BID    insulin aspart U-100  5 Units Subcutaneous TIDWM    insulin detemir U-100  10 Units Subcutaneous QHS    lisinopriL  10 mg Oral Daily    magnesium oxide  400 mg Oral Daily    metoprolol tartrate  25 mg Oral BID    pravastatin  80 mg Oral QHS    sertraline  100 mg Oral Daily    spironolactone  25 mg Oral QAM     Continuous Infusions:  PRN Meds:acetaminophen, benzonatate, dextrose 50%, dextrose 50%, glucagon (human recombinant), glucose, glucose, HYDROcodone-acetaminophen, insulin aspart U-100, morphine, ondansetron, sodium chloride 0.9%    Review of patient's allergies indicates:   Allergen Reactions    Atorvastatin      Other reaction(s): Generalized Myalgias    Effexor [venlafaxine] Other (See Comments)     Tremulousness       Review of Systems: As per interval history    OBJECTIVE:     Vital Signs (Most Recent)  Temp: 98.1 °F (36.7 °C) (05/15/20 1659)  Pulse: 77 (05/15/20 1659)  Resp: 19 (05/15/20 1659)  BP: 120/83 (05/15/20 1659)  SpO2: 97 % (05/15/20 1659)    Vital Signs Range (Last 24H):  Temp:  [97.6 °F (36.4 °C)-98.7 °F (37.1 °C)]   Pulse:  [64-77]   Resp:  [18-20]   BP: (120-149)/(78-90)   SpO2:  [92 %-98 %]     I & O (Last 24H):    Intake/Output Summary (Last 24 hours) at  5/15/2020 1815  Last data filed at 5/15/2020 0914  Gross per 24 hour   Intake 480 ml   Output 300 ml   Net 180 ml       Physical Exam:  General: Patient resting comfortably in no acute distress. Appears as stated age. Calm  Eyes: No conjunctival injection. No scleral icterus.  ENT: Hearing grossly intact. No discharge from ears. No nasal discharge.   Neck: Supple, trachea midline. No JVD  CVS: RRR. No LE edema BL  Lungs: CTA BL, no wheezing or crackles. Good breath sounds. No accessory muscle use. No acute respiratory distress  Abdomen:  Soft, nontender and nondistended.  No organomegaly  Neuro: AOx3.  Chronic left-sided weakness due to prior CVA  Skin:  No rash or erythema noted  MSK:  No deformity.  Lower back midline tenderness noted    Laboratory:  CBC:   Recent Labs   Lab 05/14/20  0908   WBC 6.37   RBC 5.20   HGB 12.4*   HCT 40.8      MCV 79*   MCH 23.8*   MCHC 30.4*     CMP:   Recent Labs   Lab 05/14/20  0908   *   CALCIUM 8.6*   ALBUMIN 3.4*   PROT 7.0      K 4.5   CO2 25      BUN 16   CREATININE 0.9   ALKPHOS 118   ALT 21   AST 14   BILITOT 0.6       ASSESSMENT/PLAN:     Active Hospital Problems    Diagnosis  POA    *Compression fracture of L4 vertebra [S32.040A]  Yes    Fall [W19.XXXA]  Yes    COVID-19 virus detected [U07.1]  Yes    Paroxysmal Atrial Fibrillation With H/O RVR [I48.0]  Yes    Chronic Bilateral Lower Extremity Edema [R60.0]  Yes    Chronic diastolic congestive heart failure [I50.32]  Yes    Morbid obesity with BMI of 40.0-44.9, adult [E66.01, Z68.41]  Not Applicable      Resolved Hospital Problems   No resolved problems to display.         Plan:   New L4-L5 compression fracture. Prior hx of L1 compression fx s/p kyphoplasty   Pain control   Due to morbid obesity and body habitus, will switch to lumbosacral back support   Sw consulted for the same   Fall precautions   PT/OT consult   Hx of frequent falls likely due to gait instability from prior CVA and recent  hospital stay   Asymptomatic from COVID-19 standpoint; maintain appropriate isolation precautions  Home meds for chronic medical conditions       VTE Risk Mitigation (From admission, onward)         Ordered     dabigatran etexilate capsule 75 mg  2 times daily      05/14/20 1510     IP VTE HIGH RISK PATIENT  Once      05/14/20 1510     Place sequential compression device  Until discontinued      05/14/20 1510                  Department Hospital Medicine  [facility]  Hari Judd MD  Date of service: 05/15/2020

## 2020-05-15 NOTE — PT/OT/SLP PROGRESS
Occupational Therapy      Patient Name:  Jose Leon   MRN:  [id number]    Patient not seen today secondary to Other (Comment)(awaiting TLSO from Dr. Judd note; TLSO not in the pt's room ). Secure chatted Dr. Judd regarding if neurosurgery was being consulted. Collaborated with pt's RN and PT as well regarding pt's status.     Crissy Nettles OT  5/15/2020

## 2020-05-15 NOTE — PLAN OF CARE
05/15/20 1812   FRANCO Message   Medicare Outpatient and Observation Notification regarding financial responsibility Given to patient/caregiver;Explained to patient/caregiver;Other (comments)  (Due to COVID-19, Pt's daughter, Ayesha Leon ([phone]), contacted and notified of MOON.)   Date FRANCO was signed 05/15/20   Time FRANCO was signed 1812

## 2020-05-15 NOTE — NURSING
Pt ambulated w/ me to the bathroom. Pt very weak.  His brace just arrived.   Pt stable. VSS WDL. Pt has no pain or complains.   Safety precautions implemented. Will continue to monitor.

## 2020-05-15 NOTE — RESPIRATORY THERAPY
05/14/20 2300   Patient Assessment/Suction   Rhythm/Pattern, Respiratory pattern regular;unlabored   PRE-TX-O2   O2 Device (Oxygen Therapy) room air   SpO2 97 %   Pulse Oximetry Type Continuous   $ Pulse Oximetry - Multiple Charge Pulse Oximetry - Multiple

## 2020-05-16 VITALS
WEIGHT: 282.19 LBS | RESPIRATION RATE: 16 BRPM | HEART RATE: 70 BPM | HEIGHT: 71 IN | DIASTOLIC BLOOD PRESSURE: 83 MMHG | TEMPERATURE: 99 F | BODY MASS INDEX: 39.51 KG/M2 | SYSTOLIC BLOOD PRESSURE: 138 MMHG | OXYGEN SATURATION: 98 %

## 2020-05-16 LAB
GLUCOSE SERPL-MCNC: 129 MG/DL (ref 70–110)
GLUCOSE SERPL-MCNC: 90 MG/DL (ref 70–110)

## 2020-05-16 PROCEDURE — 99900035 HC TECH TIME PER 15 MIN (STAT)

## 2020-05-16 PROCEDURE — 97116 GAIT TRAINING THERAPY: CPT | Mod: 59

## 2020-05-16 PROCEDURE — 97161 PT EVAL LOW COMPLEX 20 MIN: CPT

## 2020-05-16 PROCEDURE — 94761 N-INVAS EAR/PLS OXIMETRY MLT: CPT

## 2020-05-16 PROCEDURE — 25000003 PHARM REV CODE 250: Performed by: INTERNAL MEDICINE

## 2020-05-16 PROCEDURE — 97535 SELF CARE MNGMENT TRAINING: CPT

## 2020-05-16 PROCEDURE — 97530 THERAPEUTIC ACTIVITIES: CPT

## 2020-05-16 PROCEDURE — 97165 OT EVAL LOW COMPLEX 30 MIN: CPT

## 2020-05-16 PROCEDURE — G0378 HOSPITAL OBSERVATION PER HR: HCPCS

## 2020-05-16 RX ORDER — INSULIN ASPART 100 [IU]/ML
3 INJECTION, SOLUTION INTRAVENOUS; SUBCUTANEOUS
Refills: 0 | Status: ON HOLD
Start: 2020-05-16 | End: 2020-10-26 | Stop reason: SDUPTHER

## 2020-05-16 RX ORDER — INSULIN GLARGINE 100 [IU]/ML
12 INJECTION, SOLUTION SUBCUTANEOUS DAILY
Qty: 30 ML | Refills: 3 | Status: ON HOLD
Start: 2020-05-16 | End: 2020-10-26 | Stop reason: SDUPTHER

## 2020-05-16 RX ORDER — IPRATROPIUM BROMIDE AND ALBUTEROL SULFATE 2.5; .5 MG/3ML; MG/3ML
3 SOLUTION RESPIRATORY (INHALATION) EVERY 6 HOURS PRN
Qty: 1 BOX | Refills: 0
Start: 2020-05-16 | End: 2023-10-08 | Stop reason: CLARIF

## 2020-05-16 RX ADMIN — DABIGATRAN ETEXILATE MESYLATE 75 MG: 75 CAPSULE ORAL at 09:05

## 2020-05-16 RX ADMIN — ALPRAZOLAM 0.5 MG: 0.5 TABLET ORAL at 09:05

## 2020-05-16 RX ADMIN — DOCUSATE SODIUM 200 MG: 100 CAPSULE, LIQUID FILLED ORAL at 09:05

## 2020-05-16 RX ADMIN — HYDROCODONE BITARTRATE AND ACETAMINOPHEN 1 TABLET: 5; 325 TABLET ORAL at 09:05

## 2020-05-16 RX ADMIN — SPIRONOLACTONE 25 MG: 25 TABLET ORAL at 06:05

## 2020-05-16 RX ADMIN — HYDROCODONE BITARTRATE AND ACETAMINOPHEN 1 TABLET: 5; 325 TABLET ORAL at 12:05

## 2020-05-16 RX ADMIN — LISINOPRIL 10 MG: 10 TABLET ORAL at 09:05

## 2020-05-16 RX ADMIN — AMIODARONE HYDROCHLORIDE 200 MG: 200 TABLET ORAL at 09:05

## 2020-05-16 RX ADMIN — BUPROPION HYDROCHLORIDE 150 MG: 150 TABLET, FILM COATED, EXTENDED RELEASE ORAL at 06:05

## 2020-05-16 RX ADMIN — GABAPENTIN 300 MG: 300 CAPSULE ORAL at 09:05

## 2020-05-16 RX ADMIN — METOPROLOL TARTRATE 25 MG: 25 TABLET, FILM COATED ORAL at 09:05

## 2020-05-16 RX ADMIN — MAGNESIUM OXIDE 400 MG: 400 TABLET ORAL at 09:05

## 2020-05-16 RX ADMIN — SERTRALINE HYDROCHLORIDE 100 MG: 50 TABLET ORAL at 09:05

## 2020-05-16 NOTE — PLAN OF CARE
05/16/20 0742   Patient Assessment/Suction   Level of Consciousness (AVPU) alert   Respiratory Effort Normal;Unlabored   Expansion/Accessory Muscles/Retractions no use of accessory muscles;no retractions   Rhythm/Pattern, Respiratory unlabored;pattern regular;depth regular   PRE-TX-O2   O2 Device (Oxygen Therapy) room air   SpO2 98 %   Pulse Oximetry Type Continuous   $ Pulse Oximetry - Multiple Charge Pulse Oximetry - Multiple   Pulse 62   Resp 18   Respiratory Evaluation   $ Care Plan Tech Time 15 min   Evaluation For   (CARE PLAN)

## 2020-05-16 NOTE — PLAN OF CARE
Pt is alert, and states he has lower back pain, Morphine and norco 5 given last night. Pt is able to reposition but states no positioning really helps. Voiding in urinal without difficulty. Call light and personal items at easy reach. Will continue to monitor pain

## 2020-05-16 NOTE — PLAN OF CARE
Problem: Occupational Therapy Goal  Goal: Occupational Therapy Goal  Outcome: Met   OT initial eval, treatment and discharge completed.  Pt instructed in donning/doffing LSO brace with min A 2/2 LUE hemiparesis from old stroke.  DC pt home with family assist and 24/7 supervision.  Pt remains a high fall risk.

## 2020-05-16 NOTE — PT/OT/SLP EVAL
Occupational Therapy   Evaluation/treatment/Discharge summary    Name: Jose Leon  MRN: [id number]  Admitting Diagnosis:  Compression fracture of L4 vertebra      Recommendations:     Discharge Recommendations: home, acute care hospital(24/7 assist and supervision from family)  Discharge Equipment Recommendations:  none  Barriers to discharge:  None    Assessment:     Jose Leon is a 59 y.o. male with a medical diagnosis of Compression fracture of L4 vertebra.  He presents with Performance deficits affecting function: weakness, impaired functional mobilty, decreased safety awareness, decreased coordination, gait instability, impaired endurance, impaired balance, decreased upper extremity function, impaired self care skills, decreased lower extremity function, orthopedic precautions, decreased ROM, pain.       OT initial eval, treatment and discharge completed.  Pt instructed in donning/doffing LSO brace with min A 2/2 LUE hemiparesis from old stroke.  DC pt home with family assist and 24/7 supervision.  Pt remains a high fall risk.                Rehab Prognosis: Fair; patient would benefit from acute skilled OT services to address these deficits and reach maximum level of function.       Plan:     Patient to be seen 1 x/week to address the above listed problems via self-care/home management  · Plan of Care Expires:    · Plan of Care Reviewed with: patient    Subjective     Chief Complaint: back pain  Patient/Family Comments/goals: none    Occupational Profile:  Living Environment: lives with nephew [facility] with 5 steps and one handrail, tub/shower combo with TTB  Previous level of function: nephew assisted with ADLs and ambulation with RW or rollator, pt. uses wc Suresh as primary mode; multiple falls at home.  Roles and Routines: sedentary  Equipment Used at Home:  rollator, walker, rolling, bath bench, wheelchair, raised toilet  Assistance upon Discharge: nephew    Pain/Comfort:  · Pain Rating 1: 7/10  · Location -  Side 1: Bilateral  · Location - Orientation 1: lower  · Location 1: back  · Pain Addressed 1: Reposition, Distraction, Cessation of Activity, Nurse notified  · Pain Rating Post-Intervention 1: 8/10    Patients cultural, spiritual, Scientologist conflicts given the current situation: no    Objective:     Communicated with: nurse prior to session.  Patient found HOB elevated with bed alarm, telemetry, pulse ox (continuous) upon OT entry to room.    General Precautions: Standard, airborne, droplet, fall, contact (COVID19+)  Orthopedic Precautions:spinal precautions   Braces: LSO     Occupational Performance:    Bed Mobility:    · Patient completed Rolling/Turning to Right with stand by assistance and with side rail  · Patient completed Scooting/Bridging with stand by assistance and with side rail  · Patient completed Supine to Sit with stand by assistance and with side rail  · Patient completed Sit to Supine with stand by assistance and with side rail    Functional Mobility/Transfers:  · Patient completed Sit <> Stand Transfer with contact guard assistance  with  rolling walker   Functional Mobility: ambulated short distance in room with CGA using RW, drags LLE, residual weakness from old stroke, unsteady. LSO brace on all times.      Activities of Daily Living:  · Feeding:  modified independence opening containers, L hand incoordinated from old stroke  · Grooming: contact guard assistance standing at sink with RW, washed hands and face, leans to left side, residuals from old stroke  · Upper Body Dressing: minimum assistance donned/doffed LSO brace, limited LUE AROM reaching behind back to grasp brace, SBA to open and close front velcro flap and pull reins to tighten brace around waist for additional brace support seated EOB.  · Lower Body Dressing: total socks   · Toileting: uses urinal simulated setup, wearing brief for accidents.    Cognitive/Visual Perceptual:  Cognitive/Psychosocial Skills:     -       Oriented to:  Person, Place and Situation   -       Follows Commands/attention:Follows one-step commands  -       Communication: clear/fluent  -       Memory: Poor immediate recall  -       Safety awareness/insight to disability: impaired   -       Mood/Affect/Coping skills/emotional control: Cooperative  Visual/Perceptual:      -Intact grossly    Physical Exam:  Balance:    -       static sitting:  fair -, inconsistent, loses balance to left side     Dynamic:  poor plus   standing:  fair-    dynamic;  poor   Postural examination/scapula alignment:    -       Rounded shoulders  Dominant hand:    -       right  Upper Extremity Range of Motion:     -       Right Upper Extremity: WFL  -       Left Upper Extremity: WFL  Upper Extremity Strength:    -       Right Upper Extremity: WFL  -       Left Upper Extremity: Deficits: shld 3-/5, distally 4-/5, hx left hemiparesis from old stroke   Strength:    -       Right Upper Extremity: WFL  -       Left Upper Extremity: WFL    AMPAC 6 Click ADL:  AMPAC Total Score: 16    Treatment & Education:  -Role of OT and POC  -Pt instructed in donning/doffing LSO brace with min A 2/2 LUE hemiparesis from old stroke.  -back precautions: no bending , lifting or twisting, verbalized understanding.  Log roll bed mobility with SBA, min vc reminders, decreased immediate recall.   Education:    Patient left HOB elevated with all lines intact, call button in reach, bed alarm on and nurse notified    GOALS:   Multidisciplinary Problems     Occupational Therapy Goals     Not on file          Multidisciplinary Problems (Resolved)        Problem: Occupational Therapy Goal    Goal Priority Disciplines Outcome Interventions   Occupational Therapy Goal   (Resolved)     OT, PT/OT Met                    History:     Past Medical History:   Diagnosis Date    a A H/O Medical Noncompliance     H/O Chronic Noncompliance With His CHF Diet    a Cardiac Diastolic Dysfunction     Dr. Aure Washington    a Chronic  "Anticoagulation With Pradaxa     Dr. Aure Washington    a Coronary Artery Disease With H/O Stenting     Dr. Aure [place]; Was Hospitalized At [facility] 3/8/17-3/17/17 For CHF Exacerbatioin Due To "Dietary Discrepancies" With LCST Negative There    a Nonsustained Ventricular Tachycardia (NSVT)     a Paroxysmal Atrial Fibrillation With H/O RVR     Dr. Aure Washington; On Chronic Eliquis    a Syncopal Episode     [facility] 4/3/17-4/7/17 Stay For This: Was Likely Due To NSVT, And His Medications Were Adjusted    a Systolic CHF With EF 35-45%     Dr. Aure Washington; Was Hospitalized At [facility] 3/8/17-3/17/17 For CHF Exacerbatioin Due To "Dietary Discrepancies" With LCST Negative There    b Hypertension     b Proteinuria     04/2014 Referred To Dr. Leroy Allison; 4/1/14 Bilateral Renal U/S = Normal; On Lisinopril 20 Mg Daily    b Stage 2 CKD     c Hypercholesterolemia With Low HDL     d Type 2 DM On Insulin     ** 12/4/18 Referred To DM EDU; 1/11/18 Referred To Dr. Dipika Rodriguez And Re-Referred To [facility] EDU; 12/28/17 HgA1c = 12.0;" 7/5/17 Referred To DM EDU    f Morbid Obesity     i 1 PPD X 25+ YRs Chronic Tobacco Use Disorder     7/5/17 Increased Wellbutrin-XL To 300 Mg Daily; 6/8/17 RXd Wellbutrin- Mg Daily X 4 Months    i JULY On CPAP     Dr. Marion ngo Chronic Left Groin Pain     l Chronic Left Shoulder Pain     Dr. MARTINA martinez Chronic Recurrent Low Back Pain 12/04/2018    Dr. Llamas Is His Pain Management Neurologist; 5/219/18 Referred To Dr. Ismael martinez Left 5-7th Rib FXs 04/2016 4/23/16 [facility] Left Rib XRays = Questionable Nondisplaced Left 5-7th Rib FXs With Normal Lung Fields    l Right Shouder SX 5/26/16 Due To Work Related Injury     Dr. Mora At [facility]; Dr. MARTINA ahtch H/O Transient Ischemic Attack In 2013     n Anxiety And Depression 12/04/2018    RTC In 6 Weeks; 12/4/18 Added Wellbutrin- Mg qAM; 5/29/18 Increased 100 Mg Zoloft To 100 Mg Bid    n Continuous " Benzodiazepine (Xanax) Use 12/04/2018 5/29/18 I Am Weaning Him Off Of This By Decreasing 1 Mg Bid To 0.5 Mg Bid PRN, And Will Wean Further Next OV    n H/O ETOH Abuse, Quit In 09/2014     q Bilateral Lower Extremity Venous Stasis Ulcers     2/28/18 [facility]    q Chronic Bilateral Lower Extremity Edema     2/28/18 Added Metolazone 10 Mg qAM On MWF And Referred Back To Dr. Washington; On Lasix 40 Mg Bid; He Wears Bilateral Compressin Hose Stockings    q Disability Examination 7/15/16     For CHF, PAF, DM2, And JULY On CPAP    Wellness Visit 11/6/2017        Past Surgical History:   Procedure Laterality Date    CARDIAC SURGERY      coronary stent    COLONOSCOPY N/A 12/19/2017    Procedure: COLONOSCOPY;  Surgeon: Dave Allen MD;  [facility];  Service: Endoscopy;  Laterality: N/A;    DIABETES MANAGEMENT LABS      heart stent      INCISION AND DRAINAGE OF WOUND      on stomach    SHOULDER SURGERY         Time Tracking:     OT Date of Treatment: 05/16/20  OT Start Time: 1300  OT Stop Time: 1331  OT Total Time (min): 31 min    Billable Minutes:Evaluation 15  Self Care/Home Management 16  Total Time 31    Sandee Gerardo OT  5/16/2020

## 2020-05-16 NOTE — PT/OT/SLP EVAL
"Physical Therapy Evaluation and Discharge Note    Patient Name:  Jose Leon   MRN:  [id number]    Recommendations:     Discharge Recommendations:    Home Health PT  Discharge Equipment Recommendations: bedside commode, walker, rolling, wheelchair, bath bench   Barriers to discharge: None    Assessment:     Jose Leon is a 59 y.o. male admitted with a medical diagnosis of Compression fracture of L4 vertebra. Pt donned TLSO while seated at EOB then ambulated in the room with pt reporting " I can walk better with the brace." PTA pt was at home with cousin and it appears that patient required at least Supervision  for unctional mobility. Pt had decreased mobility just PTA due  to a fall and injury to low back.    At this time, patient is functioning close to prior level of function but would benefit from HH PT.  Patient has orders for D/C home with HH PT. This therapist spoke with daughter  Zoe on the phone  who explained  that she could not  assistance  pt with D/C home as she doesn't have a car. I spoke with son Austyn who  questioned " Doesn't he have COVID?"  Son was instructed to speak with patient's nurse. Not sure of arrangement and willingness of family members to take patient home.    Recent Surgery: * No surgery found *      Plan:     During this hospitalization, patient does not require further acute PT services.  Please re-consult if situation changes.      Subjective     Chief Complaint:  Patient/Family Comments/goals: home with family  Pain/Comfort:  ·      Patients cultural, spiritual, Shinto conflicts given the current situation:      Living Environment:    Prior to admission, patients level of function was supervision/ Minimal Assistance . Equipment used at home: Tub bench,   W/C, RW  .  DME owned (not currently used): none.  Upon discharge, patient will have assistance from his cousin    Objective:     Communicated with nurse prior to session.  Patient found supine with   upon PT entry to " "room.    General Precautions: Standard,     Orthopedic Precautions:    Braces:       Exams:  · Cognitive Exam:  Patient is oriented to Person, Place and Situation  · RLE ROM: WFL  · RLE Strength: WFL  · LLE ROM: WFL  · LLE Strength: WFL except weaker than R side    Functional Mobility:  · Bed Mobility:     · Supine to Sit: minimum assistance  · Sit to Supine: minimum assistance  · Transfers:     · Sit to Stand:  minimum assistance with hand-held assist  · Gait: 40 ft Rw and  CGA    AM-PAC 6 CLICK MOBILITY  Total Score:17       Therapeutic Activities and Exercises:  Bed mobility, t/f, gait training with TLSO in place    AM-PAC 6 CLICK MOBILITY  Total Score:17     Patient left supine with call button in reach and bed alarm on.    GOALS:   Multidisciplinary Problems     Physical Therapy Goals     Not on file                History:     Past Medical History:   Diagnosis Date    a A H/O Medical Noncompliance     H/O Chronic Noncompliance With His CHF Diet    a Cardiac Diastolic Dysfunction     Dr. Aure Washington    a Chronic Anticoagulation With Pradaxa     Dr. Aure Washington    a Coronary Artery Disease With H/O Stenting     Dr. Aure Washington; Was Hospitalized At [facility] 3/8/17-3/17/17 For CHF Exacerbatioin Due To "Dietary Discrepancies" With LCST Negative There    a Nonsustained Ventricular Tachycardia (NSVT)     a Paroxysmal Atrial Fibrillation With H/O RVR     Dr. Aure Washington; On Chronic Eliquis    a Syncopal Episode     [facility] 4/3/17-4/7/17 Stay For This: Was Likely Due To NSVT, And His Medications Were Adjusted    a Systolic CHF With EF 35-45%     Dr. Aure Washington; Was Hospitalized At [facility] 3/8/17-3/17/17 For CHF Exacerbatioin Due To "Dietary Discrepancies" With LCST Negative There    b Hypertension     b Proteinuria     04/2014 Referred To Dr. Leroy Allison; 4/1/14 Bilateral Renal U/S = Normal; On Lisinopril 20 Mg Daily    b Stage 2 CKD     c Hypercholesterolemia With Low HDL     d Type 2 DM On Insulin     ** 12/4/18 " "Referred To DM EDU; 1/11/18 Referred To Dr. Dipika Rodriguez And Re-Referred To [facility] EDU; 12/28/17 HgA1c = 12.0;" 7/5/17 Referred To DM EDU    f Morbid Obesity     i 1 PPD X 25+ YRs Chronic Tobacco Use Disorder     7/5/17 Increased Wellbutrin-XL To 300 Mg Daily; 6/8/17 RXd Wellbutrin- Mg Daily X 4 Months    i JULY On CPAP     Dr. Marion ngo Chronic Left Groin Pain     l Chronic Left Shoulder Pain     Dr. MARTINA martinez Chronic Recurrent Low Back Pain 12/04/2018    Dr. Llamas Is His Pain Management Neurologist; 5/219/18 Referred To Dr. Ismael martinez Left 5-7th Rib FXs 04/2016 4/23/16 [facility] Left Rib XRays = Questionable Nondisplaced Left 5-7th Rib FXs With Normal Lung Fields    l Right Shouder SX 5/26/16 Due To Work Related Injury     Dr. Mora At [facility]; Dr. MARTINA hatch H/O Transient Ischemic Attack In 2013     n Anxiety And Depression 12/04/2018    RTC In 6 Weeks; 12/4/18 Added Wellbutrin- Mg qAM; 5/29/18 Increased 100 Mg Zoloft To 100 Mg Bid    n Continuous Benzodiazepine (Xanax) Use 12/04/2018 5/29/18 I Am Weaning Him Off Of This By Decreasing 1 Mg Bid To 0.5 Mg Bid PRN, And Will Wean Further Next OV    n H/O ETOH Abuse, Quit In 09/2014     q Bilateral Lower Extremity Venous Stasis Ulcers     2/28/18 [facility]    q Chronic Bilateral Lower Extremity Edema     2/28/18 Added Metolazone 10 Mg qAM On MWF And Referred Back To Dr. Washington; On Lasix 40 Mg Bid; He Wears Bilateral Compressin Hose Stockings    q Disability Examination 7/15/16     For CHF, PAF, DM2, And JULY On CPAP    Wellness Visit 11/6/2017        Past Surgical History:   Procedure Laterality Date    CARDIAC SURGERY      coronary stent    COLONOSCOPY N/A 12/19/2017    Procedure: COLONOSCOPY;  Surgeon: Dave Allen MD;  Location[facility];  Service: Endoscopy;  Laterality: N/A;    DIABETES MANAGEMENT LABS      heart stent   "    INCISION AND DRAINAGE OF WOUND      on stomach    SHOULDER SURGERY         Time Tracking:     PT Received On: 05/16/20  PT Start Time: 1029     PT Stop Time: 1110  PT Total Time (min): 41 min     Billable Minutes: Evaluation 21  minutes, Gait Training 10 minutesand Therapeutic Activity 10 minutes      Anjali Abdi, PT  05/16/2020

## 2020-05-16 NOTE — NURSING
Pt ambulated w/ physical therapy today.   Pt ate 100% of meal. Pt has no distress, no pain, or complains.   DC instructions & teaching given. Pt verbalizes understanding. Pt wheeled outside by me. His family member brought you home.

## 2020-05-17 NOTE — HOSPITAL COURSE
59-year-old male with multiple medical comorbidities including but not limited to CVA with mild left-sided residual weakness, paroxysmal atrial fibrillation on anticoagulation with dabigatran, obesity and chronic diastolic congestive heart failure presented to the ED with chief complaint of acute on chronic lower back pain after sustaining a mechanical fall.  On admission patient was found to have acute vertebral fracture involving superior endplate of L4.  Of note patient has prior history of L1 compression fracture status post vertebroplasty.  Patient admitted for pain control.    He was recently discharged from [facility] after a prolonged hospital stay secondary to COVID-19 respiratory failure.  On admission patient tested positive for COVID-19 however he was asymptomatic from the same.  A lumbar brace was provided and patient was evaluated by physical therapy and occupational therapy who recommended discharge to home with 24 x 7 supervision of care. He has ambulated with the help of a rolling walker without lower back pain or discomfort. Patient deemed medically stable to be discharged home.  COVID-19 home quarantine precautions provided.      Instructions provided to follow up with primary care physician as outpatient. Patient verbalized understanding and is aware to contact primary care physician or return to ED if new or worsening symptoms.    Physical exam on the day of discharge:  General: Patient in no acute distress.  Lungs: CTA. Good air entry.  Cor: Regular rate and rhythm. No murmurs. No pedal edema.  Abd: Soft. Nontender. Non-distended.  Neuro: A&O x3. Ambulating with rolling walker. Chronic left sided weakness  Ext: No clubbing. No cyanosis.

## 2020-05-17 NOTE — DISCHARGE SUMMARY
[facility]  Discharge Summary      Patient Name: Jose Leon  MRN: [id number]  Admission Date: 5/14/2020  Hospital Length of Stay: 0 days  Discharge Date and Time: 5/16/2020  3:10 PM  Attending Physician: No att. providers found   Discharging Provider: Hari Judd MD  Primary Care Provider: Josh Giordano MD      HPI:   Patient is a 59 year male with multiple medical comorbidities including but not limited to morbid obesity, prior CVA with residual left-sided weakness,  chronic diastolic congestive heart failure and recent prolonged stay for COVID-19 respiratory failure who was discharged from [facility] 3 days ago presents to the ED with chief complaint of fall and generalized weakness.    Patient endorses generalized weakness since his discharge from the hospital on 04/22/2020.  He has been released from [facility] 3 days ago.  Since then he reports not taking his medications.  Earlier today he suffered a fall and struck his back against a television.  He endorses acute on chronic lower back pain.  Pain is worse with any sort of movement.  Relieved after receiving pain medications in the ED.  It is described as sharp in nature with no radiation.  States that his left leg gave away leading to the fall.  He denies head or neck trauma or loss of consciousness.  No chest pain or palpitations surrounding the fall.  He also denies any recent fever, chills, shortness of breath, cough, abdominal pain, nausea/vomiting or diarrhea/constipation.  He has prior history of L1 compression fracture status post kyphoplasty.    In the ED:  Hemodynamically stable.  Repeat COVID-19 testing positive.  Imaging revealed a new compression fracture involving L4 and L5.  Admitted for pain control.    Rest of the 10 point review of systems is negative except as mentioned above.    * No surgery found *      Hospital Course:   59-year-old male with multiple medical comorbidities including but  not limited to CVA with mild left-sided residual weakness, paroxysmal atrial fibrillation on anticoagulation with dabigatran, obesity and chronic diastolic congestive heart failure presented to the ED with chief complaint of acute on chronic lower back pain after sustaining a mechanical fall.  On admission patient was found to have acute vertebral fracture involving superior endplate of L4.  Of note patient has prior history of L1 compression fracture status post vertebroplasty.  Patient admitted for pain control.    He was recently discharged from [facility] after a prolonged hospital stay secondary to COVID-19 respiratory failure.  On admission patient tested positive for COVID-19 however he was asymptomatic from the same.  A lumbar brace was provided and patient was evaluated by physical therapy and occupational therapy who recommended discharge to home with 24 x 7 supervision of care. He has ambulated with the help of a rolling walker without lower back pain or discomfort. Patient deemed medically stable to be discharged home.  COVID-19 home quarantine precautions provided.      Instructions provided to follow up with primary care physician as outpatient. Patient verbalized understanding and is aware to contact primary care physician or return to ED if new or worsening symptoms.    Physical exam on the day of discharge:  General: Patient in no acute distress.  Lungs: CTA. Good air entry.  Cor: Regular rate and rhythm. No murmurs. No pedal edema.  Abd: Soft. Nontender. Non-distended.  Neuro: A&O x3. Ambulating with rolling walker. Chronic left sided weakness  Ext: No clubbing. No cyanosis.        Consults:   Consults (From admission, onward)        Status Ordering Provider     Inpatient consult to   Once     Provider:  (Not yet assigned)    Completed ALYSHA CLEMENT     IP consult to case management  Once     Provider:  (Not yet assigned)    Completed ALYSHA CLEMENT          No new Assessment & Plan  notes have been filed under this hospital service since the last note was generated.  Service: Hospital Medicine    Final Active Diagnoses:    Diagnosis Date Noted POA    PRINCIPAL PROBLEM:  Compression fracture of L4 vertebra [S32.040A] 05/14/2020 Yes    Fall [W19.XXXA] 05/14/2020 Yes    COVID-19 virus detected [U07.1] 05/14/2020 Yes    Paroxysmal Atrial Fibrillation With H/O RVR [I48.0]  Yes    Chronic Bilateral Lower Extremity Edema [R60.0]  Yes    Chronic diastolic congestive heart failure [I50.32] 01/01/2017 Yes    Morbid obesity with BMI of 40.0-44.9, adult [E66.01, Z68.41] 02/27/2013 Not Applicable      Problems Resolved During this Admission:       Discharged Condition: fair    Disposition: Home-Health Care [facility]    Follow Up:  Follow-up Information     [facility].    Specialty:  Transplant  Contact information:  [address] [facility] [address] [phone]  Additional information:  3rd floor           [facility] .    Specialties:  Physical Therapy, Occupational Therapy, Home Health Services  Contact information:  [address] [address] [address]  [phone]                 Patient Instructions:      Ambulatory referral/consult to Home Health   Standing Status: Future   Referral Priority: Routine Referral Type: Home Health   Referral Reason: Patient Preference   Referred to Provider: [facility] Requested Specialty: Home Health Services   Number of Visits Requested: 1     Diet Cardiac     Notify your health care provider if you experience any of the following:  temperature >100.4     Notify your health care provider if you experience any of the following:  persistent nausea and vomiting or diarrhea     Notify your health care provider if you experience any of the following:  persistent dizziness, light-headedness, or visual disturbances     Activity as tolerated       Significant Diagnostic Studies:   Imaging Results          CT Head  Without Contrast (Final result)  Result time 05/14/20 10:51:45    Final result by Sabrina Nunez IV, MD (05/14/20 10:51:45)                 Narrative:    CMS MANDATED QUALITY DATA - CT RADIATION 436    All CT scans at this facility utilize dose modulation, iterative  reconstruction, and/or weight based dosing when appropriate to reduce  radiation dose to as low as reasonably achievable.    CT of the brain without contrast    HISTORY: Dizziness.    A focal area of encephalomalacia/gliosis involves the right hemisphere  posteriorly incorporating portions of the temporal and parietal lobes.  A similar but smaller area involves the left frontal lobe and  subcortical white matter. These likely represent areas of remote  cortical infarction. There are changes of decreased CT attenuation of  the subcortical and periventricular white matter of both hemispheres  compatible with chronic deep white matter ischemia.    There are no findings of acute hemorrhage or infarction. There is no  evidence of intra-axial mass or mass effect.    Prominence of the ventricular system and cortical sulci is a  reflection of age-related involutional changes. There are no  extra-axial collections.    No acute osseous abnormality is identified. Scattered mucosal  thickening is noted within the paranasal sinuses.    Calcified plaque formation is observed within the intracranial  segments of the internal carotid arteries bilaterally.    There is a lipomatous lesion within the soft tissues of the scalp  along the right side of the calvarium in the frontal region.    IMPRESSION:    No acute intracranial abnormality.    Regions of remote hemispheric infarction, right larger than left.    Involutional changes and chronic small vessel ischemic changes.    Electronically Signed by Sabrina Nunez M.D. on 5/14/2020 10:59 AM                             X-Ray Lumbar Spine 2 Or 3 Views (Final result)  Result time 05/14/20 10:17:42   Procedure changed from X-Ray  Lumbar Spine Complete 5 View     Final result by Koko Govea MD (05/14/20 10:17:42)                 Narrative:    XR LUMBAR SPINE 2 OR 3 VIEWS    CLINICAL HISTORY:  59 years Male Low back pain, minor trauma    COMPARISON: February 7, 2019 lumbar spine radiography    FINDINGS: Lumbar spine alignment is within normal limits. L1  compression fracture status post vertebroplasty, stable from prior.  New superior endplate irregularity of L4 and new mild superior  endplate depression of L5. Lower lumbar facet arthropathy. Paraspinal  soft tissues are unremarkable.    Impression:    Superior endplate depression of L5 and superior endplate irregularity  of L4, concerning for fracture if there is history of trauma.  Schmorl's node development could give a similar appearance. Please  correlate with focal tenderness.    Electronically Signed by Koko FARLEY on 5/14/2020 10:22 AM                             X-Ray Chest AP Portable (Final result)  Result time 05/14/20 10:15:42    Final result by Koko Govea MD (05/14/20 10:15:42)                 Narrative:    XR CHEST AP PORTABLE    CLINICAL HISTORY:  59 years Male CHF    COMPARISON: April 3, 2020    FINDINGS: Mild enlargement of the cardiac silhouette and prominence of  central pulmonary vascularity is stable from prior. No confluent  airspace disease. No pleural fluid or pneumothorax. Loop recorder  projects over the left chest. No acute osseous abnormality.    IMPRESSION:    Stable chest radiograph demonstrating mild cardiomegaly and central  pulmonary vascular prominence.    Electronically Signed by Koko FARLEY on 5/14/2020 10:19 AM                                Pending Diagnostic Studies:     None         Medications:  Reconciled Home Medications:      Medication List      CHANGE how you take these medications    albuterol-ipratropium 2.5 mg-0.5 mg/3 mL nebulizer solution  Commonly known as:  DUO-NEB  Take 3 mLs by nebulization every 6 (six)  "hours as needed for Wheezing or Shortness of Breath. Rescue  What changed:    · when to take this  · reasons to take this     insulin aspart U-100 100 unit/mL (3 mL) Inpn pen  Commonly known as:  NovoLOG  Inject 3 Units into the skin 3 (three) times daily with meals.  What changed:  how much to take     insulin glargine 100 units/mL (3mL) SubQ pen  Commonly known as:  BASAGLAR KWIKPEN U-100 INSULIN  Inject 12 Units into the skin once daily.  What changed:  how much to take     metoprolol tartrate 25 MG tablet  Commonly known as:  LOPRESSOR  TAKE 1 TABLET BY MOUTH 2 TIMES DAILY.  What changed:    · how much to take  · how to take this  · when to take this        CONTINUE taking these medications    acetaminophen 500 MG tablet  Commonly known as:  TYLENOL  Take 2 tablets (1,000 mg total) by mouth every 6 (six) hours as needed for Pain.     ALPRAZolam 0.5 MG tablet  Commonly known as:  XANAX  TAKE ONE TABLET BY MOUTH TWICE DAILY AS NEEDED FOR ANXIETY     amiodarone 200 MG Tab  Commonly known as:  PACERONE  200 mg 2 (two) times daily.     aspirin 81 MG EC tablet  Commonly known as:  ECOTRIN  Take 81 mg by mouth every evening.     buPROPion 150 MG TB24 tablet  Commonly known as:  WELLBUTRIN XL  Take 1 tablet (150 mg total) by mouth every morning.     docusate sodium 100 MG capsule  Commonly known as:  COLACE  Take 2 capsules (200 mg total) by mouth 2 (two) times daily.     gabapentin 300 MG capsule  Commonly known as:  NEURONTIN  Take 1 capsule (300 mg total) by mouth 2 (two) times daily.     lisinopriL 10 MG tablet  Take 1 tablet (10 mg total) by mouth once daily.     magnesium oxide 400 mg (241.3 mg magnesium) tablet  Commonly known as:  MAG-OX  Take 1 tablet (400 mg total) by mouth once daily.     PRADAXA 75 mg Cap  Generic drug:  dabigatran etexilate  Take 75 mg by mouth 2 (two) times daily. "Do NOT break, chew, or open capsules."     pravastatin 80 MG tablet  Commonly known as:  PRAVACHOL  TAKE 1 TABLET BY MOUTH AT " BEDTIME     sertraline 100 MG tablet  Commonly known as:  ZOLOFT  Take 1 tablet (100 mg total) by mouth once daily.     spironolactone 25 MG tablet  Commonly known as:  ALDACTONE  Take 1 tablet (25 mg total) by mouth every morning.        STOP taking these medications    predniSONE 20 MG tablet  Commonly known as:  DELTASONE            Indwelling Lines/Drains at time of discharge:   Lines/Drains/Airways     None                 Time spent on the discharge of patient: 28 minutes  Patient was seen and examined on the date of discharge and determined to be suitable for discharge.         Hari Judd MD  Department of Hospital Medicine  [facility]

## 2020-05-17 NOTE — PLAN OF CARE
05/17/20 1645   Final Note   Assessment Type Final Discharge Note   Anticipated Discharge Disposition Home-Health   Sent referral to [facility].

## 2020-05-20 ENCOUNTER — HOSPITAL ENCOUNTER (EMERGENCY)
Facility: HOSPITAL | Age: 60
End: 2020-05-22
Attending: EMERGENCY MEDICINE
Payer: MEDICARE

## 2020-05-20 DIAGNOSIS — R06.02 SHORTNESS OF BREATH: ICD-10-CM

## 2020-05-20 DIAGNOSIS — R45.851 SUICIDAL IDEATION: Primary | ICD-10-CM

## 2020-05-20 LAB
ALBUMIN SERPL BCP-MCNC: 3.7 G/DL (ref 3.5–5.2)
ALP SERPL-CCNC: 108 U/L (ref 55–135)
ALT SERPL W/O P-5'-P-CCNC: 20 U/L (ref 10–44)
AMPHET+METHAMPHET UR QL: NEGATIVE
ANION GAP SERPL CALC-SCNC: 9 MMOL/L (ref 8–16)
APAP SERPL-MCNC: <10 UG/ML (ref 10–20)
APTT PPP: 33.2 SEC (ref 23.6–33.3)
AST SERPL-CCNC: 15 U/L (ref 10–40)
BACTERIA #/AREA URNS HPF: NEGATIVE /HPF
BARBITURATES UR QL SCN>200 NG/ML: NEGATIVE
BASOPHILS # BLD AUTO: 0.14 K/UL (ref 0–0.2)
BASOPHILS NFR BLD: 1.9 % (ref 0–1.9)
BENZODIAZ UR QL SCN>200 NG/ML: NORMAL
BILIRUB SERPL-MCNC: 0.7 MG/DL (ref 0.1–1)
BILIRUB UR QL STRIP: NEGATIVE
BNP SERPL-MCNC: 527 PG/ML (ref 0–99)
BUN SERPL-MCNC: 11 MG/DL (ref 6–20)
BZE UR QL SCN: NEGATIVE
CALCIUM SERPL-MCNC: 8.7 MG/DL (ref 8.7–10.5)
CANNABINOIDS UR QL SCN: NEGATIVE
CHLORIDE SERPL-SCNC: 103 MMOL/L (ref 95–110)
CK MB SERPL-MCNC: 2 NG/ML (ref 0.1–6.5)
CK SERPL-CCNC: 46 U/L (ref 20–200)
CLARITY UR: CLEAR
CO2 SERPL-SCNC: 26 MMOL/L (ref 23–29)
COLOR UR: YELLOW
CREAT SERPL-MCNC: 1 MG/DL (ref 0.5–1.4)
CREAT UR-MCNC: 209 MG/DL (ref 23–375)
DIFFERENTIAL METHOD: ABNORMAL
EOSINOPHIL # BLD AUTO: 0.3 K/UL (ref 0–0.5)
EOSINOPHIL NFR BLD: 3.6 % (ref 0–8)
ERYTHROCYTE [DISTWIDTH] IN BLOOD BY AUTOMATED COUNT: 18.3 % (ref 11.5–14.5)
EST. GFR  (AFRICAN AMERICAN): >60 ML/MIN/1.73 M^2
EST. GFR  (NON AFRICAN AMERICAN): >60 ML/MIN/1.73 M^2
ETHANOL SERPL-MCNC: <5 MG/DL
GLUCOSE SERPL-MCNC: 117 MG/DL (ref 70–110)
GLUCOSE SERPL-MCNC: 152 MG/DL (ref 70–110)
GLUCOSE UR QL STRIP: NEGATIVE
HCT VFR BLD AUTO: 43.7 % (ref 40–54)
HGB BLD-MCNC: 13.2 G/DL (ref 14–18)
HGB UR QL STRIP: NEGATIVE
HYALINE CASTS #/AREA URNS LPF: 5 /LPF
IMM GRANULOCYTES # BLD AUTO: 0.01 K/UL (ref 0–0.04)
IMM GRANULOCYTES NFR BLD AUTO: 0.1 % (ref 0–0.5)
INR PPP: 1.2
KETONES UR QL STRIP: NEGATIVE
LEUKOCYTE ESTERASE UR QL STRIP: NEGATIVE
LYMPHOCYTES # BLD AUTO: 1.4 K/UL (ref 1–4.8)
LYMPHOCYTES NFR BLD: 19.1 % (ref 18–48)
MAGNESIUM SERPL-MCNC: 1.6 MG/DL (ref 1.6–2.6)
MCH RBC QN AUTO: 24 PG (ref 27–31)
MCHC RBC AUTO-ENTMCNC: 30.2 G/DL (ref 32–36)
MCV RBC AUTO: 79 FL (ref 82–98)
MICROSCOPIC COMMENT: ABNORMAL
MONOCYTES # BLD AUTO: 0.6 K/UL (ref 0.3–1)
MONOCYTES NFR BLD: 8.4 % (ref 4–15)
NEUTROPHILS # BLD AUTO: 4.9 K/UL (ref 1.8–7.7)
NEUTROPHILS NFR BLD: 66.9 % (ref 38–73)
NITRITE UR QL STRIP: NEGATIVE
NRBC BLD-RTO: 0 /100 WBC
OPIATES UR QL SCN: NEGATIVE
PCP UR QL SCN>25 NG/ML: NEGATIVE
PH UR STRIP: 6 [PH] (ref 5–8)
PLATELET # BLD AUTO: 251 K/UL (ref 150–350)
PMV BLD AUTO: 11.5 FL (ref 9.2–12.9)
POTASSIUM SERPL-SCNC: 3.4 MMOL/L (ref 3.5–5.1)
PROT SERPL-MCNC: 7.4 G/DL (ref 6–8.4)
PROT UR QL STRIP: ABNORMAL
PROTHROMBIN TIME: 14.4 SEC (ref 10.6–14.8)
RBC # BLD AUTO: 5.51 M/UL (ref 4.6–6.2)
RBC #/AREA URNS HPF: 1 /HPF (ref 0–4)
SALICYLATES SERPL-MCNC: <4 MG/DL (ref 15–30)
SARS-COV-2 RDRP RESP QL NAA+PROBE: NEGATIVE
SODIUM SERPL-SCNC: 138 MMOL/L (ref 136–145)
SP GR UR STRIP: 1.02 (ref 1–1.03)
SQUAMOUS #/AREA URNS HPF: 0 /HPF
TOXICOLOGY INFORMATION: NORMAL
TROPONIN I SERPL DL<=0.01 NG/ML-MCNC: <0.03 NG/ML
URN SPEC COLLECT METH UR: ABNORMAL
UROBILINOGEN UR STRIP-ACNC: ABNORMAL EU/DL
WBC # BLD AUTO: 7.26 K/UL (ref 3.9–12.7)
WBC #/AREA URNS HPF: 1 /HPF (ref 0–5)

## 2020-05-20 PROCEDURE — U0002 COVID-19 LAB TEST NON-CDC: HCPCS

## 2020-05-20 PROCEDURE — 82553 CREATINE MB FRACTION: CPT

## 2020-05-20 PROCEDURE — 80320 DRUG SCREEN QUANTALCOHOLS: CPT

## 2020-05-20 PROCEDURE — 93005 ELECTROCARDIOGRAM TRACING: CPT | Performed by: INTERNAL MEDICINE

## 2020-05-20 PROCEDURE — 80307 DRUG TEST PRSMV CHEM ANLYZR: CPT

## 2020-05-20 PROCEDURE — 83735 ASSAY OF MAGNESIUM: CPT

## 2020-05-20 PROCEDURE — 99285 EMERGENCY DEPT VISIT HI MDM: CPT | Mod: 25

## 2020-05-20 PROCEDURE — 36415 COLL VENOUS BLD VENIPUNCTURE: CPT

## 2020-05-20 PROCEDURE — 81001 URINALYSIS AUTO W/SCOPE: CPT

## 2020-05-20 PROCEDURE — 82550 ASSAY OF CK (CPK): CPT

## 2020-05-20 PROCEDURE — 85610 PROTHROMBIN TIME: CPT

## 2020-05-20 PROCEDURE — 82962 GLUCOSE BLOOD TEST: CPT

## 2020-05-20 PROCEDURE — 80053 COMPREHEN METABOLIC PANEL: CPT

## 2020-05-20 PROCEDURE — 84484 ASSAY OF TROPONIN QUANT: CPT

## 2020-05-20 PROCEDURE — 85730 THROMBOPLASTIN TIME PARTIAL: CPT

## 2020-05-20 PROCEDURE — 83880 ASSAY OF NATRIURETIC PEPTIDE: CPT

## 2020-05-20 PROCEDURE — 85025 COMPLETE CBC W/AUTO DIFF WBC: CPT

## 2020-05-20 PROCEDURE — 80307 DRUG TEST PRSMV CHEM ANLYZR: CPT | Mod: 59

## 2020-05-20 PROCEDURE — 25000003 PHARM REV CODE 250: Performed by: EMERGENCY MEDICINE

## 2020-05-20 RX ORDER — AMIODARONE HYDROCHLORIDE 200 MG/1
200 TABLET ORAL ONCE
Status: COMPLETED | OUTPATIENT
Start: 2020-05-20 | End: 2020-05-20

## 2020-05-20 RX ORDER — DABIGATRAN ETEXILATE 75 MG/1
75 CAPSULE ORAL ONCE
Status: COMPLETED | OUTPATIENT
Start: 2020-05-20 | End: 2020-05-21

## 2020-05-20 RX ORDER — ALPRAZOLAM 0.5 MG/1
0.5 TABLET ORAL
Status: COMPLETED | OUTPATIENT
Start: 2020-05-20 | End: 2020-05-20

## 2020-05-20 RX ORDER — GABAPENTIN 300 MG/1
300 CAPSULE ORAL ONCE
Status: COMPLETED | OUTPATIENT
Start: 2020-05-20 | End: 2020-05-20

## 2020-05-20 RX ORDER — NAPROXEN SODIUM 220 MG/1
81 TABLET, FILM COATED ORAL
Status: COMPLETED | OUTPATIENT
Start: 2020-05-20 | End: 2020-05-20

## 2020-05-20 RX ADMIN — ASPIRIN 81 MG 81 MG: 81 TABLET ORAL at 10:05

## 2020-05-20 RX ADMIN — ALPRAZOLAM 0.5 MG: 0.5 TABLET ORAL at 10:05

## 2020-05-20 RX ADMIN — GABAPENTIN 300 MG: 300 CAPSULE ORAL at 11:05

## 2020-05-20 RX ADMIN — AMIODARONE HYDROCHLORIDE 200 MG: 200 TABLET ORAL at 11:05

## 2020-05-20 NOTE — ED NOTES
resp even non labored, calm, cooperative, psych tech patience outside room with constant visual of pt, completing [id number] checks, see paper charting

## 2020-05-20 NOTE — ED PROVIDER NOTES
"Encounter Date: 5/20/2020       History     Chief Complaint   Patient presents with    Suicidal     Patient with recent complicated past medical history including respiratory complications due to COVID-19 and lumbar compression fracture.  Patient has been having a hard time at home.  He attempted to see his family today and they wanted nothing to do with him.  He has increasing depression now with suicidal ideation.  No specific plan.  He denies any drug ingestion.  He does continue report occasional shortness of breath and back pain.  No new trauma.  No head trauma.  No headache.  Patient does have history of psychiatric problems in the past.        Review of patient's allergies indicates:   Allergen Reactions    Atorvastatin      Other reaction(s): Generalized Myalgias    Effexor [venlafaxine] Other (See Comments)     Tremulousness     Past Medical History:   Diagnosis Date    a A H/O Medical Noncompliance     H/O Chronic Noncompliance With His CHF Diet    a Cardiac Diastolic Dysfunction     Dr. Aure Washington    a Chronic Anticoagulation With Pradaxa     Dr. Aure Washington    a Coronary Artery Disease With H/O Stenting     Dr. Aure Washington; Was Hospitalized At [facility] 3/8/17-3/17/17 For CHF Exacerbatioin Due To "Dietary Discrepancies" With LCST Negative There    a Nonsustained Ventricular Tachycardia (NSVT)     a Paroxysmal Atrial Fibrillation With H/O RVR     Dr. Aure Washington; On Chronic Eliquis    a Syncopal Episode     [facility] 4/3/17-4/7/17 Stay For This: Was Likely Due To NSVT, And His Medications Were Adjusted    a Systolic CHF With EF 35-45%     Dr. Aure Washington; Was Hospitalized At [facility] 3/8/17-3/17/17 For CHF Exacerbatioin Due To "Dietary Discrepancies" With LCST Negative There    b Hypertension     b Proteinuria     04/2014 Referred To Dr. Leroy Allison; 4/1/14 Bilateral Renal U/S = Normal; On Lisinopril 20 Mg Daily    b Stage 2 CKD     c Hypercholesterolemia With Low HDL     d Type 2 DM On Insulin     ** " "12/4/18 Referred To [facility]; 1/11/18 Referred To Dr. Dipika Rodriguez And Re-Referred To [facility]; 12/28/17 HgA1c = 12.0;" 7/5/17 Referred To [facility]    f Morbid Obesity     i 1 PPD X 25+ YRs Chronic Tobacco Use Disorder     7/5/17 Increased Wellbutrin-XL To 300 Mg Daily; 6/8/17 RXd Wellbutrin- Mg Daily X 4 Months    i JULY On CPAP     Dr. Marion ngo Chronic Left Groin Pain     l Chronic Left Shoulder Pain     Dr. MARTINA martinez Chronic Recurrent Low Back Pain 12/04/2018    Dr. Llamas Is His Pain Management Neurologist; 5/219/18 Referred To Dr. Ismael martinez Left 5-7th Rib FXs 04/2016 4/23/16 [facility] Left Rib XRays = Questionable Nondisplaced Left 5-7th Rib FXs With Normal Lung Fields    l Right Shouder SX 5/26/16 Due To Work Related Injury     Dr. Mora [facility]; Dr. MARTINA hatch H/O Transient Ischemic Attack In 2013     n Anxiety And Depression 12/04/2018    RTC In 6 Weeks; 12/4/18 Added Wellbutrin- Mg qAM; 5/29/18 Increased 100 Mg Zoloft To 100 Mg Bid    n Continuous Benzodiazepine (Xanax) Use 12/04/2018 5/29/18 I Am Weaning Him Off Of This By Decreasing 1 Mg Bid To 0.5 Mg Bid PRN, And Will Wean Further Next OV    n H/O ETOH Abuse, Quit In 09/2014     q Bilateral Lower Extremity Venous Stasis Ulcers     2/28/18 [facility]    q Chronic Bilateral Lower Extremity Edema     2/28/18 Added Metolazone 10 Mg qAM On MWF And Referred Back To Dr. Washington; On Lasix 40 Mg Bid; He Wears Bilateral Compressin Hose Stockings    q Disability Examination 7/15/16     For CHF, PAF, DM2, And JULY On CPAP    Wellness Visit 11/6/2017      Past Surgical History:   Procedure Laterality Date    CARDIAC SURGERY      coronary stent    COLONOSCOPY N/A 12/19/2017    Procedure: COLONOSCOPY;  Surgeon: Dave Allen MD;  Location: [facility];  Service: Endoscopy;  Laterality: N/A;    DIABETES MANAGEMENT LABS      heart " stent      INCISION AND DRAINAGE OF WOUND      on stomach    SHOULDER SURGERY       Family History   Problem Relation Age of Onset    Heart disease Mother     Arthritis Mother     Diabetes Mother     Hyperlipidemia Mother     Hypertension Mother     Heart disease Father     Arthritis Father     Asthma Father     COPD Father     Hyperlipidemia Father     Hypertension Father     Stroke Father     Diabetes Sister     Diabetes Maternal Uncle      Social History     Tobacco Use    Smoking status: Former Smoker     Packs/day: 0.25     Types: Cigarettes     Start date: 1/1/1981     Last attempt to quit: 9/24/2016     Years since quitting: 3.6    Smokeless tobacco: Never Used    Tobacco comment: every now and again with drinks   Substance Use Topics    Alcohol use: Yes     Comment: occas    Drug use: No     Review of Systems   Constitutional: Negative for chills and fever.   HENT: Negative for sore throat.    Eyes: Negative for photophobia and visual disturbance.   Respiratory: Positive for shortness of breath.    Cardiovascular: Negative for chest pain.   Gastrointestinal: Negative for abdominal pain and vomiting.   Genitourinary: Negative for dysuria.   Musculoskeletal: Negative for joint swelling.   Skin: Negative for rash.   Neurological: Negative for seizures, syncope and headaches.   Psychiatric/Behavioral: Negative for confusion.       Physical Exam     Initial Vitals [05/20/20 1520]   BP Pulse Resp Temp SpO2   (!) 183/87 90 20 98.3 °F (36.8 °C) 100 %      MAP       --         Physical Exam    Nursing note and vitals reviewed.  Constitutional: He is not diaphoretic. No distress.   HENT:   Head: Normocephalic and atraumatic.   Eyes: Conjunctivae are normal.   Neck: Normal range of motion.   Cardiovascular: Regular rhythm.   Pulmonary/Chest: Breath sounds normal.   Abdominal: Soft. There is no tenderness.   Musculoskeletal: Normal range of motion.   Neurological: He is alert and oriented to person,  place, and time. No cranial nerve deficit.   Dense left-sided georgette paresis secondary previous stroke.   Skin: No rash noted.   Bilateral ankles with small pustules consistent with recent ant bites   Psychiatric:   No apparent delusions or hallucinations.         ED Course   Procedures  Labs Reviewed   CBC W/ AUTO DIFFERENTIAL   COMPREHENSIVE METABOLIC PANEL   URINALYSIS, REFLEX TO URINE CULTURE   DRUG SCREEN PANEL, URINE EMERGENCY   ALCOHOL,MEDICAL (ETHANOL)   ACETAMINOPHEN LEVEL   SALICYLATE LEVEL   SARS-COV-2 RNA AMPLIFICATION, QUAL   TROPONIN I   CK-MB   CK   MAGNESIUM   URINALYSIS, REFLEX TO URINE CULTURE   PROTIME-INR   APTT   B-TYPE NATRIURETIC PEPTIDE          Imaging Results          X-Ray Chest AP Portable (In process)                  Medical Decision Making:   History:   Old Medical Records: I decided to obtain old medical records.  Clinical Tests:   Lab Tests: Reviewed  Radiological Study: Reviewed  Medical Tests: Reviewed  ED Management:  Patient presents with suicidal ideation.  Patient with recent corona virus illness.  He is currently negative.  Symptoms are essentially resolved.  Patient is gravely disabled.  Physician emergency certificate completed.  Patient is medically stable for psychiatric evaluation and transfer.                                 Clinical Impression:       ICD-10-CM ICD-9-CM   1. Suicidal ideation R45.851 V62.84   2. Shortness of breath R06.02 786.05                                Adalberto Curran MD  05/20/20 [id number]

## 2020-05-20 NOTE — ED TRIAGE NOTES
"Present to the ER via EMS reports SI, pt states " well I call my home health care nurse and they decided that I need to call, I was feeling suicidal my family disowned me because I have this virus" pt report he tested positive for Covid-19 multiple times,   "

## 2020-05-20 NOTE — ED NOTES
Psych tech Kenya outside room completing [id number] checks, see paper charting, calm, cooperative, pleasant

## 2020-05-21 LAB
GLUCOSE SERPL-MCNC: 126 MG/DL (ref 70–110)
GLUCOSE SERPL-MCNC: 127 MG/DL (ref 70–110)
GLUCOSE SERPL-MCNC: 146 MG/DL (ref 70–110)
SARS-COV-2 RDRP RESP QL NAA+PROBE: NEGATIVE

## 2020-05-21 PROCEDURE — 25000003 PHARM REV CODE 250: Performed by: EMERGENCY MEDICINE

## 2020-05-21 PROCEDURE — U0002 COVID-19 LAB TEST NON-CDC: HCPCS

## 2020-05-21 RX ORDER — METOPROLOL TARTRATE 25 MG/1
25 TABLET, FILM COATED ORAL ONCE
Status: COMPLETED | OUTPATIENT
Start: 2020-05-21 | End: 2020-05-21

## 2020-05-21 RX ORDER — AMIODARONE HYDROCHLORIDE 200 MG/1
200 TABLET ORAL 2 TIMES DAILY
Status: DISCONTINUED | OUTPATIENT
Start: 2020-05-21 | End: 2020-05-22 | Stop reason: HOSPADM

## 2020-05-21 RX ORDER — OXYCODONE AND ACETAMINOPHEN 10; 325 MG/1; MG/1
1 TABLET ORAL EVERY 6 HOURS PRN
Status: ON HOLD | COMMUNITY
Start: 2020-05-19 | End: 2020-10-26 | Stop reason: HOSPADM

## 2020-05-21 RX ORDER — SERTRALINE HYDROCHLORIDE 50 MG/1
100 TABLET, FILM COATED ORAL DAILY
Status: DISCONTINUED | OUTPATIENT
Start: 2020-05-21 | End: 2020-05-22 | Stop reason: HOSPADM

## 2020-05-21 RX ORDER — LANOLIN ALCOHOL/MO/W.PET/CERES
400 CREAM (GRAM) TOPICAL ONCE
Status: COMPLETED | OUTPATIENT
Start: 2020-05-21 | End: 2020-05-21

## 2020-05-21 RX ORDER — ASPIRIN 81 MG/1
81 TABLET ORAL DAILY
Status: DISCONTINUED | OUTPATIENT
Start: 2020-05-21 | End: 2020-05-22 | Stop reason: HOSPADM

## 2020-05-21 RX ORDER — GABAPENTIN 300 MG/1
300 CAPSULE ORAL ONCE
Status: COMPLETED | OUTPATIENT
Start: 2020-05-21 | End: 2020-05-21

## 2020-05-21 RX ORDER — DABIGATRAN ETEXILATE 75 MG/1
75 CAPSULE ORAL ONCE
Status: DISCONTINUED | OUTPATIENT
Start: 2020-05-21 | End: 2020-05-22 | Stop reason: HOSPADM

## 2020-05-21 RX ORDER — OXYCODONE AND ACETAMINOPHEN 10; 325 MG/1; MG/1
1 TABLET ORAL ONCE
Status: COMPLETED | OUTPATIENT
Start: 2020-05-21 | End: 2020-05-21

## 2020-05-21 RX ORDER — ACETAMINOPHEN 500 MG
1000 TABLET ORAL
Status: COMPLETED | OUTPATIENT
Start: 2020-05-21 | End: 2020-05-21

## 2020-05-21 RX ORDER — LISINOPRIL 5 MG/1
10 TABLET ORAL
Status: COMPLETED | OUTPATIENT
Start: 2020-05-21 | End: 2020-05-21

## 2020-05-21 RX ADMIN — GABAPENTIN 300 MG: 300 CAPSULE ORAL at 11:05

## 2020-05-21 RX ADMIN — AMIODARONE HYDROCHLORIDE 200 MG: 200 TABLET ORAL at 11:05

## 2020-05-21 RX ADMIN — OXYCODONE HYDROCHLORIDE AND ACETAMINOPHEN 1 TABLET: 10; 325 TABLET ORAL at 11:05

## 2020-05-21 RX ADMIN — AMIODARONE HYDROCHLORIDE 200 MG: 200 TABLET ORAL at 09:05

## 2020-05-21 RX ADMIN — ASPIRIN 81 MG: 81 TABLET, DELAYED RELEASE ORAL at 11:05

## 2020-05-21 RX ADMIN — MAGNESIUM OXIDE 400 MG: 400 TABLET ORAL at 11:05

## 2020-05-21 RX ADMIN — ACETAMINOPHEN 1000 MG: 500 TABLET, FILM COATED ORAL at 05:05

## 2020-05-21 RX ADMIN — DABIGATRAN ETEXILATE MESYLATE 75 MG: 75 CAPSULE ORAL at 11:05

## 2020-05-21 RX ADMIN — METOPROLOL TARTRATE 25 MG: 25 TABLET, FILM COATED ORAL at 11:05

## 2020-05-21 RX ADMIN — SERTRALINE HYDROCHLORIDE 100 MG: 50 TABLET ORAL at 11:05

## 2020-05-21 RX ADMIN — OXYCODONE HYDROCHLORIDE AND ACETAMINOPHEN 1 TABLET: 10; 325 TABLET ORAL at 09:05

## 2020-05-21 RX ADMIN — LISINOPRIL 10 MG: 5 TABLET ORAL at 11:05

## 2020-05-21 NOTE — ED NOTES
Patient awake and alert, requests prn home medication for chronic lower back pain. Patient given phone per request.

## 2020-05-22 VITALS
BODY MASS INDEX: 41.86 KG/M2 | SYSTOLIC BLOOD PRESSURE: 173 MMHG | RESPIRATION RATE: 20 BRPM | HEIGHT: 71 IN | HEART RATE: 75 BPM | WEIGHT: 299 LBS | OXYGEN SATURATION: 100 % | DIASTOLIC BLOOD PRESSURE: 90 MMHG | TEMPERATURE: 98 F

## 2020-05-22 NOTE — ED NOTES
[facility] arrived to transport pt to [facility], security called for belongings, pt placed in two psych safe gowns, vs rechecked, vss, pt assisted to [facility] car via wc, [email or web address], rrr unlabored, trenton

## 2020-05-22 NOTE — ED NOTES
Assumed care of pt, pt lying in bed, resting quietly with eyes closed, rrr unlabored, nadn, sitter in direct line of sight, will continue to monitor

## 2020-06-29 NOTE — PLAN OF CARE
Goals remain appropriate. Continue with POC    Problem: Occupational Therapy Goal  Goal: Occupational Therapy Goal  Description  Revised Goals to be met by: 5/5/2020     Patient will increase functional independence with ADLs by performing:    UE Dressing with Minimal Assistance seated EOB.  LE Dressing with Minimal Assistance and Assistive Devices as needed.  Grooming while EOB with Stand-by Assistance.  Supine to sit with Minimal Assistance for UE dressing.  Sit to stand with Minimal Assistance for LE dressing.  Upper extremity exercise program x10 reps per handout, with supervision.    Goals to be met by: 5/5/2020     Patient will increase functional independence with ADLs by performing:    LE Dressing with Contact Guard Assistance and Assistive Devices as needed.  Grooming while EOB with Stand-by Assistance.  Toileting from toilet with Minimal Assistance for hygiene and clothing management. - Discontinue (pt is incontinent)  Supine to sit with Minimal Assistance.  Toilet transfer to bedside commode with Minimal Assistance. - Discontinue (pt is incontinent)  Upper extremity exercise program x10 reps per handout, with supervision.      Outcome: Ongoing, Progressing      normal

## 2020-09-21 ENCOUNTER — HOSPITAL ENCOUNTER (EMERGENCY)
Facility: HOSPITAL | Age: 60
Discharge: HOME | End: 2020-09-21
Attending: EMERGENCY MEDICINE
Payer: MEDICARE

## 2020-09-21 VITALS
HEART RATE: 74 BPM | SYSTOLIC BLOOD PRESSURE: 156 MMHG | DIASTOLIC BLOOD PRESSURE: 70 MMHG | RESPIRATION RATE: 16 BRPM | OXYGEN SATURATION: 99 % | TEMPERATURE: 97 F
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
## 2020-09-21 DIAGNOSIS — S70.02XA CONTUSION OF LEFT HIP, INITIAL ENCOUNTER: ICD-10-CM

## 2020-09-21 DIAGNOSIS — I50.23 ACUTE ON CHRONIC SYSTOLIC CONGESTIVE HEART FAILURE: Primary | ICD-10-CM

## 2020-09-21 DIAGNOSIS — M25.559 HIP PAIN: ICD-10-CM

## 2020-09-21 DIAGNOSIS — M79.89 LEG SWELLING: ICD-10-CM

## 2020-09-21 DIAGNOSIS — R06.02 SOB (SHORTNESS OF BREATH): ICD-10-CM

## 2020-09-21 LAB
ALBUMIN SERPL BCP-MCNC: 3.5 G/DL (ref 3.5–5.2)
ALP SERPL-CCNC: 95 U/L (ref 55–135)
ALT SERPL W/O P-5'-P-CCNC: 10 U/L (ref 10–44)
ANION GAP SERPL CALC-SCNC: 11 MMOL/L (ref 8–16)
AST SERPL-CCNC: 9 U/L (ref 10–40)
BASOPHILS # BLD AUTO: 0.13 K/UL (ref 0–0.2)
BASOPHILS NFR BLD: 1.3 % (ref 0–1.9)
BILIRUB SERPL-MCNC: 0.4 MG/DL (ref 0.1–1)
BNP SERPL-MCNC: 372 PG/ML (ref 0–99)
BUN SERPL-MCNC: 18 MG/DL (ref 6–20)
CALCIUM SERPL-MCNC: 8.7 MG/DL (ref 8.7–10.5)
CHLORIDE SERPL-SCNC: 107 MMOL/L (ref 95–110)
CO2 SERPL-SCNC: 26 MMOL/L (ref 23–29)
CREAT SERPL-MCNC: 1.4 MG/DL (ref 0.5–1.4)
DIFFERENTIAL METHOD: ABNORMAL
DIGOXIN SERPL-MCNC: <0.1 NG/ML (ref 0.8–2)
EOSINOPHIL # BLD AUTO: 0.4 K/UL (ref 0–0.5)
EOSINOPHIL NFR BLD: 3.7 % (ref 0–8)
ERYTHROCYTE [DISTWIDTH] IN BLOOD BY AUTOMATED COUNT: 16.8 % (ref 11.5–14.5)
EST. GFR  (AFRICAN AMERICAN): >60 ML/MIN/1.73 M^2
EST. GFR  (NON AFRICAN AMERICAN): 55 ML/MIN/1.73 M^2
GLUCOSE SERPL-MCNC: 119 MG/DL (ref 70–110)
HCT VFR BLD AUTO: 40.1 % (ref 40–54)
HGB BLD-MCNC: 11.9 G/DL (ref 14–18)
IMM GRANULOCYTES # BLD AUTO: 0.03 K/UL (ref 0–0.04)
IMM GRANULOCYTES NFR BLD AUTO: 0.3 % (ref 0–0.5)
LYMPHOCYTES # BLD AUTO: 1.5 K/UL (ref 1–4.8)
LYMPHOCYTES NFR BLD: 13.9 % (ref 18–48)
MCH RBC QN AUTO: 23.6 PG (ref 27–31)
MCHC RBC AUTO-ENTMCNC: 29.7 G/DL (ref 32–36)
MCV RBC AUTO: 79 FL (ref 82–98)
MONOCYTES # BLD AUTO: 0.6 K/UL (ref 0.3–1)
MONOCYTES NFR BLD: 5.9 % (ref 4–15)
NEUTROPHILS # BLD AUTO: 7.8 K/UL (ref 1.8–7.7)
NEUTROPHILS NFR BLD: 74.9 % (ref 38–73)
NRBC BLD-RTO: 0 /100 WBC
PLATELET # BLD AUTO: 200 K/UL (ref 150–350)
PMV BLD AUTO: 12 FL (ref 9.2–12.9)
POTASSIUM SERPL-SCNC: 3.6 MMOL/L (ref 3.5–5.1)
PROT SERPL-MCNC: 7 G/DL (ref 6–8.4)
RBC # BLD AUTO: 5.05 M/UL (ref 4.6–6.2)
SODIUM SERPL-SCNC: 144 MMOL/L (ref 136–145)
WBC # BLD AUTO: 10.4 K/UL (ref 3.9–12.7)

## 2020-09-21 PROCEDURE — 85025 COMPLETE CBC W/AUTO DIFF WBC: CPT

## 2020-09-21 PROCEDURE — 93005 ELECTROCARDIOGRAM TRACING: CPT

## 2020-09-21 PROCEDURE — 99285 EMERGENCY DEPT VISIT HI MDM: CPT | Mod: 25

## 2020-09-21 PROCEDURE — 63600175 PHARM REV CODE 636 W HCPCS: Performed by: EMERGENCY MEDICINE

## 2020-09-21 PROCEDURE — 93010 ELECTROCARDIOGRAM REPORT: CPT | Mod: ,,, | Performed by: SPECIALIST

## 2020-09-21 PROCEDURE — 93010 EKG 12-LEAD: ICD-10-PCS | Mod: ,,, | Performed by: SPECIALIST

## 2020-09-21 PROCEDURE — 80053 COMPREHEN METABOLIC PANEL: CPT

## 2020-09-21 PROCEDURE — 96374 THER/PROPH/DIAG INJ IV PUSH: CPT

## 2020-09-21 PROCEDURE — 80162 ASSAY OF DIGOXIN TOTAL: CPT

## 2020-09-21 PROCEDURE — 36415 COLL VENOUS BLD VENIPUNCTURE: CPT

## 2020-09-21 PROCEDURE — 25000003 PHARM REV CODE 250: Performed by: EMERGENCY MEDICINE

## 2020-09-21 PROCEDURE — 83880 ASSAY OF NATRIURETIC PEPTIDE: CPT

## 2020-09-21 RX ORDER — IBUPROFEN 600 MG/1
600 TABLET ORAL
Status: COMPLETED | OUTPATIENT
Start: 2020-09-21 | End: 2020-09-21

## 2020-09-21 RX ORDER — FUROSEMIDE 20 MG/1
TABLET ORAL
COMMUNITY
End: 2020-09-21 | Stop reason: SDUPTHER

## 2020-09-21 RX ORDER — FUROSEMIDE 10 MG/ML
60 INJECTION INTRAMUSCULAR; INTRAVENOUS
Status: COMPLETED | OUTPATIENT
Start: 2020-09-21 | End: 2020-09-21

## 2020-09-21 RX ORDER — DIAZEPAM 10 MG/1
TABLET ORAL
Status: ON HOLD | COMMUNITY
End: 2020-10-26 | Stop reason: HOSPADM

## 2020-09-21 RX ORDER — BUPROPION HYDROCHLORIDE 150 MG/1
150 TABLET, EXTENDED RELEASE ORAL DAILY
COMMUNITY
End: 2023-10-08 | Stop reason: CLARIF

## 2020-09-21 RX ORDER — FUROSEMIDE 40 MG/1
40 TABLET ORAL DAILY
Qty: 30 TABLET | Refills: 1 | Status: ON HOLD | OUTPATIENT
Start: 2020-09-21 | End: 2020-10-26 | Stop reason: SDUPTHER

## 2020-09-21 RX ORDER — DIGOXIN 250 MCG
250 TABLET ORAL DAILY
COMMUNITY
End: 2023-10-08 | Stop reason: CLARIF

## 2020-09-21 RX ORDER — CLOPIDOGREL BISULFATE 75 MG/1
TABLET ORAL
Status: ON HOLD | COMMUNITY
End: 2020-10-26 | Stop reason: HOSPADM

## 2020-09-21 RX ADMIN — FUROSEMIDE 60 MG: 10 INJECTION, SOLUTION INTRAMUSCULAR; INTRAVENOUS at 09:09

## 2020-09-21 RX ADMIN — IBUPROFEN 600 MG: 600 TABLET ORAL at 08:09

## 2020-09-21 NOTE — ED TRIAGE NOTES
Jose Leon is here with history of falling since October and twice today since stroke; also swelling to left lower leg for 4 days.

## 2020-09-22 NOTE — ED PROVIDER NOTES
"Encounter Date: 9/21/2020    SCRIBE #1 NOTE: I, Lailakathi Amaya, am scribing for, and in the presence of, Aureliano Steven MD.       History     Chief Complaint   Patient presents with    Fall     "I been falling" states he's been falling since October since the stroke    Leg Swelling     to left lower       Time seen by provider: 7:04 PM on 09/21/2020    Jose Leon is a 59 y.o. male who presents to the ED via EMS with an onset of left leg swelling and SOB for approximately 4 days. He also presents with left hip pain and an abrasion on his left side after falling today. Patient reports SOB is worse when laying down or on exertion. Patiently reportedly has frequent falls since stroke last October. The patient denies cp, heart palpitations, dizziness, HA or any other symptoms at this time. Patient has a PMHx of HTN, CAD s/p stent placement, CHF, A-fib, prior stroke, anti-coagulant use and CKD.    The history is provided by the patient.     Review of patient's allergies indicates:   Allergen Reactions    Atorvastatin      Other reaction(s): Generalized Myalgias    Effexor [venlafaxine] Other (See Comments)     Tremulousness     Past Medical History:   Diagnosis Date    a A H/O Medical Noncompliance     H/O Chronic Noncompliance With His CHF Diet    a Cardiac Diastolic Dysfunction     Dr. Aure Washington    a Chronic Anticoagulation With Pradaxa     Dr. Aure Washington    a Coronary Artery Disease With H/O Stenting     Dr. Aure Washington; Was Hospitalized At [facility] 3/8/17-3/17/17 For CHF Exacerbatioin Due To "Dietary Discrepancies" With LCST Negative There    a Nonsustained Ventricular Tachycardia (NSVT)     a Paroxysmal Atrial Fibrillation With H/O RVR     Dr. Aure Washington; On Chronic Eliquis    a Syncopal Episode     [facility] 4/3/17-4/7/17 Stay For This: Was Likely Due To NSVT, And His Medications Were Adjusted    a Systolic CHF With EF 35-45%     Dr. Aure Washington; Was Hospitalized At [facility] 3/8/17-3/17/17 For CHF Exacerbatioin " "Due To "Dietary Discrepancies" With LCST Negative There    b Hypertension     b Proteinuria     04/2014 Referred To Dr. Leroy Allison; 4/1/14 Bilateral Renal U/S = Normal; On Lisinopril 20 Mg Daily    b Stage 2 CKD     c Hypercholesterolemia With Low HDL     d Type 2 DM On Insulin     ** 12/4/18 Referred To [facility]; 1/11/18 Referred To Dr. Dipika Rodriguez And Re-Referred To [facility]; 12/28/17 HgA1c = 12.0;" 7/5/17 Referred To [facility]    f Morbid Obesity     i 1 PPD X 25+ YRs Chronic Tobacco Use Disorder     7/5/17 Increased Wellbutrin-XL To 300 Mg Daily; 6/8/17 RXd Wellbutrin- Mg Daily X 4 Months    i JULY On CPAP     Dr. Marion ngo Chronic Left Groin Pain     l Chronic Left Shoulder Pain     Dr. MARTINA martinez Chronic Recurrent Low Back Pain 12/04/2018    Dr. Llamas Is His Pain Management Neurologist; 5/219/18 Referred To Dr. Ismael Hernández    l Left 5-7th Rib FXs 04/2016 4/23/16 [facility] Left Rib XRays = Questionable Nondisplaced Left 5-7th Rib FXs With Normal Lung Fields    l Right Shouder SX 5/26/16 Due To Work Related Injury     Dr. Mora At [facility]; Dr. MARTINA hatch H/O Transient Ischemic Attack In 2013     n Anxiety And Depression 12/04/2018    RTC In 6 Weeks; 12/4/18 Added Wellbutrin- Mg qAM; 5/29/18 Increased 100 Mg Zoloft To 100 Mg Bid    n Continuous Benzodiazepine (Xanax) Use 12/04/2018 5/29/18 I Am Weaning Him Off Of This By Decreasing 1 Mg Bid To 0.5 Mg Bid PRN, And Will Wean Further Next OV    n H/O ETOH Abuse, Quit In 09/2014     q Bilateral Lower Extremity Venous Stasis Ulcers     2/28/18 [facility]    q Chronic Bilateral Lower Extremity Edema     2/28/18 Added Metolazone 10 Mg qAM On MWF And Referred Back To Dr. Washington; On Lasix 40 Mg Bid; He Wears Bilateral Compressin Hose Stockings    q Disability Examination 7/15/16     For CHF, PAF, DM2, And JUYL On CPAP    Wellness Visit 11/6/2017  "     Past Surgical History:   Procedure Laterality Date    CARDIAC SURGERY      coronary stent    COLONOSCOPY N/A 12/19/2017    Procedure: COLONOSCOPY;  Surgeon: Dave Allen MD;  Location: [facility];  Service: Endoscopy;  Laterality: N/A;    DIABETES MANAGEMENT LABS      heart stent      INCISION AND DRAINAGE OF WOUND      on stomach    SHOULDER SURGERY       Family History   Problem Relation Age of Onset    Heart disease Mother     Arthritis Mother     Diabetes Mother     Hyperlipidemia Mother     Hypertension Mother     Heart disease Father     Arthritis Father     Asthma Father     COPD Father     Hyperlipidemia Father     Hypertension Father     Stroke Father     Diabetes Sister     Diabetes Maternal Uncle      Social History     Tobacco Use    Smoking status: Former Smoker     Packs/day: 0.25     Types: Cigarettes     Start date: 1/1/1981     Quit date: 9/24/2016     Years since quitting: 3.9    Smokeless tobacco: Never Used    Tobacco comment: every now and again with drinks   Substance Use Topics    Alcohol use: Yes     Comment: occas    Drug use: No     Review of Systems   Constitutional: Negative for activity change, appetite change, chills, fatigue and fever.   Eyes: Negative for visual disturbance.   Respiratory: Positive for shortness of breath. Negative for apnea.    Cardiovascular: Positive for leg swelling. Negative for chest pain and palpitations.   Gastrointestinal: Negative for abdominal distention and abdominal pain.   Genitourinary: Negative for difficulty urinating.   Musculoskeletal: Positive for arthralgias and gait problem. Negative for neck pain.   Skin: Positive for wound. Negative for pallor and rash.   Neurological: Negative for dizziness and headaches.   Hematological: Bruises/bleeds easily.   Psychiatric/Behavioral: Negative for agitation.       Physical Exam     Initial Vitals   BP Pulse Resp Temp SpO2   09/21/20 1836 09/21/20 1836 09/21/20 1836  09/21/20 1836 09/21/20 1923   (!) 154/84 75 16 96.6 °F (35.9 °C) (!) 70 %      MAP       --                Physical Exam    Nursing note and vitals reviewed.  Constitutional: He appears well-developed and well-nourished.   HENT:   Head: Normocephalic and atraumatic.   Eyes: Conjunctivae are normal.   Neck: Normal range of motion. Neck supple.   Cardiovascular: Normal rate and normal heart sounds. An irregularly irregular rhythm present.  Exam reveals no gallop and no friction rub.    No murmur heard.  Pulmonary/Chest: No respiratory distress. He has no wheezes. He has no rhonchi. He has rales.   Left bibasilar rales.   Abdominal: Soft. He exhibits no distension. There is no hepatomegaly. There is no abdominal tenderness.   Musculoskeletal: Normal range of motion.      Comments: Bilateral leg swelling that is much more pronounced on the left.   Neurological: He is alert and oriented to person, place, and time.   Skin: Skin is warm and dry. Abrasion and ecchymosis noted.   Ecchymosis and an abrasion noted to the left side of the abdomen.   Psychiatric: He has a normal mood and affect.         ED Course   Procedures  Labs Reviewed   B-TYPE NATRIURETIC PEPTIDE - Abnormal; Notable for the following components:       Result Value     (*)     All other components within normal limits   CBC W/ AUTO DIFFERENTIAL - Abnormal; Notable for the following components:    Hemoglobin 11.9 (*)     Mean Corpuscular Volume 79 (*)     Mean Corpuscular Hemoglobin 23.6 (*)     Mean Corpuscular Hemoglobin Conc 29.7 (*)     RDW 16.8 (*)     Gran # (ANC) 7.8 (*)     Gran% 74.9 (*)     Lymph% 13.9 (*)     All other components within normal limits   COMPREHENSIVE METABOLIC PANEL - Abnormal; Notable for the following components:    Glucose 119 (*)     AST 9 (*)     eGFR if non  55 (*)     All other components within normal limits   DIGOXIN LEVEL - Abnormal; Notable for the following components:    Digoxin Lvl <0.1 (*)      All other components within normal limits     EKG Readings: (Independently Interpreted)   Atrial fibrillation with a ventricular rate of 78 bpm. Left axis deviation. Nonspecific ST changes.       Imaging Results          X-Ray Hip 2 View Left (Final result)  Result time 09/21/20 21:49:55    Final result by Leroy Ramsay MD (09/21/20 21:49:55)                 Impression:      Mild degenerative changes in the hips pelvis and lumbar spine with no fracture of the left hip evident.      Electronically signed by: Leroy Ramsay  Date:    09/21/2020  Time:    21:49             Narrative:    EXAMINATION:  XR HIP 2 VIEW LEFT    CLINICAL HISTORY:  Pain in unspecified hip    TECHNIQUE:  AP view of the pelvis and frog leg lateral view of the left hip were performed.    COMPARISON:  None    FINDINGS:  Pelvic ring is intact.  Mild degenerative changes in the lumbar spine and femoroacetabular joints are noted bilaterally.  There is no evidence of fracture or bony destructive process.  Atherosclerotic calcifications in the iliac and femoral vessels are noted bilaterally.                               US Lower Extremity Veins Left (Final result)  Result time 09/21/20 21:16:07    Final result by Rajinder Pepe MD (09/21/20 21:16:07)                 Impression:      No evidence of deep venous thrombosis in the left lower extremity, noting only single peroneal vein is identified.    Lower extremity subcutaneous edema.      Electronically signed by: Rajinder Pepe MD  Date:    09/21/2020  Time:    21:16             Narrative:    EXAMINATION:  US LOWER EXTREMITY VEINS LEFT    CLINICAL HISTORY:  Other specified soft tissue disorders    TECHNIQUE:  Duplex and color flow Doppler evaluation and graded compression of the left lower extremity veins was performed.    COMPARISON:  12/20/2013    FINDINGS:  Duplex and color flow Doppler evaluation does not reveal any evidence of acute venous thrombosis in the common femoral,  superficial femoral, greater saphenous, popliteal, peroneal, anterior tibial and posterior tibial veins of the left lower extremity.  There is no reflux to suggest valvular incompetence.  Please note, only single peroneal vein is identified.  There is lower extremity edema.                               X-Ray Femur Ap/Lat Left (Final result)  Result time 09/21/20 21:14:18    Final result by Rajindre Pepe MD (09/21/20 21:14:18)                 Impression:      1. Linear lucency involving the medial aspect of the superior pubic ramus, appears to have been present on previous CT however not confirmed.  Correlation with any focal tenderness is recommended.  Dedicated pelvic radiography advised.  2. Obliquely oriented lucency involving the intertrochanteric region is most likely related to overlying skin fold as this is not appreciated on the orthogonal view.      Electronically signed by: Rajinder Pepe MD  Date:    09/21/2020  Time:    21:14             Narrative:    EXAMINATION:  XR FEMUR 2 VIEW LEFT    CLINICAL HISTORY:  Unspecified fall, initial encounter    TECHNIQUE:  AP and lateral views of the left femur were performed.    COMPARISON:  None}    FINDINGS:  Four views left femur.    Degenerative changes are noted of the left femoroacetabular joint.  There is obliquely oriented lucency traversing the intertrochanteric region, likely underlying skin fold.  There is osteopenia.  There is vascular calcification.  Degenerative changes are noted of the knee.  There is curvilinear lucency involving the medial aspect of the superior pubic ramus, similar in configuration two  of CT 04/03/2020.                                X-Ray Chest AP Portable (Final result)  Result time 09/21/20 20:44:05    Final result by Yocasta Ramsay MD (09/21/20 20:44:05)                 Impression:      Interval development of bilateral perihilar and infrahilar airspace opacities raising question of pulmonary edema/CHF.   Pneumonitis related to infectious etiology is considered less likely given the patient's history but in the differential therefore correlation is needed.    This report was flagged in Epic as abnormal.      Electronically signed by: Yocasta Ramsay  Date:    09/21/2020  Time:    20:44             Narrative:    EXAMINATION:  XR CHEST AP PORTABLE    CLINICAL HISTORY:  Shortness of breath    TECHNIQUE:  Single frontal view of the chest was performed.    COMPARISON:  05/20/2020 frontal chest exam    FINDINGS:  The cardiomediastinal silhouette is stable and borderline normal in size.  Loop recorder is again noted over the left chest and mediastinum.    There is interval development of acute bilateral perihilar/infrahilar airspace opacification more pronounced on the right.  There is no acute pleural effusion or pneumothorax.  Pulmonary vasculature appears symmetric.                                 Medical Decision Making:   History:   Old Medical Records: I decided to obtain old medical records.  Independently Interpreted Test(s):   I have ordered and independently interpreted X-rays - see prior notes.  I have ordered and independently interpreted EKG Reading(s) - see prior notes  Clinical Tests:   Lab Tests: Ordered and Reviewed  Radiological Study: Ordered and Reviewed  Medical Tests: Ordered and Reviewed  ED Management:  59-year-old male with a history of chronic atrial fibrillation and congestive heart failure presents with worsening lower extremity swelling with orthopnea, PND and dyspnea on exertion.  Chest x-ray independently interpreted by me demonstrates bilateral interstitial infiltrates consistent with CHF.  He has no hypoxia at present.  He is given Lasix 60 mg IV and his home dosage of Lasix will be increased from 20-40 mg. he is encouraged to return for worsening symptoms and is to follow-up with primary care this week.  Lower extremity edema appears to be right-sided heart failure.  Due to disproportion is  swelling of the left leg ultrasound is obtained to exclude DVT with no evidence of same.  Digoxin level is nondetected suggesting noncompliance.       APC / Resident Notes:   I, Dr. Aureliano Steven III, personally performed the services described in this documentation. All medical record entries made by the scribe were at my direction and in my presence.  I have reviewed the chart and agree that the record reflects my personal performance and is accurate and complete       Scribe Attestation:   Scribe #1: I performed the above scribed service and the documentation accurately describes the services I performed. I attest to the accuracy of the note.                      Clinical Impression:     ICD-10-CM ICD-9-CM   1. Acute on chronic systolic congestive heart failure  I50.23 428.23     428.0   2. Leg swelling  M79.89 729.81   3. SOB (shortness of breath)  R06.02 786.05   4. Fall  W19.XXXA E888.9   5. Hip pain  M25.559 719.45   6. Contusion of left hip, initial encounter  S70.02XA 924.01                      Disposition:   Disposition: Discharged  Condition: Stable     ED Disposition Condition    Discharge Stable        ED Prescriptions     None        Follow-up Information    None                                      Aureliano Steven III, MD  09/21/20 [id number]

## 2020-10-01 ENCOUNTER — HOSPITAL ENCOUNTER (OUTPATIENT)
Dept: RADIOLOGY | Facility: HOSPITAL | Age: 60
Discharge: HOME | End: 2020-10-01
Attending: EMERGENCY MEDICINE
Payer: COMMERCIAL

## 2020-10-01 DIAGNOSIS — E87.70 FLUID OVERLOAD: ICD-10-CM

## 2020-10-01 DIAGNOSIS — L03.90 CELLULITIS: ICD-10-CM

## 2020-10-01 DIAGNOSIS — L03.90 CELLULITIS: Primary | ICD-10-CM

## 2020-10-01 PROCEDURE — 93970 EXTREMITY STUDY: CPT | Mod: TC

## 2020-10-05 ENCOUNTER — HOSPITAL ENCOUNTER (EMERGENCY)
Facility: HOSPITAL | Age: 60
Discharge: HOME | End: 2020-10-05
Attending: EMERGENCY MEDICINE
Payer: MEDICARE

## 2020-10-05 VITALS
OXYGEN SATURATION: 96 % | DIASTOLIC BLOOD PRESSURE: 78 MMHG | BODY MASS INDEX: 39.62 KG/M2 | SYSTOLIC BLOOD PRESSURE: 164 MMHG | RESPIRATION RATE: 17 BRPM | HEART RATE: 72 BPM | HEIGHT: 71 IN | TEMPERATURE: 98 F | WEIGHT: 283 LBS

## 2020-10-05 DIAGNOSIS — S01.81XA CHIN LACERATION, INITIAL ENCOUNTER: ICD-10-CM
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
## 2020-10-05 LAB
ALBUMIN SERPL BCP-MCNC: 3.9 G/DL (ref 3.5–5.2)
ALP SERPL-CCNC: 99 U/L (ref 55–135)
ALT SERPL W/O P-5'-P-CCNC: 16 U/L (ref 10–44)
ANION GAP SERPL CALC-SCNC: 14 MMOL/L (ref 8–16)
AST SERPL-CCNC: 16 U/L (ref 10–40)
BASOPHILS # BLD AUTO: 0.12 K/UL (ref 0–0.2)
BASOPHILS NFR BLD: 1.7 % (ref 0–1.9)
BILIRUB SERPL-MCNC: 0.8 MG/DL (ref 0.1–1)
BNP SERPL-MCNC: 241 PG/ML (ref 0–99)
BUN SERPL-MCNC: 28 MG/DL (ref 6–20)
CALCIUM SERPL-MCNC: 9.1 MG/DL (ref 8.7–10.5)
CHLORIDE SERPL-SCNC: 96 MMOL/L (ref 95–110)
CO2 SERPL-SCNC: 27 MMOL/L (ref 23–29)
CREAT SERPL-MCNC: 1.6 MG/DL (ref 0.5–1.4)
DIFFERENTIAL METHOD: ABNORMAL
EOSINOPHIL # BLD AUTO: 0.3 K/UL (ref 0–0.5)
EOSINOPHIL NFR BLD: 4.8 % (ref 0–8)
ERYTHROCYTE [DISTWIDTH] IN BLOOD BY AUTOMATED COUNT: 16.7 % (ref 11.5–14.5)
EST. GFR  (AFRICAN AMERICAN): 53.7 ML/MIN/1.73 M^2
EST. GFR  (NON AFRICAN AMERICAN): 46.5 ML/MIN/1.73 M^2
GLUCOSE SERPL-MCNC: 160 MG/DL (ref 70–110)
HCT VFR BLD AUTO: 39.9 % (ref 40–54)
HGB BLD-MCNC: 11.9 G/DL (ref 14–18)
IMM GRANULOCYTES # BLD AUTO: 0.02 K/UL (ref 0–0.04)
IMM GRANULOCYTES NFR BLD AUTO: 0.3 % (ref 0–0.5)
INR PPP: 1.3
LYMPHOCYTES # BLD AUTO: 0.9 K/UL (ref 1–4.8)
LYMPHOCYTES NFR BLD: 12.3 % (ref 18–48)
MCH RBC QN AUTO: 23.3 PG (ref 27–31)
MCHC RBC AUTO-ENTMCNC: 29.8 G/DL (ref 32–36)
MCV RBC AUTO: 78 FL (ref 82–98)
MONOCYTES # BLD AUTO: 0.5 K/UL (ref 0.3–1)
MONOCYTES NFR BLD: 6.8 % (ref 4–15)
NEUTROPHILS # BLD AUTO: 5.2 K/UL (ref 1.8–7.7)
NEUTROPHILS NFR BLD: 74.1 % (ref 38–73)
NRBC BLD-RTO: 0 /100 WBC
PLATELET # BLD AUTO: 221 K/UL (ref 150–350)
PMV BLD AUTO: 12.4 FL (ref 9.2–12.9)
POTASSIUM SERPL-SCNC: 4.3 MMOL/L (ref 3.5–5.1)
PROT SERPL-MCNC: 7.3 G/DL (ref 6–8.4)
PROTHROMBIN TIME: 15.4 SEC (ref 10.6–14.8)
RBC # BLD AUTO: 5.1 M/UL (ref 4.6–6.2)
SODIUM SERPL-SCNC: 137 MMOL/L (ref 136–145)
TROPONIN I SERPL DL<=0.01 NG/ML-MCNC: <0.03 NG/ML
WBC # BLD AUTO: 7.07 K/UL (ref 3.9–12.7)

## 2020-10-05 PROCEDURE — 83880 ASSAY OF NATRIURETIC PEPTIDE: CPT

## 2020-10-05 PROCEDURE — 93010 ELECTROCARDIOGRAM REPORT: CPT | Mod: ,,, | Performed by: INTERNAL MEDICINE

## 2020-10-05 PROCEDURE — 12013 RPR F/E/E/N/L/M 2.6-5.0 CM: CPT

## 2020-10-05 PROCEDURE — 84484 ASSAY OF TROPONIN QUANT: CPT

## 2020-10-05 PROCEDURE — 80053 COMPREHEN METABOLIC PANEL: CPT

## 2020-10-05 PROCEDURE — 85610 PROTHROMBIN TIME: CPT

## 2020-10-05 PROCEDURE — 99285 EMERGENCY DEPT VISIT HI MDM: CPT | Mod: 25

## 2020-10-05 PROCEDURE — 85025 COMPLETE CBC W/AUTO DIFF WBC: CPT

## 2020-10-05 PROCEDURE — 93010 EKG 12-LEAD: ICD-10-PCS | Mod: ,,, | Performed by: INTERNAL MEDICINE

## 2020-10-05 RX ORDER — AMLODIPINE BESYLATE 5 MG/1
5 TABLET ORAL DAILY
Status: ON HOLD | COMMUNITY
End: 2020-10-26 | Stop reason: SDUPTHER

## 2020-10-05 RX ORDER — MUPIROCIN 20 MG/G
1 OINTMENT TOPICAL DAILY
Status: ON HOLD | COMMUNITY
End: 2020-10-26 | Stop reason: HOSPADM

## 2020-10-05 RX ORDER — POTASSIUM CHLORIDE 20 MEQ/1
40 TABLET, EXTENDED RELEASE ORAL DAILY
Status: ON HOLD | COMMUNITY
End: 2020-10-22 | Stop reason: ALTCHOICE

## 2020-10-05 RX ORDER — METFORMIN HYDROCHLORIDE 1000 MG/1
1000 TABLET ORAL 2 TIMES DAILY WITH MEALS
COMMUNITY
End: 2023-10-08 | Stop reason: DRUGHIGH

## 2020-10-05 RX ORDER — MELOXICAM 15 MG/1
15 TABLET ORAL DAILY
Status: ON HOLD | COMMUNITY
End: 2020-10-26 | Stop reason: HOSPADM

## 2020-10-05 RX ORDER — ESCITALOPRAM OXALATE 20 MG/1
20 TABLET ORAL DAILY
Status: ON HOLD | COMMUNITY
End: 2020-10-26 | Stop reason: HOSPADM

## 2020-10-05 RX ORDER — METOPROLOL SUCCINATE 50 MG/1
50 TABLET, EXTENDED RELEASE ORAL DAILY
Status: ON HOLD | COMMUNITY
End: 2020-10-26 | Stop reason: HOSPADM

## 2020-10-05 RX ORDER — ZOLPIDEM TARTRATE 10 MG/1
10 TABLET ORAL NIGHTLY PRN
Status: ON HOLD | COMMUNITY
End: 2020-10-26 | Stop reason: HOSPADM

## 2020-10-05 RX ORDER — DOXYCYCLINE HYCLATE 100 MG
100 TABLET ORAL 2 TIMES DAILY
Status: ON HOLD | COMMUNITY
End: 2020-10-26 | Stop reason: HOSPADM

## 2020-10-05 RX ORDER — GABAPENTIN 600 MG/1
600 TABLET ORAL 3 TIMES DAILY
COMMUNITY
End: 2023-10-08 | Stop reason: CLARIF

## 2020-10-05 RX ORDER — CYCLOBENZAPRINE HCL 10 MG
10 TABLET ORAL 2 TIMES DAILY PRN
Status: ON HOLD | COMMUNITY
End: 2023-10-13 | Stop reason: HOSPADM

## 2020-10-05 RX ORDER — FUROSEMIDE 20 MG/1
20 TABLET ORAL DAILY
Status: ON HOLD | COMMUNITY
End: 2020-10-26 | Stop reason: HOSPADM

## 2020-10-05 RX ORDER — MIRABEGRON 50 MG/1
1 TABLET, FILM COATED, EXTENDED RELEASE ORAL DAILY
COMMUNITY
End: 2023-10-08 | Stop reason: CLARIF

## 2020-10-05 RX ORDER — ALPRAZOLAM 1 MG/1
1 TABLET ORAL 4 TIMES DAILY PRN
Status: ON HOLD | COMMUNITY
End: 2020-10-26 | Stop reason: HOSPADM

## 2020-10-05 RX ORDER — LISINOPRIL 20 MG/1
20 TABLET ORAL DAILY
COMMUNITY
End: 2023-10-08 | Stop reason: CLARIF

## 2020-10-05 NOTE — ED PROVIDER NOTES
"Encounter Date: 10/5/2020       History     Chief Complaint   Patient presents with    Fall     Patient status post fall this morning states his leg gave out in his landed forward on his chin causing a laceration to his chin he denies any loss of consciousness denies any neck pain he is on Eliquis bleeding was controlled with pressure he denies any other injuries no chest pain no arm pain he does report chronic cough no worse than baseline he has chronic bilateral lower extremity edema and did abrade his left lower extremity and right lower extremity        Review of patient's allergies indicates:   Allergen Reactions    Atorvastatin      Other reaction(s): Generalized Myalgias    Effexor [venlafaxine] Other (See Comments)     Tremulousness     Past Medical History:   Diagnosis Date    a A H/O Medical Noncompliance     H/O Chronic Noncompliance With His CHF Diet    a Cardiac Diastolic Dysfunction     Dr. Aure Washington    a Chronic Anticoagulation With Pradaxa     Dr. Aure Washington    a Coronary Artery Disease With H/O Stenting     Dr. Aure Washington; Was Hospitalized At [facility] 3/8/17-3/17/17 For CHF Exacerbatioin Due To "Dietary Discrepancies" With LCST Negative There    a Nonsustained Ventricular Tachycardia (NSVT)     a Paroxysmal Atrial Fibrillation With H/O RVR     Dr. Aure Washington; On Chronic Eliquis    a Syncopal Episode     [facility] 4/3/17-4/7/17 Stay For This: Was Likely Due To NSVT, And His Medications Were Adjusted    a Systolic CHF With EF 35-45%     Dr. Aure Washington; Was Hospitalized At [facility] 3/8/17-3/17/17 For CHF Exacerbatioin Due To "Dietary Discrepancies" With LCST Negative There    b Hypertension     b Proteinuria     04/2014 Referred To Dr. Leroy Allison; 4/1/14 Bilateral Renal U/S = Normal; On Lisinopril 20 Mg Daily    b Stage 2 CKD     c Hypercholesterolemia With Low HDL     d Type 2 DM On Insulin     ** 12/4/18 Referred To [facility] EDU; 1/11/18 Referred To Dr. Dipika Rodriguez And Re-Referred To [facility] EDU; " "12/28/17 HgA1c = 12.0;" 7/5/17 Referred To [facility] EDU    f Morbid Obesity     i 1 PPD X 25+ YRs Chronic Tobacco Use Disorder     7/5/17 Increased Wellbutrin-XL To 300 Mg Daily; 6/8/17 RXd Wellbutrin- Mg Daily X 4 Months    i JULY On CPAP     Dr. Marion ngo Chronic Left Groin Pain     l Chronic Left Shoulder Pain     Dr. MARTINA martinez Chronic Recurrent Low Back Pain 12/04/2018    Dr. Llamas Is His Pain Management Neurologist; 5/219/18 Referred To Dr. Ismael martinez Left 5-7th Rib FXs 04/2016 4/23/16 [facility] Left Rib XRays = Questionable Nondisplaced Left 5-7th Rib FXs With Normal Lung Fields    l Right Shouder SX 5/26/16 Due To Work Related Injury     Dr. Mora At [facility]; Dr. MARTINA hatch H/O Transient Ischemic Attack In 2013     n Anxiety And Depression 12/04/2018    RTC In 6 Weeks; 12/4/18 Added Wellbutrin- Mg qAM; 5/29/18 Increased 100 Mg Zoloft To 100 Mg Bid    n Continuous Benzodiazepine (Xanax) Use 12/04/2018 5/29/18 I Am Weaning Him Off Of This By Decreasing 1 Mg Bid To 0.5 Mg Bid PRN, And Will Wean Further Next OV    n H/O ETOH Abuse, Quit In 09/2014     q Bilateral Lower Extremity Venous Stasis Ulcers     2/28/18 [facility]    q Chronic Bilateral Lower Extremity Edema     2/28/18 Added Metolazone 10 Mg qAM On MWF And Referred Back To Dr. Washington; On Lasix 40 Mg Bid; He Wears Bilateral Compressin Hose Stockings    q Disability Examination 7/15/16     For CHF, PAF, DM2, And JULY On CPAP    Wellness Visit 11/6/2017      Past Surgical History:   Procedure Laterality Date    CARDIAC SURGERY      coronary stent    COLONOSCOPY N/A 12/19/2017    Procedure: COLONOSCOPY;  Surgeon: Dave Allen MD;  Location: [facility];  Service: Endoscopy;  Laterality: N/A;    DIABETES MANAGEMENT LABS      heart stent      INCISION AND DRAINAGE OF WOUND      on stomach    SHOULDER SURGERY       Family " History   Problem Relation Age of Onset    Heart disease Mother     Arthritis Mother     Diabetes Mother     Hyperlipidemia Mother     Hypertension Mother     Heart disease Father     Arthritis Father     Asthma Father     COPD Father     Hyperlipidemia Father     Hypertension Father     Stroke Father     Diabetes Sister     Diabetes Maternal Uncle      Social History     Tobacco Use    Smoking status: Former Smoker     Packs/day: 0.25     Types: Cigarettes     Start date: 1981     Quit date: 2016     Years since quittin.0    Smokeless tobacco: Never Used    Tobacco comment: every now and again with drinks   Substance Use Topics    Alcohol use: Yes     Comment: occas    Drug use: No     Review of Systems   Constitutional: Negative for chills and fever.   HENT: Negative for congestion, ear pain, rhinorrhea and sore throat.    Eyes: Negative for discharge.   Respiratory: Positive for cough. Negative for shortness of breath.    Cardiovascular: Positive for leg swelling. Negative for chest pain.   Gastrointestinal: Negative for abdominal pain, blood in stool, constipation, diarrhea, nausea and vomiting.   Genitourinary: Negative for dysuria and flank pain.   Musculoskeletal: Negative for myalgias.   Skin: Positive for wound. Negative for rash.   Allergic/Immunologic: Negative for immunocompromised state.   Neurological: Negative for headaches.   Hematological: Negative for adenopathy.       Physical Exam     Initial Vitals   BP Pulse Resp Temp SpO2   10/05/20 0800 10/05/20 0800 10/05/20 0801 10/05/20 0801 10/05/20 0800   138/75 74 17 97.2 °F (36.2 °C) (!) 94 %      MAP       --                Physical Exam    Constitutional: He appears well-developed and well-nourished. No distress.   HENT:   Head: Normocephalic.   Right Ear: External ear normal.   Left Ear: External ear normal.   Mouth/Throat: Oropharynx is clear and moist.   V-shaped Laceration to patient's chin at the midline 3 cm  total length   Eyes: Conjunctivae and EOM are normal. Pupils are equal, round, and reactive to light.   Neck: Normal range of motion. Neck supple.   Cardiovascular: Normal rate, regular rhythm, S1 normal, S2 normal, normal heart sounds and intact distal pulses.   Pulmonary/Chest: Breath sounds normal. No respiratory distress. He has no wheezes. He has no rales.   Abdominal: Soft. Normal appearance and bowel sounds are normal. There is no abdominal tenderness.   Musculoskeletal: Normal range of motion. Edema present. No tenderness.      Comments: Abrasion to the right knee and left lateral lower extremity   Neurological: He is alert and oriented to person, place, and time. GCS score is 15. GCS eye subscore is 4. GCS verbal subscore is 5. GCS motor subscore is 6.   Skin: Skin is warm and dry. Capillary refill takes less than 2 seconds. No rash noted.   Psychiatric: He has a normal mood and affect. His behavior is normal.         ED Course   Lac Repair    Date/Time: 10/5/2020 12:47 PM  Performed by: John Crabtree MD  Authorized by: John Crabtree MD     Laceration details:     Location:  Face    Face location:  Chin    Length (cm):  3  Repair type:     Repair type:  Simple  Pre-procedure details:     Preparation:  Patient was prepped and draped in usual sterile fashion  Treatment:     Area cleansed with:  Betadine    Amount of cleaning:  Standard    Irrigation solution:  Sterile saline    Irrigation method:  Syringe  Skin repair:     Repair method:  Sutures    Suture size:  4-0    Suture material:  Nylon    Number of sutures:  8  Approximation:     Approximation:  Close  Post-procedure details:     Dressing:  Antibiotic ointment    Patient tolerance of procedure:  Tolerated well, no immediate complications      Labs Reviewed   CBC W/ AUTO DIFFERENTIAL - Abnormal; Notable for the following components:       Result Value    Hemoglobin 11.9 (*)     Hematocrit 39.9 (*)     Mean Corpuscular Volume 78 (*)     Mean  Corpuscular Hemoglobin 23.3 (*)     Mean Corpuscular Hemoglobin Conc 29.8 (*)     RDW 16.7 (*)     Lymph # 0.9 (*)     Gran% 74.1 (*)     Lymph% 12.3 (*)     All other components within normal limits   COMPREHENSIVE METABOLIC PANEL - Abnormal; Notable for the following components:    Glucose 160 (*)     BUN, Bld 28 (*)     Creatinine 1.6 (*)     eGFR if  53.7 (*)     eGFR if non  46.5 (*)     All other components within normal limits   B-TYPE NATRIURETIC PEPTIDE - Abnormal; Notable for the following components:     (*)     All other components within normal limits   PROTIME-INR - Abnormal; Notable for the following components:    PT 15.4 (*)     All other components within normal limits   TROPONIN I        ECG Results          EKG 12-lead (In process)  Result time 10/05/20 08:28:03    In process by Interface, Lab In [facility] (10/05/20 08:28:03)                 Narrative:    Test Reason : R06.02,    Vent. Rate : 078 BPM     Atrial Rate : 227 BPM     P-R Int : 000 ms          QRS Dur : 104 ms      QT Int : 416 ms       P-R-T Axes : 000 -37 124 degrees     QTc Int : 474 ms    Atrial fibrillation  Left axis deviation  Low voltage QRS  Cannot rule out Anterior infarct (cited on or before 22-DEC-2018)  Abnormal ECG  When compared with ECG of 21-SEP-2020 19:16,  Questionable change in initial forces of Anterior-lateral leads    Referred By: [email or web address]   SELF           Confirmed By:                             Imaging Results          CT Maxillofacial Without Contrast (Final result)  Result time 10/05/20 09:11:47    Final result by Brittani Velez MD (10/05/20 09:11:47)                 Narrative:    CMS MANDATED QUALITY DATA - CT RADIATION - 436    All CT scans at this facility utilize dose modulation, iterative  reconstruction, and/or weight based dosing when appropriate to reduce  radiation dose to as low as reasonably achievable.        Reason: Facial trauma    TECHNIQUE:  Maxillofacial CT without IV contrast obtained with coronal  reformations.    FINDINGS:  There is no evidence of facial fracture the orbital bones, zygomatic  arches, nasal bones and mandible are intact. The cranial cervical  junction is normal. The paranasal sinuses and mastoid air cells are  clear.    The periorbital soft tissues are normal. There is generalized cerebral  atrophy with encephalomalacia in the left frontal lobe and right  parietal lobe.    IMPRESSION:  No evidence of facial fracture    Generalized cerebral atrophy with bilateral areas of encephalomalacia    Electronically Signed by Brittani Velez M.D. on 10/5/2020 9:20 AM                             CT Head Without Contrast (Final result)  Result time 10/05/20 09:10:47    Final result by Brittani Velez MD (10/05/20 09:10:47)                 Narrative:    CMS MANDATED QUALITY DATA - CT RADIATION  436    All CT scans at this facility utilize dose modulation, iterative  reconstruction, and/or weight based dosing when appropriate to reduce  radiation dose to as low as reasonably achievable.  CT head without contrast    Clinical data: Head trauma after fall    COMPARISON: 5/14/2020    FINDINGS: Noninfusion images were obtained from the skull base to the  vertex. There is no intracranial mass, hemorrhage, or midline shift.  Ventricles and sulci are mildly prominent. There are no pathologic  extra-axial fluid collections.    There is a stable large area of encephalomalacia in the right parietal  lobe. There is stable small area of encephalomalacia in the left  frontal lobe. Changes of mild chronic microvascular white matter  disease are noted. Cerebellum and brainstem are normal. The calvarium  is intact.    IMPRESSION:    1. No acute intracranial abnormalities.    Stable encephalomalacia in the right parietal lobe and left frontal  lobe    Electronically Signed by Brittani Velez M.D. on 10/5/2020 9:17 AM                             X-Ray Chest AP  Portable (Final result)  Result time 10/05/20 08:24:40    Final result by Brittani Velez MD (10/05/20 08:24:40)                 Narrative:    Portable chest x-ray at 8:27 AM is compared to prior study dated  9/21/2020    Clinical history is CHF    The heart is enlarged. The lungs are clear. There are no acute osseous  abnormalities.    IMPRESSION: Cardiomegaly with no acute pulmonary process    Electronically Signed by Brittani Velez M.D. on 10/5/2020 8:38 AM                               Medical Decision Making:   ED Management:  Patient's CT scan showed no acute changes I did obtain some blood work given patient's physical findings labs reviewed CT scans were negative wound repaired with sutures patient tolerated well will release with outpatient follow-up with Dr. Smith tomorrow and suture removal and 10 days                   ED Course as of Oct 05 1241   Mon Oct 05, 2020   0913 BUN, Bld(!): 28 [AT]      ED Course User Index  [AT] John Crabtree MD            Clinical Impression:       ICD-10-CM ICD-9-CM   1. Fall, initial encounter  W19.XXXA E888.9   2. Chin laceration, initial encounter  S01.81XA 873.44                          ED Disposition Condition    Discharge Stable        ED Prescriptions     None        Follow-up Information     Follow up With Specialties Details Why Contact Info    Jv Smith MD Internal Medicine Call in 1 day for re-examination of your symptoms and in  10 days for suture removal [address]  [address]  [facility] [address]  [phone]                                         John Crabtree MD  10/05/20 1244

## 2020-10-05 NOTE — ED TRIAGE NOTES
Pt states his left leg gave out while walking falling to the floor hitting his chin. Pt denies loc. Laceration to the chin noted and abrasion to the right arm and bilateral knees.

## 2020-10-19 ENCOUNTER — HOSPITAL ENCOUNTER (INPATIENT)
Facility: HOSPITAL | Age: 60
LOS: 4 days | Discharge: NURSING, REHAB OR LONG-TERM CARE | DRG: 602 | End: 2020-10-26
Attending: EMERGENCY MEDICINE | Admitting: INTERNAL MEDICINE
Payer: MEDICARE

## 2020-10-19 DIAGNOSIS — S30.810A EXCORIATION OF BUTTOCK, INITIAL ENCOUNTER: ICD-10-CM

## 2020-10-19 DIAGNOSIS — R60.0 VENOUS STASIS ULCER OF LEFT LOWER LEG WITH EDEMA OF LEFT LOWER LEG: ICD-10-CM

## 2020-10-19 DIAGNOSIS — L03.116 CELLULITIS OF LEFT LOWER EXTREMITY: Primary | ICD-10-CM

## 2020-10-19 DIAGNOSIS — M79.605 LEFT LEG PAIN: ICD-10-CM

## 2020-10-19 DIAGNOSIS — I83.029 VENOUS STASIS ULCER OF LEFT LOWER LEG WITH EDEMA OF LEFT LOWER LEG: ICD-10-CM

## 2020-10-19 DIAGNOSIS — L97.929 VENOUS STASIS ULCER OF LEFT LOWER LEG WITH EDEMA OF LEFT LOWER LEG: ICD-10-CM

## 2020-10-19 DIAGNOSIS — I83.892 VENOUS STASIS ULCER OF LEFT LOWER LEG WITH EDEMA OF LEFT LOWER LEG: ICD-10-CM

## 2020-10-19 DIAGNOSIS — I73.9 PERIPHERAL VASCULAR DISEASE: ICD-10-CM

## 2020-10-19 LAB
ANION GAP SERPL CALC-SCNC: 9 MMOL/L (ref 8–16)
BASOPHILS # BLD AUTO: 0.07 K/UL (ref 0–0.2)
BASOPHILS NFR BLD: 1 % (ref 0–1.9)
BUN SERPL-MCNC: 16 MG/DL (ref 6–20)
CALCIUM SERPL-MCNC: 8.4 MG/DL (ref 8.7–10.5)
CHLORIDE SERPL-SCNC: 106 MMOL/L (ref 95–110)
CO2 SERPL-SCNC: 24 MMOL/L (ref 23–29)
CREAT SERPL-MCNC: 1.5 MG/DL (ref 0.5–1.4)
DIFFERENTIAL METHOD: ABNORMAL
EOSINOPHIL # BLD AUTO: 0.3 K/UL (ref 0–0.5)
EOSINOPHIL NFR BLD: 4.7 % (ref 0–8)
ERYTHROCYTE [DISTWIDTH] IN BLOOD BY AUTOMATED COUNT: 16.7 % (ref 11.5–14.5)
EST. GFR  (AFRICAN AMERICAN): 58 ML/MIN/1.73 M^2
EST. GFR  (NON AFRICAN AMERICAN): 50 ML/MIN/1.73 M^2
GLUCOSE SERPL-MCNC: 125 MG/DL (ref 70–110)
HCT VFR BLD AUTO: 35.6 % (ref 40–54)
HGB BLD-MCNC: 10.9 G/DL (ref 14–18)
IMM GRANULOCYTES # BLD AUTO: 0.02 K/UL (ref 0–0.04)
IMM GRANULOCYTES NFR BLD AUTO: 0.3 % (ref 0–0.5)
LYMPHOCYTES # BLD AUTO: 1 K/UL (ref 1–4.8)
LYMPHOCYTES NFR BLD: 14.3 % (ref 18–48)
MCH RBC QN AUTO: 24.3 PG (ref 27–31)
MCHC RBC AUTO-ENTMCNC: 30.6 G/DL (ref 32–36)
MCV RBC AUTO: 80 FL (ref 82–98)
MONOCYTES # BLD AUTO: 0.6 K/UL (ref 0.3–1)
MONOCYTES NFR BLD: 8.7 % (ref 4–15)
NEUTROPHILS # BLD AUTO: 4.9 K/UL (ref 1.8–7.7)
NEUTROPHILS NFR BLD: 71 % (ref 38–73)
NRBC BLD-RTO: 0 /100 WBC
PLATELET # BLD AUTO: 244 K/UL (ref 150–350)
PMV BLD AUTO: 11.8 FL (ref 9.2–12.9)
POCT GLUCOSE: 141 MG/DL (ref 70–110)
POTASSIUM SERPL-SCNC: 4.4 MMOL/L (ref 3.5–5.1)
RBC # BLD AUTO: 4.48 M/UL (ref 4.6–6.2)
SARS-COV-2 RDRP RESP QL NAA+PROBE: NEGATIVE
SODIUM SERPL-SCNC: 139 MMOL/L (ref 136–145)
WBC # BLD AUTO: 6.87 K/UL (ref 3.9–12.7)

## 2020-10-19 PROCEDURE — 96374 THER/PROPH/DIAG INJ IV PUSH: CPT | Mod: 59

## 2020-10-19 PROCEDURE — 36415 COLL VENOUS BLD VENIPUNCTURE: CPT

## 2020-10-19 PROCEDURE — 80048 BASIC METABOLIC PNL TOTAL CA: CPT

## 2020-10-19 PROCEDURE — G0378 HOSPITAL OBSERVATION PER HR: HCPCS

## 2020-10-19 PROCEDURE — 87040 BLOOD CULTURE FOR BACTERIA: CPT

## 2020-10-19 PROCEDURE — 94760 N-INVAS EAR/PLS OXIMETRY 1: CPT

## 2020-10-19 PROCEDURE — 25000003 PHARM REV CODE 250: Performed by: INTERNAL MEDICINE

## 2020-10-19 PROCEDURE — 94761 N-INVAS EAR/PLS OXIMETRY MLT: CPT

## 2020-10-19 PROCEDURE — 99285 EMERGENCY DEPT VISIT HI MDM: CPT | Mod: 25

## 2020-10-19 PROCEDURE — U0003 INFECTIOUS AGENT DETECTION BY NUCLEIC ACID (DNA OR RNA); SEVERE ACUTE RESPIRATORY SYNDROME CORONAVIRUS 2 (SARS-COV-2) (CORONAVIRUS DISEASE [COVID-19]), AMPLIFIED PROBE TECHNIQUE, MAKING USE OF HIGH THROUGHPUT TECHNOLOGIES AS DESCRIBED BY CMS-2020-01-R: HCPCS

## 2020-10-19 PROCEDURE — 85025 COMPLETE CBC W/AUTO DIFF WBC: CPT

## 2020-10-19 PROCEDURE — 63600175 PHARM REV CODE 636 W HCPCS: Performed by: INTERNAL MEDICINE

## 2020-10-19 PROCEDURE — C9399 UNCLASSIFIED DRUGS OR BIOLOG: HCPCS | Performed by: INTERNAL MEDICINE

## 2020-10-19 PROCEDURE — 96372 THER/PROPH/DIAG INJ SC/IM: CPT | Mod: 59

## 2020-10-19 PROCEDURE — 63600175 PHARM REV CODE 636 W HCPCS: Performed by: EMERGENCY MEDICINE

## 2020-10-19 PROCEDURE — U0002 COVID-19 LAB TEST NON-CDC: HCPCS

## 2020-10-19 PROCEDURE — 96365 THER/PROPH/DIAG IV INF INIT: CPT

## 2020-10-19 PROCEDURE — 96366 THER/PROPH/DIAG IV INF ADDON: CPT

## 2020-10-19 RX ORDER — FUROSEMIDE 40 MG/1
40 TABLET ORAL DAILY
Status: DISCONTINUED | OUTPATIENT
Start: 2020-10-20 | End: 2020-10-23

## 2020-10-19 RX ORDER — DIGOXIN 125 MCG
0.25 TABLET ORAL DAILY
Status: DISCONTINUED | OUTPATIENT
Start: 2020-10-20 | End: 2020-10-26 | Stop reason: HOSPADM

## 2020-10-19 RX ORDER — ACETAMINOPHEN 500 MG
1000 TABLET ORAL EVERY 6 HOURS PRN
Status: DISCONTINUED | OUTPATIENT
Start: 2020-10-19 | End: 2020-10-26 | Stop reason: HOSPADM

## 2020-10-19 RX ORDER — ALPRAZOLAM 0.25 MG/1
0.5 TABLET ORAL 2 TIMES DAILY PRN
Status: DISCONTINUED | OUTPATIENT
Start: 2020-10-19 | End: 2020-10-23

## 2020-10-19 RX ORDER — IPRATROPIUM BROMIDE AND ALBUTEROL SULFATE 2.5; .5 MG/3ML; MG/3ML
3 SOLUTION RESPIRATORY (INHALATION) EVERY 6 HOURS PRN
Status: DISCONTINUED | OUTPATIENT
Start: 2020-10-19 | End: 2020-10-22

## 2020-10-19 RX ORDER — OXYCODONE AND ACETAMINOPHEN 10; 325 MG/1; MG/1
1 TABLET ORAL EVERY 6 HOURS PRN
Status: DISCONTINUED | OUTPATIENT
Start: 2020-10-19 | End: 2020-10-26 | Stop reason: HOSPADM

## 2020-10-19 RX ORDER — GLUCAGON 1 MG
1 KIT INJECTION
Status: DISCONTINUED | OUTPATIENT
Start: 2020-10-19 | End: 2020-10-26 | Stop reason: HOSPADM

## 2020-10-19 RX ORDER — METOPROLOL SUCCINATE 50 MG/1
50 TABLET, EXTENDED RELEASE ORAL DAILY
Status: DISCONTINUED | OUTPATIENT
Start: 2020-10-20 | End: 2020-10-26 | Stop reason: HOSPADM

## 2020-10-19 RX ORDER — LISINOPRIL 10 MG/1
10 TABLET ORAL DAILY
Status: CANCELLED | OUTPATIENT
Start: 2020-10-20

## 2020-10-19 RX ORDER — POTASSIUM CHLORIDE 20 MEQ/1
40 TABLET, EXTENDED RELEASE ORAL DAILY
Status: DISCONTINUED | OUTPATIENT
Start: 2020-10-20 | End: 2020-10-22

## 2020-10-19 RX ORDER — METOPROLOL TARTRATE 25 MG/1
25 TABLET, FILM COATED ORAL 2 TIMES DAILY
Status: CANCELLED | OUTPATIENT
Start: 2020-10-19

## 2020-10-19 RX ORDER — IPRATROPIUM BROMIDE AND ALBUTEROL SULFATE 2.5; .5 MG/3ML; MG/3ML
3 SOLUTION RESPIRATORY (INHALATION) EVERY 6 HOURS PRN
Status: DISCONTINUED | OUTPATIENT
Start: 2020-10-19 | End: 2020-10-19

## 2020-10-19 RX ORDER — LISINOPRIL 10 MG/1
20 TABLET ORAL DAILY
Status: DISCONTINUED | OUTPATIENT
Start: 2020-10-20 | End: 2020-10-22

## 2020-10-19 RX ORDER — ASPIRIN 81 MG/1
81 TABLET ORAL NIGHTLY
Status: CANCELLED | OUTPATIENT
Start: 2020-10-19

## 2020-10-19 RX ORDER — DOCUSATE SODIUM 100 MG/1
200 CAPSULE, LIQUID FILLED ORAL 2 TIMES DAILY
Status: DISCONTINUED | OUTPATIENT
Start: 2020-10-19 | End: 2020-10-26 | Stop reason: HOSPADM

## 2020-10-19 RX ORDER — METFORMIN HYDROCHLORIDE 500 MG/1
1000 TABLET ORAL 2 TIMES DAILY WITH MEALS
Status: DISCONTINUED | OUTPATIENT
Start: 2020-10-20 | End: 2020-10-19

## 2020-10-19 RX ORDER — GABAPENTIN 300 MG/1
300 CAPSULE ORAL 2 TIMES DAILY
Status: CANCELLED | OUTPATIENT
Start: 2020-10-19

## 2020-10-19 RX ORDER — AMLODIPINE BESYLATE 5 MG/1
5 TABLET ORAL DAILY
Status: DISCONTINUED | OUTPATIENT
Start: 2020-10-20 | End: 2020-10-26

## 2020-10-19 RX ORDER — CEFAZOLIN SODIUM 2 G/50ML
2 SOLUTION INTRAVENOUS
Status: DISCONTINUED | OUTPATIENT
Start: 2020-10-19 | End: 2020-10-22

## 2020-10-19 RX ORDER — GABAPENTIN 300 MG/1
600 CAPSULE ORAL 3 TIMES DAILY
Status: DISCONTINUED | OUTPATIENT
Start: 2020-10-19 | End: 2020-10-26 | Stop reason: HOSPADM

## 2020-10-19 RX ORDER — ZOLPIDEM TARTRATE 5 MG/1
10 TABLET ORAL NIGHTLY PRN
Status: DISCONTINUED | OUTPATIENT
Start: 2020-10-19 | End: 2020-10-26 | Stop reason: HOSPADM

## 2020-10-19 RX ORDER — IBUPROFEN 200 MG
24 TABLET ORAL
Status: DISCONTINUED | OUTPATIENT
Start: 2020-10-19 | End: 2020-10-26 | Stop reason: HOSPADM

## 2020-10-19 RX ORDER — CYCLOBENZAPRINE HCL 10 MG
10 TABLET ORAL 2 TIMES DAILY PRN
Status: DISCONTINUED | OUTPATIENT
Start: 2020-10-19 | End: 2020-10-23

## 2020-10-19 RX ORDER — PRAVASTATIN SODIUM 40 MG/1
80 TABLET ORAL NIGHTLY
Status: DISCONTINUED | OUTPATIENT
Start: 2020-10-19 | End: 2020-10-26 | Stop reason: HOSPADM

## 2020-10-19 RX ORDER — SERTRALINE HYDROCHLORIDE 50 MG/1
100 TABLET, FILM COATED ORAL DAILY
Status: CANCELLED | OUTPATIENT
Start: 2020-10-20

## 2020-10-19 RX ORDER — INSULIN ASPART 100 [IU]/ML
3 INJECTION, SOLUTION INTRAVENOUS; SUBCUTANEOUS
Status: DISCONTINUED | OUTPATIENT
Start: 2020-10-20 | End: 2020-10-26 | Stop reason: HOSPADM

## 2020-10-19 RX ORDER — IBUPROFEN 200 MG
16 TABLET ORAL
Status: DISCONTINUED | OUTPATIENT
Start: 2020-10-19 | End: 2020-10-26 | Stop reason: HOSPADM

## 2020-10-19 RX ORDER — INSULIN ASPART 100 [IU]/ML
0-5 INJECTION, SOLUTION INTRAVENOUS; SUBCUTANEOUS
Status: DISCONTINUED | OUTPATIENT
Start: 2020-10-19 | End: 2020-10-26 | Stop reason: HOSPADM

## 2020-10-19 RX ORDER — LANOLIN ALCOHOL/MO/W.PET/CERES
400 CREAM (GRAM) TOPICAL DAILY
Status: DISCONTINUED | OUTPATIENT
Start: 2020-10-20 | End: 2020-10-26 | Stop reason: HOSPADM

## 2020-10-19 RX ORDER — CLOPIDOGREL BISULFATE 75 MG/1
75 TABLET ORAL DAILY
Status: DISCONTINUED | OUTPATIENT
Start: 2020-10-20 | End: 2020-10-26 | Stop reason: HOSPADM

## 2020-10-19 RX ORDER — ALPRAZOLAM 1 MG/1
1 TABLET ORAL 4 TIMES DAILY PRN
Status: CANCELLED | OUTPATIENT
Start: 2020-10-19

## 2020-10-19 RX ORDER — BUPROPION HYDROCHLORIDE 150 MG/1
150 TABLET, EXTENDED RELEASE ORAL DAILY
Status: DISCONTINUED | OUTPATIENT
Start: 2020-10-20 | End: 2020-10-26 | Stop reason: HOSPADM

## 2020-10-19 RX ORDER — AMIODARONE HYDROCHLORIDE 200 MG/1
200 TABLET ORAL 2 TIMES DAILY
Status: DISCONTINUED | OUTPATIENT
Start: 2020-10-19 | End: 2020-10-26 | Stop reason: HOSPADM

## 2020-10-19 RX ORDER — SODIUM CHLORIDE 0.9 % (FLUSH) 0.9 %
10 SYRINGE (ML) INJECTION
Status: DISCONTINUED | OUTPATIENT
Start: 2020-10-19 | End: 2020-10-26 | Stop reason: HOSPADM

## 2020-10-19 RX ORDER — ESCITALOPRAM OXALATE 10 MG/1
20 TABLET ORAL DAILY
Status: DISCONTINUED | OUTPATIENT
Start: 2020-10-20 | End: 2020-10-26 | Stop reason: HOSPADM

## 2020-10-19 RX ORDER — OXYBUTYNIN CHLORIDE 5 MG/1
10 TABLET, EXTENDED RELEASE ORAL DAILY
Status: DISCONTINUED | OUTPATIENT
Start: 2020-10-20 | End: 2020-10-26 | Stop reason: HOSPADM

## 2020-10-19 RX ORDER — MELOXICAM 7.5 MG/1
15 TABLET ORAL DAILY
Status: DISCONTINUED | OUTPATIENT
Start: 2020-10-20 | End: 2020-10-26 | Stop reason: HOSPADM

## 2020-10-19 RX ADMIN — DOCUSATE SODIUM 200 MG: 100 CAPSULE, LIQUID FILLED ORAL at 10:10

## 2020-10-19 RX ADMIN — VANCOMYCIN HYDROCHLORIDE 1250 MG: 1.25 INJECTION, POWDER, LYOPHILIZED, FOR SOLUTION INTRAVENOUS at 07:10

## 2020-10-19 RX ADMIN — CEFAZOLIN SODIUM 2 G: 2 SOLUTION INTRAVENOUS at 10:10

## 2020-10-19 RX ADMIN — OXYCODONE AND ACETAMINOPHEN 1 TABLET: 10; 325 TABLET ORAL at 10:10

## 2020-10-19 RX ADMIN — PRAVASTATIN SODIUM 80 MG: 40 TABLET ORAL at 10:10

## 2020-10-19 RX ADMIN — APIXABAN 5 MG: 2.5 TABLET, FILM COATED ORAL at 10:10

## 2020-10-19 RX ADMIN — GABAPENTIN 600 MG: 300 CAPSULE ORAL at 10:10

## 2020-10-19 RX ADMIN — INSULIN DETEMIR 10 UNITS: 100 INJECTION, SOLUTION SUBCUTANEOUS at 10:10

## 2020-10-19 RX ADMIN — AMIODARONE HYDROCHLORIDE 200 MG: 200 TABLET ORAL at 10:10

## 2020-10-19 NOTE — ED PROVIDER NOTES
"Encounter Date: 10/19/2020    SCRIBE #1 NOTE: I, Sharmaine Kuhn am scribing for, and in the presence of, Paco Villela MD.       History     Chief Complaint   Patient presents with    Wound Check     home health wound care nurse sent patient to ED for evaluation  / left leg        Time seen by provider: 4:46 PM on 10/19/2020    Jose Leon is a 60 y.o. male with PMHx of DM, CHF, chronic LE edema, morbid obesity, chronic benzo, pain med and anticoagulant use who presents to the ED via EMS for evaluation of wound to the left lower leg s/p fall on 10/5. Patient had a follow-up visit with wound care today and was forwarded to the ED for further workup. The patient denies fever, chest pain, or any other symptoms at this time. Patient is a former smoker.    The history is provided by the patient.     Review of patient's allergies indicates:   Allergen Reactions    Atorvastatin      Other reaction(s): Generalized Myalgias    Effexor [venlafaxine] Other (See Comments)     Tremulousness     Past Medical History:   Diagnosis Date    a A H/O Medical Noncompliance     H/O Chronic Noncompliance With His CHF Diet    a Cardiac Diastolic Dysfunction     Dr. Aure Washington    a Chronic Anticoagulation With Pradaxa     Dr. Aure Washington    a Coronary Artery Disease With H/O Stenting     Dr. Aure Washington; Was Hospitalized At [facility] 3/8/17-3/17/17 For CHF Exacerbatioin Due To "Dietary Discrepancies" With LCST Negative There    a Nonsustained Ventricular Tachycardia (NSVT)     a Paroxysmal Atrial Fibrillation With H/O RVR     Dr. Aure Washington; On Chronic Eliquis    a Syncopal Episode     [facility] 4/3/17-4/7/17 Stay For This: Was Likely Due To NSVT, And His Medications Were Adjusted    a Systolic CHF With EF 35-45%     Dr. Aure Washington; Was Hospitalized At [facility] 3/8/17-3/17/17 For CHF Exacerbatioin Due To "Dietary Discrepancies" With LCST Negative There    b Hypertension     b Proteinuria     04/2014 Referred To Dr. Leroy Allison; 4/1/14 " "Bilateral Renal U/S = Normal; On Lisinopril 20 Mg Daily    b Stage 2 CKD     c Hypercholesterolemia With Low HDL     d Type 2 DM On Insulin     ** 12/4/18 Referred To [facility] EDU; 1/11/18 Referred To Dr. Dipika Rodriguez And Re-Referred To [facility] ED[facility]; 12/28/17 HgA1c = 12.0;" 7/5/17 Referred To [facility] EDU    f Morbid Obesity     i 1 PPD X 25+ YRs Chronic Tobacco Use Disorder     7/5/17 Increased Wellbutrin-XL To 300 Mg Daily; 6/8/17 RXd Wellbutrin- Mg Daily X 4 Months    i JULY On CPAP     Dr. Marion ngo Chronic Left Groin Pain     l Chronic Left Shoulder Pain     Dr. MARTINA martinez Chronic Recurrent Low Back Pain 12/04/2018    Dr. Llamas Is His Pain Management Neurologist; 5/219/18 Referred To Dr. Ismael Hernández    l Left 5-7th Rib FXs 04/2016 4/23/16 [facility] Left Rib XRays = Questionable Nondisplaced Left 5-7th Rib FXs With Normal Lung Fields    l Right Shouder SX 5/26/16 Due To Work Related Injury     Dr. Mora At [facility]; Dr. MARTINA hatch H/O Transient Ischemic Attack In 2013     n Anxiety And Depression 12/04/2018    RTC In 6 Weeks; 12/4/18 Added Wellbutrin- Mg qAM; 5/29/18 Increased 100 Mg Zoloft To 100 Mg Bid    n Continuous Benzodiazepine (Xanax) Use 12/04/2018 5/29/18 I Am Weaning Him Off Of This By Decreasing 1 Mg Bid To 0.5 Mg Bid PRN, And Will Wean Further Next OV    n H/O ETOH Abuse, Quit In 09/2014     q Bilateral Lower Extremity Venous Stasis Ulcers     2/28/18 [facility]    q Chronic Bilateral Lower Extremity Edema     2/28/18 Added Metolazone 10 Mg qAM On MWF And Referred Back To Dr. Washington; On Lasix 40 Mg Bid; He Wears Bilateral Compressin Hose Stockings    q Disability Examination 7/15/16     For CHF, PAF, DM2, And JULY On CPAP    Wellness Visit 11/6/2017      Past Surgical History:   Procedure Laterality Date    CARDIAC SURGERY      coronary stent    COLONOSCOPY N/A 12/19/2017    Procedure: " COLONOSCOPY;  Surgeon: Dave Allen MD;  Location: [facility];  Service: Endoscopy;  Laterality: N/A;    DIABETES MANAGEMENT LABS      heart stent      INCISION AND DRAINAGE OF WOUND      on stomach    SHOULDER SURGERY       Family History   Problem Relation Age of Onset    Heart disease Mother     Arthritis Mother     Diabetes Mother     Hyperlipidemia Mother     Hypertension Mother     Heart disease Father     Arthritis Father     Asthma Father     COPD Father     Hyperlipidemia Father     Hypertension Father     Stroke Father     Diabetes Sister     Diabetes Maternal Uncle      Social History     Tobacco Use    Smoking status: Former Smoker     Packs/day: 0.25     Types: Cigarettes     Start date: 1981     Quit date: 2016     Years since quittin.0    Smokeless tobacco: Never Used    Tobacco comment: every now and again with drinks   Substance Use Topics    Alcohol use: Yes     Comment: occas    Drug use: No     Review of Systems   Constitutional: Negative for chills and fever.   HENT: Negative for congestion and sore throat.    Respiratory: Negative for cough and shortness of breath.    Cardiovascular: Negative for chest pain and palpitations.   Gastrointestinal: Negative for nausea and vomiting.   Genitourinary: Negative for dysuria.   Musculoskeletal: Negative for back pain and myalgias.   Skin: Positive for wound. Negative for rash.   Neurological: Negative for weakness.   Hematological: Bruises/bleeds easily.       Physical Exam     Initial Vitals [10/19/20 1630]   BP Pulse Resp Temp SpO2   136/80 100 16 99.4 °F (37.4 °C) 99 %      MAP       --         Physical Exam    Nursing note and vitals reviewed.  Constitutional: He appears well-developed and well-nourished. He is not diaphoretic. No distress.   HENT:   Head: Normocephalic and atraumatic.   Mouth/Throat: Oropharynx is clear and moist.   Eyes: Conjunctivae are normal.   Neck: Neck supple.   Cardiovascular:  Normal rate, regular rhythm, normal heart sounds and intact distal pulses. Exam reveals no gallop and no friction rub.    No murmur heard.  Pulmonary/Chest: Breath sounds normal. He has no wheezes. He has no rhonchi. He has no rales.   Musculoskeletal: Normal range of motion. Tenderness and edema present.      Comments: Asymmetric edema to left lower extremity from calf to proximal thigh. Left calf tenderness. Multiple abrasions and circular ligature to left tib fib region with surrounding eryth and tenderness. No foreign bodies. No fluctuance. No crepitus. No joint effusion.  Edema and pain does not extend past the knee. No pain with flexion or extension of the left knee.   1+ pitting edema bilaterally.   Neurological: He is alert and oriented to person, place, and time.   Cranial nerves III through XII grossly intact. 5/5 strength with intact sensation to BUE's and BLE's.   Skin: Abrasion noted. No rash noted. No erythema.         ED Course   Procedures  Labs Reviewed   CBC W/ AUTO DIFFERENTIAL - Abnormal; Notable for the following components:       Result Value    RBC 4.48 (*)     Hemoglobin 10.9 (*)     Hematocrit 35.6 (*)     Mean Corpuscular Volume 80 (*)     Mean Corpuscular Hemoglobin 24.3 (*)     Mean Corpuscular Hemoglobin Conc 30.6 (*)     RDW 16.7 (*)     Lymph% 14.3 (*)     All other components within normal limits   BASIC METABOLIC PANEL - Abnormal; Notable for the following components:    Glucose 125 (*)     Creatinine 1.5 (*)     Calcium 8.4 (*)     eGFR if  58 (*)     eGFR if non  50 (*)     All other components within normal limits   SARS-COV-2 RNA AMPLIFICATION, QUAL   SARS-COV-2 (COVID-19) QUALITATIVE PCR          Imaging Results          US Lower Extremity Veins Left (Final result)  Result time 10/19/20 17:51:59    Final result by Leroy Vasquez Jr., MD (10/19/20 17:51:59)                 Impression:      No evidence of deep venous thrombosis in the left lower  extremity.      Electronically signed by: Leroy Vasquez MD  Date:    10/19/2020  Time:    17:51             Narrative:    EXAMINATION:  US LOWER EXTREMITY VEINS LEFT    CLINICAL HISTORY:  Leg pain;    TECHNIQUE:  Duplex and color flow Doppler evaluation and graded compression of the left lower extremity veins was performed.    COMPARISON:  None    FINDINGS:  Left thigh veins: The common femoral, femoral, popliteal, upper greater saphenous, and deep femoral veins are patent and free of thrombus. The veins are normally compressible and have normal phasic flow and augmentation response.    Left calf veins: The visualized calf veins are patent.    Contralateral CFV: The contralateral (right) common femoral vein is patent and free of thrombus.    Miscellaneous: None                               X-Ray Tibia Fibula 2 View Left (Final result)  Result time 10/19/20 17:29:35    Final result by Leroy Vasquez Jr., MD (10/19/20 17:29:35)                 Impression:      Mild DJD at the knee and at the medial ankle mortise joint.  Otherwise negative tibia and fibula.      Electronically signed by: Leroy Vasquez MD  Date:    10/19/2020  Time:    17:29             Narrative:    EXAMINATION:  XR TIBIA FIBULA 2 VIEW LEFT    CLINICAL HISTORY:  Pain in left leg    TECHNIQUE:  AP and lateral views of the left tibia and fibula were performed.    COMPARISON:  None.    FINDINGS:  A fracture of the tibia or fibula is not identified.  Soft tissue abnormalities are not seen.  There is narrowing of the medial and lateral intercondylar joint space at the knee which may represent DJD.  There are degenerative changes identified at the medial ankle mortise joint is well without a fracture at the ankle noted.                            (radiology reading, XR visualized by me)     Medical Decision Making:   History:   Old Medical Records: I decided to obtain old medical records.  Clinical Tests:   Lab Tests: Ordered and  Reviewed  Radiological Study: Ordered and Reviewed            Scribe Attestation:   Scribe #1: I performed the above scribed service and the documentation accurately describes the services I performed. I attest to the accuracy of the note.    I, Dr. Paco Villela, personally performed the services described in this documentation. All medical record entries made by the scribe were at my direction and in my presence.  I have reviewed the chart and agree that the record reflects my personal performance and is accurate and complete. Paco Villela MD.  6:41 PM 10/19/2020    Jose Leon is a 60 y.o. male presenting with left lower extremity edema and pain consistent with cellulitis secondary to traumatic injury around 2 weeks ago.  He has had progressive onset.  Patient has minimal weight-bearing secondary to other comorbidities.  He has no distal neurovascular compromise.  There is no sign of DVT.  He has palpable distal pulses bilaterally.  I doubt acute with vascular occlusive disease.  There is no sign of underlying fracture or dislocation.  Based on degree of edema and erythema with multiple other comorbidities, I do think he warrants initial IV antibiotic therapy with vancomycin initiated after discussion with hospital medicine team will assume care.  Low suspicion for sepsis at this point.  I doubt other deep space infection such as necrotizing fasciitis.  I do not think further imaging or surgical consultation is indicated in the ED.                  Clinical Impression:     ICD-10-CM ICD-9-CM   1. Cellulitis of left lower extremity  L03.116 682.6   2. Left leg pain  M79.605 729.5                          ED Disposition Condition    Observation                             Paco Villela MD  10/19/20 1842

## 2020-10-19 NOTE — ASSESSMENT & PLAN NOTE
Patient's FSGs are controlled on current hypoglycemics.   Last A1c reviewed-   Lab Results   Component Value Date    HGBA1C 7.8 (H) 04/03/2018     Most recent fingerstick glucose reviewed- No results for input(s): POCTGLUCOSE in the last 24 hours.  Current correctional scale  Low  Maintain anti-hyperglycemic dose as follows-   Antihyperglycemics (From admission, onward)    None        Hold Oral hypoglycemics while patient is in the hospital.

## 2020-10-19 NOTE — SUBJECTIVE & OBJECTIVE
"Past Medical History:   Diagnosis Date    a A H/O Medical Noncompliance     H/O Chronic Noncompliance With His CHF Diet    a Cardiac Diastolic Dysfunction     Dr. Aure Washington    a Chronic Anticoagulation With Pradaxa     Dr. Aure Washington    a Coronary Artery Disease With H/O Stenting     Dr. Aure Washington; Was Hospitalized At [facility] 3/8/17-3/17/17 For CHF Exacerbatioin Due To "Dietary Discrepancies" With LCST Negative There    a Nonsustained Ventricular Tachycardia (NSVT)     a Paroxysmal Atrial Fibrillation With H/O RVR     Dr. Aure Washington; On Chronic Eliquis    a Syncopal Episode     [facility] 4/3/17-4/7/17 Stay For This: Was Likely Due To NSVT, And His Medications Were Adjusted    a Systolic CHF With EF 35-45%     Dr. Aure Washington; Was Hospitalized At [facility] 3/8/17-3/17/17 For CHF Exacerbatioin Due To "Dietary Discrepancies" With LCST Negative There    b Hypertension     b Proteinuria     04/2014 Referred To Dr. Leroy Allison; 4/1/14 Bilateral Renal U/S = Normal; On Lisinopril 20 Mg Daily    b Stage 2 CKD     c Hypercholesterolemia With Low HDL     d Type 2 DM On Insulin     ** 12/4/18 Referred To [facility] ED[facility]; 1/11/18 Referred To Dr. Dipika Rodriguez And Re-Referred To [facility] EDU; 12/28/17 HgA1c = 12.0;" 7/5/17 Referred To [facility]    f Morbid Obesity     i 1 PPD X 25+ YRs Chronic Tobacco Use Disorder     7/5/17 Increased Wellbutrin-XL To 300 Mg Daily; 6/8/17 RXd Wellbutrin- Mg Daily X 4 Months    i JULY On CPAP     Dr. Marion ngo Chronic Left Groin Pain     l Chronic Left Shoulder Pain     Dr. MARTINA martinez Chronic Recurrent Low Back Pain 12/04/2018    Dr. Llamas Is His Pain Management Neurologist; 5/219/18 Referred To Dr. Ismael martinez Left 5-7th Rib FXs 04/2016 4/23/16 [facility] Left Rib XRays = Questionable Nondisplaced Left 5-7th Rib FXs With Normal Lung Fields    l Right Shouder SX 5/26/16 Due To Work Related Injury     Dr. Mora At [facility]; Dr. MARTINA hatch H/O Transient Ischemic " Attack In 2013     n Anxiety And Depression 12/04/2018    RTC In 6 Weeks; 12/4/18 Added Wellbutrin- Mg qAM; 5/29/18 Increased 100 Mg Zoloft To 100 Mg Bid    n Continuous Benzodiazepine (Xanax) Use 12/04/2018 5/29/18 I Am Weaning Him Off Of This By Decreasing 1 Mg Bid To 0.5 Mg Bid PRN, And Will Wean Further Next OV    n H/O ETOH Abuse, Quit In 09/2014     q Bilateral Lower Extremity Venous Stasis Ulcers     2/28/18 [facility]    q Chronic Bilateral Lower Extremity Edema     2/28/18 Added Metolazone 10 Mg qAM On MWF And Referred Back To Dr. Washington; On Lasix 40 Mg Bid; He Wears Bilateral Compressin Hose Stockings    q Disability Examination 7/15/16     For CHF, PAF, DM2, And JULY On CPAP    Wellness Visit 11/6/2017        Past Surgical History:   Procedure Laterality Date    CARDIAC SURGERY      coronary stent    COLONOSCOPY N/A 12/19/2017    Procedure: COLONOSCOPY;  Surgeon: Dave Allen MD;  Location: [facility];  Service: Endoscopy;  Laterality: N/A;    DIABETES MANAGEMENT LABS      heart stent      INCISION AND DRAINAGE OF WOUND      on stomach    SHOULDER SURGERY         Review of patient's allergies indicates:   Allergen Reactions    Atorvastatin      Other reaction(s): Generalized Myalgias    Effexor [venlafaxine] Other (See Comments)     Tremulousness       No current facility-administered medications on file prior to encounter.      Current Outpatient Medications on File Prior to Encounter   Medication Sig    acetaminophen (TYLENOL) 500 MG tablet Take 2 tablets (1,000 mg total) by mouth every 6 (six) hours as needed for Pain.    albuterol-ipratropium (DUO-NEB) 2.5 mg-0.5 mg/3 mL nebulizer solution Take 3 mLs by nebulization every 6 (six) hours as needed for Wheezing or Shortness of Breath. Rescue    ALPRAZolam (XANAX) 0.5 MG tablet TAKE ONE TABLET BY MOUTH TWICE DAILY AS NEEDED FOR ANXIETY (Patient taking  differently: Take 0.5 mg by mouth 2 (two) times daily as needed. )    ALPRAZolam (XANAX) 1 MG tablet Take 1 mg by mouth 4 (four) times daily as needed for Anxiety.    amiodarone (PACERONE) 200 MG Tab Take 200 mg by mouth 2 (two) times daily.     amLODIPine (NORVASC) 5 MG tablet Take 5 mg by mouth once daily.    apixaban (ELIQUIS) 5 mg Tab Take 5 mg by mouth 2 (two) times daily.    aspirin (ECOTRIN) 81 MG EC tablet Take 81 mg by mouth every evening.     buPROPion (WELLBUTRIN SR) 150 MG TBSR 12 hr tablet Take 150 mg by mouth once daily.     clopidogreL (PLAVIX) 75 mg tablet clopidogrel 75 mg tablet    cyclobenzaprine (FLEXERIL) 10 MG tablet Take 10 mg by mouth 2 (two) times daily as needed for Muscle spasms.    diazePAM (VALIUM) 10 MG Tab diazepam 10 mg tablet    digoxin (LANOXIN) 250 mcg tablet Take 250 mcg by mouth.    docusate sodium (COLACE) 100 MG capsule Take 2 capsules (200 mg total) by mouth 2 (two) times daily.    doxycycline (VIBRA-TABS) 100 MG tablet Take 100 mg by mouth 2 (two) times daily.    escitalopram oxalate (LEXAPRO) 20 MG tablet Take 20 mg by mouth once daily.    furosemide (LASIX) 20 MG tablet Take 20 mg by mouth once daily.    furosemide (LASIX) 40 MG tablet Take 1 tablet (40 mg total) by mouth once daily.    gabapentin (NEURONTIN) 300 MG capsule Take 1 capsule (300 mg total) by mouth 2 (two) times daily.    gabapentin (NEURONTIN) 600 MG tablet Take 600 mg by mouth 3 (three) times daily.    insulin (BASAGLAR KWIKPEN U-100 INSULIN) glargine 100 units/mL (3mL) SubQ pen Inject 12 Units into the skin once daily.    insulin aspart U-100 (NOVOLOG) 100 unit/mL (3 mL) InPn pen Inject 3 Units into the skin 3 (three) times daily with meals.    lisinopriL (PRINIVIL,ZESTRIL) 20 MG tablet Take 20 mg by mouth once daily.    lisinopriL 10 MG tablet Take 1 tablet (10 mg total) by mouth once daily.    magnesium oxide (MAG-OX) 400 mg tablet Take 1 tablet (400 mg total) by mouth once daily.     meloxicam (MOBIC) 15 MG tablet Take 15 mg by mouth once daily.    metFORMIN (GLUCOPHAGE) 1000 MG tablet Take 1,000 mg by mouth 2 (two) times daily with meals.    metoprolol succinate (TOPROL-XL) 50 MG 24 hr tablet Take 50 mg by mouth once daily.    metoprolol tartrate (LOPRESSOR) 25 MG tablet TAKE 1 TABLET BY MOUTH 2 TIMES DAILY. (Patient taking differently: Take 25 mg by mouth 2 (two) times daily. )    mirabegron (MYRBETRIQ) 50 mg Tb24 Take 1 tablet by mouth once daily.    mupirocin (BACTROBAN) 2 % ointment Apply 1 g topically once daily.    oxyCODONE-acetaminophen (PERCOCET)  mg per tablet Take 1 tablet by mouth every 6 (six) hours as needed.     potassium chloride SA (K-DUR,KLOR-CON) 20 MEQ tablet Take 40 mEq by mouth once daily. For 5 days    pravastatin (PRAVACHOL) 80 MG tablet TAKE 1 TABLET BY MOUTH AT BEDTIME (Patient taking differently: Take 80 mg by mouth once daily. )    sertraline (ZOLOFT) 100 MG tablet Take 1 tablet (100 mg total) by mouth once daily.    SITagliptin (JANUVIA) 50 MG Tab Take 50 mg by mouth once daily.    zolpidem (AMBIEN) 10 mg Tab Take 10 mg by mouth nightly as needed.     Family History     Problem Relation (Age of Onset)    Arthritis Mother, Father    Asthma Father    COPD Father    Diabetes Mother, Sister, Maternal Uncle    Heart disease Mother, Father    Hyperlipidemia Mother, Father    Hypertension Mother, Father    Stroke Father        Tobacco Use    Smoking status: Former Smoker     Packs/day: 0.25     Types: Cigarettes     Start date: 1981     Quit date: 2016     Years since quittin.0    Smokeless tobacco: Never Used    Tobacco comment: every now and again with drinks   Substance and Sexual Activity    Alcohol use: Yes     Comment: occas    Drug use: No    Sexual activity: Yes     Partners: Female     Birth control/protection: None     Review of Systems   Constitutional: Negative for appetite change, chills, fatigue and fever.   HENT:  Negative for congestion, hearing loss, rhinorrhea, sore throat, trouble swallowing and voice change.    Respiratory: Negative for cough, chest tightness, shortness of breath and wheezing.    Cardiovascular: Negative for chest pain, palpitations and leg swelling.   Gastrointestinal: Negative for abdominal pain, blood in stool, diarrhea, nausea and vomiting.   Genitourinary: Negative for difficulty urinating, frequency, hematuria and urgency.   Musculoskeletal: Negative for back pain, joint swelling and neck stiffness.   Skin: Positive for wound. Negative for pallor and rash.   Neurological: Negative for tremors, seizures, syncope, speech difficulty, weakness, numbness and headaches.   Hematological: Negative for adenopathy.   Psychiatric/Behavioral: Negative for agitation, behavioral problems, confusion and sleep disturbance.     Objective:     Vital Signs (Most Recent):  Temp: 99.4 °F (37.4 °C) (10/19/20 1630)  Pulse: 100 (10/19/20 1630)  Resp: 16 (10/19/20 1630)  BP: 136/80 (10/19/20 1630)  SpO2: 99 % (10/19/20 1630) Vital Signs (24h Range):  Temp:  [99.4 °F (37.4 °C)] 99.4 °F (37.4 °C)  Pulse:  [100] 100  Resp:  [16] 16  SpO2:  [99 %] 99 %  BP: (136)/(80) 136/80     Weight: 131.1 kg (289 lb)  Body mass index is 40.31 kg/m².    Physical Exam  Constitutional:       General: He is not in acute distress.     Appearance: He is well-developed. He is not diaphoretic.   HENT:      Head: Normocephalic and atraumatic.      Right Ear: External ear normal.      Left Ear: External ear normal.      Nose: Nose normal.   Eyes:      General: No scleral icterus.        Right eye: No discharge.         Left eye: No discharge.      Conjunctiva/sclera: Conjunctivae normal.      Pupils: Pupils are equal, round, and reactive to light.   Neck:      Musculoskeletal: Normal range of motion and neck supple.      Thyroid: No thyromegaly.   Cardiovascular:      Rate and Rhythm: Normal rate and regular rhythm.      Heart sounds: Normal heart  sounds. No murmur.   Pulmonary:      Effort: Pulmonary effort is normal. No respiratory distress.      Breath sounds: Normal breath sounds. No stridor. No wheezing or rales.   Abdominal:      General: Bowel sounds are normal. There is no distension.      Palpations: Abdomen is soft.      Tenderness: There is no abdominal tenderness.   Musculoskeletal:         General: No tenderness.      Right lower leg: No edema.      Left lower leg: Edema present.   Lymphadenopathy:      Cervical: No cervical adenopathy.   Skin:     General: Skin is warm and dry.      Capillary Refill: Capillary refill takes less than 2 seconds.      Findings: Bruising present. No erythema or rash.      Comments: Asymmetric edema to left lower extremity from calf to proximal thigh. Left calf tenderness. Multiple abrasions and circular ligature to left tib fib region with surrounding eryth and tenderness. No foreign bodies. No fluctuance. No crepitus. No joint effusion.  Edema and pain does not extend past the knee. No pain with flexion or extension of the left knee.   1+ pitting edema bilaterally.    Neurological:      Mental Status: He is alert and oriented to person, place, and time.      Cranial Nerves: No cranial nerve deficit.      Sensory: No sensory deficit.      Motor: No abnormal muscle tone.      Coordination: Coordination normal.   Psychiatric:         Behavior: Behavior normal.         Thought Content: Thought content normal.         Judgment: Judgment normal.           CRANIAL NERVES     CN III, IV, VI   Pupils are equal, round, and reactive to light.       Significant Labs:   BMP:   Recent Labs   Lab 10/19/20  1729   *      K 4.4      CO2 24   BUN 16   CREATININE 1.5*   CALCIUM 8.4*     CBC:   Recent Labs   Lab 10/19/20  1729   WBC 6.87   HGB 10.9*   HCT 35.6*          Significant Imaging: I have reviewed all pertinent imaging results/findings within the past 24 hours.

## 2020-10-19 NOTE — HPI
Jose Leon is a 60 y.o. male with PMHx of DM, CHF, chronic LE edema, morbid obesity, chronic benzo, pain med and anticoagulant use who presented to the ED via EMS for evaluation of wound to the left lower leg s/p fall on 10/5.  When home health nurse evaluated the patient and evaluated the wound recommended patient come to the emergency room for IV antibiotics.  Patient states that he had a low-grade temperature and had chills.  Patient was given a referral to wound care nurses as an outpatient but did not go to his clinic visit.  Patient states that he has difficulty ambulating due to and swelling of the left leg and foot.  The patient denies  chest pain, or any other symptoms at this time. Patient is a former smoker.     The history is provided by the patient.

## 2020-10-19 NOTE — ASSESSMENT & PLAN NOTE
Patient is identified as having Diastolic heart failure that is Chronic. CHF is currently controlled. Latest ECHO performed and demonstrates-   Results for orders placed during the hospital encounter of 04/03/20   Echo Color Flow Doppler? Yes; Bubble Contrast? No    Narrative · Concentric left ventricular remodeling.  · The mean diastolic gradient across the mitral valve is 1 mmHg at a heart   rate of bpm.  · Severe left atrial enlargement.  · Normal left ventricular systolic function. The estimated ejection   fraction is 60%.  · Indeterminate left ventricular diastolic function.  · No wall motion abnormalities.  · Moderate right atrial enlargement.  · Normal central venous pressure (3 mmHg).  · The estimated PA systolic pressure is 27 mmHg.  · Normal right ventricular systolic function.  · Mild tricuspid regurgitation.      . Continue Beta Blocker ACE/ARB Furosemide and monitor clinical status closely. Monitor on telemetry. Patient is off CHF pathway.  Monitor strict Is&Os and daily weights.  Place on fluid restriction of 1.5 L. Continue to stress to patient importance of self efficacy and  on diet for CHF. Last BNP reviewed- and noted below No results for input(s): BNP, BNPTRIAGEBLO in the last 168 hours..

## 2020-10-20 LAB
ANION GAP SERPL CALC-SCNC: 9 MMOL/L (ref 8–16)
BASOPHILS # BLD AUTO: 0.06 K/UL (ref 0–0.2)
BASOPHILS NFR BLD: 1.2 % (ref 0–1.9)
BUN SERPL-MCNC: 14 MG/DL (ref 6–20)
CALCIUM SERPL-MCNC: 8.3 MG/DL (ref 8.7–10.5)
CHLORIDE SERPL-SCNC: 107 MMOL/L (ref 95–110)
CO2 SERPL-SCNC: 25 MMOL/L (ref 23–29)
CREAT SERPL-MCNC: 1.4 MG/DL (ref 0.5–1.4)
DIFFERENTIAL METHOD: ABNORMAL
EOSINOPHIL # BLD AUTO: 0.4 K/UL (ref 0–0.5)
EOSINOPHIL NFR BLD: 6.8 % (ref 0–8)
ERYTHROCYTE [DISTWIDTH] IN BLOOD BY AUTOMATED COUNT: 16.8 % (ref 11.5–14.5)
EST. GFR  (AFRICAN AMERICAN): >60 ML/MIN/1.73 M^2
EST. GFR  (NON AFRICAN AMERICAN): 54 ML/MIN/1.73 M^2
ESTIMATED AVG GLUCOSE: 137 MG/DL (ref 68–131)
GLUCOSE SERPL-MCNC: 118 MG/DL (ref 70–110)
HBA1C MFR BLD HPLC: 6.4 % (ref 4–5.6)
HCT VFR BLD AUTO: 36.1 % (ref 40–54)
HGB BLD-MCNC: 10.6 G/DL (ref 14–18)
IMM GRANULOCYTES # BLD AUTO: 0.02 K/UL (ref 0–0.04)
IMM GRANULOCYTES NFR BLD AUTO: 0.4 % (ref 0–0.5)
LYMPHOCYTES # BLD AUTO: 0.8 K/UL (ref 1–4.8)
LYMPHOCYTES NFR BLD: 15.4 % (ref 18–48)
MCH RBC QN AUTO: 23.5 PG (ref 27–31)
MCHC RBC AUTO-ENTMCNC: 29.4 G/DL (ref 32–36)
MCV RBC AUTO: 80 FL (ref 82–98)
MONOCYTES # BLD AUTO: 0.5 K/UL (ref 0.3–1)
MONOCYTES NFR BLD: 8.8 % (ref 4–15)
NEUTROPHILS # BLD AUTO: 3.5 K/UL (ref 1.8–7.7)
NEUTROPHILS NFR BLD: 67.4 % (ref 38–73)
NRBC BLD-RTO: 0 /100 WBC
PLATELET # BLD AUTO: 230 K/UL (ref 150–350)
PMV BLD AUTO: 11.2 FL (ref 9.2–12.9)
POCT GLUCOSE: 125 MG/DL (ref 70–110)
POCT GLUCOSE: 132 MG/DL (ref 70–110)
POCT GLUCOSE: 141 MG/DL (ref 70–110)
POCT GLUCOSE: 97 MG/DL (ref 70–110)
POTASSIUM SERPL-SCNC: 3.8 MMOL/L (ref 3.5–5.1)
RBC # BLD AUTO: 4.51 M/UL (ref 4.6–6.2)
SARS-COV-2 RNA RESP QL NAA+PROBE: NOT DETECTED
SODIUM SERPL-SCNC: 141 MMOL/L (ref 136–145)
WBC # BLD AUTO: 5.13 K/UL (ref 3.9–12.7)

## 2020-10-20 PROCEDURE — 63600175 PHARM REV CODE 636 W HCPCS: Performed by: NURSE PRACTITIONER

## 2020-10-20 PROCEDURE — 36415 COLL VENOUS BLD VENIPUNCTURE: CPT

## 2020-10-20 PROCEDURE — 99900035 HC TECH TIME PER 15 MIN (STAT)

## 2020-10-20 PROCEDURE — 63600175 PHARM REV CODE 636 W HCPCS: Performed by: INTERNAL MEDICINE

## 2020-10-20 PROCEDURE — 94761 N-INVAS EAR/PLS OXIMETRY MLT: CPT

## 2020-10-20 PROCEDURE — 94660 CPAP INITIATION&MGMT: CPT

## 2020-10-20 PROCEDURE — G0378 HOSPITAL OBSERVATION PER HR: HCPCS

## 2020-10-20 PROCEDURE — 85025 COMPLETE CBC W/AUTO DIFF WBC: CPT

## 2020-10-20 PROCEDURE — 80048 BASIC METABOLIC PNL TOTAL CA: CPT

## 2020-10-20 PROCEDURE — 27000190 HC CPAP FULL FACE MASK W/VALVE

## 2020-10-20 PROCEDURE — 96376 TX/PRO/DX INJ SAME DRUG ADON: CPT

## 2020-10-20 PROCEDURE — 96372 THER/PROPH/DIAG INJ SC/IM: CPT

## 2020-10-20 PROCEDURE — 25000003 PHARM REV CODE 250: Performed by: INTERNAL MEDICINE

## 2020-10-20 PROCEDURE — 96375 TX/PRO/DX INJ NEW DRUG ADDON: CPT

## 2020-10-20 PROCEDURE — 83036 HEMOGLOBIN GLYCOSYLATED A1C: CPT

## 2020-10-20 RX ORDER — DIPHENHYDRAMINE HYDROCHLORIDE 50 MG/ML
12.5 INJECTION INTRAMUSCULAR; INTRAVENOUS EVERY 4 HOURS PRN
Status: DISCONTINUED | OUTPATIENT
Start: 2020-10-20 | End: 2020-10-26 | Stop reason: HOSPADM

## 2020-10-20 RX ADMIN — OXYBUTYNIN CHLORIDE 10 MG: 5 TABLET, EXTENDED RELEASE ORAL at 08:10

## 2020-10-20 RX ADMIN — AMLODIPINE BESYLATE 5 MG: 5 TABLET ORAL at 09:10

## 2020-10-20 RX ADMIN — CEFAZOLIN SODIUM 2 G: 2 SOLUTION INTRAVENOUS at 05:10

## 2020-10-20 RX ADMIN — CLOPIDOGREL BISULFATE 75 MG: 75 TABLET, FILM COATED ORAL at 08:10

## 2020-10-20 RX ADMIN — OXYCODONE AND ACETAMINOPHEN 1 TABLET: 10; 325 TABLET ORAL at 05:10

## 2020-10-20 RX ADMIN — APIXABAN 5 MG: 2.5 TABLET, FILM COATED ORAL at 08:10

## 2020-10-20 RX ADMIN — INSULIN ASPART 3 UNITS: 100 INJECTION, SOLUTION INTRAVENOUS; SUBCUTANEOUS at 09:10

## 2020-10-20 RX ADMIN — CEFAZOLIN SODIUM 2 G: 2 SOLUTION INTRAVENOUS at 08:10

## 2020-10-20 RX ADMIN — GABAPENTIN 600 MG: 300 CAPSULE ORAL at 02:10

## 2020-10-20 RX ADMIN — DIGOXIN 0.25 MG: 125 TABLET ORAL at 08:10

## 2020-10-20 RX ADMIN — CEFAZOLIN SODIUM 2 G: 2 SOLUTION INTRAVENOUS at 02:10

## 2020-10-20 RX ADMIN — PRAVASTATIN SODIUM 80 MG: 40 TABLET ORAL at 08:10

## 2020-10-20 RX ADMIN — MAGNESIUM OXIDE 400 MG (241.3 MG MAGNESIUM) TABLET 400 MG: TABLET at 08:10

## 2020-10-20 RX ADMIN — FUROSEMIDE 40 MG: 40 TABLET ORAL at 08:10

## 2020-10-20 RX ADMIN — CYCLOBENZAPRINE HYDROCHLORIDE 10 MG: 5 TABLET, FILM COATED ORAL at 02:10

## 2020-10-20 RX ADMIN — ACETAMINOPHEN 1000 MG: 500 TABLET ORAL at 08:10

## 2020-10-20 RX ADMIN — OXYCODONE AND ACETAMINOPHEN 1 TABLET: 10; 325 TABLET ORAL at 11:10

## 2020-10-20 RX ADMIN — LISINOPRIL 20 MG: 10 TABLET ORAL at 08:10

## 2020-10-20 RX ADMIN — GABAPENTIN 600 MG: 300 CAPSULE ORAL at 08:10

## 2020-10-20 RX ADMIN — AMIODARONE HYDROCHLORIDE 200 MG: 200 TABLET ORAL at 08:10

## 2020-10-20 RX ADMIN — INSULIN ASPART 3 UNITS: 100 INJECTION, SOLUTION INTRAVENOUS; SUBCUTANEOUS at 11:10

## 2020-10-20 RX ADMIN — APIXABAN 5 MG: 2.5 TABLET, FILM COATED ORAL at 09:10

## 2020-10-20 RX ADMIN — DIPHENHYDRAMINE HYDROCHLORIDE 12.5 MG: 50 INJECTION, SOLUTION INTRAMUSCULAR; INTRAVENOUS at 02:10

## 2020-10-20 RX ADMIN — BUPROPION HYDROCHLORIDE 150 MG: 150 TABLET, EXTENDED RELEASE ORAL at 08:10

## 2020-10-20 RX ADMIN — MELOXICAM 15 MG: 7.5 TABLET ORAL at 09:10

## 2020-10-20 RX ADMIN — DOCUSATE SODIUM 200 MG: 100 CAPSULE, LIQUID FILLED ORAL at 08:10

## 2020-10-20 RX ADMIN — METOPROLOL SUCCINATE 50 MG: 50 TABLET, FILM COATED, EXTENDED RELEASE ORAL at 08:10

## 2020-10-20 RX ADMIN — INSULIN DETEMIR 10 UNITS: 100 INJECTION, SOLUTION SUBCUTANEOUS at 08:10

## 2020-10-20 RX ADMIN — POTASSIUM CHLORIDE 40 MEQ: 1500 TABLET, EXTENDED RELEASE ORAL at 08:10

## 2020-10-20 RX ADMIN — ESCITALOPRAM OXALATE 20 MG: 10 TABLET, FILM COATED ORAL at 08:10

## 2020-10-20 NOTE — ASSESSMENT & PLAN NOTE
This patient does have evidence of infective focus  My overall impression is cellulitis of the left foot.  Vital signs were reviewed and noted in progress note.  Antibiotics given-   Antibiotics (From admission, onward)    Start     Stop Route Frequency Ordered    10/19/20 2145  cefazolin (ANCEF) 2 gram in dextrose 5% 50 mL IVPB (premix)      -- IV Every 8 hours (non-standard times) 10/19/20 2139        Cultures were taken-   Microbiology Results (last 7 days)     Procedure Component Value Units Date/Time    Blood culture [[id number]] Collected: 10/19/20 1911    Order Status: Completed Specimen: Blood from Antecubital, Right Arm Updated: 10/20/20 0945     Blood Culture, Routine No Growth to date    Narrative:      Take 2 sets, from peripheral site. Take both aerobic and  anaerobic bottles.        Latest lactate reviewed, they are-    Will resume vancomycin for now  Will get blood culture

## 2020-10-20 NOTE — H&P
"[facility] Medicine  History & Physical    Patient Name: Jose Leon  MRN: [id number]  Admission Date: 10/19/2020  Attending Physician: Paco Villela MD   Primary Care Provider: Josh Giordano MD         Patient information was obtained from patient and ER records.     Subjective:     Principal Problem:Cellulitis of left lower extremity    Chief Complaint:   Chief Complaint   Patient presents with    Wound Check     home health wound care nurse sent patient to ED for evaluation  / left leg         HPI: Jose Leon is a 60 y.o. male with PMHx of DM, CHF, chronic LE edema, morbid obesity, chronic benzo, pain med and anticoagulant use who presented to the ED via EMS for evaluation of wound to the left lower leg s/p fall on 10/5.  When home health nurse evaluated the patient and evaluated the wound recommended patient come to the emergency room for IV antibiotics.  Patient states that he had a low-grade temperature and had chills.  Patient was given a referral to wound care nurses as an outpatient but did not go to his clinic visit.  Patient states that he has difficulty ambulating due to and swelling of the left leg and foot.  The patient denies  chest pain, or any other symptoms at this time. Patient is a former smoker.     The history is provided by the patient.     Past Medical History:   Diagnosis Date    a A H/O Medical Noncompliance     H/O Chronic Noncompliance With His CHF Diet    a Cardiac Diastolic Dysfunction     Dr. Aure Washington    a Chronic Anticoagulation With Pradaxa     Dr. Aure Washington    a Coronary Artery Disease With H/O Stenting     Dr. Aure Washington; Was Hospitalized At [facility] 3/8/17-3/17/17 For CHF Exacerbatioin Due To "Dietary Discrepancies" With LCST Negative There    a Nonsustained Ventricular Tachycardia (NSVT)     a Paroxysmal Atrial Fibrillation With H/O RVR     Dr. Aure Washington; On Chronic Eliquis    a Syncopal Episode     [facility] 4/3/17-4/7/17 Stay For " "This: Was Likely Due To NSVT, And His Medications Were Adjusted    a Systolic CHF With EF 35-45%     Dr. Aure Washington; Was Hospitalized At [facility] 3/8/17-3/17/17 For CHF Exacerbatioin Due To "Dietary Discrepancies" With LCST Negative There    b Hypertension     b Proteinuria     04/2014 Referred To Dr. Leroy Allison; 4/1/14 Bilateral Renal U/S = Normal; On Lisinopril 20 Mg Daily    b Stage 2 CKD     c Hypercholesterolemia With Low HDL     d Type 2 DM On Insulin     ** 12/4/18 Referred To DM [facility]; 1/11/18 Referred To Dr. Dipika Rodriguez And Re-Referred To DM [facility]; 12/28/17 HgA1c = 12.0;" 7/5/17 Referred To DM [facility]    f Morbid Obesity     i 1 PPD X 25+ YRs Chronic Tobacco Use Disorder     7/5/17 Increased Wellbutrin-XL To 300 Mg Daily; 6/8/17 RXd Wellbutrin- Mg Daily X 4 Months    i JULY On CPAP     Dr. Marion ngo Chronic Left Groin Pain     l Chronic Left Shoulder Pain     Dr. MARTINA martinez Chronic Recurrent Low Back Pain 12/04/2018    Dr. Llamas Is His Pain Management Neurologist; 5/219/18 Referred To Dr. Ismael Hernández    l Left 5-7th Rib FXs 04/2016 4/23/16 [facility] Left Rib XRays = Questionable Nondisplaced Left 5-7th Rib FXs With Normal Lung Fields    l Right Shouder SX 5/26/16 Due To Work Related Injury     Dr. Mora At [facility]; Dr. MARTINA hatch H/O Transient Ischemic Attack In 2013     n Anxiety And Depression 12/04/2018    RTC In 6 Weeks; 12/4/18 Added Wellbutrin- Mg qAM; 5/29/18 Increased 100 Mg Zoloft To 100 Mg Bid    n Continuous Benzodiazepine (Xanax) Use 12/04/2018 5/29/18 I Am Weaning Him Off Of This By Decreasing 1 Mg Bid To 0.5 Mg Bid PRN, And Will Wean Further Next OV    n H/O ETOH Abuse, Quit In 09/2014     q Bilateral Lower Extremity Venous Stasis Ulcers     2/28/18 [facility]    q Chronic Bilateral Lower Extremity Edema     2/28/18 Added Metolazone 10 Mg qAM On MWF And Referred Back To " Dr. Washington; On Lasix 40 Mg Bid; He Wears Bilateral Compressin Hose Stockings    q Disability Examination 7/15/16     For CHF, PAF, DM2, And JULY On CPAP    Wellness Visit 11/6/2017        Past Surgical History:   Procedure Laterality Date    CARDIAC SURGERY      coronary stent    COLONOSCOPY N/A 12/19/2017    Procedure: COLONOSCOPY;  Surgeon: Dave Allen MD;  [facility];  Service: Endoscopy;  Laterality: N/A;    DIABETES MANAGEMENT LABS      heart stent      INCISION AND DRAINAGE OF WOUND      on stomach    SHOULDER SURGERY         Review of patient's allergies indicates:   Allergen Reactions    Atorvastatin      Other reaction(s): Generalized Myalgias    Effexor [venlafaxine] Other (See Comments)     Tremulousness       No current facility-administered medications on file prior to encounter.      Current Outpatient Medications on File Prior to Encounter   Medication Sig    acetaminophen (TYLENOL) 500 MG tablet Take 2 tablets (1,000 mg total) by mouth every 6 (six) hours as needed for Pain.    albuterol-ipratropium (DUO-NEB) 2.5 mg-0.5 mg/3 mL nebulizer solution Take 3 mLs by nebulization every 6 (six) hours as needed for Wheezing or Shortness of Breath. Rescue    ALPRAZolam (XANAX) 0.5 MG tablet TAKE ONE TABLET BY MOUTH TWICE DAILY AS NEEDED FOR ANXIETY (Patient taking differently: Take 0.5 mg by mouth 2 (two) times daily as needed. )    ALPRAZolam (XANAX) 1 MG tablet Take 1 mg by mouth 4 (four) times daily as needed for Anxiety.    amiodarone (PACERONE) 200 MG Tab Take 200 mg by mouth 2 (two) times daily.     amLODIPine (NORVASC) 5 MG tablet Take 5 mg by mouth once daily.    apixaban (ELIQUIS) 5 mg Tab Take 5 mg by mouth 2 (two) times daily.    aspirin (ECOTRIN) 81 MG EC tablet Take 81 mg by mouth every evening.     buPROPion (WELLBUTRIN SR) 150 MG TBSR 12 hr tablet Take 150 mg by mouth once daily.     clopidogreL (PLAVIX) 75 mg tablet clopidogrel 75 mg tablet     cyclobenzaprine (FLEXERIL) 10 MG tablet Take 10 mg by mouth 2 (two) times daily as needed for Muscle spasms.    diazePAM (VALIUM) 10 MG Tab diazepam 10 mg tablet    digoxin (LANOXIN) 250 mcg tablet Take 250 mcg by mouth.    docusate sodium (COLACE) 100 MG capsule Take 2 capsules (200 mg total) by mouth 2 (two) times daily.    doxycycline (VIBRA-TABS) 100 MG tablet Take 100 mg by mouth 2 (two) times daily.    escitalopram oxalate (LEXAPRO) 20 MG tablet Take 20 mg by mouth once daily.    furosemide (LASIX) 20 MG tablet Take 20 mg by mouth once daily.    furosemide (LASIX) 40 MG tablet Take 1 tablet (40 mg total) by mouth once daily.    gabapentin (NEURONTIN) 300 MG capsule Take 1 capsule (300 mg total) by mouth 2 (two) times daily.    gabapentin (NEURONTIN) 600 MG tablet Take 600 mg by mouth 3 (three) times daily.    insulin (BASAGLAR KWIKPEN U-100 INSULIN) glargine 100 units/mL (3mL) SubQ pen Inject 12 Units into the skin once daily.    insulin aspart U-100 (NOVOLOG) 100 unit/mL (3 mL) InPn pen Inject 3 Units into the skin 3 (three) times daily with meals.    lisinopriL (PRINIVIL,ZESTRIL) 20 MG tablet Take 20 mg by mouth once daily.    lisinopriL 10 MG tablet Take 1 tablet (10 mg total) by mouth once daily.    magnesium oxide (MAG-OX) 400 mg tablet Take 1 tablet (400 mg total) by mouth once daily.    meloxicam (MOBIC) 15 MG tablet Take 15 mg by mouth once daily.    metFORMIN (GLUCOPHAGE) 1000 MG tablet Take 1,000 mg by mouth 2 (two) times daily with meals.    metoprolol succinate (TOPROL-XL) 50 MG 24 hr tablet Take 50 mg by mouth once daily.    metoprolol tartrate (LOPRESSOR) 25 MG tablet TAKE 1 TABLET BY MOUTH 2 TIMES DAILY. (Patient taking differently: Take 25 mg by mouth 2 (two) times daily. )    mirabegron (MYRBETRIQ) 50 mg Tb24 Take 1 tablet by mouth once daily.    mupirocin (BACTROBAN) 2 % ointment Apply 1 g topically once daily.    oxyCODONE-acetaminophen (PERCOCET)  mg per tablet  Take 1 tablet by mouth every 6 (six) hours as needed.     potassium chloride SA (K-DUR,KLOR-CON) 20 MEQ tablet Take 40 mEq by mouth once daily. For 5 days    pravastatin (PRAVACHOL) 80 MG tablet TAKE 1 TABLET BY MOUTH AT BEDTIME (Patient taking differently: Take 80 mg by mouth once daily. )    sertraline (ZOLOFT) 100 MG tablet Take 1 tablet (100 mg total) by mouth once daily.    SITagliptin (JANUVIA) 50 MG Tab Take 50 mg by mouth once daily.    zolpidem (AMBIEN) 10 mg Tab Take 10 mg by mouth nightly as needed.     Family History     Problem Relation (Age of Onset)    Arthritis Mother, Father    Asthma Father    COPD Father    Diabetes Mother, Sister, Maternal Uncle    Heart disease Mother, Father    Hyperlipidemia Mother, Father    Hypertension Mother, Father    Stroke Father        Tobacco Use    Smoking status: Former Smoker     Packs/day: 0.25     Types: Cigarettes     Start date: 1981     Quit date: 2016     Years since quittin.0    Smokeless tobacco: Never Used    Tobacco comment: every now and again with drinks   Substance and Sexual Activity    Alcohol use: Yes     Comment: occas    Drug use: No    Sexual activity: Yes     Partners: Female     Birth control/protection: None     Review of Systems   Constitutional: Negative for appetite change, chills, fatigue and fever.   HENT: Negative for congestion, hearing loss, rhinorrhea, sore throat, trouble swallowing and voice change.    Respiratory: Negative for cough, chest tightness, shortness of breath and wheezing.    Cardiovascular: Negative for chest pain, palpitations and leg swelling.   Gastrointestinal: Negative for abdominal pain, blood in stool, diarrhea, nausea and vomiting.   Genitourinary: Negative for difficulty urinating, frequency, hematuria and urgency.   Musculoskeletal: Negative for back pain, joint swelling and neck stiffness.   Skin: Positive for wound. Negative for pallor and rash.   Neurological: Negative for tremors,  seizures, syncope, speech difficulty, weakness, numbness and headaches.   Hematological: Negative for adenopathy.   Psychiatric/Behavioral: Negative for agitation, behavioral problems, confusion and sleep disturbance.     Objective:     Vital Signs (Most Recent):  Temp: 99.4 °F (37.4 °C) (10/19/20 1630)  Pulse: 100 (10/19/20 1630)  Resp: 16 (10/19/20 1630)  BP: 136/80 (10/19/20 1630)  SpO2: 99 % (10/19/20 1630) Vital Signs (24h Range):  Temp:  [99.4 °F (37.4 °C)] 99.4 °F (37.4 °C)  Pulse:  [100] 100  Resp:  [16] 16  SpO2:  [99 %] 99 %  BP: (136)/(80) 136/80     Weight: 131.1 kg (289 lb)  Body mass index is 40.31 kg/m².    Physical Exam  Constitutional:       General: He is not in acute distress.     Appearance: He is well-developed. He is not diaphoretic.   HENT:      Head: Normocephalic and atraumatic.      Right Ear: External ear normal.      Left Ear: External ear normal.      Nose: Nose normal.   Eyes:      General: No scleral icterus.        Right eye: No discharge.         Left eye: No discharge.      Conjunctiva/sclera: Conjunctivae normal.      Pupils: Pupils are equal, round, and reactive to light.   Neck:      Musculoskeletal: Normal range of motion and neck supple.      Thyroid: No thyromegaly.   Cardiovascular:      Rate and Rhythm: Normal rate and regular rhythm.      Heart sounds: Normal heart sounds. No murmur.   Pulmonary:      Effort: Pulmonary effort is normal. No respiratory distress.      Breath sounds: Normal breath sounds. No stridor. No wheezing or rales.   Abdominal:      General: Bowel sounds are normal. There is no distension.      Palpations: Abdomen is soft.      Tenderness: There is no abdominal tenderness.   Musculoskeletal:         General: No tenderness.      Right lower leg: No edema.      Left lower leg: Edema present.   Lymphadenopathy:      Cervical: No cervical adenopathy.   Skin:     General: Skin is warm and dry.      Capillary Refill: Capillary refill takes less than 2  seconds.      Findings: Bruising present. No erythema or rash.      Comments: Asymmetric edema to left lower extremity from calf to proximal thigh. Left calf tenderness. Multiple abrasions and circular ligature to left tib fib region with surrounding eryth and tenderness. No foreign bodies. No fluctuance. No crepitus. No joint effusion.  Edema and pain does not extend past the knee. No pain with flexion or extension of the left knee.   1+ pitting edema bilaterally.    Neurological:      Mental Status: He is alert and oriented to person, place, and time.      Cranial Nerves: No cranial nerve deficit.      Sensory: No sensory deficit.      Motor: No abnormal muscle tone.      Coordination: Coordination normal.   Psychiatric:         Behavior: Behavior normal.         Thought Content: Thought content normal.         Judgment: Judgment normal.           CRANIAL NERVES     CN III, IV, VI   Pupils are equal, round, and reactive to light.       Significant Labs:   BMP:   Recent Labs   Lab 10/19/20  1729   *      K 4.4      CO2 24   BUN 16   CREATININE 1.5*   CALCIUM 8.4*     CBC:   Recent Labs   Lab 10/19/20  1729   WBC 6.87   HGB 10.9*   HCT 35.6*          Significant Imaging: I have reviewed all pertinent imaging results/findings within the past 24 hours.    Assessment/Plan:     * Cellulitis of left lower extremity  This patient does have evidence of infective focus  My overall impression is cellulitis of the left foot.  Vital signs were reviewed and noted in progress note.  Antibiotics given-   Antibiotics (From admission, onward)    Start     Stop Route Frequency Ordered    10/19/20 1815  vancomycin 1.25 g in dextrose 5% 250 mL IVPB (ready to mix)      10/20 0614 IV ED 1 Time 10/19/20 1812        Cultures were taken-   Microbiology Results (last 7 days)     ** No results found for the last 168 hours. **        Latest lactate reviewed, they are-    Will resume vancomycin for now  Will get blood  culture        Type 2 diabetes mellitus with hyperglycemia, with long-term current use of insulin  Patient's FSGs are controlled on current hypoglycemics.   Last A1c reviewed-   Lab Results   Component Value Date    HGBA1C 7.8 (H) 04/03/2018     Most recent fingerstick glucose reviewed- No results for input(s): POCTGLUCOSE in the last 24 hours.  Current correctional scale  Low  Maintain anti-hyperglycemic dose as follows-   Antihyperglycemics (From admission, onward)    None        Hold Oral hypoglycemics while patient is in the hospital.        Anxiety And Depression    Will will resume home benzodiazepine    Hypertension associated with diabetes  Will resume home meds      Chronic diastolic congestive heart failure  Patient is identified as having Diastolic heart failure that is Chronic. CHF is currently controlled. Latest ECHO performed and demonstrates-   Results for orders placed during the hospital encounter of 04/03/20   Echo Color Flow Doppler? Yes; Bubble Contrast? No    Narrative · Concentric left ventricular remodeling.  · The mean diastolic gradient across the mitral valve is 1 mmHg at a heart   rate of bpm.  · Severe left atrial enlargement.  · Normal left ventricular systolic function. The estimated ejection   fraction is 60%.  · Indeterminate left ventricular diastolic function.  · No wall motion abnormalities.  · Moderate right atrial enlargement.  · Normal central venous pressure (3 mmHg).  · The estimated PA systolic pressure is 27 mmHg.  · Normal right ventricular systolic function.  · Mild tricuspid regurgitation.      . Continue Beta Blocker ACE/ARB Furosemide and monitor clinical status closely. Monitor on telemetry. Patient is off CHF pathway.  Monitor strict Is&Os and daily weights.  Place on fluid restriction of 1.5 L. Continue to stress to patient importance of self efficacy and  on diet for CHF. Last BNP reviewed- and noted below No results for input(s): BNP, BNPTRIAGEBLO in the  last 168 hours..            JULY on CPAP  History noted  Will resume CPAP at night      Morbid obesity with BMI of 40.0-44.9, adult  Body mass index is 40.31 kg/m². Morbid obesity complicates all aspects of disease management from diagnostic modalities to treatment. Weight loss encouraged and health benefits explained to patient.        Atrial fibrillation with rapid ventricular response, CHADS-VAS score 3  History noted  Currently in sinus  Will resume blood thinner and metoprolol        VTE Risk Mitigation (From admission, onward)    None             Amina Sal MD  Department of Hospital Medicine   [facility]

## 2020-10-20 NOTE — PLAN OF CARE
Simon spoke with Gerry with OT, he informed me that pt will be seen tomorrow in the am. Both PT/OT. Cm will continue to follow-up.       10/20/20 1523   Discharge Reassessment   Assessment Type Discharge Planning Reassessment

## 2020-10-20 NOTE — PLAN OF CARE
Patient admitted from ED with belongings ( shirt, shorts, shoes, cell phone, no wallet). Alert and oriented x4, delayed response noted. Left sided facial droop, patient reports previous CVA. Wound noted to left shin, patient reports from falling and shearing skin, which then got infected. Shallow, pink wound bed noted with some yellow slough. Raised blistered area around circumference of of calf and shin, patient reports from wrapping done by family. Scattered scabbing and scratches noted through legs and arms, left knee, left foot, bilateral elbows, and small pressure injury noted to coccyx with shallow pink wound bed. Pictures documented.  Foam protective dressings administered to elbows, foam dressing to coccyx, and non adhesive gauze dressing placed to left shin. SOS completed, # [id number]  , and wound consult ordered. Started IV ABT per orders. FS ACHS,  upon arrival. Patient reports he lives at home by himself, but has help from family whenever needed. Reports he uses a wheelchair and cane at home. Per report, patient was seen in ED 3 weeks prior, sent home with orders for home health, was sent in  Diabetic diet, appetite good. Patient has no teeth, reports he doesn't have dentures, but that he doesn't have problems eating regular foods. Per orders, CPAP at night. Patient stable on room air, but frequent non productive cough noted. Patient reports it started 3 days ago at home. Spoke with daughter on the phone and updated. Patient complaining of itching, scattered, raised rash noted on arms after receiving Ancef. NP made aware, PRN benadryl ordered, patient reports effective.   Bed low, call bell in reach, bed alarm on.               Problem: Adult Inpatient Plan of Care  Goal: Plan of Care Review  Outcome: Ongoing, Progressing  Flowsheets (Taken 10/19/2020 2330)  Plan of Care Reviewed With: patient  Goal: Patient-Specific Goal (Individualization)  Outcome: Ongoing, Progressing  Flowsheets (Taken 10/19/2020  2330)  Individualized Care Needs: Wound care, IV ABT, blood glucose monitoring     Problem: Bariatric Environmental Safety  Goal: Safety Maintained with Care  Outcome: Ongoing, Progressing     Problem: Diabetes Comorbidity  Goal: Blood Glucose Level Within Desired Range  Outcome: Ongoing, Progressing  Intervention: Maintain Glycemic Control  Flowsheets (Taken 10/19/2020 2330)  Glycemic Management: blood glucose monitoring     Problem: Fall Injury Risk  Goal: Absence of Fall and Fall-Related Injury  Outcome: Ongoing, Progressing  Intervention: Identify and Manage Contributors to Fall Injury Risk  Flowsheets (Taken 10/19/2020 2330)  Self-Care Promotion:   independence encouraged   BADL personal objects within reach  Medication Review/Management: medications reviewed  Intervention: Promote Injury-Free Environment  Flowsheets (Taken 10/19/2020 2330)  Safety Promotion/Fall Prevention:   assistive device/personal item within reach   bed alarm set   side rails raised x 3  Environmental Safety Modification:   assistive device/personal items within reach   clutter free environment maintained   room near unit station     Problem: Skin Injury Risk Increased  Goal: Skin Health and Integrity  Outcome: Ongoing, Progressing  Intervention: Optimize Skin Protection  Flowsheets (Taken 10/19/2020 2330)  Pressure Reduction Techniques:   frequent weight shift encouraged   weight shift assistance provided  Pressure Reduction Devices:   elbow protectors utilized   positioning supports utilized   pressure-redistributing mattress utilized  Skin Protection:   adhesive use limited   tubing/devices free from skin contact  Head of Bed (HOB): HOB elevated  Intervention: Promote and Optimize Oral Intake  Flowsheets (Taken 10/19/2020 2330)  Oral Nutrition Promotion:   calorie dense foods provided   rest periods promoted     Problem: Wound  Goal: Optimal Wound Healing  Outcome: Ongoing, Progressing  Intervention: Promote Effective  Wound Healing  Flowsheets (Taken 10/19/2020 2330)  Oral Nutrition Promotion:   calorie dense foods provided   rest periods promoted  Sleep/Rest Enhancement:   awakenings minimized   regular sleep/rest pattern promoted  Pain Management Interventions:   quiet environment facilitated   pain management plan reviewed with patient/caregiver   relaxation techniques promoted

## 2020-10-20 NOTE — PLAN OF CARE
10/20/20 1050   FRANCO Message   Medicare Outpatient and Observation Notification regarding financial responsibility Explained to patient/caregiver   Date FRANCO was signed 10/20/20   Time FRANCO was signed 1053

## 2020-10-20 NOTE — PROGRESS NOTES
[facility] Hospital Medicine  Progress Note    Patient Name: Jose Leon  MRN: [id number]  Patient Class: OP- Observation   Admission Date: 10/19/2020  Length of Stay: 0 days  Attending Physician: Amina Sal MD  Primary Care Provider: Josh Giordano MD        Subjective:     Principal Problem:Cellulitis of left lower extremity        HPI:  Jose Leon is a 60 y.o. male with PMHx of DM, CHF, chronic LE edema, morbid obesity, chronic benzo, pain med and anticoagulant use who presented to the ED via EMS for evaluation of wound to the left lower leg s/p fall on 10/5.  When home health nurse evaluated the patient and evaluated the wound recommended patient come to the emergency room for IV antibiotics.  Patient states that he had a low-grade temperature and had chills.  Patient was given a referral to wound care nurses as an outpatient but did not go to his clinic visit.  Patient states that he has difficulty ambulating due to and swelling of the left leg and foot.  The patient denies  chest pain, or any other symptoms at this time. Patient is a former smoker.     The history is provided by the patient.     Overview/Hospital Course:  No notes on file    Interval History:  Continues to is states that he has pain in the left lower extremity has difficulty ambulating.  Afebrile    Review of Systems   Constitutional: Negative for appetite change, chills, fatigue and fever.   HENT: Negative for congestion, hearing loss, rhinorrhea, sore throat, trouble swallowing and voice change.    Respiratory: Negative for cough, chest tightness, shortness of breath and wheezing.    Cardiovascular: Negative for chest pain, palpitations and leg swelling.   Gastrointestinal: Negative for abdominal pain, blood in stool, diarrhea, nausea and vomiting.   Genitourinary: Negative for difficulty urinating, frequency, hematuria and urgency.   Musculoskeletal: Positive for gait problem and myalgias. Negative for back  pain, joint swelling and neck stiffness.   Skin: Positive for wound. Negative for pallor and rash.   Neurological: Negative for tremors, seizures, syncope, speech difficulty, weakness, numbness and headaches.   Hematological: Negative for adenopathy.   Psychiatric/Behavioral: Negative for agitation, behavioral problems, confusion and sleep disturbance.     Objective:     Vital Signs (Most Recent):  Temp: 97.6 °F (36.4 °C) (10/20/20 1130)  Pulse: 73 (10/20/20 1130)  Resp: 18 (10/20/20 1130)  BP: 134/78 (10/20/20 1130)  SpO2: 95 % (10/20/20 1130) Vital Signs (24h Range):  Temp:  [97.2 °F (36.2 °C)-99.4 °F (37.4 °C)] 97.6 °F (36.4 °C)  Pulse:  [] 73  Resp:  [15-20] 18  SpO2:  [92 %-99 %] 95 %  BP: (115-160)/(64-95) 134/78     Weight: 129.5 kg (285 lb 7.9 oz)  Body mass index is 39.82 kg/m².    Intake/Output Summary (Last 24 hours) at 10/20/2020 1406  Last data filed at 10/20/2020 0900  Gross per 24 hour   Intake 250 ml   Output 500 ml   Net -250 ml      Physical Exam  Constitutional:       General: He is not in acute distress.     Appearance: He is well-developed. He is not diaphoretic.   HENT:      Head: Normocephalic and atraumatic.      Right Ear: External ear normal.      Left Ear: External ear normal.      Nose: Nose normal.   Eyes:      General: No scleral icterus.        Right eye: No discharge.         Left eye: No discharge.      Conjunctiva/sclera: Conjunctivae normal.      Pupils: Pupils are equal, round, and reactive to light.   Neck:      Musculoskeletal: Normal range of motion and neck supple.      Thyroid: No thyromegaly.   Cardiovascular:      Rate and Rhythm: Normal rate and regular rhythm.      Heart sounds: Normal heart sounds. No murmur.   Pulmonary:      Effort: Pulmonary effort is normal. No respiratory distress.      Breath sounds: Normal breath sounds. No stridor. No wheezing or rales.   Abdominal:      General: Bowel sounds are normal. There is no distension.      Palpations: Abdomen is  soft.      Tenderness: There is no abdominal tenderness.   Musculoskeletal:         General: No tenderness.      Right lower leg: No edema.      Left lower leg: Edema present.   Lymphadenopathy:      Cervical: No cervical adenopathy.   Skin:     General: Skin is warm and dry.      Capillary Refill: Capillary refill takes less than 2 seconds.      Findings: Bruising present. No erythema or rash.      Comments: Asymmetric edema to left lower extremity from calf to proximal thigh. Left calf tenderness. Multiple abrasions and circular ligature to left tib fib region with surrounding eryth and tenderness. No foreign bodies. No fluctuance. No crepitus. No joint effusion.  Edema and pain does not extend past the knee. No pain with flexion or extension of the left knee.   1+ pitting edema bilaterally.    Neurological:      Mental Status: He is alert and oriented to person, place, and time.      Cranial Nerves: No cranial nerve deficit.      Sensory: No sensory deficit.      Motor: No abnormal muscle tone.      Coordination: Coordination normal.   Psychiatric:         Behavior: Behavior normal.         Thought Content: Thought content normal.         Judgment: Judgment normal.         Significant Labs:   BMP:   Recent Labs   Lab 10/20/20  0442   *      K 3.8      CO2 25   BUN 14   CREATININE 1.4   CALCIUM 8.3*     CBC:   Recent Labs   Lab 10/19/20  1729 10/20/20  0442   WBC 6.87 5.13   HGB 10.9* 10.6*   HCT 35.6* 36.1*    230       Significant Imaging: I have reviewed all pertinent imaging results/findings within the past 24 hours.      Assessment/Plan:      * Cellulitis of left lower extremity  This patient does have evidence of infective focus  My overall impression is cellulitis of the left foot.  Vital signs were reviewed and noted in progress note.  Antibiotics given-   Antibiotics (From admission, onward)    Start     Stop Route Frequency Ordered    10/19/20 6243  cefazolin (ANCEF) 2 gram in  dextrose 5% 50 mL IVPB (premix)      -- IV Every 8 hours (non-standard times) 10/19/20 2139        Cultures were taken-   Microbiology Results (last 7 days)     Procedure Component Value Units Date/Time    Blood culture [[id number]] Collected: 10/19/20 1911    Order Status: Completed Specimen: Blood from Antecubital, Right Arm Updated: 10/20/20 0945     Blood Culture, Routine No Growth to date    Narrative:      Take 2 sets, from peripheral site. Take both aerobic and  anaerobic bottles.        Latest lactate reviewed, they are-    Will resume vancomycin for now  Will get blood culture        Type 2 diabetes mellitus with hyperglycemia, with long-term current use of insulin  Patient's FSGs are controlled on current hypoglycemics.   Last A1c reviewed-   Lab Results   Component Value Date    HGBA1C 6.4 (H) 10/20/2020     Most recent fingerstick glucose reviewed-   Recent Labs   Lab 10/19/20  2147 10/20/20  0459 10/20/20  1110   POCTGLUCOSE 141* 125* 132*     Current correctional scale  Low  Maintain anti-hyperglycemic dose as follows-   Antihyperglycemics (From admission, onward)    Start     Stop Route Frequency Ordered    10/20/20 0745  insulin aspart U-100 pen 3 Units      -- SubQ 3 times daily with meals 10/19/20 2139    10/19/20 2145  insulin detemir U-100 pen 10 Units      -- SubQ Nightly 10/19/20 2139    10/19/20 2139  insulin aspart U-100 pen 0-5 Units      -- SubQ Before meals & nightly PRN 10/19/20 2139        Hold Oral hypoglycemics while patient is in the hospital.        Anxiety And Depression   will resume home benzodiazepine    Hypertension associated with diabetes  Will resume home meds      Chronic diastolic congestive heart failure  Patient is identified as having Diastolic heart failure that is Chronic. CHF is currently controlled. Latest ECHO performed and demonstrates-   Results for orders placed during the hospital encounter of 04/03/20   Echo Color Flow Doppler? Yes; Bubble Contrast? No     Narrative · Concentric left ventricular remodeling.  · The mean diastolic gradient across the mitral valve is 1 mmHg at a heart   rate of bpm.  · Severe left atrial enlargement.  · Normal left ventricular systolic function. The estimated ejection   fraction is 60%.  · Indeterminate left ventricular diastolic function.  · No wall motion abnormalities.  · Moderate right atrial enlargement.  · Normal central venous pressure (3 mmHg).  · The estimated PA systolic pressure is 27 mmHg.  · Normal right ventricular systolic function.  · Mild tricuspid regurgitation.      . Continue Beta Blocker ACE/ARB Furosemide and monitor clinical status closely. Monitor on telemetry. Patient is off CHF pathway.  Monitor strict Is&Os and daily weights.  Place on fluid restriction of 1.5 L. Continue to stress to patient importance of self efficacy and  on diet for CHF. Last BNP reviewed- and noted below No results for input(s): BNP, BNPTRIAGEBLO in the last 168 hours..            JULY on CPAP  History noted  Will resume CPAP at night      Morbid obesity with BMI of 40.0-44.9, adult  Body mass index is 39.82 kg/m². Morbid obesity complicates all aspects of disease management from diagnostic modalities to treatment. Weight loss encouraged and health benefits explained to patient.        Atrial fibrillation with rapid ventricular response, CHADS-VAS score 3  History noted  Currently in sinus  Will resume blood thinner and metoprolol        VTE Risk Mitigation (From admission, onward)         Ordered     apixaban tablet 5 mg  2 times daily      10/19/20 2139     IP VTE HIGH RISK PATIENT  Once      10/19/20 2139     Place sequential compression device  Until discontinued      10/19/20 2139                Discharge Planning   KOKI:      Code Status: Full Code   Is the patient medically ready for discharge?:     Reason for patient still in hospital (select all that apply): Patient trending condition and Treatment  Discharge Plan A: Rehab                   Amina Sal MD  Department of Hospital Medicine   [facility]

## 2020-10-20 NOTE — SUBJECTIVE & OBJECTIVE
Interval History:  Continues to is states that he has pain in the left lower extremity has difficulty ambulating.  Afebrile    Review of Systems   Constitutional: Negative for appetite change, chills, fatigue and fever.   HENT: Negative for congestion, hearing loss, rhinorrhea, sore throat, trouble swallowing and voice change.    Respiratory: Negative for cough, chest tightness, shortness of breath and wheezing.    Cardiovascular: Negative for chest pain, palpitations and leg swelling.   Gastrointestinal: Negative for abdominal pain, blood in stool, diarrhea, nausea and vomiting.   Genitourinary: Negative for difficulty urinating, frequency, hematuria and urgency.   Musculoskeletal: Positive for gait problem and myalgias. Negative for back pain, joint swelling and neck stiffness.   Skin: Positive for wound. Negative for pallor and rash.   Neurological: Negative for tremors, seizures, syncope, speech difficulty, weakness, numbness and headaches.   Hematological: Negative for adenopathy.   Psychiatric/Behavioral: Negative for agitation, behavioral problems, confusion and sleep disturbance.     Objective:     Vital Signs (Most Recent):  Temp: 97.6 °F (36.4 °C) (10/20/20 1130)  Pulse: 73 (10/20/20 1130)  Resp: 18 (10/20/20 1130)  BP: 134/78 (10/20/20 1130)  SpO2: 95 % (10/20/20 1130) Vital Signs (24h Range):  Temp:  [97.2 °F (36.2 °C)-99.4 °F (37.4 °C)] 97.6 °F (36.4 °C)  Pulse:  [] 73  Resp:  [15-20] 18  SpO2:  [92 %-99 %] 95 %  BP: (115-160)/(64-95) 134/78     Weight: 129.5 kg (285 lb 7.9 oz)  Body mass index is 39.82 kg/m².    Intake/Output Summary (Last 24 hours) at 10/20/2020 1406  Last data filed at 10/20/2020 0900  Gross per 24 hour   Intake 250 ml   Output 500 ml   Net -250 ml      Physical Exam  Constitutional:       General: He is not in acute distress.     Appearance: He is well-developed. He is not diaphoretic.   HENT:      Head: Normocephalic and atraumatic.      Right Ear: External ear normal.       Left Ear: External ear normal.      Nose: Nose normal.   Eyes:      General: No scleral icterus.        Right eye: No discharge.         Left eye: No discharge.      Conjunctiva/sclera: Conjunctivae normal.      Pupils: Pupils are equal, round, and reactive to light.   Neck:      Musculoskeletal: Normal range of motion and neck supple.      Thyroid: No thyromegaly.   Cardiovascular:      Rate and Rhythm: Normal rate and regular rhythm.      Heart sounds: Normal heart sounds. No murmur.   Pulmonary:      Effort: Pulmonary effort is normal. No respiratory distress.      Breath sounds: Normal breath sounds. No stridor. No wheezing or rales.   Abdominal:      General: Bowel sounds are normal. There is no distension.      Palpations: Abdomen is soft.      Tenderness: There is no abdominal tenderness.   Musculoskeletal:         General: No tenderness.      Right lower leg: No edema.      Left lower leg: Edema present.   Lymphadenopathy:      Cervical: No cervical adenopathy.   Skin:     General: Skin is warm and dry.      Capillary Refill: Capillary refill takes less than 2 seconds.      Findings: Bruising present. No erythema or rash.      Comments: Asymmetric edema to left lower extremity from calf to proximal thigh. Left calf tenderness. Multiple abrasions and circular ligature to left tib fib region with surrounding eryth and tenderness. No foreign bodies. No fluctuance. No crepitus. No joint effusion.  Edema and pain does not extend past the knee. No pain with flexion or extension of the left knee.   1+ pitting edema bilaterally.    Neurological:      Mental Status: He is alert and oriented to person, place, and time.      Cranial Nerves: No cranial nerve deficit.      Sensory: No sensory deficit.      Motor: No abnormal muscle tone.      Coordination: Coordination normal.   Psychiatric:         Behavior: Behavior normal.         Thought Content: Thought content normal.         Judgment: Judgment normal.          Significant Labs:   BMP:   Recent Labs   Lab 10/20/20  0442   *      K 3.8      CO2 25   BUN 14   CREATININE 1.4   CALCIUM 8.3*     CBC:   Recent Labs   Lab 10/19/20  1729 10/20/20  0442   WBC 6.87 5.13   HGB 10.9* 10.6*   HCT 35.6* 36.1*    230       Significant Imaging: I have reviewed all pertinent imaging results/findings within the past 24 hours.

## 2020-10-20 NOTE — ASSESSMENT & PLAN NOTE
This patient does have evidence of infective focus  My overall impression is cellulitis of the left foot.  Vital signs were reviewed and noted in progress note.  Antibiotics given-   Antibiotics (From admission, onward)    Start     Stop Route Frequency Ordered    10/19/20 1815  vancomycin 1.25 g in dextrose 5% 250 mL IVPB (ready to mix)      10/20 0614 IV ED 1 Time 10/19/20 1812        Cultures were taken-   Microbiology Results (last 7 days)     ** No results found for the last 168 hours. **        Latest lactate reviewed, they are-    Will resume vancomycin for now  Will get blood culture

## 2020-10-20 NOTE — PROGRESS NOTES
[id number] has been evaluated according to the pharmacy practice protocol.      Lab Results   Component Value Date/Time    CREATININE 1.5 (H) 10/19/2020 05:29 PM    CREATININE 1.16 11/03/2015 09:56 AM    CREATININE 1.0 02/27/2013 05:45 AM       Metformin therapy has been discontinued.  Metformin therapy can be resumed once SCr < 1.4 mg/dL (female) or 1.5 mg/dL (male) or at physician's discretion.    Thank you,  Stefany Lopez

## 2020-10-20 NOTE — CARE UPDATE
10/20/20 0722   Patient Assessment/Suction   Level of Consciousness (AVPU) alert   Respiratory Effort Normal   Expansion/Accessory Muscles/Retractions expansion symmetric   All Lung Fields Breath Sounds Anterior:   LLL Breath Sounds diminished   RLL Breath Sounds diminished   Rhythm/Pattern, Respiratory pattern regular   Cough Frequency no cough   PRE-TX-O2   O2 Device (Oxygen Therapy) room air   Oxygen Concentration (%) 21   SpO2 (!) 93 %   Pulse Oximetry Type Intermittent   $ Pulse Oximetry - Multiple Charge Pulse Oximetry - Multiple   Pulse 76   Resp 15   Positioning HOB elevated 30 degrees   Aerosol Therapy   $ Aerosol Therapy Charges PRN treatment not required   Wound Care   $ Wound Care Tech Time 15 min   Area of Concern Bridge of nose   Skin Color/Characteristics without discoloration   Skin Temperature warm   Was wound care notified? No   Preset CPAP/BiPAP Settings   Mode Of Delivery Standby   POx, nebs prn, CPAP qhs

## 2020-10-20 NOTE — ASSESSMENT & PLAN NOTE
Patient's FSGs are controlled on current hypoglycemics.   Last A1c reviewed-   Lab Results   Component Value Date    HGBA1C 6.4 (H) 10/20/2020     Most recent fingerstick glucose reviewed-   Recent Labs   Lab 10/19/20  2147 10/20/20  0459 10/20/20  1110   POCTGLUCOSE 141* 125* 132*     Current correctional scale  Low  Maintain anti-hyperglycemic dose as follows-   Antihyperglycemics (From admission, onward)    Start     Stop Route Frequency Ordered    10/20/20 0745  insulin aspart U-100 pen 3 Units      -- SubQ 3 times daily with meals 10/19/20 2139    10/19/20 2145  insulin detemir U-100 pen 10 Units      -- SubQ Nightly 10/19/20 2139    10/19/20 2139  insulin aspart U-100 pen 0-5 Units      -- SubQ Before meals & nightly PRN 10/19/20 2139        Hold Oral hypoglycemics while patient is in the hospital.

## 2020-10-20 NOTE — RESPIRATORY THERAPY
10/19/20 3529   Patient Assessment/Suction   Level of Consciousness (AVPU) alert   Respiratory Effort Unlabored   Expansion/Accessory Muscles/Retractions no use of accessory muscles   All Lung Fields Breath Sounds diminished   PRE-TX-O2   O2 Device (Oxygen Therapy) room air   SpO2 96 %   Pulse Oximetry Type Intermittent   $ Pulse Oximetry - Multiple Charge Pulse Oximetry - Multiple   Preset CPAP/BiPAP Settings   Mode Of Delivery Standby  (pt. stated he did not want to wear at this time. )

## 2020-10-20 NOTE — PLAN OF CARE
"Cm completed the assessment with pt at bedside. Pt verbalized, he lives alone and sometimes his nephew stays with him.  When asked if his nephew cares for him , pt said, " no not really. I'm mostly home alone.".  PCP is Dr. Jv Smith. Insurance verified as BC[employer]/Medicare.  Pt is a diabetic and on Eliquis. Pt verbalized, "[facility] has been following up with me. They suggested I  go to [place] to a rehab place. I really want to go, so I can get better."  C will follow-up with [facility].  Disposition:  Pt can benefit with PT/OT evaluation for Rehab vs SNF placement.  Transportation will provided by his family. Cm will inform Dr. Sal in morning rounds.    1:10pm  Cm received a call from Yadira at [phone] with [facility].  She informed me of the following:  "The nurse assessed pt's needs as needing help, he cannot take care of himself, he hallucinates and when he had a wound dressing, he cut the dressing using a knife and accidentally jabbed himself with the instrument."  We was trying to get the pt into Rehab in [place] "[facility]" pt agreed to go or just somewhere he can get the care he needs. His daughter helps with his medicines, but that is it. Please help this pt, he needs to go somewhere for rehab. He cannot stay alone at this time, per Yadira at [employer].  Cm will update Dr. Sal.       10/20/20 8035   Discharge Assessment   Assessment Type Discharge Planning Assessment   Confirmed/corrected address and phone number on facesheet? Yes   Assessment information obtained from? Patient   Expected Length of Stay (days) 2   Communicated expected length of stay with patient/caregiver yes   Prior to hospitilization cognitive status: Alert/Oriented   Prior to hospitalization functional status: Independent;Assistive Equipment   Current cognitive status: Alert/Oriented   Current Functional Status: Assistive Equipment;Needs Assistance   Facility Arrived From: Home   Lives With alone;other " relative(s)   Able to Return to Prior Arrangements no  (tbd)   Is patient able to care for self after discharge? No   Who are your caregiver(s) and their phone number(s)? Zoe Rubalcava [phone]   Patient's perception of discharge disposition skilled nursing facility   Patient currently being followed by outpatient case management? Yes   If yes, name of outpatient case management following: insurance company assigned oupatient case management   Patient currently receives any other outside agency services? Yes   Name and contact number of agency or person providing outside services [facility] [phone]   Is it the patient/care giver preference to resume care with the current outside agency? Yes   Equipment Currently Used at Home glucometer;walker, rolling   Do you have any problems affording any of your prescribed medications? No   Is the patient taking medications as prescribed? yes   Does the patient have transportation home? Yes   Transportation Anticipated family or friend will provide   Dialysis Name and Scheduled days na   Does the patient receive services at the Coumadin Clinic? No  (Eliquis)   Discharge Plan A Rehab   Discharge Plan B Skilled Nursing Facility   DME Needed Upon Discharge  bedside commode   Patient/Family in Agreement with Plan yes

## 2020-10-20 NOTE — ASSESSMENT & PLAN NOTE
Body mass index is 39.82 kg/m². Morbid obesity complicates all aspects of disease management from diagnostic modalities to treatment. Weight loss encouraged and health benefits explained to patient.

## 2020-10-21 PROBLEM — R26.2 UNABLE TO AMBULATE: Status: ACTIVE | Noted: 2020-10-21

## 2020-10-21 LAB
ANION GAP SERPL CALC-SCNC: 10 MMOL/L (ref 8–16)
BASOPHILS # BLD AUTO: 0.08 K/UL (ref 0–0.2)
BASOPHILS NFR BLD: 1.3 % (ref 0–1.9)
BUN SERPL-MCNC: 15 MG/DL (ref 6–20)
CALCIUM SERPL-MCNC: 8.6 MG/DL (ref 8.7–10.5)
CHLORIDE SERPL-SCNC: 105 MMOL/L (ref 95–110)
CO2 SERPL-SCNC: 26 MMOL/L (ref 23–29)
CREAT SERPL-MCNC: 1.6 MG/DL (ref 0.5–1.4)
DIFFERENTIAL METHOD: ABNORMAL
EOSINOPHIL # BLD AUTO: 0.5 K/UL (ref 0–0.5)
EOSINOPHIL NFR BLD: 7.5 % (ref 0–8)
ERYTHROCYTE [DISTWIDTH] IN BLOOD BY AUTOMATED COUNT: 17.2 % (ref 11.5–14.5)
EST. GFR  (AFRICAN AMERICAN): 53 ML/MIN/1.73 M^2
EST. GFR  (NON AFRICAN AMERICAN): 46 ML/MIN/1.73 M^2
GLUCOSE SERPL-MCNC: 109 MG/DL (ref 70–110)
HCT VFR BLD AUTO: 37.2 % (ref 40–54)
HGB BLD-MCNC: 11 G/DL (ref 14–18)
IMM GRANULOCYTES # BLD AUTO: 0.02 K/UL (ref 0–0.04)
IMM GRANULOCYTES NFR BLD AUTO: 0.3 % (ref 0–0.5)
LYMPHOCYTES # BLD AUTO: 0.5 K/UL (ref 1–4.8)
LYMPHOCYTES NFR BLD: 9 % (ref 18–48)
MCH RBC QN AUTO: 23.8 PG (ref 27–31)
MCHC RBC AUTO-ENTMCNC: 29.6 G/DL (ref 32–36)
MCV RBC AUTO: 80 FL (ref 82–98)
MONOCYTES # BLD AUTO: 0.5 K/UL (ref 0.3–1)
MONOCYTES NFR BLD: 8.7 % (ref 4–15)
NEUTROPHILS # BLD AUTO: 4.4 K/UL (ref 1.8–7.7)
NEUTROPHILS NFR BLD: 73.2 % (ref 38–73)
NRBC BLD-RTO: 0 /100 WBC
PLATELET # BLD AUTO: 226 K/UL (ref 150–350)
PMV BLD AUTO: 11.6 FL (ref 9.2–12.9)
POCT GLUCOSE: 106 MG/DL (ref 70–110)
POCT GLUCOSE: 114 MG/DL (ref 70–110)
POCT GLUCOSE: 145 MG/DL (ref 70–110)
POCT GLUCOSE: 98 MG/DL (ref 70–110)
POTASSIUM SERPL-SCNC: 4.5 MMOL/L (ref 3.5–5.1)
RBC # BLD AUTO: 4.63 M/UL (ref 4.6–6.2)
SODIUM SERPL-SCNC: 141 MMOL/L (ref 136–145)
WBC # BLD AUTO: 5.99 K/UL (ref 3.9–12.7)

## 2020-10-21 PROCEDURE — 99900035 HC TECH TIME PER 15 MIN (STAT)

## 2020-10-21 PROCEDURE — 85025 COMPLETE CBC W/AUTO DIFF WBC: CPT

## 2020-10-21 PROCEDURE — 25000003 PHARM REV CODE 250: Performed by: INTERNAL MEDICINE

## 2020-10-21 PROCEDURE — 96376 TX/PRO/DX INJ SAME DRUG ADON: CPT

## 2020-10-21 PROCEDURE — G0378 HOSPITAL OBSERVATION PER HR: HCPCS

## 2020-10-21 PROCEDURE — 80048 BASIC METABOLIC PNL TOTAL CA: CPT

## 2020-10-21 PROCEDURE — 30200315 PPD INTRADERMAL TEST REV CODE 302: Performed by: INTERNAL MEDICINE

## 2020-10-21 PROCEDURE — 36415 COLL VENOUS BLD VENIPUNCTURE: CPT

## 2020-10-21 PROCEDURE — 96372 THER/PROPH/DIAG INJ SC/IM: CPT

## 2020-10-21 PROCEDURE — 97535 SELF CARE MNGMENT TRAINING: CPT

## 2020-10-21 PROCEDURE — 94640 AIRWAY INHALATION TREATMENT: CPT

## 2020-10-21 PROCEDURE — 86580 TB INTRADERMAL TEST: CPT | Performed by: INTERNAL MEDICINE

## 2020-10-21 PROCEDURE — 97110 THERAPEUTIC EXERCISES: CPT

## 2020-10-21 PROCEDURE — 97162 PT EVAL MOD COMPLEX 30 MIN: CPT

## 2020-10-21 PROCEDURE — 94761 N-INVAS EAR/PLS OXIMETRY MLT: CPT

## 2020-10-21 PROCEDURE — 97166 OT EVAL MOD COMPLEX 45 MIN: CPT

## 2020-10-21 PROCEDURE — 63600175 PHARM REV CODE 636 W HCPCS: Performed by: INTERNAL MEDICINE

## 2020-10-21 PROCEDURE — 94660 CPAP INITIATION&MGMT: CPT

## 2020-10-21 RX ADMIN — OXYCODONE AND ACETAMINOPHEN 1 TABLET: 10; 325 TABLET ORAL at 11:10

## 2020-10-21 RX ADMIN — PRAVASTATIN SODIUM 80 MG: 40 TABLET ORAL at 08:10

## 2020-10-21 RX ADMIN — MELOXICAM 15 MG: 7.5 TABLET ORAL at 08:10

## 2020-10-21 RX ADMIN — DOCUSATE SODIUM 200 MG: 100 CAPSULE, LIQUID FILLED ORAL at 08:10

## 2020-10-21 RX ADMIN — OXYCODONE AND ACETAMINOPHEN 1 TABLET: 10; 325 TABLET ORAL at 05:10

## 2020-10-21 RX ADMIN — METOPROLOL SUCCINATE 50 MG: 50 TABLET, FILM COATED, EXTENDED RELEASE ORAL at 08:10

## 2020-10-21 RX ADMIN — GABAPENTIN 600 MG: 300 CAPSULE ORAL at 08:10

## 2020-10-21 RX ADMIN — APIXABAN 5 MG: 2.5 TABLET, FILM COATED ORAL at 08:10

## 2020-10-21 RX ADMIN — DIGOXIN 0.25 MG: 125 TABLET ORAL at 08:10

## 2020-10-21 RX ADMIN — FUROSEMIDE 40 MG: 40 TABLET ORAL at 08:10

## 2020-10-21 RX ADMIN — AMIODARONE HYDROCHLORIDE 200 MG: 200 TABLET ORAL at 08:10

## 2020-10-21 RX ADMIN — CEFAZOLIN SODIUM 2 G: 2 SOLUTION INTRAVENOUS at 05:10

## 2020-10-21 RX ADMIN — INSULIN ASPART 3 UNITS: 100 INJECTION, SOLUTION INTRAVENOUS; SUBCUTANEOUS at 04:10

## 2020-10-21 RX ADMIN — GABAPENTIN 600 MG: 300 CAPSULE ORAL at 03:10

## 2020-10-21 RX ADMIN — CEFAZOLIN SODIUM 2 G: 2 SOLUTION INTRAVENOUS at 10:10

## 2020-10-21 RX ADMIN — ACETAMINOPHEN 1000 MG: 500 TABLET ORAL at 08:10

## 2020-10-21 RX ADMIN — INSULIN ASPART 3 UNITS: 100 INJECTION, SOLUTION INTRAVENOUS; SUBCUTANEOUS at 08:10

## 2020-10-21 RX ADMIN — OXYBUTYNIN CHLORIDE 10 MG: 5 TABLET, EXTENDED RELEASE ORAL at 08:10

## 2020-10-21 RX ADMIN — CLOPIDOGREL BISULFATE 75 MG: 75 TABLET, FILM COATED ORAL at 08:10

## 2020-10-21 RX ADMIN — AMLODIPINE BESYLATE 5 MG: 5 TABLET ORAL at 08:10

## 2020-10-21 RX ADMIN — ESCITALOPRAM OXALATE 20 MG: 10 TABLET, FILM COATED ORAL at 08:10

## 2020-10-21 RX ADMIN — BUPROPION HYDROCHLORIDE 150 MG: 150 TABLET, EXTENDED RELEASE ORAL at 08:10

## 2020-10-21 RX ADMIN — POTASSIUM CHLORIDE 40 MEQ: 1500 TABLET, EXTENDED RELEASE ORAL at 08:10

## 2020-10-21 RX ADMIN — INSULIN DETEMIR 10 UNITS: 100 INJECTION, SOLUTION SUBCUTANEOUS at 08:10

## 2020-10-21 RX ADMIN — INSULIN ASPART 3 UNITS: 100 INJECTION, SOLUTION INTRAVENOUS; SUBCUTANEOUS at 11:10

## 2020-10-21 RX ADMIN — TUBERCULIN PURIFIED PROTEIN DERIVATIVE 5 UNITS: 5 INJECTION, SOLUTION INTRADERMAL at 11:10

## 2020-10-21 RX ADMIN — LISINOPRIL 20 MG: 10 TABLET ORAL at 08:10

## 2020-10-21 RX ADMIN — OXYCODONE AND ACETAMINOPHEN 1 TABLET: 10; 325 TABLET ORAL at 06:10

## 2020-10-21 RX ADMIN — CEFAZOLIN SODIUM 2 G: 2 SOLUTION INTRAVENOUS at 03:10

## 2020-10-21 RX ADMIN — MAGNESIUM OXIDE 400 MG (241.3 MG MAGNESIUM) TABLET 400 MG: TABLET at 08:10

## 2020-10-21 NOTE — PROGRESS NOTES
[facility] Hospital Medicine  Progress Note    Patient Name: Jose Leon  MRN: [id number]  Patient Class: OP- Observation   Admission Date: 10/19/2020  Length of Stay: 0 days  Attending Physician: Amina Sal MD  Primary Care Provider: Josh Giordano MD        Subjective:     Principal Problem:Cellulitis of left lower extremity        HPI:  Jose Leon is a 60 y.o. male with PMHx of DM, CHF, chronic LE edema, morbid obesity, chronic benzo, pain med and anticoagulant use who presented to the ED via EMS for evaluation of wound to the left lower leg s/p fall on 10/5.  When home health nurse evaluated the patient and evaluated the wound recommended patient come to the emergency room for IV antibiotics.  Patient states that he had a low-grade temperature and had chills.  Patient was given a referral to wound care nurses as an outpatient but did not go to his clinic visit.  Patient states that he has difficulty ambulating due to and swelling of the left leg and foot.  The patient denies  chest pain, or any other symptoms at this time. Patient is a former smoker.     The history is provided by the patient.     Overview/Hospital Course:  No notes on file    Interval History: Unable to place weight on the left leg, unsteady gait. Swelling and redness has improved. Afebrile    Review of Systems   Constitutional: Negative for appetite change, chills, fatigue and fever.   HENT: Negative for congestion, hearing loss, rhinorrhea, sore throat, trouble swallowing and voice change.    Respiratory: Negative for cough, chest tightness, shortness of breath and wheezing.    Cardiovascular: Negative for chest pain, palpitations and leg swelling.   Gastrointestinal: Negative for abdominal pain, blood in stool, diarrhea, nausea and vomiting.   Genitourinary: Negative for difficulty urinating, frequency, hematuria and urgency.   Musculoskeletal: Positive for gait problem and myalgias. Negative for back pain,  joint swelling and neck stiffness.   Skin: Positive for wound. Negative for pallor and rash.   Neurological: Negative for tremors, seizures, syncope, speech difficulty, weakness, numbness and headaches.   Hematological: Negative for adenopathy.   Psychiatric/Behavioral: Negative for agitation, behavioral problems, confusion and sleep disturbance.     Objective:     Vital Signs (Most Recent):  Temp: 96.2 °F (35.7 °C) (10/21/20 0730)  Pulse: 63 (10/21/20 0743)  Resp: 18 (10/21/20 0743)  BP: (!) 169/77 (10/21/20 0730)  SpO2: 98 % (10/21/20 0743) Vital Signs (24h Range):  Temp:  [96.2 °F (35.7 °C)-97.6 °F (36.4 °C)] 96.2 °F (35.7 °C)  Pulse:  [58-73] 63  Resp:  [16-18] 18  SpO2:  [95 %-98 %] 98 %  BP: (130-172)/(69-86) 169/77     Weight: 132 kg (291 lb 0.1 oz)  Body mass index is 40.59 kg/m².    Intake/Output Summary (Last 24 hours) at 10/21/2020 1007  Last data filed at 10/21/2020 0600  Gross per 24 hour   Intake 600 ml   Output 425 ml   Net 175 ml      Physical Exam  Constitutional:       General: He is not in acute distress.     Appearance: He is well-developed. He is not diaphoretic.   HENT:      Head: Normocephalic and atraumatic.      Right Ear: External ear normal.      Left Ear: External ear normal.      Nose: Nose normal.   Eyes:      General: No scleral icterus.        Right eye: No discharge.         Left eye: No discharge.      Conjunctiva/sclera: Conjunctivae normal.      Pupils: Pupils are equal, round, and reactive to light.   Neck:      Musculoskeletal: Normal range of motion and neck supple.      Thyroid: No thyromegaly.   Cardiovascular:      Rate and Rhythm: Normal rate and regular rhythm.      Heart sounds: Normal heart sounds. No murmur.   Pulmonary:      Effort: Pulmonary effort is normal. No respiratory distress.      Breath sounds: Normal breath sounds. No stridor. No wheezing or rales.   Abdominal:      General: Bowel sounds are normal. There is no distension.      Palpations: Abdomen is soft.       Tenderness: There is no abdominal tenderness.   Musculoskeletal:         General: No tenderness.      Right lower leg: No edema.      Left lower leg: Edema present.   Lymphadenopathy:      Cervical: No cervical adenopathy.   Skin:     General: Skin is warm and dry.      Capillary Refill: Capillary refill takes less than 2 seconds.      Findings: Bruising present. No erythema or rash.      Comments: Asymmetric edema to left lower extremity from calf to proximal thigh. Left calf tenderness. Multiple abrasions and circular ligature to left tib fib region with surrounding eryth and tenderness. No foreign bodies. No fluctuance. No crepitus. No joint effusion.  Edema and pain does not extend past the knee. No pain with flexion or extension of the left knee.   1+ pitting edema bilaterally.    Neurological:      Mental Status: He is alert and oriented to person, place, and time.      Cranial Nerves: No cranial nerve deficit.      Sensory: No sensory deficit.      Motor: No abnormal muscle tone.      Coordination: Coordination normal.   Psychiatric:         Behavior: Behavior normal.         Thought Content: Thought content normal.         Judgment: Judgment normal.         Significant Labs:   BMP:   Recent Labs   Lab 10/21/20  0434         K 4.5      CO2 26   BUN 15   CREATININE 1.6*   CALCIUM 8.6*     CBC:   Recent Labs   Lab 10/19/20  1729 10/20/20  0442 10/21/20  0434   WBC 6.87 5.13 5.99   HGB 10.9* 10.6* 11.0*   HCT 35.6* 36.1* 37.2*    230 226       Significant Imaging: I have reviewed all pertinent imaging results/findings within the past 24 hours.      Assessment/Plan:      * Cellulitis of left lower extremity  This patient does have evidence of infective focus    My overall impression is cellulitis of the left foot.  Vital signs were reviewed and noted in progress note.  Antibiotics given-   Antibiotics (From admission, onward)    Start     Stop Route Frequency Ordered    10/19/20  2145  cefazolin (ANCEF) 2 gram in dextrose 5% 50 mL IVPB (premix)      -- IV Every 8 hours (non-standard times) 10/19/20 2139        Cultures were taken-   Microbiology Results (last 7 days)     Procedure Component Value Units Date/Time    Blood culture [[id number]] Collected: 10/19/20 1911    Order Status: Completed Specimen: Blood from Antecubital, Right Arm Updated: 10/21/20 0613     Blood Culture, Routine No Growth to date      No Growth to date    Narrative:      Take 2 sets, from peripheral site. Take both aerobic and  anaerobic bottles.        Latest lactate reviewed, they are-    Will resume vancomycin for now  Microbiology Results (last 7 days)     Procedure Component Value Units Date/Time    Blood culture [[id number]] Collected: 10/19/20 1911    Order Status: Completed Specimen: Blood from Antecubital, Right Arm Updated: 10/21/20 0613     Blood Culture, Routine No Growth to date      No Growth to date    Narrative:      Take 2 sets, from peripheral site. Take both aerobic and  anaerobic bottles.      will follow wound care re      Unable to ambulate  Unable to place weight on the left leg  Will follow up PT OT final recs  Discussed with PT OT recommend rehab            Type 2 diabetes mellitus with hyperglycemia, with long-term current use of insulin  Patient's FSGs are controlled on current hypoglycemics.   Last A1c reviewed-   Lab Results   Component Value Date    HGBA1C 6.4 (H) 10/20/2020     Most recent fingerstick glucose reviewed-   Recent Labs   Lab 10/20/20  1110 10/20/20  1710 10/20/20  2037 10/21/20  0514   POCTGLUCOSE 132* 97 141* 98     Current correctional scale  Low  Maintain anti-hyperglycemic dose as follows-   Antihyperglycemics (From admission, onward)    Start     Stop Route Frequency Ordered    10/20/20 0745  insulin aspart U-100 pen 3 Units      -- SubQ 3 times daily with meals 10/19/20 2139    10/19/20 2145  insulin detemir U-100 pen 10 Units      -- SubQ Nightly 10/19/20 2139     10/19/20 2139  insulin aspart U-100 pen 0-5 Units      -- SubQ Before meals & nightly PRN 10/19/20 2139        Hold Oral hypoglycemics while patient is in the hospital.        Anxiety And Depression   will resume home benzodiazepine    Hypertension associated with diabetes  Will resume home meds      Chronic diastolic congestive heart failure  Patient is identified as having Diastolic heart failure that is Chronic. CHF is currently controlled. Latest ECHO performed and demonstrates-   Results for orders placed during the hospital encounter of 04/03/20   Echo Color Flow Doppler? Yes; Bubble Contrast? No    Narrative · Concentric left ventricular remodeling.  · The mean diastolic gradient across the mitral valve is 1 mmHg at a heart   rate of bpm.  · Severe left atrial enlargement.  · Normal left ventricular systolic function. The estimated ejection   fraction is 60%.  · Indeterminate left ventricular diastolic function.  · No wall motion abnormalities.  · Moderate right atrial enlargement.  · Normal central venous pressure (3 mmHg).  · The estimated PA systolic pressure is 27 mmHg.  · Normal right ventricular systolic function.  · Mild tricuspid regurgitation.      . Continue Beta Blocker ACE/ARB Furosemide and monitor clinical status closely. Monitor on telemetry. Patient is off CHF pathway.  Monitor strict Is&Os and daily weights.  Place on fluid restriction of 1.5 L. Continue to stress to patient importance of self efficacy and  on diet for CHF. Last BNP reviewed- and noted below No results for input(s): BNP, BNPTRIAGEBLO in the last 168 hours..            JULY on CPAP  History noted  Will resume CPAP at night      Morbid obesity with BMI of 40.0-44.9, adult  Body mass index is 39.82 kg/m². Morbid obesity complicates all aspects of disease management from diagnostic modalities to treatment. Weight loss encouraged and health benefits explained to patient.        Atrial fibrillation with rapid ventricular  response, CHADS-VAS score 3  History noted  Currently in sinus  Will resume blood thinner and metoprolol        VTE Risk Mitigation (From admission, onward)         Ordered     apixaban tablet 5 mg  2 times daily      10/19/20 2139     IP VTE HIGH RISK PATIENT  Once      10/19/20 2139     Place sequential compression device  Until discontinued      10/19/20 2139                Discharge Planning   KOKI:      Code Status: Full Code   Is the patient medically ready for discharge?:     Reason for patient still in hospital (select all that apply): Patient trending condition and Treatment  Discharge Plan A: Rehab                  Amina Sal MD  Department of Hospital Medicine   [facility]

## 2020-10-21 NOTE — CARE UPDATE
10/21/20 0743   Patient Assessment/Suction   Level of Consciousness (AVPU) alert   PRE-TX-O2   O2 Device (Oxygen Therapy) room air   SpO2 98 %   Pulse Oximetry Type Intermittent   $ Pulse Oximetry - Multiple Charge Pulse Oximetry - Multiple   Pulse 63   Resp 18   Aerosol Therapy   $ Aerosol Therapy Charges Aerosol Treatment

## 2020-10-21 NOTE — PT/OT/SLP EVAL
"Occupational Therapy   Evaluation    Name: Jose Leon  MRN: [id number]  Admitting Diagnosis:  Cellulitis of left lower extremity      Recommendations:     Discharge Recommendations: rehabilitation facility  Discharge Equipment Recommendations:  none  Barriers to discharge:  Decreased caregiver support    Assessment:     Jose Leon is a 60 y.o. male with a medical diagnosis of Cellulitis of left lower extremity.  He presents with L side weakness from a prior stroke in 2019. Patient also c/o LLE pain at rest and with mobility. Patient's L knee kvng frequently when ambulating making patient a high fall risk. Patient did not require assist with bed mobility, but requires Mod A using RW for standing and ambulating short distances in his room. Patient has poor BLE muscle control when sitting as patient is unable to control his descent into the chair requiring Mod A for ease of transfer. Although patient lives with his nephew, he does not have consistent support at home. Patient would greatly benefit from inpatient rehab placement to maximize independence with ADLs and functional mobiltiy.  Performance deficits affecting function: weakness, impaired endurance, impaired self care skills, impaired functional mobilty, gait instability, impaired balance, decreased upper extremity function, decreased lower extremity function, decreased safety awareness, pain, decreased ROM, impaired fine motor.      Rehab Prognosis: Good; patient would benefit from acute skilled OT services to address these deficits and reach maximum level of function.       Plan:     Patient to be seen 4 x/week to address the above listed problems via self-care/home management, therapeutic activities, therapeutic exercises  · Plan of Care Expires: 10/31/20  · Plan of Care Reviewed with: patient    Subjective     Chief Complaint: LLE pain  Patient/Family Comments/goals: "I would like to go to rehab."    Occupational Profile:  Living Environment: Patient " lives with his nephew in a H with 5 ROSARIO and R handrail.   Previous level of function: Patient required assistance with don/doffing footwear but was Mod I with all other ADLs. Patient was mostly ambulatory in w/c. Patient stated he was able to transfer himself into the w/c and walk up a few steps into home w/o assistance.   Roles and Routines: Patient was getting home health services PTA.   Equipment Used at Home:  wheelchair, bath bench, walker, rolling  Assistance upon Discharge: Patient has limited assistance from family.    Pain/Comfort:  · Pain Rating 1: 6/10  · Location - Side 1: Left  · Location - Orientation 1: lower  · Location 1: leg  · Pain Addressed 1: Reposition, Distraction, Nurse notified  · Pain Rating Post-Intervention 1: 6/10    Patients cultural, spiritual, Bahai conflicts given the current situation:      Objective:     Communicated with: nurse Scanlon prior to session.  Patient found HOB elevated with bed alarm, telemetry, peripheral IV, SCD upon OT entry to room.    General Precautions: Standard, fall   Orthopedic Precautions:N/A   Braces: N/A     Occupational Performance:    Bed Mobility:    · Patient completed Scooting/Bridging with stand by assistance  · Patient completed Supine to Sit with stand by assistance    Functional Mobility/Transfers:  · Patient completed Sit <> Stand Transfer with moderate assistance  with  rolling walker   · Patient completed Bed <> Chair Transfer using Stand Pivot technique with moderate assistance with rolling walker  · Functional Mobility: Noted L knee buckling when ambulating in room with RW requiring Mod A for balance.     Activities of Daily Living:  · Grooming: supervision and set up assist with oral/facial hygiene and hair grooming while seated in chair.   · Lower Body Dressing: Supervision to don/doff R sock; Mod A to don/doff L sock while seated EOB.     Cognitive/Visual Perceptual:  Cognitive/Psychosocial Skills:     -       Oriented to: Person,  Place and Situation   -       Follows Commands/attention:Follows multistep  commands  -       Communication: Mild dysarthria but mostly intelligible and clear  -       Safety awareness/insight to disability: impaired   -       Mood/Affect/Coping skills/emotional control: Appropriate to situation and Cooperative  Visual/Perceptual:      -Intact     Physical Exam:  Postural examination/scapula alignment:    -       Rounded shoulders  -       Forward head  Upper Extremity Range of Motion:     -       Right Upper Extremity: WFL  -       Left Upper Extremity: 90 degrees of shld flex; WFL distally  Upper Extremity Strength:    -       Right Upper Extremity: 4/5  -       Left Upper Extremity: 3-/5 at shldr; 3+/5 distally   Strength:    -       Right Upper Extremity: 4/5  -       Left Upper Extremity: 3/5  Fine Motor Coordination:    -       Impaired  Left hand, manipulation of objects - patient often drops items if he attempts to hold with L hand.  Gross motor coordination:   Mildly impaired in UE's    AMPAC 6 Click ADL:  AMPAC Total Score: 18    Treatment & Education:  OT ed pt on OT role & POC as well as discharge recommendations.  OT ed patient on safety with walker use for functional mobility with cues for hand placement & sequencing.   OT issued rubber squeeze ball to pt & educated pt on B hand resistive gross grasp HEP. Pt performed 25 reps each hand with cues for proper form.    Education:    Patient left up in chair with all lines intact, call button in reach, chair alarm on and nurse notified    GOALS:   Multidisciplinary Problems     Occupational Therapy Goals        Problem: Occupational Therapy Goal    Goal Priority Disciplines Outcome Interventions   Occupational Therapy Goal     OT, PT/OT Ongoing, Progressing    Description: Goals to be met by: 10/31/2020     Patient will increase functional independence with ADLs by performing:    LE Dressing with Supervision and Assistive Devices as needed.  Grooming  "while seated at sink with Supervision.  Toileting from toilet with Supervision for hygiene and clothing management.   Supine to sit with Modified [place].  Toilet transfer to toilet with Contact Guard Assistance.  Left Upper extremity GM and FM exercise program, with supervision.                     History:     Past Medical History:   Diagnosis Date    a A H/O Medical Noncompliance     H/O Chronic Noncompliance With His CHF Diet    a Cardiac Diastolic Dysfunction     Dr. Aure Washington    a Chronic Anticoagulation With Pradaxa     Dr. Aure Washington    a Coronary Artery Disease With H/O Stenting     Dr. Aure Washington; Was Hospitalized At [facility] 3/8/17-3/17/17 For CHF Exacerbatioin Due To "Dietary Discrepancies" With LCST Negative There    a Nonsustained Ventricular Tachycardia (NSVT)     a Paroxysmal Atrial Fibrillation With H/O RVR     Dr. Aure Washington; On Chronic Eliquis    a Syncopal Episode     [facility] 4/3/17-4/7/17 Stay For This: Was Likely Due To NSVT, And His Medications Were Adjusted    a Systolic CHF With EF 35-45%     Dr. Aure Washington; Was Hospitalized At [facility] 3/8/17-3/17/17 For CHF Exacerbatioin Due To "Dietary Discrepancies" With LCST Negative There    b Hypertension     b Proteinuria     04/2014 Referred To Dr. Leroy Allison; 4/1/14 Bilateral Renal U/S = Normal; On Lisinopril 20 Mg Daily    b Stage 2 CKD     c Hypercholesterolemia With Low HDL     d Type 2 DM On Insulin     ** 12/4/18 Referred To [facility] EDU; 1/11/18 Referred To Dr. Dipika Rodriguez And Re-Referred To [facility] EDU; 12/28/17 HgA1c = 12.0;" 7/5/17 Referred To [facility] EDU    f Morbid Obesity     i 1 PPD X 25+ YRs Chronic Tobacco Use Disorder     7/5/17 Increased Wellbutrin-XL To 300 Mg Daily; 6/8/17 RXd Wellbutrin- Mg Daily X 4 Months    i JULY On CPAP     Dr. Marion ngo Chronic Left Groin Pain     l Chronic Left Shoulder Pain     Dr. MARTINA martinez Chronic Recurrent Low Back Pain 12/04/2018    Dr. Llamas Is His Pain Management " Neurologist; 5/219/18 Referred To Dr. Ismael martinez Left 5-7th Rib FXs 04/2016 4/23/16 [facility] Left Rib XRays = Questionable Nondisplaced Left 5-7th Rib FXs With Normal Lung Fields    l Right Shouder SX 5/26/16 Due To Work Related Injury     Dr. Mora At [facility]; Dr. MARTINA hatch H/O Transient Ischemic Attack In 2013     n Anxiety And Depression 12/04/2018    RTC In 6 Weeks; 12/4/18 Added Wellbutrin- Mg qAM; 5/29/18 Increased 100 Mg Zoloft To 100 Mg Bid    n Continuous Benzodiazepine (Xanax) Use 12/04/2018 5/29/18 I Am Weaning Him Off Of This By Decreasing 1 Mg Bid To 0.5 Mg Bid PRN, And Will Wean Further Next OV    n H/O ETOH Abuse, Quit In 09/2014     q Bilateral Lower Extremity Venous Stasis Ulcers     2/28/18 [facility]    q Chronic Bilateral Lower Extremity Edema     2/28/18 Added Metolazone 10 Mg qAM On MWF And Referred Back To Dr. Washington; On Lasix 40 Mg Bid; He Wears Bilateral Compressin Hose Stockings    q Disability Examination 7/15/16     For CHF, PAF, DM2, And JULY On CPAP    Wellness Visit 11/6/2017        Past Surgical History:   Procedure Laterality Date    CARDIAC SURGERY      coronary stent    COLONOSCOPY N/A 12/19/2017    Procedure: COLONOSCOPY;  Surgeon: Dave Allen MD;  Location[facility];  Service: Endoscopy;  Laterality: N/A;    DIABETES MANAGEMENT LABS      heart stent      INCISION AND DRAINAGE OF WOUND      on stomach    SHOULDER SURGERY         Time Tracking:     OT Date of Treatment: 10/21/20  OT Start Time: 0910  OT Stop Time: 1003  OT Total Time (min): 53 min    Billable Minutes:Evaluation 10  Self Care/Home Management 33  Therapeutic Exercise 10    Gerry Grant, TOREY  10/21/2020

## 2020-10-21 NOTE — SUBJECTIVE & OBJECTIVE
Interval History: Unable to place weight on the left leg, unsteady gait. Swelling and redness has improved. Afebrile    Review of Systems   Constitutional: Negative for appetite change, chills, fatigue and fever.   HENT: Negative for congestion, hearing loss, rhinorrhea, sore throat, trouble swallowing and voice change.    Respiratory: Negative for cough, chest tightness, shortness of breath and wheezing.    Cardiovascular: Negative for chest pain, palpitations and leg swelling.   Gastrointestinal: Negative for abdominal pain, blood in stool, diarrhea, nausea and vomiting.   Genitourinary: Negative for difficulty urinating, frequency, hematuria and urgency.   Musculoskeletal: Positive for gait problem and myalgias. Negative for back pain, joint swelling and neck stiffness.   Skin: Positive for wound. Negative for pallor and rash.   Neurological: Negative for tremors, seizures, syncope, speech difficulty, weakness, numbness and headaches.   Hematological: Negative for adenopathy.   Psychiatric/Behavioral: Negative for agitation, behavioral problems, confusion and sleep disturbance.     Objective:     Vital Signs (Most Recent):  Temp: 96.2 °F (35.7 °C) (10/21/20 0730)  Pulse: 63 (10/21/20 0743)  Resp: 18 (10/21/20 0743)  BP: (!) 169/77 (10/21/20 0730)  SpO2: 98 % (10/21/20 0743) Vital Signs (24h Range):  Temp:  [96.2 °F (35.7 °C)-97.6 °F (36.4 °C)] 96.2 °F (35.7 °C)  Pulse:  [58-73] 63  Resp:  [16-18] 18  SpO2:  [95 %-98 %] 98 %  BP: (130-172)/(69-86) 169/77     Weight: 132 kg (291 lb 0.1 oz)  Body mass index is 40.59 kg/m².    Intake/Output Summary (Last 24 hours) at 10/21/2020 1007  Last data filed at 10/21/2020 0600  Gross per 24 hour   Intake 600 ml   Output 425 ml   Net 175 ml      Physical Exam  Constitutional:       General: He is not in acute distress.     Appearance: He is well-developed. He is not diaphoretic.   HENT:      Head: Normocephalic and atraumatic.      Right Ear: External ear normal.      Left  Ear: External ear normal.      Nose: Nose normal.   Eyes:      General: No scleral icterus.        Right eye: No discharge.         Left eye: No discharge.      Conjunctiva/sclera: Conjunctivae normal.      Pupils: Pupils are equal, round, and reactive to light.   Neck:      Musculoskeletal: Normal range of motion and neck supple.      Thyroid: No thyromegaly.   Cardiovascular:      Rate and Rhythm: Normal rate and regular rhythm.      Heart sounds: Normal heart sounds. No murmur.   Pulmonary:      Effort: Pulmonary effort is normal. No respiratory distress.      Breath sounds: Normal breath sounds. No stridor. No wheezing or rales.   Abdominal:      General: Bowel sounds are normal. There is no distension.      Palpations: Abdomen is soft.      Tenderness: There is no abdominal tenderness.   Musculoskeletal:         General: No tenderness.      Right lower leg: No edema.      Left lower leg: Edema present.   Lymphadenopathy:      Cervical: No cervical adenopathy.   Skin:     General: Skin is warm and dry.      Capillary Refill: Capillary refill takes less than 2 seconds.      Findings: Bruising present. No erythema or rash.      Comments: Asymmetric edema to left lower extremity from calf to proximal thigh. Left calf tenderness. Multiple abrasions and circular ligature to left tib fib region with surrounding eryth and tenderness. No foreign bodies. No fluctuance. No crepitus. No joint effusion.  Edema and pain does not extend past the knee. No pain with flexion or extension of the left knee.   1+ pitting edema bilaterally.    Neurological:      Mental Status: He is alert and oriented to person, place, and time.      Cranial Nerves: No cranial nerve deficit.      Sensory: No sensory deficit.      Motor: No abnormal muscle tone.      Coordination: Coordination normal.   Psychiatric:         Behavior: Behavior normal.         Thought Content: Thought content normal.         Judgment: Judgment normal.          Significant Labs:   BMP:   Recent Labs   Lab 10/21/20  0434         K 4.5      CO2 26   BUN 15   CREATININE 1.6*   CALCIUM 8.6*     CBC:   Recent Labs   Lab 10/19/20  1729 10/20/20  0442 10/21/20  0434   WBC 6.87 5.13 5.99   HGB 10.9* 10.6* 11.0*   HCT 35.6* 36.1* 37.2*    230 226       Significant Imaging: I have reviewed all pertinent imaging results/findings within the past 24 hours.

## 2020-10-21 NOTE — ASSESSMENT & PLAN NOTE
This patient does have evidence of infective focus    My overall impression is cellulitis of the left foot.  Vital signs were reviewed and noted in progress note.  Antibiotics given-   Antibiotics (From admission, onward)    Start     Stop Route Frequency Ordered    10/19/20 2145  cefazolin (ANCEF) 2 gram in dextrose 5% 50 mL IVPB (premix)      -- IV Every 8 hours (non-standard times) 10/19/20 2139        Cultures were taken-   Microbiology Results (last 7 days)     Procedure Component Value Units Date/Time    Blood culture [[id number]] Collected: 10/19/20 1911    Order Status: Completed Specimen: Blood from Antecubital, Right Arm Updated: 10/21/20 0613     Blood Culture, Routine No Growth to date      No Growth to date    Narrative:      Take 2 sets, from peripheral site. Take both aerobic and  anaerobic bottles.        Latest lactate reviewed, they are-    Will resume vancomycin for now  Microbiology Results (last 7 days)     Procedure Component Value Units Date/Time    Blood culture [[id number]] Collected: 10/19/20 1911    Order Status: Completed Specimen: Blood from Antecubital, Right Arm Updated: 10/21/20 0613     Blood Culture, Routine No Growth to date      No Growth to date    Narrative:      Take 2 sets, from peripheral site. Take both aerobic and  anaerobic bottles.      will follow wound care re

## 2020-10-21 NOTE — PLAN OF CARE
Per Colette with [facility] - [phone]- they had the pt in the past. He was a good resident but during his last admit they were unable to accept him. Their  opened his own place on the [facility] and accepted him at [facility]. The pt had issues there and they are not accepting him back. Per Colette the pt has Medicare A and B primary and BCBS secondary from the school board. They are not able to accept the patient bc he has been in and out of the hospital and has not regenerated his days. I will update rehab that the pts Medicare is primary. I updated Ksenia in [facility]. Lisa Glass LCSW   Date Status/Notes Created By   · 10/21/2020 1:58:22 PM Note: Just FYI - the pts Medicare A and B is primary- admission is fixing it now. Thanks  Lisa Glass  · 10/21/2020 10:32:49 AM Under Review: Onsite Review: Thank you for the referral. It will go under review. I can be reached at [phone]  Ghislaine [email or web address]  · 10/21/2020 10:30:50 AM New: Therapy notes are being input but they do recommend rehab. Thanks  Lisa Glass      I updated Loni with [facility] that the pt is medicare primary. Lisa Glass LCSW           10/21/20 0115   Post-Acute Status   Post-Acute Authorization Placement   Post-Acute Placement Status Referrals Sent

## 2020-10-21 NOTE — ASSESSMENT & PLAN NOTE
Patient's FSGs are controlled on current hypoglycemics.   Last A1c reviewed-   Lab Results   Component Value Date    HGBA1C 6.4 (H) 10/20/2020     Most recent fingerstick glucose reviewed-   Recent Labs   Lab 10/20/20  1110 10/20/20  1710 10/20/20  2037 10/21/20  0514   POCTGLUCOSE 132* 97 141* 98     Current correctional scale  Low  Maintain anti-hyperglycemic dose as follows-   Antihyperglycemics (From admission, onward)    Start     Stop Route Frequency Ordered    10/20/20 0745  insulin aspart U-100 pen 3 Units      -- SubQ 3 times daily with meals 10/19/20 2139    10/19/20 2145  insulin detemir U-100 pen 10 Units      -- SubQ Nightly 10/19/20 2139    10/19/20 2139  insulin aspart U-100 pen 0-5 Units      -- SubQ Before meals & nightly PRN 10/19/20 2139        Hold Oral hypoglycemics while patient is in the hospital.

## 2020-10-21 NOTE — PLAN OF CARE
Problem: Occupational Therapy Goal  Goal: Occupational Therapy Goal  Description: Goals to be met by: 10/31/2020     Patient will increase functional independence with ADLs by performing:    LE Dressing with Supervision and Assistive Devices as needed.  Grooming while seated at sink with Supervision.  Toileting from toilet with Supervision for hygiene and clothing management.   Supine to sit with Modified [place].  Toilet transfer to toilet with Contact Guard Assistance.  Left Upper extremity GM and FM exercise program, with supervision.    Outcome: Ongoing, Progressing

## 2020-10-21 NOTE — ASSESSMENT & PLAN NOTE
Unable to place weight on the left leg  Will follow up PT OT final recs  Discussed with PT OT recommend rehab

## 2020-10-21 NOTE — RESPIRATORY THERAPY
10/20/20 2230   Patient Assessment/Suction   All Lung Fields Breath Sounds diminished   Rhythm/Pattern, Respiratory assisted mechanically   PRE-TX-O2   O2 Device (Oxygen Therapy) room air   SpO2 98 %   Pulse Oximetry Type Intermittent   $ Pulse Oximetry - Multiple Charge Pulse Oximetry - Multiple   Preset CPAP/BiPAP Settings   Mode Of Delivery CPAP   $ CPAP/BiPAP Daily Charge BiPAP/CPAP Daily   $ Initial CPAP/BiPAP Setup? Yes   $ Is patient using? Yes   Size of Mask Small/Medium   Sized Appropriately? Yes   Equipment Type [place]   Airway Device Type medium full face mask   Humidifier not applicable   CPAP (cm H2O) 10

## 2020-10-21 NOTE — PT/OT/SLP EVAL
"Physical Therapy Evaluation    Patient Name:  Jose Leon   MRN:  [id number]    Recommendations:     Discharge Recommendations:  rehabilitation facility   Discharge Equipment Recommendations: bedside commode   Barriers to discharge: None    Assessment:     Jose Leon is a 60 y.o. male admitted with a medical diagnosis of Cellulitis of left lower extremity.  He presents with the following impairments/functional limitations:  weakness, impaired endurance, impaired self care skills, impaired functional mobilty, gait instability, impaired balance, decreased coordination, decreased lower extremity function, decreased safety awareness, pain, decreased ROM, impaired coordination, impaired skin, edema, impaired cardiopulmonary response to activity.    Rehab Prognosis: Good; patient would benefit from acute skilled PT services to address these deficits and reach maximum level of function.    Recent Surgery: * No surgery found *      Plan:     During this hospitalization, patient to be seen 6 x/week to address the identified rehab impairments via gait training, therapeutic activities, therapeutic exercises, neuromuscular re-education, wheelchair management/training and progress toward the following goals:    · Plan of Care Expires:       Subjective     Chief Complaint: LLE pain (stabbing)  Patient/Family Comments/goals: "I really hope they let me go to rehab"  Pain/Comfort:  · Pain Rating 1: 7/10  · Location - Side 1: Right  · Location - Orientation 1: lower  · Location 1: leg  · Pain Addressed 1: Nurse notified    Patients cultural, spiritual, Holiness conflicts given the current situation: no    Living Environment:  Pt lives alone in a [facility], [address]  Prior to admission, patients level of function was Suresh.  Equipment used at home: walker, rolling.  DME owned (not currently used): none.  Upon discharge, patient will have assistance from *N/A. Pt reports he was capable of doing his own grocery shopping PTA.    Objective: "     Communicated with nurse prior to session.  Patient found up in chair with peripheral IV(chair alarm, (L) calf wrapped)  upon PT entry to room.    General Precautions: Standard, fall   Orthopedic Precautions:    Braces:       Exams:  · Cognitive Exam:  Patient is oriented to Person, Place, Time and Situation  · Gross Motor Coordination:  slowed, significantly on LLE  · Sensation:    · -       Impaired  light/touch BLEs L>R and sharp/dull BLEs L>R  · RLE ROM: WFL  · RLE Strength: WFL  · LLE ROM: Deficits: 80% AROM  · LLE Strength: 3+/5    Functional Mobility:  · Bed Mobility:     · Supine to Sit: contact guard assistance  · Sit to Supine: contact guard assistance  · Transfers:     · Sit to Stand:  minimum assistance with rolling walker  · Bed to Chair: minimum assistance with  rolling walker  using  Stand Pivot  · Gait: moderate assistance /c RW 38 feet, 40 feet/ Pt experiences (L) knee buckling and quick fatigue  · Balance: standing supported dynamic: Poor+, Pt requires Radha<>ModA to maintain balance when attempting mobility tasks    Therapeutic Activities and Exercises:   On 2nd gait attempt, pt tried to incr step length, resulting in (L) knee buckle and LOB requiring ModA from PT to avoid fall.    AM-PAC 6 CLICK MOBILITY  Total Score:14     Patient left up in chair with all lines intact, call button in reach and chair alarm on.    GOALS:   Multidisciplinary Problems     Physical Therapy Goals        Problem: Physical Therapy Goal    Goal Priority Disciplines Outcome Goal Variances Interventions   Physical Therapy Goal     PT, PT/OT      Description: Goals to be met by: 10/28/20    Patient will increase functional independence with mobility by performin. Supine to sit with Set-up [place]  2. Sit to supine with Set-up [place]  3. Sit to stand transfer with Stand-by Assistance  4. Bed to chair transfer with Stand-by Assistance using Rolling Walker  5. Gait  x 50 feet with Minimal Assistance using  "Arpita Roque.                      History:     Past Medical History:   Diagnosis Date    a A H/O Medical Noncompliance     H/O Chronic Noncompliance With His CHF Diet    a Cardiac Diastolic Dysfunction     Dr. Aure Washington    a Chronic Anticoagulation With Pradaxa     Dr. Aure Washington    a Coronary Artery Disease With H/O Stenting     Dr. Aure Washington; Was Hospitalized At [facility] 3/8/17-3/17/17 For CHF Exacerbatioin Due To "Dietary Discrepancies" With LCST Negative There    a Nonsustained Ventricular Tachycardia (NSVT)     a Paroxysmal Atrial Fibrillation With H/O RVR     Dr. Aure Washington; On Chronic Eliquis    a Syncopal Episode     [facility] 4/3/17-4/7/17 Stay For This: Was Likely Due To NSVT, And His Medications Were Adjusted    a Systolic CHF With EF 35-45%     Dr. Aure Washington; Was Hospitalized At [facility] 3/8/17-3/17/17 For CHF Exacerbatioin Due To "Dietary Discrepancies" With LCST Negative There    b Hypertension     b Proteinuria     04/2014 Referred To Dr. Leroy Allison; 4/1/14 Bilateral Renal U/S = Normal; On Lisinopril 20 Mg Daily    b Stage 2 CKD     c Hypercholesterolemia With Low HDL     d Type 2 DM On Insulin     ** 12/4/18 Referred To [facility]; 1/11/18 Referred To Dr. Dipika Rodriguez And Re-Referred To [facility]; 12/28/17 HgA1c = 12.0;" 7/5/17 Referred To [facility]    f Morbid Obesity     i 1 PPD X 25+ YRs Chronic Tobacco Use Disorder     7/5/17 Increased Wellbutrin-XL To 300 Mg Daily; 6/8/17 RXd Wellbutrin- Mg Daily X 4 Months    i JULY On CPAP     Dr. Marion ngo Chronic Left Groin Pain     l Chronic Left Shoulder Pain     Dr. MARTINA martinez Chronic Recurrent Low Back Pain 12/04/2018    Dr. Llamas Is His Pain Management Neurologist; 5/219/18 Referred To Dr. Ismael martinez Left 5-7th Rib FXs 04/2016 4/23/16 [facility] Left Rib XRays = Questionable Nondisplaced Left 5-7th Rib FXs With Normal Lung Fields    l Right Shouder SX 5/26/16 Due To Work Related Injury     Dr[facility] Mora [facility]; " Dr. MARTINA hatch H/O Transient Ischemic Attack In 2013     n Anxiety And Depression 12/04/2018    RTC In 6 Weeks; 12/4/18 Added Wellbutrin- Mg qAM; 5/29/18 Increased 100 Mg Zoloft To 100 Mg Bid    n Continuous Benzodiazepine (Xanax) Use 12/04/2018 5/29/18 I Am Weaning Him Off Of This By Decreasing 1 Mg Bid To 0.5 Mg Bid PRN, And Will Wean Further Next OV    n H/O ETOH Abuse, Quit In 09/2014     q Bilateral Lower Extremity Venous Stasis Ulcers     2/28/18 [facility]    q Chronic Bilateral Lower Extremity Edema     2/28/18 Added Metolazone 10 Mg qAM On MWF And Referred Back To Dr. Washington; On Lasix 40 Mg Bid; He Wears Bilateral Compressin Hose Stockings    q Disability Examination 7/15/16     For CHF, PAF, DM2, And JULY On CPAP    Wellness Visit 11/6/2017        Past Surgical History:   Procedure Laterality Date    CARDIAC SURGERY      coronary stent    COLONOSCOPY N/A 12/19/2017    Procedure: COLONOSCOPY;  Surgeon: Dave Allen MD;  Location: [facility];  Service: Endoscopy;  Laterality: N/A;    DIABETES MANAGEMENT LABS      heart stent      INCISION AND DRAINAGE OF WOUND      on stomach    SHOULDER SURGERY         Time Tracking:     PT Received On: 10/21/20  PT Start Time: 1240     PT Stop Time: 1305  PT Total Time (min): 25 min     Billable Minutes: Evaluation 25      Sera Howard, PT  10/21/2020

## 2020-10-22 PROBLEM — E87.5 HYPERKALEMIA: Status: ACTIVE | Noted: 2020-10-22

## 2020-10-22 PROBLEM — E87.5 HYPERKALEMIA: Status: RESOLVED | Noted: 2020-10-22 | Resolved: 2020-10-22

## 2020-10-22 PROBLEM — J96.01 ACUTE HYPOXEMIC RESPIRATORY FAILURE: Status: ACTIVE | Noted: 2020-10-22

## 2020-10-22 LAB
ALBUMIN SERPL BCP-MCNC: 3.1 G/DL (ref 3.5–5.2)
ALLENS TEST: ABNORMAL
ALLENS TEST: ABNORMAL
ALP SERPL-CCNC: 124 U/L (ref 55–135)
ALT SERPL W/O P-5'-P-CCNC: 5 U/L (ref 10–44)
ANION GAP SERPL CALC-SCNC: 10 MMOL/L (ref 8–16)
ANION GAP SERPL CALC-SCNC: 9 MMOL/L (ref 8–16)
AST SERPL-CCNC: 14 U/L (ref 10–40)
BASOPHILS # BLD AUTO: 0.04 K/UL (ref 0–0.2)
BASOPHILS # BLD AUTO: 0.05 K/UL (ref 0–0.2)
BASOPHILS NFR BLD: 0.5 % (ref 0–1.9)
BASOPHILS NFR BLD: 0.5 % (ref 0–1.9)
BILIRUB SERPL-MCNC: 0.7 MG/DL (ref 0.1–1)
BNP SERPL-MCNC: 863 PG/ML (ref 0–99)
BUN SERPL-MCNC: 20 MG/DL (ref 6–20)
BUN SERPL-MCNC: 20 MG/DL (ref 6–20)
CALCIUM SERPL-MCNC: 8.4 MG/DL (ref 8.7–10.5)
CALCIUM SERPL-MCNC: 8.5 MG/DL (ref 8.7–10.5)
CHLORIDE SERPL-SCNC: 102 MMOL/L (ref 95–110)
CHLORIDE SERPL-SCNC: 103 MMOL/L (ref 95–110)
CO2 SERPL-SCNC: 23 MMOL/L (ref 23–29)
CO2 SERPL-SCNC: 24 MMOL/L (ref 23–29)
CREAT SERPL-MCNC: 1.6 MG/DL (ref 0.5–1.4)
CREAT SERPL-MCNC: 1.6 MG/DL (ref 0.5–1.4)
CRP SERPL-MCNC: 177.2 MG/L (ref 0–8.2)
D DIMER PPP IA.FEU-MCNC: 0.54 MG/L FEU
DELSYS: ABNORMAL
DELSYS: ABNORMAL
DIFFERENTIAL METHOD: ABNORMAL
DIFFERENTIAL METHOD: ABNORMAL
EOSINOPHIL # BLD AUTO: 0.1 K/UL (ref 0–0.5)
EOSINOPHIL # BLD AUTO: 0.1 K/UL (ref 0–0.5)
EOSINOPHIL NFR BLD: 0.8 % (ref 0–8)
EOSINOPHIL NFR BLD: 1.6 % (ref 0–8)
EP: 5
ERYTHROCYTE [DISTWIDTH] IN BLOOD BY AUTOMATED COUNT: 17 % (ref 11.5–14.5)
ERYTHROCYTE [DISTWIDTH] IN BLOOD BY AUTOMATED COUNT: 17.2 % (ref 11.5–14.5)
ERYTHROCYTE [SEDIMENTATION RATE] IN BLOOD BY WESTERGREN METHOD: 12 MM/H
EST. GFR  (AFRICAN AMERICAN): 53 ML/MIN/1.73 M^2
EST. GFR  (AFRICAN AMERICAN): 53 ML/MIN/1.73 M^2
EST. GFR  (NON AFRICAN AMERICAN): 46 ML/MIN/1.73 M^2
EST. GFR  (NON AFRICAN AMERICAN): 46 ML/MIN/1.73 M^2
FIO2: 50
GLUCOSE SERPL-MCNC: 130 MG/DL (ref 70–110)
GLUCOSE SERPL-MCNC: 142 MG/DL (ref 70–110)
HCO3 UR-SCNC: 22.5 MMOL/L (ref 24–28)
HCO3 UR-SCNC: 23.1 MMOL/L (ref 24–28)
HCT VFR BLD AUTO: 37.2 % (ref 40–54)
HCT VFR BLD AUTO: 37.6 % (ref 40–54)
HGB BLD-MCNC: 11.2 G/DL (ref 14–18)
HGB BLD-MCNC: 11.2 G/DL (ref 14–18)
IMM GRANULOCYTES # BLD AUTO: 0.01 K/UL (ref 0–0.04)
IMM GRANULOCYTES # BLD AUTO: 0.03 K/UL (ref 0–0.04)
IMM GRANULOCYTES NFR BLD AUTO: 0.1 % (ref 0–0.5)
IMM GRANULOCYTES NFR BLD AUTO: 0.3 % (ref 0–0.5)
IP: 15
LACTATE SERPL-SCNC: 1.2 MMOL/L (ref 0.5–2.2)
LYMPHOCYTES # BLD AUTO: 0.5 K/UL (ref 1–4.8)
LYMPHOCYTES # BLD AUTO: 0.9 K/UL (ref 1–4.8)
LYMPHOCYTES NFR BLD: 4.6 % (ref 18–48)
LYMPHOCYTES NFR BLD: 9.6 % (ref 18–48)
MCH RBC QN AUTO: 23.9 PG (ref 27–31)
MCH RBC QN AUTO: 24 PG (ref 27–31)
MCHC RBC AUTO-ENTMCNC: 29.8 G/DL (ref 32–36)
MCHC RBC AUTO-ENTMCNC: 30.1 G/DL (ref 32–36)
MCV RBC AUTO: 80 FL (ref 82–98)
MCV RBC AUTO: 80 FL (ref 82–98)
MIN VOL: 17.1
MODE: ABNORMAL
MODE: ABNORMAL
MONOCYTES # BLD AUTO: 0.4 K/UL (ref 0.3–1)
MONOCYTES # BLD AUTO: 0.4 K/UL (ref 0.3–1)
MONOCYTES NFR BLD: 4 % (ref 4–15)
MONOCYTES NFR BLD: 4.9 % (ref 4–15)
NEUTROPHILS # BLD AUTO: 7.4 K/UL (ref 1.8–7.7)
NEUTROPHILS # BLD AUTO: 8.7 K/UL (ref 1.8–7.7)
NEUTROPHILS NFR BLD: 83.3 % (ref 38–73)
NEUTROPHILS NFR BLD: 89.8 % (ref 38–73)
NRBC BLD-RTO: 0 /100 WBC
NRBC BLD-RTO: 0 /100 WBC
PCO2 BLDA: 34 MMHG (ref 35–45)
PCO2 BLDA: 36.8 MMHG (ref 35–45)
PEEP: 10
PH SMN: 7.39 [PH] (ref 7.35–7.45)
PH SMN: 7.44 [PH] (ref 7.35–7.45)
PLATELET # BLD AUTO: 224 K/UL (ref 150–350)
PLATELET # BLD AUTO: 226 K/UL (ref 150–350)
PMV BLD AUTO: 11.7 FL (ref 9.2–12.9)
PMV BLD AUTO: 12.2 FL (ref 9.2–12.9)
PO2 BLDA: 113 MMHG (ref 80–100)
PO2 BLDA: 49 MMHG (ref 80–100)
POC BE: -1 MMOL/L
POC BE: -2 MMOL/L
POC SATURATED O2: 86 % (ref 95–100)
POC SATURATED O2: 98 % (ref 95–100)
POC TCO2: 24 MMOL/L (ref 23–27)
POC TCO2: 24 MMOL/L (ref 23–27)
POCT GLUCOSE: 122 MG/DL (ref 70–110)
POCT GLUCOSE: 147 MG/DL (ref 70–110)
POCT GLUCOSE: 168 MG/DL (ref 70–110)
POCT GLUCOSE: 189 MG/DL (ref 70–110)
POTASSIUM SERPL-SCNC: 4.4 MMOL/L (ref 3.5–5.1)
POTASSIUM SERPL-SCNC: 5.4 MMOL/L (ref 3.5–5.1)
POTASSIUM SERPL-SCNC: 5.5 MMOL/L (ref 3.5–5.1)
PROCALCITONIN SERPL IA-MCNC: 0.43 NG/ML
PROT SERPL-MCNC: 7.5 G/DL (ref 6–8.4)
RBC # BLD AUTO: 4.67 M/UL (ref 4.6–6.2)
RBC # BLD AUTO: 4.68 M/UL (ref 4.6–6.2)
SAMPLE: ABNORMAL
SAMPLE: ABNORMAL
SITE: ABNORMAL
SITE: ABNORMAL
SODIUM SERPL-SCNC: 135 MMOL/L (ref 136–145)
SODIUM SERPL-SCNC: 136 MMOL/L (ref 136–145)
SP02: 100
SPONT RATE: 23
WBC # BLD AUTO: 8.84 K/UL (ref 3.9–12.7)
WBC # BLD AUTO: 9.68 K/UL (ref 3.9–12.7)

## 2020-10-22 PROCEDURE — 36556 CENTRAL LINE: ICD-10-PCS | Mod: ,,, | Performed by: ANESTHESIOLOGY

## 2020-10-22 PROCEDURE — 25000003 PHARM REV CODE 250: Performed by: INTERNAL MEDICINE

## 2020-10-22 PROCEDURE — 36600 WITHDRAWAL OF ARTERIAL BLOOD: CPT

## 2020-10-22 PROCEDURE — 85025 COMPLETE CBC W/AUTO DIFF WBC: CPT

## 2020-10-22 PROCEDURE — 84145 PROCALCITONIN (PCT): CPT

## 2020-10-22 PROCEDURE — 63600175 PHARM REV CODE 636 W HCPCS: Performed by: INTERNAL MEDICINE

## 2020-10-22 PROCEDURE — 96372 THER/PROPH/DIAG INJ SC/IM: CPT

## 2020-10-22 PROCEDURE — 83880 ASSAY OF NATRIURETIC PEPTIDE: CPT

## 2020-10-22 PROCEDURE — 94640 AIRWAY INHALATION TREATMENT: CPT

## 2020-10-22 PROCEDURE — 80053 COMPREHEN METABOLIC PANEL: CPT

## 2020-10-22 PROCEDURE — 87040 BLOOD CULTURE FOR BACTERIA: CPT

## 2020-10-22 PROCEDURE — 96376 TX/PRO/DX INJ SAME DRUG ADON: CPT

## 2020-10-22 PROCEDURE — 85379 FIBRIN DEGRADATION QUANT: CPT

## 2020-10-22 PROCEDURE — 80048 BASIC METABOLIC PNL TOTAL CA: CPT

## 2020-10-22 PROCEDURE — 63600175 PHARM REV CODE 636 W HCPCS: Performed by: ANESTHESIOLOGY

## 2020-10-22 PROCEDURE — 63600175 PHARM REV CODE 636 W HCPCS

## 2020-10-22 PROCEDURE — 99900035 HC TECH TIME PER 15 MIN (STAT)

## 2020-10-22 PROCEDURE — 27000221 HC OXYGEN, UP TO 24 HOURS

## 2020-10-22 PROCEDURE — C9399 UNCLASSIFIED DRUGS OR BIOLOG: HCPCS | Performed by: INTERNAL MEDICINE

## 2020-10-22 PROCEDURE — 36415 COLL VENOUS BLD VENIPUNCTURE: CPT

## 2020-10-22 PROCEDURE — 83605 ASSAY OF LACTIC ACID: CPT

## 2020-10-22 PROCEDURE — 94660 CPAP INITIATION&MGMT: CPT

## 2020-10-22 PROCEDURE — 27000190 HC CPAP FULL FACE MASK W/VALVE

## 2020-10-22 PROCEDURE — 82803 BLOOD GASES ANY COMBINATION: CPT

## 2020-10-22 PROCEDURE — 20000000 HC ICU ROOM

## 2020-10-22 PROCEDURE — 84132 ASSAY OF SERUM POTASSIUM: CPT

## 2020-10-22 PROCEDURE — 25000242 PHARM REV CODE 250 ALT 637 W/ HCPCS: Performed by: INTERNAL MEDICINE

## 2020-10-22 PROCEDURE — 94761 N-INVAS EAR/PLS OXIMETRY MLT: CPT

## 2020-10-22 PROCEDURE — 86140 C-REACTIVE PROTEIN: CPT

## 2020-10-22 PROCEDURE — 36556 INSERT NON-TUNNEL CV CATH: CPT | Mod: ,,, | Performed by: ANESTHESIOLOGY

## 2020-10-22 RX ORDER — LEVALBUTEROL INHALATION SOLUTION 0.63 MG/3ML
0.63 SOLUTION RESPIRATORY (INHALATION) EVERY 4 HOURS
Status: DISCONTINUED | OUTPATIENT
Start: 2020-10-22 | End: 2020-10-25

## 2020-10-22 RX ORDER — FUROSEMIDE 10 MG/ML
40 INJECTION INTRAMUSCULAR; INTRAVENOUS ONCE
Status: COMPLETED | OUTPATIENT
Start: 2020-10-22 | End: 2020-10-22

## 2020-10-22 RX ORDER — MIDAZOLAM HYDROCHLORIDE 1 MG/ML
5 INJECTION INTRAMUSCULAR; INTRAVENOUS ONCE
Status: COMPLETED | OUTPATIENT
Start: 2020-10-22 | End: 2020-10-22

## 2020-10-22 RX ORDER — MIDAZOLAM HYDROCHLORIDE 5 MG/ML
5 INJECTION INTRAMUSCULAR; INTRAVENOUS ONCE
Status: DISCONTINUED | OUTPATIENT
Start: 2020-10-22 | End: 2020-10-22 | Stop reason: SDUPTHER

## 2020-10-22 RX ORDER — IPRATROPIUM BROMIDE AND ALBUTEROL SULFATE 2.5; .5 MG/3ML; MG/3ML
3 SOLUTION RESPIRATORY (INHALATION) EVERY 4 HOURS
Status: DISCONTINUED | OUTPATIENT
Start: 2020-10-22 | End: 2020-10-22

## 2020-10-22 RX ORDER — IPRATROPIUM BROMIDE AND ALBUTEROL SULFATE 2.5; .5 MG/3ML; MG/3ML
3 SOLUTION RESPIRATORY (INHALATION) EVERY 4 HOURS PRN
Status: DISCONTINUED | OUTPATIENT
Start: 2020-10-22 | End: 2020-10-22

## 2020-10-22 RX ORDER — MIDAZOLAM HYDROCHLORIDE 1 MG/ML
INJECTION INTRAMUSCULAR; INTRAVENOUS
Status: COMPLETED
Start: 2020-10-22 | End: 2020-10-22

## 2020-10-22 RX ORDER — IPRATROPIUM BROMIDE AND ALBUTEROL SULFATE 2.5; .5 MG/3ML; MG/3ML
3 SOLUTION RESPIRATORY (INHALATION) EVERY 4 HOURS PRN
Status: DISCONTINUED | OUTPATIENT
Start: 2020-10-22 | End: 2020-10-23

## 2020-10-22 RX ORDER — DEXMEDETOMIDINE HYDROCHLORIDE 4 UG/ML
0.2 INJECTION, SOLUTION INTRAVENOUS CONTINUOUS
Status: DISCONTINUED | OUTPATIENT
Start: 2020-10-22 | End: 2020-10-24

## 2020-10-22 RX ORDER — LEVALBUTEROL INHALATION SOLUTION 0.63 MG/3ML
0.63 SOLUTION RESPIRATORY (INHALATION) EVERY 8 HOURS
Status: DISCONTINUED | OUTPATIENT
Start: 2020-10-22 | End: 2020-10-22

## 2020-10-22 RX ADMIN — MELOXICAM 15 MG: 7.5 TABLET ORAL at 09:10

## 2020-10-22 RX ADMIN — ESCITALOPRAM OXALATE 20 MG: 10 TABLET, FILM COATED ORAL at 09:10

## 2020-10-22 RX ADMIN — AMLODIPINE BESYLATE 5 MG: 5 TABLET ORAL at 09:10

## 2020-10-22 RX ADMIN — SODIUM POLYSTYRENE SULFONATE 15 G: 15 SUSPENSION ORAL; RECTAL at 11:10

## 2020-10-22 RX ADMIN — INSULIN ASPART 3 UNITS: 100 INJECTION, SOLUTION INTRAVENOUS; SUBCUTANEOUS at 09:10

## 2020-10-22 RX ADMIN — AMIODARONE HYDROCHLORIDE 200 MG: 200 TABLET ORAL at 09:10

## 2020-10-22 RX ADMIN — CEFAZOLIN SODIUM 2 G: 2 SOLUTION INTRAVENOUS at 02:10

## 2020-10-22 RX ADMIN — LEVALBUTEROL HYDROCHLORIDE 0.63 MG: 0.63 SOLUTION RESPIRATORY (INHALATION) at 03:10

## 2020-10-22 RX ADMIN — MIDAZOLAM HYDROCHLORIDE 1 MG: 1 INJECTION, SOLUTION INTRAMUSCULAR; INTRAVENOUS at 07:10

## 2020-10-22 RX ADMIN — CLOPIDOGREL BISULFATE 75 MG: 75 TABLET, FILM COATED ORAL at 09:10

## 2020-10-22 RX ADMIN — GABAPENTIN 600 MG: 300 CAPSULE ORAL at 09:10

## 2020-10-22 RX ADMIN — DIGOXIN 0.25 MG: 125 TABLET ORAL at 09:10

## 2020-10-22 RX ADMIN — CEFAZOLIN SODIUM 2 G: 2 SOLUTION INTRAVENOUS at 05:10

## 2020-10-22 RX ADMIN — IPRATROPIUM BROMIDE AND ALBUTEROL SULFATE 3 ML: .5; 2.5 SOLUTION RESPIRATORY (INHALATION) at 09:10

## 2020-10-22 RX ADMIN — APIXABAN 5 MG: 2.5 TABLET, FILM COATED ORAL at 09:10

## 2020-10-22 RX ADMIN — MIDAZOLAM HYDROCHLORIDE 5 MG: 1 INJECTION, SOLUTION INTRAMUSCULAR; INTRAVENOUS at 07:10

## 2020-10-22 RX ADMIN — LISINOPRIL 20 MG: 10 TABLET ORAL at 09:10

## 2020-10-22 RX ADMIN — FUROSEMIDE 40 MG: 40 TABLET ORAL at 09:10

## 2020-10-22 RX ADMIN — IPRATROPIUM BROMIDE AND ALBUTEROL SULFATE 3 ML: .5; 2.5 SOLUTION RESPIRATORY (INHALATION) at 12:10

## 2020-10-22 RX ADMIN — METOPROLOL SUCCINATE 50 MG: 50 TABLET, FILM COATED, EXTENDED RELEASE ORAL at 09:10

## 2020-10-22 RX ADMIN — FUROSEMIDE 40 MG: 10 INJECTION, SOLUTION INTRAMUSCULAR; INTRAVENOUS at 06:10

## 2020-10-22 RX ADMIN — INSULIN ASPART 3 UNITS: 100 INJECTION, SOLUTION INTRAVENOUS; SUBCUTANEOUS at 11:10

## 2020-10-22 RX ADMIN — INSULIN DETEMIR 10 UNITS: 100 INJECTION, SOLUTION SUBCUTANEOUS at 11:10

## 2020-10-22 RX ADMIN — BUPROPION HYDROCHLORIDE 150 MG: 150 TABLET, EXTENDED RELEASE ORAL at 09:10

## 2020-10-22 RX ADMIN — VANCOMYCIN HYDROCHLORIDE 2500 MG: 1.25 INJECTION, POWDER, LYOPHILIZED, FOR SOLUTION INTRAVENOUS at 07:10

## 2020-10-22 RX ADMIN — DEXMEDETOMIDINE HYDROCHLORIDE 0.6 MCG/KG/HR: 100 INJECTION, SOLUTION, CONCENTRATE INTRAVENOUS at 10:10

## 2020-10-22 RX ADMIN — LEVALBUTEROL HYDROCHLORIDE 0.63 MG: 0.63 SOLUTION RESPIRATORY (INHALATION) at 06:10

## 2020-10-22 RX ADMIN — MAGNESIUM OXIDE 400 MG (241.3 MG MAGNESIUM) TABLET 400 MG: TABLET at 09:10

## 2020-10-22 RX ADMIN — DEXMEDETOMIDINE HYDROCHLORIDE 0.2 MCG/KG/HR: 100 INJECTION, SOLUTION, CONCENTRATE INTRAVENOUS at 06:10

## 2020-10-22 RX ADMIN — ALPRAZOLAM 0.5 MG: 0.25 TABLET ORAL at 02:10

## 2020-10-22 RX ADMIN — OXYBUTYNIN CHLORIDE 10 MG: 5 TABLET, EXTENDED RELEASE ORAL at 09:10

## 2020-10-22 NOTE — NURSING
Pt transferred to room 510 via bed. Pt placed on bipap for respiratory failure. Report given to LELA Corcoran

## 2020-10-22 NOTE — ASSESSMENT & PLAN NOTE
Unable to place weight on the left leg  Will follow up PT OT final recs  PT OT recommended rehab

## 2020-10-22 NOTE — CARE UPDATE
10/22/20 0710   Patient Assessment/Suction   Level of Consciousness (AVPU) alert   Respiratory Effort Unlabored;Normal   Expansion/Accessory Muscles/Retractions no use of accessory muscles;no retractions;expansion symmetric   All Lung Fields Breath Sounds diminished   Rhythm/Pattern, Respiratory unlabored;pattern regular;depth regular   Cough Frequency no cough   PRE-TX-O2   O2 Device (Oxygen Therapy) nasal cannula   $ Is the patient on Low Flow Oxygen? Yes   Flow (L/min) 3   SpO2 95 %   Pulse Oximetry Type Intermittent   $ Pulse Oximetry - Multiple Charge Pulse Oximetry - Multiple   Pulse 101   Resp 18   Aerosol Therapy   $ Aerosol Therapy Charges PRN treatment not required   Respiratory Treatment Status (SVN) PRN treatment not required   Wound Care   $ Wound Care Tech Time 15 min   Area of Concern Cheek;Bridge of nose   Skin Color/Characteristics without discoloration   Skin Temperature warm   Preset CPAP/BiPAP Settings   Mode Of Delivery Standby       Aerosol treatments q6PRN. CPAP QHS.

## 2020-10-22 NOTE — SUBJECTIVE & OBJECTIVE
Interval History:  Patient continues to have cellulitis.  Looks slightly better, had a temperature  spike last night    Review of Systems   Constitutional: Negative for appetite change, chills, fatigue and fever.   HENT: Negative for congestion, hearing loss, rhinorrhea, sore throat, trouble swallowing and voice change.    Respiratory: Negative for cough, chest tightness, shortness of breath and wheezing.    Cardiovascular: Negative for chest pain, palpitations and leg swelling.   Gastrointestinal: Negative for abdominal pain, blood in stool, diarrhea, nausea and vomiting.   Genitourinary: Negative for difficulty urinating, frequency, hematuria and urgency.   Musculoskeletal: Positive for gait problem and myalgias. Negative for back pain, joint swelling and neck stiffness.   Skin: Positive for wound. Negative for pallor and rash.   Neurological: Negative for tremors, seizures, syncope, speech difficulty, weakness, numbness and headaches.   Hematological: Negative for adenopathy.   Psychiatric/Behavioral: Negative for agitation, behavioral problems, confusion and sleep disturbance.     Objective:     Vital Signs (Most Recent):  Temp: 99 °F (37.2 °C) (10/22/20 0726)  Pulse: 69 (10/22/20 1250)  Resp: 18 (10/22/20 1250)  BP: (!) 132/93 (10/22/20 0744)  SpO2: 98 % (10/22/20 1250) Vital Signs (24h Range):  Temp:  [97.3 °F (36.3 °C)-100.8 °F (38.2 °C)] 99 °F (37.2 °C)  Pulse:  [] 69  Resp:  [17-18] 18  SpO2:  [90 %-100 %] 98 %  BP: (131-173)/(75-98) 132/93     Weight: 132 kg (291 lb 0.1 oz)  Body mass index is 40.59 kg/m².    Intake/Output Summary (Last 24 hours) at 10/22/2020 1508  Last data filed at 10/22/2020 0600  Gross per 24 hour   Intake 475 ml   Output 475 ml   Net 0 ml      Physical Exam  Constitutional:       General: He is not in acute distress.     Appearance: He is well-developed. He is not diaphoretic.   HENT:      Head: Normocephalic and atraumatic.      Right Ear: External ear normal.      Left Ear:  External ear normal.      Nose: Nose normal.   Eyes:      General: No scleral icterus.        Right eye: No discharge.         Left eye: No discharge.      Conjunctiva/sclera: Conjunctivae normal.      Pupils: Pupils are equal, round, and reactive to light.   Neck:      Musculoskeletal: Normal range of motion and neck supple.      Thyroid: No thyromegaly.   Cardiovascular:      Rate and Rhythm: Normal rate and regular rhythm.      Heart sounds: Normal heart sounds. No murmur.   Pulmonary:      Effort: Pulmonary effort is normal. No respiratory distress.      Breath sounds: Normal breath sounds. No stridor. No wheezing or rales.   Abdominal:      General: Bowel sounds are normal. There is no distension.      Palpations: Abdomen is soft.      Tenderness: There is no abdominal tenderness.   Musculoskeletal:         General: No tenderness.      Right lower leg: No edema.      Left lower leg: Edema present.   Lymphadenopathy:      Cervical: No cervical adenopathy.   Skin:     General: Skin is warm and dry.      Capillary Refill: Capillary refill takes less than 2 seconds.      Findings: Bruising present. No erythema or rash.      Comments: Asymmetric edema to left lower extremity from calf to proximal thigh. Left calf tenderness. Multiple abrasions and circular ligature to left tib fib region with surrounding eryth and tenderness. No foreign bodies. No fluctuance. No crepitus. No joint effusion.  Edema and pain does not extend past the knee. No pain with flexion or extension of the left knee.   1+ pitting edema bilaterally.    Neurological:      Mental Status: He is alert and oriented to person, place, and time.      Cranial Nerves: No cranial nerve deficit.      Sensory: No sensory deficit.      Motor: No abnormal muscle tone.      Coordination: Coordination normal.   Psychiatric:         Behavior: Behavior normal.         Thought Content: Thought content normal.         Judgment: Judgment normal.         Significant  Labs:   BMP:   Recent Labs   Lab 10/22/20  0440 10/22/20  0949   *  --    *  --    K 5.5* 5.4*     --    CO2 23  --    BUN 20  --    CREATININE 1.6*  --    CALCIUM 8.4*  --      CBC:   Recent Labs   Lab 10/21/20  0434 10/22/20  0440   WBC 5.99 9.68   HGB 11.0* 11.2*   HCT 37.2* 37.6*    224       Significant Imaging: I have reviewed all pertinent imaging results/findings within the past 24 hours.

## 2020-10-22 NOTE — NURSING
1800:  Pt transferred from PCU, 5L oxygen.  BiPAP placed and attempts for 2nd IV access.  Pt anxious and confused.  Continues to pull at BiPAP and restless in bed.  Order for precedex

## 2020-10-22 NOTE — ASSESSMENT & PLAN NOTE
This patient does have evidence of infective focus    My overall impression is cellulitis of the left foot.  Vital signs were reviewed and noted in progress note.  Antibiotics given-   Antibiotics (From admission, onward)    Start     Stop Route Frequency Ordered    10/22/20 1609  vancomycin - pharmacy to dose  (vancomycin IVPB)      -- IV pharmacy to manage frequency 10/22/20 1509        Cultures were taken-   Microbiology Results (last 7 days)     Procedure Component Value Units Date/Time    Blood culture [[id number]] Collected: 10/19/20 1911    Order Status: Completed Specimen: Blood from Antecubital, Right Arm Updated: 10/22/20 0612     Blood Culture, Routine No Growth to date      No Growth to date      No Growth to date    Narrative:      Take 2 sets, from peripheral site. Take both aerobic and  anaerobic bottles.    Aerobic culture [[id number]]     Order Status: No result Specimen: Skin from Leg, Left         Latest lactate reviewed, they are-    Will resume vancomycin for now  Microbiology Results (last 7 days)     Procedure Component Value Units Date/Time    Blood culture [[id number]] Collected: 10/19/20 1911    Order Status: Completed Specimen: Blood from Antecubital, Right Arm Updated: 10/22/20 0612     Blood Culture, Routine No Growth to date      No Growth to date      No Growth to date    Narrative:      Take 2 sets, from peripheral site. Take both aerobic and  anaerobic bottles.    Aerobic culture [[id number]]     Order Status: No result Specimen: Skin from Leg, Left       will follow wound care re

## 2020-10-22 NOTE — ASSESSMENT & PLAN NOTE
Patient's FSGs are controlled on current hypoglycemics.   Last A1c reviewed-   Lab Results   Component Value Date    HGBA1C 6.4 (H) 10/20/2020     Most recent fingerstick glucose reviewed-   Recent Labs   Lab 10/21/20  1639 10/21/20  2017 10/22/20  0611 10/22/20  1118   POCTGLUCOSE 106 114* 189* 147*     Current correctional scale  Low  Maintain anti-hyperglycemic dose as follows-   Antihyperglycemics (From admission, onward)    Start     Stop Route Frequency Ordered    10/22/20 1045  insulin regular injection 3 Units      -- IV Once 10/22/20 1044    10/20/20 0745  insulin aspart U-100 pen 3 Units      -- SubQ 3 times daily with meals 10/19/20 2139    10/19/20 2145  insulin detemir U-100 pen 10 Units      -- SubQ Nightly 10/19/20 2139    10/19/20 2139  insulin aspart U-100 pen 0-5 Units      -- SubQ Before meals & nightly PRN 10/19/20 2139        Hold Oral hypoglycemics while patient is in the hospital.

## 2020-10-22 NOTE — NURSING
Clarified vanc order with Vangie (pharmacist) d/t elevated creatinine. Stated ok to give loading dose and ordered for vanc trough 10/23.

## 2020-10-22 NOTE — PLAN OF CARE
Per Sera with inpatient rehab the pt is currently under review. Due to the pt being Medicare primary he does not meet criteria for SNF while under observation status. CM following. Lisa Glass LCSW     10/22/20 0829   Post-Acute Status   Post-Acute Authorization Placement   Post-Acute Placement Status Referrals Sent

## 2020-10-22 NOTE — ASSESSMENT & PLAN NOTE
History noted  Currently in AFib but rate controlled  Will resume blood thinner and metoprolol  Will resume digoxin

## 2020-10-22 NOTE — NURSING
Results for SAI DEXTER (MRN [id number]) as of 10/22/2020 17:04   Ref. Range 10/22/2020 16:42   POC PH Latest Ref Range: 7.35 - 7.45  7.439   POC PCO2 Latest Ref Range: 35 - 45 mmHg 34.0 (L)   POC PO2 Latest Ref Range: 80 - 100 mmHg 49 (LL)   POC BE Latest Ref Range: -2 to 2 mmol/L -1   POC HCO3 Latest Ref Range: 24 - 28 mmol/L 23.1 (L)   POC SATURATED O2 Latest Ref Range: 95 - 100 % 86 (L)   POC TCO2 Latest Ref Range: 23 - 27 mmol/L 24   PEEP Unknown 10   Sample Unknown ARTERIAL   DelSys Unknown CPAP/BiPAP   Allens Test Unknown Pass   Site Unknown RR   Mode Unknown CPAP       Dr. Asher notified.

## 2020-10-22 NOTE — NURSING
Called to room to access pt. Pt noted to be using abd muscles to breath and tachypneic. Pulled pt up in bed. Pt's O2 sats currently 94% on 3 L NC. Audible wheezes noted. Respiratory notified of need for PRN treatment. Pt placed on cardiac monitor per MD order. Pt currently afib- rates controlled in the 70s. Dr. Asher notified of above- will monitor.

## 2020-10-22 NOTE — PLAN OF CARE
POC reviewed with patient, patient verbalized understanding, AAOX4, VSS, 3L O2 NC, no c/o pain, sob reported- CPAP initiated, BG maintained, IV ABX maintained, tele and safety maintained, bed lowest position, side rails up X2, call light and table within reach, bed alarm on and audible, no distress noted, will continue to monitor.

## 2020-10-22 NOTE — NURSING
Pt noted to be increasingly confused- Dr. Asher notified. New orders received. Respiratory therapist notified of STAT ABGs. Will monitor.

## 2020-10-22 NOTE — ASSESSMENT & PLAN NOTE
Most likely is secondary to Alonzo   Patient was given 1 dose of Kayexalate  Repeated trending down at this point  Will continue to monitor

## 2020-10-22 NOTE — CONSULTS
Pharmacokinetic Initial Assessment: IV Vancomycin    Assessment/Plan:    Initiate intravenous vancomycin with loading dose of 2500 mg once followed by a maintenance dose of vancomycin 2000 mg IV every 24 hours.  Desired empiric serum trough concentration is 10 to 15 mcg/mL.  Draw vancomycin trough level 60 min prior to third dose on 10/24 at approximately 1530.  Pharmacy will continue to follow and monitor vancomycin.      Please contact pharmacy at extension [phone] with any questions regarding this assessment.     Thank you for the consult,   Joaquina Ndiaye       Patient brief summary:  Jose Leon is a 60 y.o. male initiated on antimicrobial therapy with IV Vancomycin for treatment of suspected skin & soft tissue infection.    Drug Allergies:   Review of patient's allergies indicates:   Allergen Reactions    Atorvastatin      Other reaction(s): Generalized Myalgias    Effexor [venlafaxine] Other (See Comments)     Tremulousness       Actual Body Weight:   132 kg    Renal Function:   Estimated Creatinine Clearance: 68.1 mL/min (A) (based on SCr of 1.6 mg/dL (H)).,     Dialysis Method (if applicable):  N/A    CBC (last 72 hours):  Recent Labs   Lab Result Units 10/19/20  1729 10/20/20  0442 10/21/20  0434 10/22/20  0440   WBC K/uL 6.87 5.13 5.99 9.68   Hemoglobin g/dL 10.9* 10.6* 11.0* 11.2*   Hemoglobin A1C %  --  6.4*  --   --    Hematocrit % 35.6* 36.1* 37.2* 37.6*   Platelets K/uL 244 230 226 224   Gran% % 71.0 67.4 73.2* 89.8*   Lymph% % 14.3* 15.4* 9.0* 4.6*   Mono% % 8.7 8.8 8.7 4.0   Eosinophil% % 4.7 6.8 7.5 0.8   Basophil% % 1.0 1.2 1.3 0.5   Differential Method  Automated Automated Automated Automated       Metabolic Panel (last 72 hours):  Recent Labs   Lab Result Units 10/19/20  1729 10/20/20  0442 10/21/20  0434 10/22/20  0440 10/22/20  0949   Sodium mmol/L 139 141 141 135*  --    Potassium mmol/L 4.4 3.8 4.5 5.5* 5.4*   Chloride mmol/L 106 107 105 103  --    CO2 mmol/L 24 25 26 23  --    Glucose mg/dL  125* 118* 109 142*  --    BUN, Bld mg/dL 16 14 15 20  --    Creatinine mg/dL 1.5* 1.4 1.6* 1.6*  --        Drug levels (last 3 results):  No results for input(s): VANCOMYCINRA, VANCOMYCINPE, VANCOMYCINTR in the last 72 hours.    Microbiologic Results:  Microbiology Results (last 7 days)       Procedure Component Value Units Date/Time    Blood culture [[id number]] Collected: 10/19/20 1911    Order Status: Completed Specimen: Blood from Antecubital, Right Arm Updated: 10/22/20 0612     Blood Culture, Routine No Growth to date      No Growth to date      No Growth to date    Narrative:      Take 2 sets, from peripheral site. Take both aerobic and  anaerobic bottles.    Aerobic culture [[id number]]     Order Status: No result Specimen: Skin from Leg, Left

## 2020-10-22 NOTE — PROGRESS NOTES
[facility] Hospital Medicine  Progress Note    Patient Name: Jose Leon  MRN: [id number]  Patient Class: OP- Observation   Admission Date: 10/19/2020  Length of Stay: 0 days  Attending Physician: Amina Sal MD  Primary Care Provider: Josh Giordano MD        Subjective:     Principal Problem:Cellulitis of left lower extremity        HPI:  Jose Leon is a 60 y.o. male with PMHx of DM, CHF, chronic LE edema, morbid obesity, chronic benzo, pain med and anticoagulant use who presented to the ED via EMS for evaluation of wound to the left lower leg s/p fall on 10/5.  When home health nurse evaluated the patient and evaluated the wound recommended patient come to the emergency room for IV antibiotics.  Patient states that he had a low-grade temperature and had chills.  Patient was given a referral to wound care nurses as an outpatient but did not go to his clinic visit.  Patient states that he has difficulty ambulating due to and swelling of the left leg and foot.  The patient denies  chest pain, or any other symptoms at this time. Patient is a former smoker.     The history is provided by the patient.     Overview/Hospital Course:  No notes on file    Interval History:  Patient continues to have cellulitis.  Looks slightly better, had a temperature  spike last night    Review of Systems   Constitutional: Negative for appetite change, chills, fatigue and fever.   HENT: Negative for congestion, hearing loss, rhinorrhea, sore throat, trouble swallowing and voice change.    Respiratory: Negative for cough, chest tightness, shortness of breath and wheezing.    Cardiovascular: Negative for chest pain, palpitations and leg swelling.   Gastrointestinal: Negative for abdominal pain, blood in stool, diarrhea, nausea and vomiting.   Genitourinary: Negative for difficulty urinating, frequency, hematuria and urgency.   Musculoskeletal: Positive for gait problem and myalgias. Negative for back pain,  joint swelling and neck stiffness.   Skin: Positive for wound. Negative for pallor and rash.   Neurological: Negative for tremors, seizures, syncope, speech difficulty, weakness, numbness and headaches.   Hematological: Negative for adenopathy.   Psychiatric/Behavioral: Negative for agitation, behavioral problems, confusion and sleep disturbance.     Objective:     Vital Signs (Most Recent):  Temp: 99 °F (37.2 °C) (10/22/20 0726)  Pulse: 69 (10/22/20 1250)  Resp: 18 (10/22/20 1250)  BP: (!) 132/93 (10/22/20 0744)  SpO2: 98 % (10/22/20 1250) Vital Signs (24h Range):  Temp:  [97.3 °F (36.3 °C)-100.8 °F (38.2 °C)] 99 °F (37.2 °C)  Pulse:  [] 69  Resp:  [17-18] 18  SpO2:  [90 %-100 %] 98 %  BP: (131-173)/(75-98) 132/93     Weight: 132 kg (291 lb 0.1 oz)  Body mass index is 40.59 kg/m².    Intake/Output Summary (Last 24 hours) at 10/22/2020 1508  Last data filed at 10/22/2020 0600  Gross per 24 hour   Intake 475 ml   Output 475 ml   Net 0 ml      Physical Exam  Constitutional:       General: He is not in acute distress.     Appearance: He is well-developed. He is not diaphoretic.   HENT:      Head: Normocephalic and atraumatic.      Right Ear: External ear normal.      Left Ear: External ear normal.      Nose: Nose normal.   Eyes:      General: No scleral icterus.        Right eye: No discharge.         Left eye: No discharge.      Conjunctiva/sclera: Conjunctivae normal.      Pupils: Pupils are equal, round, and reactive to light.   Neck:      Musculoskeletal: Normal range of motion and neck supple.      Thyroid: No thyromegaly.   Cardiovascular:      Rate and Rhythm: Normal rate and regular rhythm.      Heart sounds: Normal heart sounds. No murmur.   Pulmonary:      Effort: Pulmonary effort is normal. No respiratory distress.      Breath sounds: Normal breath sounds. No stridor. No wheezing or rales.   Abdominal:      General: Bowel sounds are normal. There is no distension.      Palpations: Abdomen is soft.       Tenderness: There is no abdominal tenderness.   Musculoskeletal:         General: No tenderness.      Right lower leg: No edema.      Left lower leg: Edema present.   Lymphadenopathy:      Cervical: No cervical adenopathy.   Skin:     General: Skin is warm and dry.      Capillary Refill: Capillary refill takes less than 2 seconds.      Findings: Bruising present. No erythema or rash.      Comments: Asymmetric edema to left lower extremity from calf to proximal thigh. Left calf tenderness. Multiple abrasions and circular ligature to left tib fib region with surrounding eryth and tenderness. No foreign bodies. No fluctuance. No crepitus. No joint effusion.  Edema and pain does not extend past the knee. No pain with flexion or extension of the left knee.   1+ pitting edema bilaterally.    Neurological:      Mental Status: He is alert and oriented to person, place, and time.      Cranial Nerves: No cranial nerve deficit.      Sensory: No sensory deficit.      Motor: No abnormal muscle tone.      Coordination: Coordination normal.   Psychiatric:         Behavior: Behavior normal.         Thought Content: Thought content normal.         Judgment: Judgment normal.         Significant Labs:   BMP:   Recent Labs   Lab 10/22/20  0440 10/22/20  0949   *  --    *  --    K 5.5* 5.4*     --    CO2 23  --    BUN 20  --    CREATININE 1.6*  --    CALCIUM 8.4*  --      CBC:   Recent Labs   Lab 10/21/20  0434 10/22/20  0440   WBC 5.99 9.68   HGB 11.0* 11.2*   HCT 37.2* 37.6*    224       Significant Imaging: I have reviewed all pertinent imaging results/findings within the past 24 hours.      Assessment/Plan:      * Cellulitis of left lower extremity  This patient does have evidence of infective focus    My overall impression is cellulitis of the left foot.  Vital signs were reviewed and noted in progress note.  Antibiotics given-   Antibiotics (From admission, onward)    Start     Stop Route Frequency  Ordered    10/22/20 1609  vancomycin - pharmacy to dose  (vancomycin IVPB)      -- IV pharmacy to manage frequency 10/22/20 1509        Cultures were taken-   Microbiology Results (last 7 days)     Procedure Component Value Units Date/Time    Blood culture [[id number]] Collected: 10/19/20 1911    Order Status: Completed Specimen: Blood from Antecubital, Right Arm Updated: 10/22/20 0612     Blood Culture, Routine No Growth to date      No Growth to date      No Growth to date    Narrative:      Take 2 sets, from peripheral site. Take both aerobic and  anaerobic bottles.    Aerobic culture [[id number]]     Order Status: No result Specimen: Skin from Leg, Left         Latest lactate reviewed, they are-    Will resume vancomycin for now  Microbiology Results (last 7 days)     Procedure Component Value Units Date/Time    Blood culture [[id number]] Collected: 10/19/20 1911    Order Status: Completed Specimen: Blood from Antecubital, Right Arm Updated: 10/22/20 0612     Blood Culture, Routine No Growth to date      No Growth to date      No Growth to date    Narrative:      Take 2 sets, from peripheral site. Take both aerobic and  anaerobic bottles.    Aerobic culture [[id number]]     Order Status: No result Specimen: Skin from Leg, Left       will follow wound care re      Hyperkalemia  Most likely is secondary to Alonzo   Patient was given 1 dose of Kayexalate  Repeated trending down at this point  Will continue to monitor        Unable to ambulate  Unable to place weight on the left leg  Will follow up PT OT final recs  PT OT recommended rehab            Type 2 diabetes mellitus with hyperglycemia, with long-term current use of insulin  Patient's FSGs are controlled on current hypoglycemics.   Last A1c reviewed-   Lab Results   Component Value Date    HGBA1C 6.4 (H) 10/20/2020     Most recent fingerstick glucose reviewed-   Recent Labs   Lab 10/21/20  1639 10/21/20  2017 10/22/20  0611 10/22/20  1118   POCTGLUCOSE 106 114*  189* 147*     Current correctional scale  Low  Maintain anti-hyperglycemic dose as follows-   Antihyperglycemics (From admission, onward)    Start     Stop Route Frequency Ordered    10/22/20 1045  insulin regular injection 3 Units      -- IV Once 10/22/20 1044    10/20/20 0745  insulin aspart U-100 pen 3 Units      -- SubQ 3 times daily with meals 10/19/20 2139    10/19/20 2145  insulin detemir U-100 pen 10 Units      -- SubQ Nightly 10/19/20 2139    10/19/20 2139  insulin aspart U-100 pen 0-5 Units      -- SubQ Before meals & nightly PRN 10/19/20 2139        Hold Oral hypoglycemics while patient is in the hospital.        Anxiety And Depression   will resume home benzodiazepine    Hypertension associated with diabetes  Will resume home meds      Chronic diastolic congestive heart failure  Patient is identified as having Diastolic heart failure that is Chronic. CHF is currently controlled. Latest ECHO performed and demonstrates-   Results for orders placed during the hospital encounter of 04/03/20   Echo Color Flow Doppler? Yes; Bubble Contrast? No    Narrative · Concentric left ventricular remodeling.  · The mean diastolic gradient across the mitral valve is 1 mmHg at a heart   rate of bpm.  · Severe left atrial enlargement.  · Normal left ventricular systolic function. The estimated ejection   fraction is 60%.  · Indeterminate left ventricular diastolic function.  · No wall motion abnormalities.  · Moderate right atrial enlargement.  · Normal central venous pressure (3 mmHg).  · The estimated PA systolic pressure is 27 mmHg.  · Normal right ventricular systolic function.  · Mild tricuspid regurgitation.      . Continue Beta Blocker ACE/ARB Furosemide and monitor clinical status closely. Monitor on telemetry. Patient is off CHF pathway.  Monitor strict Is&Os and daily weights.  Place on fluid restriction of 1.5 L. Continue to stress to patient importance of self efficacy and  on diet for CHF. Last BNP  reviewed- and noted below No results for input(s): BNP, BNPTRIAGEBLO in the last 168 hours..            JULY on CPAP  History noted  Will resume CPAP at night      Morbid obesity with BMI of 40.0-44.9, adult  Body mass index is 40.59 kg/m². Morbid obesity complicates all aspects of disease management from diagnostic modalities to treatment. Weight loss encouraged and health benefits explained to patient.        Atrial fibrillation with rapid ventricular response, CHADS-VAS score 3  History noted  Currently in AFib but rate controlled  Will resume blood thinner and metoprolol  Will resume digoxin        VTE Risk Mitigation (From admission, onward)         Ordered     apixaban tablet 5 mg  2 times daily      10/19/20 2139     IP VTE HIGH RISK PATIENT  Once      10/19/20 2139     Place sequential compression device  Until discontinued      10/19/20 2139                Discharge Planning   KOKI:      Code Status: Full Code   Is the patient medically ready for discharge?:     Reason for patient still in hospital (select all that apply): Patient trending condition and Treatment  Discharge Plan A: Rehab                  Amina Sal MD  Department of Hospital Medicine   [facility]

## 2020-10-22 NOTE — RESPIRATORY THERAPY
10/21/20 1936   Patient Assessment/Suction   Level of Consciousness (AVPU) alert   Respiratory Effort Unlabored   Rhythm/Pattern, Respiratory unlabored   Cough Frequency no cough   PRE-TX-O2   O2 Device (Oxygen Therapy) room air   SpO2 96 %   Pulse Oximetry Type Intermittent   $ Pulse Oximetry - Multiple Charge Pulse Oximetry - Multiple   Aerosol Therapy   $ Aerosol Therapy Charges PRN treatment not required   Preset CPAP/BiPAP Settings   Mode Of Delivery Standby

## 2020-10-22 NOTE — NURSING
Results for SAI DEXTER (MRN [id number]) as of 10/22/2020 10:23   Ref. Range 10/22/2020 04:40 10/22/2020 06:11 10/22/2020 09:49   Potassium Latest Ref Range: 3.5 - 5.1 mmol/L 5.5 (H)  5.4 (H)     Dr. Asher notified during rounds.

## 2020-10-22 NOTE — NURSING
Pt continues to pull off bipap. Pt now with visual hallucinations. Attempting to climb out of bed. Re-oriented PRN. Dr. Asher notified.

## 2020-10-22 NOTE — PLAN OF CARE
Per Sera with inpatient rehab- she came to see the pt and he is not normally on home O2. He is currently on 3L and almost to SOB to speak to her. He also had a temp over 100 last night. The  and all are on board with accepting the pt but she needs to speak to Dr. Delgado regarding his condition. Pts nurse updated. Lisa Glass, JEAN     10/22/20 6694   Post-Acute Status   Post-Acute Authorization Placement   Post-Acute Placement Status Referrals Sent

## 2020-10-22 NOTE — PT/OT/SLP PROGRESS
Physical Therapy      Patient Name:  Jose Leon   MRN:  [id number]    Patient not seen today secondary to Pt off unit in ultrasound. Will follow-up 10/23/2020.    Sera Howard, PT

## 2020-10-22 NOTE — ASSESSMENT & PLAN NOTE
Body mass index is 40.59 kg/m². Morbid obesity complicates all aspects of disease management from diagnostic modalities to treatment. Weight loss encouraged and health benefits explained to patient.

## 2020-10-23 ENCOUNTER — ANESTHESIA (OUTPATIENT)
Dept: INTENSIVE CARE | Facility: HOSPITAL | Age: 60
DRG: 602 | End: 2020-10-23
Payer: MEDICARE

## 2020-10-23 ENCOUNTER — ANESTHESIA EVENT (OUTPATIENT)
Dept: INTENSIVE CARE | Facility: HOSPITAL | Age: 60
DRG: 602 | End: 2020-10-23
Payer: MEDICARE

## 2020-10-23 PROBLEM — I50.33 ACUTE ON CHRONIC DIASTOLIC CONGESTIVE HEART FAILURE: Status: ACTIVE | Noted: 2017-01-01

## 2020-10-23 LAB
ANION GAP SERPL CALC-SCNC: 8 MMOL/L (ref 8–16)
BASOPHILS # BLD AUTO: 0.05 K/UL (ref 0–0.2)
BASOPHILS NFR BLD: 0.7 % (ref 0–1.9)
BUN SERPL-MCNC: 20 MG/DL (ref 6–20)
CALCIUM SERPL-MCNC: 8.2 MG/DL (ref 8.7–10.5)
CHLORIDE SERPL-SCNC: 102 MMOL/L (ref 95–110)
CO2 SERPL-SCNC: 25 MMOL/L (ref 23–29)
CREAT SERPL-MCNC: 1.4 MG/DL (ref 0.5–1.4)
DIFFERENTIAL METHOD: ABNORMAL
EOSINOPHIL # BLD AUTO: 0.2 K/UL (ref 0–0.5)
EOSINOPHIL NFR BLD: 2.1 % (ref 0–8)
ERYTHROCYTE [DISTWIDTH] IN BLOOD BY AUTOMATED COUNT: 16.6 % (ref 11.5–14.5)
EST. GFR  (AFRICAN AMERICAN): >60 ML/MIN/1.73 M^2
EST. GFR  (NON AFRICAN AMERICAN): 54 ML/MIN/1.73 M^2
GLUCOSE SERPL-MCNC: 157 MG/DL (ref 70–110)
HCT VFR BLD AUTO: 33.9 % (ref 40–54)
HGB BLD-MCNC: 9.9 G/DL (ref 14–18)
IMM GRANULOCYTES # BLD AUTO: 0.03 K/UL (ref 0–0.04)
IMM GRANULOCYTES NFR BLD AUTO: 0.4 % (ref 0–0.5)
LYMPHOCYTES # BLD AUTO: 1 K/UL (ref 1–4.8)
LYMPHOCYTES NFR BLD: 12.9 % (ref 18–48)
MCH RBC QN AUTO: 23.3 PG (ref 27–31)
MCHC RBC AUTO-ENTMCNC: 29.2 G/DL (ref 32–36)
MCV RBC AUTO: 80 FL (ref 82–98)
MONOCYTES # BLD AUTO: 0.5 K/UL (ref 0.3–1)
MONOCYTES NFR BLD: 6.8 % (ref 4–15)
NEUTROPHILS # BLD AUTO: 5.8 K/UL (ref 1.8–7.7)
NEUTROPHILS NFR BLD: 77.1 % (ref 38–73)
NRBC BLD-RTO: 0 /100 WBC
PLATELET # BLD AUTO: 209 K/UL (ref 150–350)
PMV BLD AUTO: 11.7 FL (ref 9.2–12.9)
POCT GLUCOSE: 113 MG/DL (ref 70–110)
POCT GLUCOSE: 126 MG/DL (ref 70–110)
POCT GLUCOSE: 152 MG/DL (ref 70–110)
POCT GLUCOSE: 173 MG/DL (ref 70–110)
POTASSIUM SERPL-SCNC: 4.2 MMOL/L (ref 3.5–5.1)
RBC # BLD AUTO: 4.25 M/UL (ref 4.6–6.2)
SODIUM SERPL-SCNC: 135 MMOL/L (ref 136–145)
WBC # BLD AUTO: 7.5 K/UL (ref 3.9–12.7)

## 2020-10-23 PROCEDURE — 94640 AIRWAY INHALATION TREATMENT: CPT

## 2020-10-23 PROCEDURE — 99291 CRITICAL CARE FIRST HOUR: CPT | Mod: ,,, | Performed by: INTERNAL MEDICINE

## 2020-10-23 PROCEDURE — 25000003 PHARM REV CODE 250: Performed by: INTERNAL MEDICINE

## 2020-10-23 PROCEDURE — 36415 COLL VENOUS BLD VENIPUNCTURE: CPT

## 2020-10-23 PROCEDURE — 63600175 PHARM REV CODE 636 W HCPCS: Performed by: INTERNAL MEDICINE

## 2020-10-23 PROCEDURE — 94660 CPAP INITIATION&MGMT: CPT

## 2020-10-23 PROCEDURE — 99291 PR CRITICAL CARE, E/M 30-74 MINUTES: ICD-10-PCS | Mod: ,,, | Performed by: INTERNAL MEDICINE

## 2020-10-23 PROCEDURE — 25000242 PHARM REV CODE 250 ALT 637 W/ HCPCS: Performed by: INTERNAL MEDICINE

## 2020-10-23 PROCEDURE — 99900035 HC TECH TIME PER 15 MIN (STAT)

## 2020-10-23 PROCEDURE — 20000000 HC ICU ROOM

## 2020-10-23 PROCEDURE — 85025 COMPLETE CBC W/AUTO DIFF WBC: CPT

## 2020-10-23 PROCEDURE — 80048 BASIC METABOLIC PNL TOTAL CA: CPT

## 2020-10-23 PROCEDURE — 94761 N-INVAS EAR/PLS OXIMETRY MLT: CPT

## 2020-10-23 PROCEDURE — 27000221 HC OXYGEN, UP TO 24 HOURS

## 2020-10-23 RX ORDER — FUROSEMIDE 10 MG/ML
40 INJECTION INTRAMUSCULAR; INTRAVENOUS
Status: DISCONTINUED | OUTPATIENT
Start: 2020-10-23 | End: 2020-10-26

## 2020-10-23 RX ORDER — IPRATROPIUM BROMIDE 0.5 MG/2.5ML
0.5 SOLUTION RESPIRATORY (INHALATION) EVERY 4 HOURS
Status: DISCONTINUED | OUTPATIENT
Start: 2020-10-23 | End: 2020-10-25

## 2020-10-23 RX ORDER — CEFEPIME HYDROCHLORIDE 1 G/50ML
2 INJECTION, SOLUTION INTRAVENOUS
Status: DISCONTINUED | OUTPATIENT
Start: 2020-10-23 | End: 2020-10-24

## 2020-10-23 RX ADMIN — GABAPENTIN 600 MG: 300 CAPSULE ORAL at 02:10

## 2020-10-23 RX ADMIN — LEVALBUTEROL HYDROCHLORIDE 0.63 MG: 0.63 SOLUTION RESPIRATORY (INHALATION) at 04:10

## 2020-10-23 RX ADMIN — LEVALBUTEROL HYDROCHLORIDE 0.63 MG: 0.63 SOLUTION RESPIRATORY (INHALATION) at 07:10

## 2020-10-23 RX ADMIN — DOCUSATE SODIUM 200 MG: 100 CAPSULE, LIQUID FILLED ORAL at 09:10

## 2020-10-23 RX ADMIN — AMIODARONE HYDROCHLORIDE 200 MG: 200 TABLET ORAL at 09:10

## 2020-10-23 RX ADMIN — GABAPENTIN 600 MG: 300 CAPSULE ORAL at 09:10

## 2020-10-23 RX ADMIN — LEVALBUTEROL HYDROCHLORIDE 0.63 MG: 0.63 SOLUTION RESPIRATORY (INHALATION) at 03:10

## 2020-10-23 RX ADMIN — BUPROPION HYDROCHLORIDE 150 MG: 150 TABLET, EXTENDED RELEASE ORAL at 09:10

## 2020-10-23 RX ADMIN — CEFEPIME HYDROCHLORIDE 2 G: 2 INJECTION, SOLUTION INTRAVENOUS at 09:10

## 2020-10-23 RX ADMIN — FUROSEMIDE 40 MG: 10 INJECTION, SOLUTION INTRAMUSCULAR; INTRAVENOUS at 09:10

## 2020-10-23 RX ADMIN — DIGOXIN 0.25 MG: 125 TABLET ORAL at 04:10

## 2020-10-23 RX ADMIN — APIXABAN 5 MG: 2.5 TABLET, FILM COATED ORAL at 09:10

## 2020-10-23 RX ADMIN — IPRATROPIUM BROMIDE 0.5 MG: 0.5 SOLUTION RESPIRATORY (INHALATION) at 07:10

## 2020-10-23 RX ADMIN — PRAVASTATIN SODIUM 80 MG: 40 TABLET ORAL at 09:10

## 2020-10-23 RX ADMIN — CLOPIDOGREL BISULFATE 75 MG: 75 TABLET, FILM COATED ORAL at 09:10

## 2020-10-23 RX ADMIN — VANCOMYCIN HYDROCHLORIDE 2000 MG: 1 INJECTION, POWDER, LYOPHILIZED, FOR SOLUTION INTRAVENOUS at 07:10

## 2020-10-23 RX ADMIN — IPRATROPIUM BROMIDE 0.5 MG: 0.5 SOLUTION RESPIRATORY (INHALATION) at 11:10

## 2020-10-23 RX ADMIN — INSULIN ASPART 3 UNITS: 100 INJECTION, SOLUTION INTRAVENOUS; SUBCUTANEOUS at 11:10

## 2020-10-23 RX ADMIN — LEVALBUTEROL HYDROCHLORIDE 0.63 MG: 0.63 SOLUTION RESPIRATORY (INHALATION) at 12:10

## 2020-10-23 RX ADMIN — INSULIN DETEMIR 10 UNITS: 100 INJECTION, SOLUTION SUBCUTANEOUS at 09:10

## 2020-10-23 RX ADMIN — OXYBUTYNIN CHLORIDE 10 MG: 5 TABLET, EXTENDED RELEASE ORAL at 09:10

## 2020-10-23 RX ADMIN — OXYCODONE AND ACETAMINOPHEN 1 TABLET: 10; 325 TABLET ORAL at 04:10

## 2020-10-23 RX ADMIN — MELOXICAM 15 MG: 7.5 TABLET ORAL at 09:10

## 2020-10-23 RX ADMIN — INSULIN ASPART 3 UNITS: 100 INJECTION, SOLUTION INTRAVENOUS; SUBCUTANEOUS at 08:10

## 2020-10-23 RX ADMIN — IPRATROPIUM BROMIDE 0.5 MG: 0.5 SOLUTION RESPIRATORY (INHALATION) at 04:10

## 2020-10-23 RX ADMIN — LEVALBUTEROL HYDROCHLORIDE 0.63 MG: 0.63 SOLUTION RESPIRATORY (INHALATION) at 11:10

## 2020-10-23 RX ADMIN — OXYCODONE AND ACETAMINOPHEN 1 TABLET: 10; 325 TABLET ORAL at 10:10

## 2020-10-23 RX ADMIN — ESCITALOPRAM OXALATE 20 MG: 10 TABLET, FILM COATED ORAL at 09:10

## 2020-10-23 RX ADMIN — MAGNESIUM OXIDE 400 MG (241.3 MG MAGNESIUM) TABLET 400 MG: TABLET at 09:10

## 2020-10-23 RX ADMIN — LEVALBUTEROL HYDROCHLORIDE 0.63 MG: 0.63 SOLUTION RESPIRATORY (INHALATION) at 08:10

## 2020-10-23 NOTE — NURSING
Follow up for wound check to the left lower leg. Wounds are improving at this assessment.  Wound bed with bloody drainage noted after cleaning with wound cleanser. Applied the aquacel ag non adherent foam to the wound bed and to the DTI that goes around the entire circumference of the patients left leg. Patient complained of pain when cleaning the wound bed but then the pain subsided immediately. Will continue to follow up with this patient as needed through his hospital stay.

## 2020-10-23 NOTE — PROGRESS NOTES
Jose Leon [id number] is a 60 y.o. male who had been consulted for vancomycin dosing.    Vancomycin trough level has been rescheduled for 10/24 at 1830.    Thank you for allowing us to participate in this patient's care.     Matteo Alvarenga

## 2020-10-23 NOTE — PLAN OF CARE
Pt progressing.  BiPAP removed this afternoon.  Pt calmer and tolerating NC.  Needed reminding of situation but easily reoriented.  Adequate output.  CBGs with scheduled and sliding scale coverage.  Nebs started.  Wound care per wound care nurse.  Pt tolerated.  Safety maintained.

## 2020-10-23 NOTE — CARE UPDATE
10/23/20 0809   Patient Assessment/Suction   Level of Consciousness (AVPU) alert   Respiratory Effort Normal;Unlabored   All Lung Fields Breath Sounds diminished   PRE-TX-O2   O2 Device (Oxygen Therapy) BiPAP   $ Is the patient on Low Flow Oxygen? Yes   Oxygen Concentration (%) 40   SpO2 100 %   Pulse Oximetry Type Continuous   $ Pulse Oximetry - Multiple Charge Pulse Oximetry - Multiple   Pulse (!) 57   Resp 17   Aerosol Therapy   $ Aerosol Therapy Charges Aerosol Treatment   Respiratory Treatment Status (SVN) given   Treatment Route (SVN) in-line   Patient Position (SVN) semi-Youssef's   Post Treatment Assessment (SVN) breath sounds unchanged   Signs of Intolerance (SVN) none   Breath Sounds Post-Respiratory Treatment   Throughout All Fields Post-Treatment All Fields   Throughout All Fields Post-Treatment no change   Post-treatment Heart Rate (beats/min) 51   Post-treatment Resp Rate (breaths/min) 17   Preset CPAP/BiPAP Settings   Mode Of Delivery BiPAP S/T   $ CPAP/BiPAP Daily Charge BiPAP/CPAP Daily   $ Initial CPAP/BiPAP Setup? No   $ Is patient using? Yes   Size of Mask Medium/Large   Sized Appropriately? Yes   Equipment Type V60   Airway Device Type medium full face mask   Humidifier not applicable   Ipap 15   EPAP (cm H2O) 5   Pressure Support (cm H2O) 10   Set Rate (Breaths/Min) 12   ITime (sec) 1   Rise Time (sec) 2   Patient CPAP/BiPAP Settings   Timed Inspiration (Sec) 1   IPAP Rise Time (sec) 2   RR Total (Breaths/Min) 17   Tidal Volume (mL) 684   VE Minute Ventilation (L/min) 10.8 L/min   Peak Inspiratory Pressure (cm H2O) 15   TiTOT (%) 32   Total Leak (L/Min) 12   Patient Trigger - ST Mode Only (%) 99   CPAP/BiPAP Backup Settings   Backup Rate 12 breaths per minute (bpm)   Xop .63 Q4, Bipap QHS, tolerated tx inline with bipap well, currently wearing Bipap, sleeping, vitals as charted.

## 2020-10-23 NOTE — ANESTHESIA PROCEDURE NOTES
Central Line    Diagnosis: SEPSIS  Doctor requesting consult: SHAILA  Patient location during procedure: ICU  Procedure start time: 10/22/2020 7:00 PM  Timeout: 10/22/2020 7:00 PM  Procedure end time: 10/22/2020 7:30 PM    Staffing  Authorizing Provider: Sohan Hubbard MD  Performing Provider: Sohan Hubbard MD    Staffing  Anesthesiologist: Sohan Hubbard MD  Performed: anesthesiologist   Anesthesiologist was present at the time of the procedure.  Preanesthetic Checklist  Completed: patient identified, site marked, surgical consent, pre-op evaluation, timeout performed, IV checked, risks and benefits discussed, monitors and equipment checked and anesthesia consent given  Indication   Indication: med administration, vascular access     Anesthesia   local infiltration    Central Line   Skin Prep: skin prepped with ChloraPrep, skin prep agent completely dried prior to procedure  maximum sterile barriers used during central venous catheter insertion  hand hygiene performed prior to central venous catheter insertion  Location: right, internal jugular.   Catheter type: quad lumen  Catheter Size: 7.5 Fr  Inserted Catheter Length: 12 cm  Ultrasound: vascular probe with ultrasound  Vessel Caliber: large, patent  Vascular Doppler:  not done, compressibility normal  Manometry: none  Insertion Attempts: 1   Securement:chlorhexidine patch, line sutured, sterile dressing applied and blood return through all ports    Post-Procedure   X-Ray: no pneumothorax on x-ray, placement verified by x-ray and successful placement  Adverse Events:none    Guidewire Guidewire removed intact. Guidewire removed intact, verified with nurse.

## 2020-10-23 NOTE — SIGNIFICANT EVENT
Results for SAI DEXTER (MRN [id number]) as of 10/22/2020 20:35   Ref. Range 10/22/2020 20:26   POC PH Latest Ref Range: 7.35 - 7.45  7.394   POC PCO2 Latest Ref Range: 35 - 45 mmHg 36.8   POC PO2 Latest Ref Range: 80 - 100 mmHg 113 (H)   POC BE Latest Ref Range: -2 to 2 mmol/L -2   POC HCO3 Latest Ref Range: 24 - 28 mmol/L 22.5 (L)   POC SATURATED O2 Latest Ref Range: 95 - 100 % 98   POC TCO2 Latest Ref Range: 23 - 27 mmol/L 24   FiO2 Unknown 50   Sample Unknown ARTERIAL   DelSys Unknown CPAP/BiPAP   Allens Test Unknown Pass   Site Unknown RB   Mode Unknown BiPAP   Rate Unknown 12       DECREASED FIO2 TO 40%

## 2020-10-23 NOTE — PLAN OF CARE
Patient free of falls and injuries this shift. On bipap. Drowsy.On precedex gtt titrated down to 0.2mcgs. At the start of this shift central line placed and versed given. Confused most of this shift but starting to become less confused after sleeping off and on. Bilateral soft wrist restraints placed to bilateral wrist with good distal pulse and circulation. Arteaga patent to gravity with clear, jeovanny urine noted. Afib on monitor.

## 2020-10-23 NOTE — NURSING
Pt calmer at this time.  Able to re orient .  Swallowing with out difficulty.  BiPAP removed and placed on NC at this time.

## 2020-10-23 NOTE — CONSULTS
"  10/23/2020      Admit Date: 10/19/2020  Jose Leon  New Patient Consult    Chief Complaint   Patient presents with    Wound Check     home health wound care nurse sent patient to ED for evaluation  / left leg        History of Present Illness:  Pt is a 59 yo male with DM2, CAD, CHF, paroxysmal atrial fib on eliquis, morbid obesity, and JULY on CPAP who presented to the ED for left lower leg wound on 10/19.  He is being treated for cellulitis. Hospital course was complicated by worsening hypoxemic w/ respiratory failure requiring ICU transfer and BIPAP. History is limited as pt is on bipap and drowsy. He arouses to voice, says he "doesn't feel good" and having shortness of breath. Denies cough/phlegm. Says he is a smoker but denies past hx of lung disease or COPD.      PFSH:  Past Medical History:   Diagnosis Date    a A H/O Medical Noncompliance     H/O Chronic Noncompliance With His CHF Diet    a Cardiac Diastolic Dysfunction     Dr. Aure Washington    a Chronic Anticoagulation With Pradaxa     Dr. Aure Washington    a Coronary Artery Disease With H/O Stenting     Dr. Aure Washington; Was Hospitalized At [facility] 3/8/17-3/17/17 For CHF Exacerbatioin Due To "Dietary Discrepancies" With LCST Negative There    a Nonsustained Ventricular Tachycardia (NSVT)     a Paroxysmal Atrial Fibrillation With H/O RVR     Dr. Aure Washington; On Chronic Eliquis    a Syncopal Episode     [facility] 4/3/17-4/7/17 Stay For This: Was Likely Due To NSVT, And His Medications Were Adjusted    a Systolic CHF With EF 35-45%     Dr. Aure Washington; Was Hospitalized At [facility] 3/8/17-3/17/17 For CHF Exacerbatioin Due To "Dietary Discrepancies" With LCST Negative There    b Hypertension     b Proteinuria     04/2014 Referred To Dr. Leroy Allison; 4/1/14 Bilateral Renal U/S = Normal; On Lisinopril 20 Mg Daily    b Stage 2 CKD     c Hypercholesterolemia With Low HDL     d Type 2 DM On Insulin     ** 12/4/18 Referred To DM [facility]; 1/11/18 Referred To Dr. Bar " "Michael And Re-Referred To DM EDU; 12/28/17 HgA1c = 12.0;" 7/5/17 Referred To DM NEFTALY    f Morbid Obesity     i 1 PPD X 25+ YRs Chronic Tobacco Use Disorder     7/5/17 Increased Wellbutrin-XL To 300 Mg Daily; 6/8/17 RXd Wellbutrin- Mg Daily X 4 Months    i JULY On CPAP     Dr. Marion ngo Chronic Left Groin Pain     l Chronic Left Shoulder Pain     Dr. MARTINA martinez Chronic Recurrent Low Back Pain 12/04/2018    Dr. Llamas Is His Pain Management Neurologist; 5/219/18 Referred To Dr. Ismael martinez Left 5-7th Rib FXs 04/2016 4/23/16 [facility] Left Rib XRays = Questionable Nondisplaced Left 5-7th Rib FXs With Normal Lung Fields    l Right Shouder SX 5/26/16 Due To Work Related Injury     Dr. Mora At [facility]; Dr. MARTINA hatch H/O Transient Ischemic Attack In 2013     n Anxiety And Depression 12/04/2018    RTC In 6 Weeks; 12/4/18 Added Wellbutrin- Mg qAM; 5/29/18 Increased 100 Mg Zoloft To 100 Mg Bid    n Continuous Benzodiazepine (Xanax) Use 12/04/2018 5/29/18 I Am Weaning Him Off Of This By Decreasing 1 Mg Bid To 0.5 Mg Bid PRN, And Will Wean Further Next OV    n H/O ETOH Abuse, Quit In 09/2014     q Bilateral Lower Extremity Venous Stasis Ulcers     2/28/18 [facility]    q Chronic Bilateral Lower Extremity Edema     2/28/18 Added Metolazone 10 Mg qAM On MWF And Referred Back To Dr. Washington; On Lasix 40 Mg Bid; He Wears Bilateral Compressin Hose Stockings    q Disability Examination 7/15/16     For CHF, PAF, DM2, And JULY On CPAP    Wellness Visit 11/6/2017      Past Surgical History:   Procedure Laterality Date    CARDIAC SURGERY      coronary stent    COLONOSCOPY N/A 12/19/2017    Procedure: COLONOSCOPY;  Surgeon: Dave Allen MD;  Location: [facility];  Service: Endoscopy;  Laterality: N/A;    DIABETES MANAGEMENT LABS      heart stent      INCISION AND DRAINAGE OF WOUND      on stomach " "   SHOULDER SURGERY       Social History     Tobacco Use    Smoking status: Former Smoker     Packs/day: 0.25     Types: Cigarettes     Start date: 1981     Quit date: 2016     Years since quittin.0    Smokeless tobacco: Never Used    Tobacco comment: every now and again with drinks   Substance Use Topics    Alcohol use: Yes     Comment: occas    Drug use: No     Family History   Problem Relation Age of Onset    Heart disease Mother     Arthritis Mother     Diabetes Mother     Hyperlipidemia Mother     Hypertension Mother     Heart disease Father     Arthritis Father     Asthma Father     COPD Father     Hyperlipidemia Father     Hypertension Father     Stroke Father     Diabetes Sister     Diabetes Maternal Uncle      Review of patient's allergies indicates:   Allergen Reactions    Atorvastatin      Other reaction(s): Generalized Myalgias    Effexor [venlafaxine] Other (See Comments)     Tremulousness       Performance Status:Performance Status:The patient's activity level is bedbound.    Review of Systems:  due to neurologic status/impairments a Review of Systems could not be obtained       Exam:Comprehensive exam done. /63   Pulse (!) 53   Temp 97.7 °F (36.5 °C) (Skin)   Resp 17   Ht 5' 11" (1.803 m)   Wt 120.2 kg (264 lb 15.9 oz)   SpO2 100%   BMI 36.96 kg/m²   Exam included Vitals as listed, and patient's appearance and affect and alertness and mood, oral exam for yeast and hygiene and pharynx lesions and Mallapatti (M) score, neck with inspection for jvd and masses and thyroid abnormalities and lymph nodes (supraclavicular and infraclavicular nodes also examined and noted if abn), chest exam included symmetry and effort and fremitus and percussion and auscultation, cardiac exam included rhythm and gallops and murmur and rubs and jvd and edema, abdominal exam for mass and hepatosplenomegaly and tenderness and hernias and bowel sounds, Musculoskeletal exam with " muscle tone and posture and mobility/gait and  strenght, and skin for rashes and cyanosis and pallor and turgor, extremity for clubbing.  Findings were normal except as listed below:  Sedated w/ precedex  On BIPAP  RIJ central line in place  Opens eyes, pupils equal bilaterally  Bilateral tight wheezing anteriorly, diminished at bases  Abdomen obese, soft, nontender  B/L soft wrist restraints in place  Arteaga w/ dark yellow urine  L leg wrapped, 1+ edema right leg    Radiographs reviewed: view by direct vision   CXR 10/22- bilateral pulmonary edema, patchy consolidations developing and worsening on right      Labs     Recent Labs   Lab 10/23/20  0319   WBC 7.50   HGB 9.9*   HCT 33.9*        Recent Labs   Lab 10/22/20  1700 10/23/20  0319    135*   K 4.4 4.2    102   CO2 24 25   BUN 20 20   CREATININE 1.6* 1.4   * 157*   CALCIUM 8.5* 8.2*   AST 14  --    ALT 5*  --    ALKPHOS 124  --    BILITOT 0.7  --    PROT 7.5  --    ALBUMIN 3.1*  --    .2*  --    PROCAL 0.43*  --    LACTATE 1.2  --    *  --      Recent Labs   Lab 10/22/20  2026   PH 7.394   PCO2 36.8   PO2 113*   HCO3 22.5*     Microbiology Results (last 7 days)     Procedure Component Value Units Date/Time    Blood culture [[id number]] Collected: 10/22/20 1700    Order Status: Completed Specimen: Blood from Antecubital, Right Arm Updated: 10/23/20 0715     Blood Culture, Routine No Growth to date    Blood culture [[id number]] Collected: 10/19/20 1911    Order Status: Completed Specimen: Blood from Antecubital, Right Arm Updated: 10/23/20 0612     Blood Culture, Routine No Growth to date      No Growth to date      No Growth to date      No Growth to date    Narrative:      Take 2 sets, from peripheral site. Take both aerobic and  anaerobic bottles.    Aerobic culture [[id number]]     Order Status: No result Specimen: Skin from Leg, Left           Impression:  Active Hospital Problems    Diagnosis  POA    *Cellulitis of  left lower extremity [L03.116]  Yes    Hyperkalemia [E87.5]  No    Acute hypoxemic respiratory failure [J96.01]  Yes    Unable to ambulate [R26.2]  Yes    Type 2 diabetes mellitus with hyperglycemia, with long-term current use of insulin [E11.65, Z79.4]  Not Applicable    Anxiety And Depression [F41.1]  Yes    Hypertension associated with diabetes [E11.59, I10]  Yes    Chronic diastolic congestive heart failure [I50.32]  Yes    JULY on CPAP [G47.33, Z99.89]  Not Applicable    Morbid obesity with BMI of 40.0-44.9, adult [E66.01, Z68.41]  Not Applicable    Atrial fibrillation with rapid ventricular response, CHADS-VAS score 3 [I48.91]  Yes      Resolved Hospital Problems   No resolved problems to display.               Plan:   Acute hypoxemic resp failure  JULY on CPAP  Pulmonary edema  Possible R sided pneumonia  Acute on chronic CHF  Paroxysmal atrial fib    - continue BIPAP   - ABG reviewed- improved on BIPAP  - continue broad spectrum abx for possible pneumonia- add cefepime for better coverage given clinical & imaging worsening. Continue vanco  - obtain sputum culture   - repeat CXR in am  - needs more aggressive diuresis- start lasix 40mg IV BID  - HTN and cardiac meds per hospitalist  - may worsen and require intubation. Monitor closely in ICU  - precedex as needed for agitation  - NPO for now  - hold sedating meds- xanax/flexeril for now    The following were evaluated and adjusted as needed: sedation and neurologic status, antibiotics, acid base balance and oxygenation needs and input and output and renal status       Critical Care  - THE PATIENT HAS A HIGH PROBABILITY OF IMMINENT OR LIFE THREATENING DETERIORATION.  Over 50%time of encounter was in direct care at bedside.  Time was 30 to 74 minutes required for patient care.  Time needed for all of the above totaled 30 minutes.    Richa Steven MD  Pulmonary & Critical Care Medicine

## 2020-10-23 NOTE — RESPIRATORY THERAPY
10/23/20 0009   Patient Assessment/Suction   Level of Consciousness (AVPU) responds to voice   Respiratory Effort Normal;Unlabored   Expansion/Accessory Muscles/Retractions expansion symmetric;no retractions;no use of accessory muscles   All Lung Fields Breath Sounds diminished   Cough Frequency no cough   PRE-TX-O2   O2 Device (Oxygen Therapy) BiPAP   Oxygen Concentration (%) 40   SpO2 100 %   Pulse Oximetry Type Continuous   Pulse (!) 57   Resp 20   Aerosol Therapy   $ Aerosol Therapy Charges Aerosol Treatment   Respiratory Treatment Status (SVN) given   Treatment Route (SVN) in-line;oxygen   Patient Position (SVN) HOB elevated   Signs of Intolerance (SVN) none   Breath Sounds Post-Respiratory Treatment   Throughout All Fields Post-Treatment All Fields   Throughout All Fields Post-Treatment no change   Post-treatment Heart Rate (beats/min) 55   Post-treatment Resp Rate (breaths/min) 21   Ready to Wean/Extubation Screen   FIO2<=50 (chart decimal) 0.4   Preset CPAP/BiPAP Settings   Mode Of Delivery BiPAP   $ CPAP/BiPAP Daily Charge BiPAP/CPAP Daily   $ Is patient using? Yes   Sized Appropriately? Yes   Equipment Type V60   Airway Device Type medium full face mask   Humidifier not applicable   Ipap 15   EPAP (cm H2O) 5   Pressure Support (cm H2O) 10   ITime (sec) 1   Rise Time (sec) 2   Patient CPAP/BiPAP Settings   Timed Inspiration (Sec) 1   IPAP Rise Time (sec) 2   RR Total (Breaths/Min) 20   Tidal Volume (mL) 577   VE Minute Ventilation (L/min) 12.2 L/min   Peak Inspiratory Pressure (cm H2O) 15   TiTOT (%) 36   Total Leak (L/Min) 11   Patient Trigger - ST Mode Only (%) 99   CPAP/BiPAP Backup Settings   Backup Rate 12 breaths per minute (bpm)   CPAP/BiPAP Alarms   High Pressure (cm H2O) 20   Low Pressure (cm H2O) 10   Low Pressure Delay (Sec) 20   Minute Ventilation (L/Min) 3   High RR (breaths/min) 35   Low RR (breaths/min) 13

## 2020-10-24 ENCOUNTER — OUTSIDE PLACE OF SERVICE (OUTPATIENT)
Dept: PULMONOLOGY | Facility: CLINIC | Age: 60
End: 2020-10-24
Payer: MEDICARE

## 2020-10-24 DIAGNOSIS — J15.8 PNEUMONIA DUE TO OTHER SPECIFIED BACTERIA: ICD-10-CM

## 2020-10-24 DIAGNOSIS — L03.116 CELLULITIS OF LEFT LOWER EXTREMITY: ICD-10-CM

## 2020-10-24 PROBLEM — J18.9 PNEUMONIA: Status: ACTIVE | Noted: 2020-10-24

## 2020-10-24 LAB
ANION GAP SERPL CALC-SCNC: 9 MMOL/L (ref 8–16)
BASOPHILS # BLD AUTO: 0.02 K/UL (ref 0–0.2)
BASOPHILS NFR BLD: 0.3 % (ref 0–1.9)
BUN SERPL-MCNC: 25 MG/DL (ref 6–20)
CALCIUM SERPL-MCNC: 8 MG/DL (ref 8.7–10.5)
CHLORIDE SERPL-SCNC: 101 MMOL/L (ref 95–110)
CO2 SERPL-SCNC: 27 MMOL/L (ref 23–29)
CREAT SERPL-MCNC: 1.3 MG/DL (ref 0.5–1.4)
DIFFERENTIAL METHOD: ABNORMAL
EOSINOPHIL # BLD AUTO: 0.4 K/UL (ref 0–0.5)
EOSINOPHIL NFR BLD: 6.3 % (ref 0–8)
ERYTHROCYTE [DISTWIDTH] IN BLOOD BY AUTOMATED COUNT: 16.3 % (ref 11.5–14.5)
EST. GFR  (AFRICAN AMERICAN): >60 ML/MIN/1.73 M^2
EST. GFR  (NON AFRICAN AMERICAN): 59 ML/MIN/1.73 M^2
GLUCOSE SERPL-MCNC: 119 MG/DL (ref 70–110)
HCT VFR BLD AUTO: 31.3 % (ref 40–54)
HGB BLD-MCNC: 9.2 G/DL (ref 14–18)
IMM GRANULOCYTES # BLD AUTO: 0.02 K/UL (ref 0–0.04)
IMM GRANULOCYTES NFR BLD AUTO: 0.3 % (ref 0–0.5)
LYMPHOCYTES # BLD AUTO: 0.9 K/UL (ref 1–4.8)
LYMPHOCYTES NFR BLD: 14 % (ref 18–48)
MAGNESIUM SERPL-MCNC: 1.9 MG/DL (ref 1.6–2.6)
MCH RBC QN AUTO: 23.5 PG (ref 27–31)
MCHC RBC AUTO-ENTMCNC: 29.4 G/DL (ref 32–36)
MCV RBC AUTO: 80 FL (ref 82–98)
MONOCYTES # BLD AUTO: 0.5 K/UL (ref 0.3–1)
MONOCYTES NFR BLD: 7.7 % (ref 4–15)
NEUTROPHILS # BLD AUTO: 4.5 K/UL (ref 1.8–7.7)
NEUTROPHILS NFR BLD: 71.4 % (ref 38–73)
NRBC BLD-RTO: 0 /100 WBC
PHOSPHATE SERPL-MCNC: 3.3 MG/DL (ref 2.7–4.5)
PLATELET # BLD AUTO: 179 K/UL (ref 150–350)
PMV BLD AUTO: 11.8 FL (ref 9.2–12.9)
POCT GLUCOSE: 120 MG/DL (ref 70–110)
POCT GLUCOSE: 121 MG/DL (ref 70–110)
POCT GLUCOSE: 128 MG/DL (ref 70–110)
POCT GLUCOSE: 146 MG/DL (ref 70–110)
POTASSIUM SERPL-SCNC: 4.2 MMOL/L (ref 3.5–5.1)
RBC # BLD AUTO: 3.91 M/UL (ref 4.6–6.2)
SODIUM SERPL-SCNC: 137 MMOL/L (ref 136–145)
VANCOMYCIN TROUGH SERPL-MCNC: 17.2 UG/ML (ref 10–22)
WBC # BLD AUTO: 6.35 K/UL (ref 3.9–12.7)

## 2020-10-24 PROCEDURE — 83735 ASSAY OF MAGNESIUM: CPT

## 2020-10-24 PROCEDURE — 25000242 PHARM REV CODE 250 ALT 637 W/ HCPCS: Performed by: INTERNAL MEDICINE

## 2020-10-24 PROCEDURE — 99233 SBSQ HOSP IP/OBS HIGH 50: CPT | Mod: S$GLB,,, | Performed by: INTERNAL MEDICINE

## 2020-10-24 PROCEDURE — 94640 AIRWAY INHALATION TREATMENT: CPT

## 2020-10-24 PROCEDURE — 80202 ASSAY OF VANCOMYCIN: CPT

## 2020-10-24 PROCEDURE — 94660 CPAP INITIATION&MGMT: CPT

## 2020-10-24 PROCEDURE — 63600175 PHARM REV CODE 636 W HCPCS: Performed by: INTERNAL MEDICINE

## 2020-10-24 PROCEDURE — 85025 COMPLETE CBC W/AUTO DIFF WBC: CPT

## 2020-10-24 PROCEDURE — 80048 BASIC METABOLIC PNL TOTAL CA: CPT

## 2020-10-24 PROCEDURE — 84100 ASSAY OF PHOSPHORUS: CPT

## 2020-10-24 PROCEDURE — 99233 PR SUBSEQUENT HOSPITAL CARE,LEVL III: ICD-10-PCS | Mod: S$GLB,,, | Performed by: INTERNAL MEDICINE

## 2020-10-24 PROCEDURE — 11000001 HC ACUTE MED/SURG PRIVATE ROOM

## 2020-10-24 PROCEDURE — 25000003 PHARM REV CODE 250: Performed by: INTERNAL MEDICINE

## 2020-10-24 PROCEDURE — 99900035 HC TECH TIME PER 15 MIN (STAT)

## 2020-10-24 PROCEDURE — 36415 COLL VENOUS BLD VENIPUNCTURE: CPT

## 2020-10-24 PROCEDURE — 94761 N-INVAS EAR/PLS OXIMETRY MLT: CPT

## 2020-10-24 PROCEDURE — 27000221 HC OXYGEN, UP TO 24 HOURS

## 2020-10-24 RX ORDER — CEFEPIME HYDROCHLORIDE 1 G/50ML
2 INJECTION, SOLUTION INTRAVENOUS
Status: DISCONTINUED | OUTPATIENT
Start: 2020-10-24 | End: 2020-10-25

## 2020-10-24 RX ORDER — MORPHINE SULFATE 15 MG/1
15 TABLET, FILM COATED, EXTENDED RELEASE ORAL EVERY 8 HOURS
Status: DISCONTINUED | OUTPATIENT
Start: 2020-10-24 | End: 2020-10-26 | Stop reason: HOSPADM

## 2020-10-24 RX ADMIN — IPRATROPIUM BROMIDE 0.5 MG: 0.5 SOLUTION RESPIRATORY (INHALATION) at 07:10

## 2020-10-24 RX ADMIN — ESCITALOPRAM OXALATE 20 MG: 10 TABLET, FILM COATED ORAL at 09:10

## 2020-10-24 RX ADMIN — APIXABAN 5 MG: 2.5 TABLET, FILM COATED ORAL at 09:10

## 2020-10-24 RX ADMIN — CEFEPIME HYDROCHLORIDE 2 G: 2 INJECTION, SOLUTION INTRAVENOUS at 11:10

## 2020-10-24 RX ADMIN — CEFEPIME HYDROCHLORIDE 2 G: 2 INJECTION, SOLUTION INTRAVENOUS at 07:10

## 2020-10-24 RX ADMIN — IPRATROPIUM BROMIDE 0.5 MG: 0.5 SOLUTION RESPIRATORY (INHALATION) at 03:10

## 2020-10-24 RX ADMIN — OXYBUTYNIN CHLORIDE 10 MG: 5 TABLET, EXTENDED RELEASE ORAL at 09:10

## 2020-10-24 RX ADMIN — DOCUSATE SODIUM 200 MG: 100 CAPSULE, LIQUID FILLED ORAL at 09:10

## 2020-10-24 RX ADMIN — MAGNESIUM OXIDE 400 MG (241.3 MG MAGNESIUM) TABLET 400 MG: TABLET at 09:10

## 2020-10-24 RX ADMIN — LEVALBUTEROL HYDROCHLORIDE 0.63 MG: 0.63 SOLUTION RESPIRATORY (INHALATION) at 12:10

## 2020-10-24 RX ADMIN — AMIODARONE HYDROCHLORIDE 200 MG: 200 TABLET ORAL at 08:10

## 2020-10-24 RX ADMIN — AMLODIPINE BESYLATE 5 MG: 5 TABLET ORAL at 09:10

## 2020-10-24 RX ADMIN — LEVALBUTEROL HYDROCHLORIDE 0.63 MG: 0.63 SOLUTION RESPIRATORY (INHALATION) at 03:10

## 2020-10-24 RX ADMIN — CLOPIDOGREL BISULFATE 75 MG: 75 TABLET, FILM COATED ORAL at 09:10

## 2020-10-24 RX ADMIN — DOCUSATE SODIUM 200 MG: 100 CAPSULE, LIQUID FILLED ORAL at 08:10

## 2020-10-24 RX ADMIN — LEVALBUTEROL HYDROCHLORIDE 0.63 MG: 0.63 SOLUTION RESPIRATORY (INHALATION) at 07:10

## 2020-10-24 RX ADMIN — INSULIN ASPART 3 UNITS: 100 INJECTION, SOLUTION INTRAVENOUS; SUBCUTANEOUS at 05:10

## 2020-10-24 RX ADMIN — PRAVASTATIN SODIUM 80 MG: 40 TABLET ORAL at 08:10

## 2020-10-24 RX ADMIN — IPRATROPIUM BROMIDE 0.5 MG: 0.5 SOLUTION RESPIRATORY (INHALATION) at 12:10

## 2020-10-24 RX ADMIN — METOPROLOL SUCCINATE 50 MG: 50 TABLET, FILM COATED, EXTENDED RELEASE ORAL at 09:10

## 2020-10-24 RX ADMIN — FUROSEMIDE 40 MG: 10 INJECTION, SOLUTION INTRAMUSCULAR; INTRAVENOUS at 10:10

## 2020-10-24 RX ADMIN — MORPHINE SULFATE 15 MG: 15 TABLET, EXTENDED RELEASE ORAL at 07:10

## 2020-10-24 RX ADMIN — VANCOMYCIN HYDROCHLORIDE 1500 MG: 1.5 INJECTION, POWDER, LYOPHILIZED, FOR SOLUTION INTRAVENOUS at 08:10

## 2020-10-24 RX ADMIN — INSULIN ASPART 3 UNITS: 100 INJECTION, SOLUTION INTRAVENOUS; SUBCUTANEOUS at 08:10

## 2020-10-24 RX ADMIN — FUROSEMIDE 40 MG: 10 INJECTION, SOLUTION INTRAMUSCULAR; INTRAVENOUS at 11:10

## 2020-10-24 RX ADMIN — AMIODARONE HYDROCHLORIDE 200 MG: 200 TABLET ORAL at 09:10

## 2020-10-24 RX ADMIN — APIXABAN 5 MG: 2.5 TABLET, FILM COATED ORAL at 08:10

## 2020-10-24 RX ADMIN — BACITRACIN, NEOMYCIN, POLYMYXIN B 1 EACH: 400; 3.5; 5 OINTMENT TOPICAL at 10:10

## 2020-10-24 RX ADMIN — GABAPENTIN 600 MG: 300 CAPSULE ORAL at 03:10

## 2020-10-24 RX ADMIN — GABAPENTIN 600 MG: 300 CAPSULE ORAL at 09:10

## 2020-10-24 RX ADMIN — OXYCODONE AND ACETAMINOPHEN 1 TABLET: 10; 325 TABLET ORAL at 09:10

## 2020-10-24 RX ADMIN — BUPROPION HYDROCHLORIDE 150 MG: 150 TABLET, EXTENDED RELEASE ORAL at 09:10

## 2020-10-24 RX ADMIN — INSULIN ASPART 3 UNITS: 100 INJECTION, SOLUTION INTRAVENOUS; SUBCUTANEOUS at 12:10

## 2020-10-24 RX ADMIN — DIGOXIN 0.25 MG: 125 TABLET ORAL at 09:10

## 2020-10-24 RX ADMIN — GABAPENTIN 600 MG: 300 CAPSULE ORAL at 08:10

## 2020-10-24 RX ADMIN — MELOXICAM 15 MG: 7.5 TABLET ORAL at 09:10

## 2020-10-24 RX ADMIN — INSULIN DETEMIR 10 UNITS: 100 INJECTION, SOLUTION SUBCUTANEOUS at 08:10

## 2020-10-24 NOTE — ASSESSMENT & PLAN NOTE
Patient is identified as having Diastolic heart failure that is Chronic. CHF is currently controlled. Latest ECHO performed and demonstrates-   Results for orders placed during the hospital encounter of 04/03/20   Echo Color Flow Doppler? Yes; Bubble Contrast? No    Narrative · Concentric left ventricular remodeling.  · The mean diastolic gradient across the mitral valve is 1 mmHg at a heart   rate of bpm.  · Severe left atrial enlargement.  · Normal left ventricular systolic function. The estimated ejection   fraction is 60%.  · Indeterminate left ventricular diastolic function.  · No wall motion abnormalities.  · Moderate right atrial enlargement.  · Normal central venous pressure (3 mmHg).  · The estimated PA systolic pressure is 27 mmHg.  · Normal right ventricular systolic function.  · Mild tricuspid regurgitation.      . Continue Beta Blocker ACE/ARB Furosemide and monitor clinical status closely. Monitor on telemetry. Patient is off CHF pathway.  Monitor strict Is&Os and daily weights.  Place on fluid restriction of 1.5 L. Continue to stress to patient importance of self efficacy and  on diet for CHF. Last BNP reviewed- and noted below   Recent Labs   Lab 10/22/20  1700   *   .

## 2020-10-24 NOTE — SUBJECTIVE & OBJECTIVE
Interval History: Patient currently off BIPAP doing well, on NC. Only complain is the pain in L Lower extremity    Review of Systems   Constitutional: Negative for appetite change, chills, fatigue and fever.   HENT: Negative for congestion, hearing loss, rhinorrhea, sore throat, trouble swallowing and voice change.    Respiratory: Negative for cough, chest tightness, shortness of breath and wheezing.    Cardiovascular: Negative for chest pain, palpitations and leg swelling.   Gastrointestinal: Negative for abdominal pain, blood in stool, diarrhea, nausea and vomiting.   Genitourinary: Negative for difficulty urinating, frequency, hematuria and urgency.   Musculoskeletal: Positive for gait problem and myalgias. Negative for back pain, joint swelling and neck stiffness.   Skin: Positive for wound. Negative for pallor and rash.   Neurological: Negative for tremors, seizures, syncope, speech difficulty, weakness, numbness and headaches.   Hematological: Negative for adenopathy.   Psychiatric/Behavioral: Negative for agitation, behavioral problems, confusion and sleep disturbance.     Objective:     Vital Signs (Most Recent):  Temp: 98.6 °F (37 °C) (10/24/20 1200)  Pulse: 64 (10/24/20 1600)  Resp: (!) 25 (10/24/20 1600)  BP: 125/68 (10/24/20 1600)  SpO2: 100 % (10/24/20 1600) Vital Signs (24h Range):  Temp:  [98 °F (36.7 °C)-98.6 °F (37 °C)] 98.6 °F (37 °C)  Pulse:  [55-82] 64  Resp:  [12-40] 25  SpO2:  [89 %-100 %] 100 %  BP: (102-187)/(53-96) 125/68     Weight: 127.3 kg (280 lb 10.3 oz)  Body mass index is 39.14 kg/m².    Intake/Output Summary (Last 24 hours) at 10/24/2020 1617  Last data filed at 10/24/2020 1600  Gross per 24 hour   Intake 1670 ml   Output 2250 ml   Net -580 ml      Physical Exam  Constitutional:       General: He is not in acute distress.     Appearance: He is well-developed. He is not diaphoretic.   HENT:      Head: Normocephalic and atraumatic.      Right Ear: External ear normal.      Left Ear:  External ear normal.      Nose: Nose normal.   Eyes:      General: No scleral icterus.        Right eye: No discharge.         Left eye: No discharge.      Conjunctiva/sclera: Conjunctivae normal.      Pupils: Pupils are equal, round, and reactive to light.   Neck:      Musculoskeletal: Normal range of motion and neck supple.      Thyroid: No thyromegaly.   Cardiovascular:      Rate and Rhythm: Normal rate and regular rhythm.      Heart sounds: Normal heart sounds. No murmur.   Pulmonary:      Effort: Pulmonary effort is normal. No respiratory distress.      Breath sounds: No stridor. Rales present. No wheezing.      Comments: Nasal cannula currently  Abdominal:      General: Bowel sounds are normal. There is no distension.      Palpations: Abdomen is soft.      Tenderness: There is no abdominal tenderness.   Musculoskeletal:         General: No tenderness.      Right lower leg: No edema.      Left lower leg: Edema present.   Lymphadenopathy:      Cervical: No cervical adenopathy.   Skin:     General: Skin is warm and dry.      Capillary Refill: Capillary refill takes less than 2 seconds.      Findings: Bruising present. No erythema or rash.      Comments: Asymmetric edema to left lower extremity from calf to proximal thigh. Left calf tenderness. Multiple abrasions and circular ligature to left tib fib region with surrounding eryth and tenderness. No foreign bodies. No fluctuance. No crepitus. No joint effusion.  Edema and pain does not extend past the knee. No pain with flexion or extension of the left knee.   1+ pitting edema bilaterally.    Neurological:      Mental Status: He is alert and oriented to person, place, and time.      Cranial Nerves: No cranial nerve deficit.      Sensory: No sensory deficit.      Motor: No abnormal muscle tone.      Coordination: Coordination normal.   Psychiatric:         Behavior: Behavior normal.         Thought Content: Thought content normal.         Judgment: Judgment normal.          Significant Labs:   BMP:   Recent Labs   Lab 10/24/20  0409   *      K 4.2      CO2 27   BUN 25*   CREATININE 1.3   CALCIUM 8.0*   MG 1.9     CBC:   Recent Labs   Lab 10/22/20  1700 10/23/20  0319 10/24/20  0409   WBC 8.84 7.50 6.35   HGB 11.2* 9.9* 9.2*   HCT 37.2* 33.9* 31.3*    209 179       Significant Imaging: I have reviewed all pertinent imaging results/findings within the past 24 hours.

## 2020-10-24 NOTE — PLAN OF CARE
Care plan reviewed. Safety maintained. Patient calm and cooperative this shift. Tries to be sexually suggestive with staff, but can be redirected. Assisted patient with his cell phone to call his son and to text his son. Patient has a very good appetite. No BM. Arteaga draining adequate urine. Patients face shaved and new central line dressing placed. Wound care performed to LLE. Sutures removed from patients chin that was placed on 10/5/20 at [facility]. Dr. Sal notified that one area on the left side where sutures had been appear a little red. He ordered Bacitracin ointment BID. Patient was transferred to PCU room 211, Patient transferred self with SBA from ICU bed to PCU bed. Patient transferred to PCU at 1800. Cell phone is with patient. Insulin pens with patient. Patient seen by Dr. Sal, Dr. Spence, JENNIFERM. No family calls or visits.

## 2020-10-24 NOTE — ASSESSMENT & PLAN NOTE
Most likely is secondary to Alonzo   Patient was given 1 dose of Kayexalate   trending down at this point  Will continue to monitor

## 2020-10-24 NOTE — ASSESSMENT & PLAN NOTE
Patient with Hypoxic Respiratory failure which is Acute.  he is not on home oxygen. Supplemental ventilation was provided and noted- Oxygen Concentration (%):  [36-40] 40.   Differential diagnosis includes - Pneumonia Labs and images were reviewed. Patient Has recent ABG, which has been reviewed.. Will treat underlying causes and adjust management of respiratory failure as follows- will continue BIPAP prn

## 2020-10-24 NOTE — ASSESSMENT & PLAN NOTE
Patient's FSGs are controlled on current hypoglycemics.   Last A1c reviewed-   Lab Results   Component Value Date    HGBA1C 6.4 (H) 10/20/2020     Most recent fingerstick glucose reviewed-   Recent Labs   Lab 10/23/20  1637 10/23/20  2113 10/24/20  0545 10/24/20  1239   POCTGLUCOSE 113* 173* 120* 146*     Current correctional scale  Low  Maintain anti-hyperglycemic dose as follows-   Antihyperglycemics (From admission, onward)    Start     Stop Route Frequency Ordered    10/22/20 1045  insulin regular injection 3 Units      -- IV Once 10/22/20 1044    10/20/20 0745  insulin aspart U-100 pen 3 Units      -- SubQ 3 times daily with meals 10/19/20 2139    10/19/20 2145  insulin detemir U-100 pen 10 Units      -- SubQ Nightly 10/19/20 2139    10/19/20 2139  insulin aspart U-100 pen 0-5 Units      -- SubQ Before meals & nightly PRN 10/19/20 2139        Hold Oral hypoglycemics while patient is in the hospital.

## 2020-10-24 NOTE — ASSESSMENT & PLAN NOTE
Patient with Hypoxic Respiratory failure which is Acute.  he is not on home oxygen. Supplemental ventilation was provided and noted- Oxygen Concentration (%):  [28-40] 28.   On NC now  Differential diagnosis includes - Pneumonia Labs and images were reviewed. Patient Has recent ABG, which has been reviewed.. Will treat underlying causes and adjust management of respiratory failure as follows- will continue BIPAP prn

## 2020-10-24 NOTE — ASSESSMENT & PLAN NOTE
· Intermittent/nighlty BIPAP   · Continue empiric broad spectrum abx for possible LRTI for now.  De-escalate as culture data becomes available.  · Continued diuresis.  · Will defer to hospital medicine re: management of his antihypertensives and other cardiac medications.  · Seems to be improving but would continue to observe in the intensive care unit for now.  · Okay to advance diet.  · Continue to avoid sedating medications.

## 2020-10-24 NOTE — RESPIRATORY THERAPY
10/23/20 1921   Patient Assessment/Suction   Level of Consciousness (AVPU) alert   Respiratory Effort Normal;Unlabored   Expansion/Accessory Muscles/Retractions expansion symmetric;no retractions;no use of accessory muscles   All Lung Fields Breath Sounds coarse;diminished   Cough Frequency no cough   PRE-TX-O2   O2 Device (Oxygen Therapy) nasal cannula   Flow (L/min) 4   Oxygen Concentration (%) 36   SpO2 100 %   Pulse Oximetry Type Continuous   Pulse 66   Resp (!) 22   Aerosol Therapy   $ Aerosol Therapy Charges Aerosol Treatment   Respiratory Treatment Status (SVN) given   Treatment Route (SVN) mask;oxygen   Patient Position (SVN) semi-Youssef's   Signs of Intolerance (SVN) none   Breath Sounds Post-Respiratory Treatment   Throughout All Fields Post-Treatment All Fields   Throughout All Fields Post-Treatment no change   Post-treatment Heart Rate (beats/min) 68   Post-treatment Resp Rate (breaths/min) 18   Ready to Wean/Extubation Screen   FIO2<=50 (chart decimal) 0.36

## 2020-10-24 NOTE — SUBJECTIVE & OBJECTIVE
Interval History:  Patient was transferred to the ICU mainly for worsening shortness of breath and use of continues BiPAP.  Patient was hypoxemic all night was placed on BiPAP.  Currently improving on BiPAP    Review of Systems   Constitutional: Negative for appetite change, chills, fatigue and fever.   HENT: Negative for congestion, hearing loss, rhinorrhea, sore throat, trouble swallowing and voice change.    Respiratory: Positive for shortness of breath. Negative for cough, chest tightness and wheezing.    Cardiovascular: Positive for leg swelling. Negative for chest pain and palpitations.   Gastrointestinal: Negative for abdominal pain, blood in stool, diarrhea, nausea and vomiting.   Genitourinary: Negative for difficulty urinating, frequency, hematuria and urgency.   Musculoskeletal: Positive for gait problem and myalgias. Negative for back pain, joint swelling and neck stiffness.   Skin: Positive for wound. Negative for pallor and rash.   Neurological: Negative for tremors, seizures, syncope, speech difficulty, weakness, numbness and headaches.   Hematological: Negative for adenopathy.   Psychiatric/Behavioral: Negative for agitation, behavioral problems, confusion and sleep disturbance.     Objective:     Vital Signs (Most Recent):  Temp: 98 °F (36.7 °C) (10/24/20 0315)  Pulse: (!) 57 (10/24/20 0725)  Resp: (!) 26 (10/24/20 0915)  BP: 131/69 (10/24/20 0600)  SpO2: 100 % (10/24/20 0725) Vital Signs (24h Range):  Temp:  [97.7 °F (36.5 °C)-98.1 °F (36.7 °C)] 98 °F (36.7 °C)  Pulse:  [51-82] 57  Resp:  [12-28] 26  SpO2:  [96 %-100 %] 100 %  BP: (102-166)/(53-83) 131/69     Weight: 127.3 kg (280 lb 10.3 oz)  Body mass index is 39.14 kg/m².    Intake/Output Summary (Last 24 hours) at 10/24/2020 1111  Last data filed at 10/24/2020 0600  Gross per 24 hour   Intake 1423 ml   Output 1225 ml   Net 198 ml      Physical Exam  Constitutional:       General: He is not in acute distress.     Appearance: He is  well-developed. He is not diaphoretic.   HENT:      Head: Normocephalic and atraumatic.      Right Ear: External ear normal.      Left Ear: External ear normal.      Nose: Nose normal.   Eyes:      General: No scleral icterus.        Right eye: No discharge.         Left eye: No discharge.      Conjunctiva/sclera: Conjunctivae normal.      Pupils: Pupils are equal, round, and reactive to light.   Neck:      Musculoskeletal: Normal range of motion and neck supple.      Thyroid: No thyromegaly.   Cardiovascular:      Rate and Rhythm: Normal rate and regular rhythm.      Heart sounds: Normal heart sounds. No murmur.   Pulmonary:      Effort: Pulmonary effort is normal. No respiratory distress.      Breath sounds: No stridor. Rales present. No wheezing.      Comments: Nasal cannula currently  Abdominal:      General: Bowel sounds are normal. There is no distension.      Palpations: Abdomen is soft.      Tenderness: There is no abdominal tenderness.   Musculoskeletal:         General: No tenderness.      Right lower leg: No edema.      Left lower leg: Edema present.   Lymphadenopathy:      Cervical: No cervical adenopathy.   Skin:     General: Skin is warm and dry.      Capillary Refill: Capillary refill takes less than 2 seconds.      Findings: Bruising present. No erythema or rash.      Comments: Asymmetric edema to left lower extremity from calf to proximal thigh. Left calf tenderness. Multiple abrasions and circular ligature to left tib fib region with surrounding eryth and tenderness. No foreign bodies. No fluctuance. No crepitus. No joint effusion.  Edema and pain does not extend past the knee. No pain with flexion or extension of the left knee.   1+ pitting edema bilaterally.    Neurological:      Mental Status: He is alert and oriented to person, place, and time.      Cranial Nerves: No cranial nerve deficit.      Sensory: No sensory deficit.      Motor: No abnormal muscle tone.      Coordination: Coordination  normal.   Psychiatric:         Behavior: Behavior normal.         Thought Content: Thought content normal.         Judgment: Judgment normal.         Significant Labs: All pertinent labs within the past 24 hours have been reviewed.    Significant Imaging: I have reviewed all pertinent imaging results/findings within the past 24 hours.

## 2020-10-24 NOTE — PROGRESS NOTES
[facility] Hospital Medicine  Progress Note    Patient Name: Jose Leon  MRN: [id number]  Patient Class: IP- Inpatient   Admission Date: 10/19/2020  Length of Stay: 2 days  Attending Physician: Amina Sal MD  Primary Care Provider: Josh Giordano MD        Subjective:     Principal Problem:Cellulitis of left lower extremity        HPI:  Jose Leon is a 60 y.o. male with PMHx of DM, CHF, chronic LE edema, morbid obesity, chronic benzo, pain med and anticoagulant use who presented to the ED via EMS for evaluation of wound to the left lower leg s/p fall on 10/5.  When home health nurse evaluated the patient and evaluated the wound recommended patient come to the emergency room for IV antibiotics.  Patient states that he had a low-grade temperature and had chills.  Patient was given a referral to wound care nurses as an outpatient but did not go to his clinic visit.  Patient states that he has difficulty ambulating due to and swelling of the left leg and foot.  The patient denies  chest pain, or any other symptoms at this time. Patient is a former smoker.     The history is provided by the patient.     Overview/Hospital Course:  No notes on file     Interval History:  Patient was transferred to the ICU mainly for worsening shortness of breath and use of continues BiPAP.  Patient was hypoxemic all night was placed on BiPAP.  Currently improving on BiPAP    Review of Systems   Constitutional: Negative for appetite change, chills, fatigue and fever.   HENT: Negative for congestion, hearing loss, rhinorrhea, sore throat, trouble swallowing and voice change.    Respiratory: Positive for shortness of breath. Negative for cough, chest tightness and wheezing.    Cardiovascular: Positive for leg swelling. Negative for chest pain and palpitations.   Gastrointestinal: Negative for abdominal pain, blood in stool, diarrhea, nausea and vomiting.   Genitourinary: Negative for difficulty urinating,  frequency, hematuria and urgency.   Musculoskeletal: Positive for gait problem and myalgias. Negative for back pain, joint swelling and neck stiffness.   Skin: Positive for wound. Negative for pallor and rash.   Neurological: Negative for tremors, seizures, syncope, speech difficulty, weakness, numbness and headaches.   Hematological: Negative for adenopathy.   Psychiatric/Behavioral: Negative for agitation, behavioral problems, confusion and sleep disturbance.     Objective:     Vital Signs (Most Recent):  Temp: 98 °F (36.7 °C) (10/24/20 0315)  Pulse: (!) 57 (10/24/20 0725)  Resp: (!) 26 (10/24/20 0915)  BP: 131/69 (10/24/20 0600)  SpO2: 100 % (10/24/20 0725) Vital Signs (24h Range):  Temp:  [97.7 °F (36.5 °C)-98.1 °F (36.7 °C)] 98 °F (36.7 °C)  Pulse:  [51-82] 57  Resp:  [12-28] 26  SpO2:  [96 %-100 %] 100 %  BP: (102-166)/(53-83) 131/69     Weight: 127.3 kg (280 lb 10.3 oz)  Body mass index is 39.14 kg/m².    Intake/Output Summary (Last 24 hours) at 10/24/2020 1111  Last data filed at 10/24/2020 0600  Gross per 24 hour   Intake 1423 ml   Output 1225 ml   Net 198 ml      Physical Exam  Constitutional:       General: He is not in acute distress.     Appearance: He is well-developed. He is not diaphoretic.   HENT:      Head: Normocephalic and atraumatic.      Right Ear: External ear normal.      Left Ear: External ear normal.      Nose: Nose normal.   Eyes:      General: No scleral icterus.        Right eye: No discharge.         Left eye: No discharge.      Conjunctiva/sclera: Conjunctivae normal.      Pupils: Pupils are equal, round, and reactive to light.   Neck:      Musculoskeletal: Normal range of motion and neck supple.      Thyroid: No thyromegaly.   Cardiovascular:      Rate and Rhythm: Normal rate and regular rhythm.      Heart sounds: Normal heart sounds. No murmur.   Pulmonary:      Effort: Pulmonary effort is normal. No respiratory distress.      Breath sounds: No stridor. Rales present. No wheezing.       Comments: Nasal cannula currently  Abdominal:      General: Bowel sounds are normal. There is no distension.      Palpations: Abdomen is soft.      Tenderness: There is no abdominal tenderness.   Musculoskeletal:         General: No tenderness.      Right lower leg: No edema.      Left lower leg: Edema present.   Lymphadenopathy:      Cervical: No cervical adenopathy.   Skin:     General: Skin is warm and dry.      Capillary Refill: Capillary refill takes less than 2 seconds.      Findings: Bruising present. No erythema or rash.      Comments: Asymmetric edema to left lower extremity from calf to proximal thigh. Left calf tenderness. Multiple abrasions and circular ligature to left tib fib region with surrounding eryth and tenderness. No foreign bodies. No fluctuance. No crepitus. No joint effusion.  Edema and pain does not extend past the knee. No pain with flexion or extension of the left knee.   1+ pitting edema bilaterally.    Neurological:      Mental Status: He is alert and oriented to person, place, and time.      Cranial Nerves: No cranial nerve deficit.      Sensory: No sensory deficit.      Motor: No abnormal muscle tone.      Coordination: Coordination normal.   Psychiatric:         Behavior: Behavior normal.         Thought Content: Thought content normal.         Judgment: Judgment normal.         Significant Labs: All pertinent labs within the past 24 hours have been reviewed.    Significant Imaging: I have reviewed all pertinent imaging results/findings within the past 24 hours.      Assessment/Plan:      * Cellulitis of left lower extremity  This patient does have evidence of infective focus    My overall impression is cellulitis of the left foot.  Vital signs were reviewed and noted in progress note.  Antibiotics given-   Antibiotics (From admission, onward)    Start     Stop Route Frequency Ordered    10/24/20 1000  cefepime in dextrose 5 % IVPB 2 g      -- IV Every 8 hours (non-standard times)  10/24/20 0949    10/23/20 1930  vancomycin (VANCOCIN) 2,000 mg in dextrose 5 % 500 mL IVPB      -- IV Every 24 hours (non-standard times) 10/22/20 1540    10/22/20 1609  vancomycin - pharmacy to dose  (vancomycin IVPB)      -- IV pharmacy to manage frequency 10/22/20 1509        Cultures were taken-   Microbiology Results (last 7 days)     Procedure Component Value Units Date/Time    Blood culture [[id number]] Collected: 10/22/20 1700    Order Status: Completed Specimen: Blood from Antecubital, Right Arm Updated: 10/24/20 0613     Blood Culture, Routine No Growth to date      No Growth to date    Blood culture [[id number]] Collected: 10/19/20 1911    Order Status: Completed Specimen: Blood from Antecubital, Right Arm Updated: 10/24/20 0612     Blood Culture, Routine No Growth to date      No Growth to date      No Growth to date      No Growth to date      No Growth to date    Narrative:      Take 2 sets, from peripheral site. Take both aerobic and  anaerobic bottles.    Culture, Respiratory with Gram Stain [[id number]]     Order Status: No result Specimen: Respiratory from Sputum, Expectorated     Aerobic culture [[id number]]     Order Status: No result Specimen: Skin from Leg, Left         Latest lactate reviewed, they are-    Will resume vancomycin for now  Microbiology Results (last 7 days)     Procedure Component Value Units Date/Time    Blood culture [[id number]] Collected: 10/22/20 1700    Order Status: Completed Specimen: Blood from Antecubital, Right Arm Updated: 10/24/20 0613     Blood Culture, Routine No Growth to date      No Growth to date    Blood culture [[id number]] Collected: 10/19/20 1911    Order Status: Completed Specimen: Blood from Antecubital, Right Arm Updated: 10/24/20 0612     Blood Culture, Routine No Growth to date      No Growth to date      No Growth to date      No Growth to date      No Growth to date    Narrative:      Take 2 sets, from peripheral site. Take both aerobic and  anaerobic  bottles.    Culture, Respiratory with Gram Stain [[id number]]     Order Status: No result Specimen: Respiratory from Sputum, Expectorated     Aerobic culture [[id number]]     Order Status: No result Specimen: Skin from Leg, Left       will follow wound care re      Hyperkalemia  Most likely is secondary to Alonzo   Patient was given 1 dose of Kayexalate   trending down at this point  Will continue to monitor        Unable to ambulate  Unable to place weight on the left leg  Will follow up PT OT final recs  PT OT recommended rehab            Type 2 diabetes mellitus with hyperglycemia, with long-term current use of insulin  Patient's FSGs are controlled on current hypoglycemics.   Last A1c reviewed-   Lab Results   Component Value Date    HGBA1C 6.4 (H) 10/20/2020     Most recent fingerstick glucose reviewed-   Recent Labs   Lab 10/23/20  1637 10/23/20  2113 10/24/20  0545   POCTGLUCOSE 113* 173* 120*     Current correctional scale  Low  Maintain anti-hyperglycemic dose as follows-   Antihyperglycemics (From admission, onward)    Start     Stop Route Frequency Ordered    10/22/20 1045  insulin regular injection 3 Units      -- IV Once 10/22/20 1044    10/20/20 0745  insulin aspart U-100 pen 3 Units      -- SubQ 3 times daily with meals 10/19/20 2139    10/19/20 2145  insulin detemir U-100 pen 10 Units      -- SubQ Nightly 10/19/20 2139    10/19/20 2139  insulin aspart U-100 pen 0-5 Units      -- SubQ Before meals & nightly PRN 10/19/20 2139        Hold Oral hypoglycemics while patient is in the hospital.        Anxiety And Depression   will resume home benzodiazepine    Hypertension associated with diabetes  Will resume home meds      Acute on chronic diastolic congestive heart failure  Patient is identified as having Diastolic heart failure that is Chronic. CHF is currently controlled. Latest ECHO performed and demonstrates-   Results for orders placed during the hospital encounter of 04/03/20   Echo Color Flow Doppler?  Yes; Bubble Contrast? No    Narrative · Concentric left ventricular remodeling.  · The mean diastolic gradient across the mitral valve is 1 mmHg at a heart   rate of bpm.  · Severe left atrial enlargement.  · Normal left ventricular systolic function. The estimated ejection   fraction is 60%.  · Indeterminate left ventricular diastolic function.  · No wall motion abnormalities.  · Moderate right atrial enlargement.  · Normal central venous pressure (3 mmHg).  · The estimated PA systolic pressure is 27 mmHg.  · Normal right ventricular systolic function.  · Mild tricuspid regurgitation.      . Continue Beta Blocker ACE/ARB Furosemide and monitor clinical status closely. Monitor on telemetry. Patient is off CHF pathway.  Monitor strict Is&Os and daily weights.  Place on fluid restriction of 1.5 L. Continue to stress to patient importance of self efficacy and  on diet for CHF. Last BNP reviewed- and noted below   Recent Labs   Lab 10/22/20  1700   *   .            JULY on CPAP  History noted  Will resume CPAP at night      Morbid obesity with BMI of 40.0-44.9, adult  Body mass index is 39.14 kg/m². Morbid obesity complicates all aspects of disease management from diagnostic modalities to treatment. Weight loss encouraged and health benefits explained to patient.        Atrial fibrillation with rapid ventricular response, CHADS-VAS score 3  History noted  Currently in AFib but rate controlled  Will resume blood thinner and metoprolol  Will resume digoxin        VTE Risk Mitigation (From admission, onward)         Ordered     apixaban tablet 5 mg  2 times daily      10/19/20 2139     IP VTE HIGH RISK PATIENT  Once      10/19/20 2139     Place sequential compression device  Until discontinued      10/19/20 2139                Discharge Planning   KOKI:      Code Status: Full Code   Is the patient medically ready for discharge?:     Reason for patient still in hospital (select all that apply): Patient trending  condition and Treatment  Discharge Plan A: Rehab            Critical care time spent on the evaluation and treatment of severe organ dysfunction, review of pertinent labs and imaging studies, discussions with consulting providers and discussions with patient/family: 60 minutes.      Amina Sal MD  Department of Hospital Medicine   [facility]

## 2020-10-24 NOTE — CARE UPDATE
10/24/20 0725   Patient Assessment/Suction   Level of Consciousness (AVPU) alert   All Lung Fields Breath Sounds diminished   PRE-TX-O2   O2 Device (Oxygen Therapy) BiPAP   $ Is the patient on Low Flow Oxygen? Yes   Oxygen Concentration (%) 40   SpO2 100 %   Pulse Oximetry Type Continuous   $ Pulse Oximetry - Multiple Charge Pulse Oximetry - Multiple   Pulse (!) 57   Resp 13   Aerosol Therapy   $ Aerosol Therapy Charges Aerosol Treatment   Respiratory Treatment Status (SVN) given   Treatment Route (SVN) in-line   Patient Position (SVN) Youssef's   Post Treatment Assessment (SVN) breath sounds unchanged   Signs of Intolerance (SVN) none   Breath Sounds Post-Respiratory Treatment   Throughout All Fields Post-Treatment All Fields   Throughout All Fields Post-Treatment no change   Post-treatment Heart Rate (beats/min) 60   Post-treatment Resp Rate (breaths/min) 15   Preset CPAP/BiPAP Settings   Mode Of Delivery BiPAP S/T   $ CPAP/BiPAP Daily Charge BiPAP/CPAP Daily   $ Initial CPAP/BiPAP Setup? No   $ Is patient using? Yes   Size of Mask Medium/Large   Sized Appropriately? Yes   Equipment Type V60   Airway Device Type medium full face mask   Ipap 15   EPAP (cm H2O) 5   Pressure Support (cm H2O) 10   Set Rate (Breaths/Min) 12   ITime (sec) 1   Rise Time (sec) 2   Patient CPAP/BiPAP Settings   Timed Inspiration (Sec) 1   IPAP Rise Time (sec) 2   RR Total (Breaths/Min) 16   Tidal Volume (mL) 745   VE Minute Ventilation (L/min) 18.6 L/min   Peak Inspiratory Pressure (cm H2O) 16   TiTOT (%) 36   Total Leak (L/Min) 6   Patient Trigger - ST Mode Only (%) 74   CPAP/BiPAP Backup Settings   Backup Rate 12 breaths per minute (bpm)   Xop .63/Atro Q4, tolerated tx inline bipap well, bipap continuous, vitals as charted.

## 2020-10-24 NOTE — ASSESSMENT & PLAN NOTE
This patient does have evidence of infective focus    My overall impression is cellulitis of the left foot.  Vital signs were reviewed and noted in progress note.  Antibiotics given-   Antibiotics (From admission, onward)    Start     Stop Route Frequency Ordered    10/24/20 1000  cefepime in dextrose 5 % IVPB 2 g      -- IV Every 8 hours (non-standard times) 10/24/20 0949    10/23/20 1930  vancomycin (VANCOCIN) 2,000 mg in dextrose 5 % 500 mL IVPB      -- IV Every 24 hours (non-standard times) 10/22/20 1540    10/22/20 1609  vancomycin - pharmacy to dose  (vancomycin IVPB)      -- IV pharmacy to manage frequency 10/22/20 1509        Cultures were taken-   Microbiology Results (last 7 days)     Procedure Component Value Units Date/Time    Blood culture [[id number]] Collected: 10/22/20 1700    Order Status: Completed Specimen: Blood from Antecubital, Right Arm Updated: 10/24/20 0613     Blood Culture, Routine No Growth to date      No Growth to date    Blood culture [[id number]] Collected: 10/19/20 1911    Order Status: Completed Specimen: Blood from Antecubital, Right Arm Updated: 10/24/20 0612     Blood Culture, Routine No Growth to date      No Growth to date      No Growth to date      No Growth to date      No Growth to date    Narrative:      Take 2 sets, from peripheral site. Take both aerobic and  anaerobic bottles.    Culture, Respiratory with Gram Stain [[id number]]     Order Status: No result Specimen: Respiratory from Sputum, Expectorated     Aerobic culture [[id number]]     Order Status: No result Specimen: Skin from Leg, Left         Latest lactate reviewed, they are-    Will resume vancomycin for now  Microbiology Results (last 7 days)     Procedure Component Value Units Date/Time    Blood culture [[id number]] Collected: 10/22/20 1700    Order Status: Completed Specimen: Blood from Antecubital, Right Arm Updated: 10/24/20 0613     Blood Culture, Routine No Growth to date      No Growth to date    Blood  culture [[id number]] Collected: 10/19/20 1911    Order Status: Completed Specimen: Blood from Antecubital, Right Arm Updated: 10/24/20 0612     Blood Culture, Routine No Growth to date      No Growth to date      No Growth to date      No Growth to date      No Growth to date    Narrative:      Take 2 sets, from peripheral site. Take both aerobic and  anaerobic bottles.    Culture, Respiratory with Gram Stain [[id number]]     Order Status: No result Specimen: Respiratory from Sputum, Expectorated     Aerobic culture [[id number]]     Order Status: No result Specimen: Skin from Leg, Left       will follow wound care re

## 2020-10-24 NOTE — PROGRESS NOTES
Pharmacist Renal Dose Adjustment Note    Jose Leon is a 60 y.o. male being treated with the medication Cefepime    Patient Data:    Vital Signs (Most Recent):  Temp: 98 °F (36.7 °C) (10/24/20 0315)  Pulse: (!) 57 (10/24/20 0725)  Resp: (!) 26 (10/24/20 0915)  BP: 131/69 (10/24/20 0600)  SpO2: 100 % (10/24/20 0725)   Vital Signs (72h Range):  Temp:  [96.3 °F (35.7 °C)-100.8 °F (38.2 °C)]   Pulse:  []   Resp:  [12-32]   BP: ()/(53-98)   SpO2:  [90 %-100 %]      Recent Labs   Lab 10/22/20  1700 10/23/20  0319 10/24/20  0409   CREATININE 1.6* 1.4 1.3     Serum creatinine: 1.3 mg/dL 10/24/20 0409  Estimated creatinine clearance: 82.1 mL/min    Medication:Cefepime dose: 2g frequency q12h will be changed to medication:Cefepime dose:2g frequency:q8h    Pharmacist's Name: Matias Haley  Pharmacist's Extension: [phone]

## 2020-10-24 NOTE — ASSESSMENT & PLAN NOTE
Patient's FSGs are controlled on current hypoglycemics.   Last A1c reviewed-   Lab Results   Component Value Date    HGBA1C 6.4 (H) 10/20/2020     Most recent fingerstick glucose reviewed-   Recent Labs   Lab 10/23/20  1637 10/23/20  2113 10/24/20  0545   POCTGLUCOSE 113* 173* 120*     Current correctional scale  Low  Maintain anti-hyperglycemic dose as follows-   Antihyperglycemics (From admission, onward)    Start     Stop Route Frequency Ordered    10/22/20 1045  insulin regular injection 3 Units      -- IV Once 10/22/20 1044    10/20/20 0745  insulin aspart U-100 pen 3 Units      -- SubQ 3 times daily with meals 10/19/20 2139    10/19/20 2145  insulin detemir U-100 pen 10 Units      -- SubQ Nightly 10/19/20 2139    10/19/20 2139  insulin aspart U-100 pen 0-5 Units      -- SubQ Before meals & nightly PRN 10/19/20 2139        Hold Oral hypoglycemics while patient is in the hospital.

## 2020-10-24 NOTE — PLAN OF CARE
Patient free of falls and injuries this shift. Awake, alert and oriented to person, place and time but some confusion about situation. Remained on bipap most of the night. 5 liters NC when off of bipap. Afib on monitor. [place] to gravity with good output. Prn pain medication given x1 for c/o LLL pain. LLE remains elevated with heels floating.

## 2020-10-24 NOTE — ASSESSMENT & PLAN NOTE
Will continue broad-spectrum antibiotics  Microbiology Results (last 7 days)     Procedure Component Value Units Date/Time    Blood culture [[id number]] Collected: 10/22/20 1700    Order Status: Completed Specimen: Blood from Antecubital, Right Arm Updated: 10/24/20 0613     Blood Culture, Routine No Growth to date      No Growth to date    Blood culture [[id number]] Collected: 10/19/20 1911    Order Status: Completed Specimen: Blood from Antecubital, Right Arm Updated: 10/24/20 0612     Blood Culture, Routine No Growth to date      No Growth to date      No Growth to date      No Growth to date      No Growth to date    Narrative:      Take 2 sets, from peripheral site. Take both aerobic and  anaerobic bottles.    Culture, Respiratory with Gram Stain [[id number]]     Order Status: No result Specimen: Respiratory from Sputum, Expectorated     Aerobic culture [[id number]]     Order Status: No result Specimen: Skin from Leg, Left

## 2020-10-24 NOTE — ASSESSMENT & PLAN NOTE
Body mass index is 39.14 kg/m². Morbid obesity complicates all aspects of disease management from diagnostic modalities to treatment. Weight loss encouraged and health benefits explained to patient.

## 2020-10-24 NOTE — PROGRESS NOTES
Pharmacokinetic Assessment Follow Up: IV Vancomycin    Vancomycin serum concentration assessment(s):    The trough level was drawn correctly and can be used to guide therapy at this time. The measurement is above the desired definitive target range of 10 to 15 mcg/mL.    Vancomycin Regimen Plan:    Change regimen to Vancomycin 1500 mg IV every 24 hours with next serum trough concentration measured at 1830 prior to 3rd dose on 10/26/2020    Drug levels (last 3 results):  Recent Labs   Lab Result Units 10/24/20  1812   Vancomycin-Trough ug/mL 17.2       Pharmacy will continue to follow and monitor vancomycin.    Please contact pharmacy at extension [phone] for questions regarding this assessment.    Thank you for the consult,   Matias Haley       Patient brief summary:  Jose Leon is a 60 y.o. male initiated on antimicrobial therapy with IV Vancomycin for treatment of skin & soft tissue infection    The patient's current regimen is 1500mg q24h    Drug Allergies:   Review of patient's allergies indicates:   Allergen Reactions    Atorvastatin      Other reaction(s): Generalized Myalgias    Effexor [venlafaxine] Other (See Comments)     Tremulousness       Actual Body Weight:   127.3kg    Renal Function:   Estimated Creatinine Clearance: 82.1 mL/min (based on SCr of 1.3 mg/dL).,     CBC (last 72 hours):  Recent Labs   Lab Result Units 10/22/20  0440 10/22/20  1700 10/23/20  0319 10/24/20  0409   WBC K/uL 9.68 8.84 7.50 6.35   Hemoglobin g/dL 11.2* 11.2* 9.9* 9.2*   Hematocrit % 37.6* 37.2* 33.9* 31.3*   Platelets K/uL 224 226 209 179   Gran% % 89.8* 83.3* 77.1* 71.4   Lymph% % 4.6* 9.6* 12.9* 14.0*   Mono% % 4.0 4.9 6.8 7.7   Eosinophil% % 0.8 1.6 2.1 6.3   Basophil% % 0.5 0.5 0.7 0.3   Differential Method  Automated Automated Automated Automated       Metabolic Panel (last 72 hours):  Recent Labs   Lab Result Units 10/22/20  0440 10/22/20  0949 10/22/20  1700 10/23/20  0319 10/24/20  0409   Sodium mmol/L 135*  --  136  135* 137   Potassium mmol/L 5.5* 5.4* 4.4 4.2 4.2   Chloride mmol/L 103  --  102 102 101   CO2 mmol/L 23  --  24 25 27   Glucose mg/dL 142*  --  130* 157* 119*   BUN, Bld mg/dL 20  --  20 20 25*   Creatinine mg/dL 1.6*  --  1.6* 1.4 1.3   Albumin g/dL  --   --  3.1*  --   --    Total Bilirubin mg/dL  --   --  0.7  --   --    Alkaline Phosphatase U/L  --   --  124  --   --    AST U/L  --   --  14  --   --    ALT U/L  --   --  5*  --   --    Magnesium mg/dL  --   --   --   --  1.9   Phosphorus mg/dL  --   --   --   --  3.3       Vancomycin Administrations:  vancomycin given in the last 96 hours                     vancomycin (VANCOCIN) 2,000 mg in dextrose 5 % 500 mL IVPB (mg) 2,000 mg New Bag 10/23/20 1918    vancomycin (VANCOCIN) 2,500 mg in dextrose 5 % 500 mL IVPB (mg) 2,500 mg New Bag 10/22/20 1930                    Microbiologic Results:  Microbiology Results (last 7 days)       Procedure Component Value Units Date/Time    Blood culture [[id number]] Collected: 10/22/20 1700    Order Status: Completed Specimen: Blood from Antecubital, Right Arm Updated: 10/24/20 0613     Blood Culture, Routine No Growth to date      No Growth to date    Blood culture [[id number]] Collected: 10/19/20 1911    Order Status: Completed Specimen: Blood from Antecubital, Right Arm Updated: 10/24/20 0612     Blood Culture, Routine No Growth to date      No Growth to date      No Growth to date      No Growth to date      No Growth to date    Narrative:      Take 2 sets, from peripheral site. Take both aerobic and  anaerobic bottles.    Culture, Respiratory with Gram Stain [[id number]]     Order Status: No result Specimen: Respiratory from Sputum, Expectorated     Aerobic culture [[id number]]     Order Status: No result Specimen: Skin from Leg, Left

## 2020-10-24 NOTE — PROGRESS NOTES
Pulmonary / Critical Care Medicine  Progress Note    Subjective     10/24/2020:  No major issues overnight. Subjectively improved with less dyspnea/orthopnea.  His left leg is still hurting but otherwise he has no other complaints this morning.     Review of Systems   Constitutional: Negative for fever, chills and night sweats.   Respiratory: Positive for orthopnea. Negative for cough, sputum production, shortness of breath and wheezing.    Cardiovascular: Positive for leg swelling. Negative for chest pain and palpitations.   Gastrointestinal: Negative for nausea, vomiting and abdominal pain.        I have personally reviewed the following during today's evaluation:  past medical history, ROS, family history, social history, surgical history, current inpatient medications,drug allergies, vital signs over the past 24 hours, results of relevant diagnostic studies and nursing/provider documentation from the past 24 hours.     Objective     VS Temp:  [97.7 °F (36.5 °C)-98.1 °F (36.7 °C)]   Pulse:  [51-82]   Resp:  [12-28]   BP: (102-166)/(53-83)   SpO2:  [96 %-100 %]   Ideal body weight: 75.3 kg (166 lb 0.1 oz)  Adjusted ideal body weight: 96.1 kg (211 lb 13.8 oz)   I/O   Intake/Output Summary (Last 24 hours) at 10/24/2020 0843  Last data filed at 10/24/2020 0600  Gross per 24 hour   Intake 1423 ml   Output 1225 ml   Net 198 ml        Vent SpO2 100% on 2L NC during my exam   PE Physical Exam   Constitutional: He is oriented to person, place, and time. He appears well-developed and well-nourished. He is cooperative.  Non-toxic appearance. No distress. Nasal cannula in place. He is obese.   HENT:   Head: Normocephalic.   Right Ear: External ear normal.   Left Ear: External ear normal.   Nose: Nose normal.   Mouth/Throat: Oropharynx is clear and moist. Mallampati Score: III.   Neck: Normal range of motion. Neck supple. No JVD present. No thyromegaly present.   R IJ TLC   Cardiovascular: Normal rate and intact distal  pulses. An irregular rhythm present. Exam reveals no gallop and no friction rub.   No murmur heard.  Pulmonary/Chest: Normal expansion and symmetric chest wall expansion. He has no wheezes. He has no rhonchi. He has rales.   Abdominal: Soft. Bowel sounds are normal. He exhibits no distension. There is no abdominal tenderness.   Genitourinary:    Genitourinary Comments: [place]     Musculoskeletal: Normal range of motion.         General: No tenderness or edema.   Lymphadenopathy:     He has no cervical adenopathy.   Neurological: He is alert and oriented to person, place, and time. No cranial nerve deficit.   Skin: Skin is warm and dry. No rash noted. No cyanosis. Nails show no clubbing.   Left calf wrapped in a clean Kerlex dressing   Psychiatric: He has a normal mood and affect. His behavior is normal. Thought content normal.   Nursing note and vitals reviewed.      Labs I have personally reviewed and interpreted all labs / diagnostic studies obtained over the past 24 hours, and relevant results are as follows:  Recent Labs   Lab 10/24/20  0409   WBC 6.35   RBC 3.91*   HGB 9.2*   HCT 31.3*      MCV 80*   MCH 23.5*   MCHC 29.4*      K 4.2      CO2 27   BUN 25*   CREATININE 1.3   MG 1.9      Imaging I have personally reviewed and interpreted the following images and reviewed the associated Radiology report.  I have reviewed and interpreted all pertinent imaging results/findings within the past 24 hours.  CXR:  Bilateral GGOs (R>L) with improved aeration on the left compared to film from 2 days prior.     Micro I have personally reviewed and interpreted the available culture data.  Relevant results are as follows.  Blood Culture   Lab Results   Component Value Date    LABBLOO No Growth to date 10/22/2020    LABBLOO No Growth to date 10/22/2020   , Sputum Culture No results found for: GSRESP, RESPIRATORYC and Urine Culture    Lab Results   Component Value Date    LABURIN  12/11/2017     Multiple  organisms isolated. None in predominance.  Repeat if    LABURIN clinically necessary. 12/11/2017      Medications Scheduled    amiodarone  200 mg Oral BID    amLODIPine  5 mg Oral Daily    apixaban  5 mg Oral BID    buPROPion  150 mg Oral Daily    ceFEPime (MAXIPIME) IVPB  2 g Intravenous Q12H    clopidogreL  75 mg Oral Daily    digoxin  0.25 mg Oral Daily    docusate sodium  200 mg Oral BID    escitalopram oxalate  20 mg Oral Daily    furosemide (LASIX) IV  40 mg Intravenous Q12H    gabapentin  600 mg Oral TID    insulin aspart U-100  3 Units Subcutaneous TID WM    insulin detemir U-100  10 Units Subcutaneous QHS    insulin regular  3 Units Intravenous Once    ipratropium  0.5 mg Nebulization Q4H    levalbuterol  0.63 mg Nebulization Q4H    magnesium oxide  400 mg Oral Daily    meloxicam  15 mg Oral Daily    metoprolol succinate  50 mg Oral Daily    oxybutynin  10 mg Oral Daily    pravastatin  80 mg Oral QHS    vancomycin (VANCOCIN) IVPB  2,000 mg Intravenous Q24H      Continuous Infusions:      PRN   acetaminophen, dextrose 50%, dextrose 50%, diphenhydrAMINE, glucagon (human recombinant), glucose, glucose, influenza, insulin aspart U-100, oxyCODONE-acetaminophen, sodium chloride 0.9%, Pharmacy to dose Vancomycin consult **AND** vancomycin - pharmacy to dose, zolpidem        Assessment       Active Hospital Problems    Diagnosis    *Cellulitis of left lower extremity    Hyperkalemia    Acute hypoxemic respiratory failure    Unable to ambulate    Type 2 diabetes mellitus with hyperglycemia, with long-term current use of insulin    Anxiety And Depression    Hypertension associated with diabetes    Acute on chronic diastolic congestive heart failure    JULY on CPAP    Morbid obesity with BMI of 40.0-44.9, adult    Atrial fibrillation with rapid ventricular response, CHADS-VAS score 3      My Impression:  Here with cellulitis.  Hospital course complicated by the development of acute  hypoxemic respiratory failure 2/2 acute on chronic HFpEF, which sems to be improved with diuresis and NIPPV over the past 24 hours.    Plan     Pulmonary  · Intermittent/nighlty BIPAP   · Continue empiric broad spectrum abx for possible LRTI for now.  De-escalate as culture data becomes available.  · Continued diuresis.  · Will defer to hospital medicine re: management of his antihypertensives and other cardiac medications.  · Seems to be improving but would continue to observe in the intensive care unit for now.  · Okay to advance diet.  · Continue to avoid sedating medications.     George Spence MD  Pulmonary / Critical Care Medicine  [facility]

## 2020-10-24 NOTE — ASSESSMENT & PLAN NOTE
This patient does have evidence of infective focus    My overall impression is cellulitis of the left foot.  Vital signs were reviewed and noted in progress note.  Antibiotics given-   Antibiotics (From admission, onward)    Start     Stop Route Frequency Ordered    10/24/20 1000  cefepime in dextrose 5 % IVPB 2 g      -- IV Every 8 hours (non-standard times) 10/24/20 0949    10/23/20 1930  vancomycin (VANCOCIN) 2,000 mg in dextrose 5 % 500 mL IVPB      -- IV Every 24 hours (non-standard times) 10/22/20 1540    10/22/20 1609  vancomycin - pharmacy to dose  (vancomycin IVPB)      -- IV pharmacy to manage frequency 10/22/20 1509        Cultures were taken-   Microbiology Results (last 7 days)     Procedure Component Value Units Date/Time    Blood culture [[id number]] Collected: 10/22/20 1700    Order Status: Completed Specimen: Blood from Antecubital, Right Arm Updated: 10/24/20 0613     Blood Culture, Routine No Growth to date      No Growth to date    Blood culture [[id number]] Collected: 10/19/20 1911    Order Status: Completed Specimen: Blood from Antecubital, Right Arm Updated: 10/24/20 0612     Blood Culture, Routine No Growth to date      No Growth to date      No Growth to date      No Growth to date      No Growth to date    Narrative:      Take 2 sets, from peripheral site. Take both aerobic and  anaerobic bottles.    Culture, Respiratory with Gram Stain [[id number]]     Order Status: No result Specimen: Respiratory from Sputum, Expectorated     Aerobic culture [[id number]]     Order Status: No result Specimen: Skin from Leg, Left         Latest lactate reviewed, they are-    Will resume vancomycin for now  Microbiology Results (last 7 days)     Procedure Component Value Units Date/Time    Blood culture [[id number]] Collected: 10/22/20 1700    Order Status: Completed Specimen: Blood from Antecubital, Right Arm Updated: 10/24/20 0613     Blood Culture, Routine No Growth to date      No Growth to date    Blood  culture [[id number]] Collected: 10/19/20 1911    Order Status: Completed Specimen: Blood from Antecubital, Right Arm Updated: 10/24/20 0612     Blood Culture, Routine No Growth to date      No Growth to date      No Growth to date      No Growth to date      No Growth to date    Narrative:      Take 2 sets, from peripheral site. Take both aerobic and  anaerobic bottles.    Culture, Respiratory with Gram Stain [[id number]]     Order Status: No result Specimen: Respiratory from Sputum, Expectorated     Aerobic culture [[id number]]     Order Status: No result Specimen: Skin from Leg, Left       will follow wound care re

## 2020-10-24 NOTE — ASSESSMENT & PLAN NOTE
Will continue broad-spectrum antibiotics  Microbiology Results (last 7 days)     Procedure Component Value Units Date/Time    Blood culture [[id number]] Collected: 10/22/20 1700    Order Status: Completed Specimen: Blood from Antecubital, Right Arm Updated: 10/24/20 0613     Blood Culture, Routine No Growth to date      No Growth to date    Blood culture [[id number]] Collected: 10/19/20 1911    Order Status: Completed Specimen: Blood from Antecubital, Right Arm Updated: 10/24/20 0612     Blood Culture, Routine No Growth to date      No Growth to date      No Growth to date      No Growth to date      No Growth to date    Narrative:      Take 2 sets, from peripheral site. Take both aerobic and  anaerobic bottles.    Culture, Respiratory with Gram Stain [[id number]]     Order Status: No result Specimen: Respiratory from Sputum, Expectorated     Aerobic culture [[id number]]     Order Status: No result Specimen: Skin from Leg, Left

## 2020-10-24 NOTE — PROGRESS NOTES
[facility] Medicine  Progress Note    Patient Name: Jose Leon  MRN: [id number]  Patient Class: IP- Inpatient   Admission Date: 10/19/2020  Length of Stay: 2 days  Attending Physician: Amina Sal MD  Primary Care Provider: Josh Giordano MD        Subjective:     Principal Problem:Cellulitis of left lower extremity        HPI:  Jose Leon is a 60 y.o. male with PMHx of DM, CHF, chronic LE edema, morbid obesity, chronic benzo, pain med and anticoagulant use who presented to the ED via EMS for evaluation of wound to the left lower leg s/p fall on 10/5.  When home health nurse evaluated the patient and evaluated the wound recommended patient come to the emergency room for IV antibiotics.  Patient states that he had a low-grade temperature and had chills.  Patient was given a referral to wound care nurses as an outpatient but did not go to his clinic visit.  Patient states that he has difficulty ambulating due to and swelling of the left leg and foot.  The patient denies  chest pain, or any other symptoms at this time. Patient is a former smoker.     The history is provided by the patient.     Overview/Hospital Course:  No notes on file    Interval History: Patient currently off BIPAP doing well, on NC. Only complain is the pain in L Lower extremity    Review of Systems   Constitutional: Negative for appetite change, chills, fatigue and fever.   HENT: Negative for congestion, hearing loss, rhinorrhea, sore throat, trouble swallowing and voice change.    Respiratory: Negative for cough, chest tightness, shortness of breath and wheezing.    Cardiovascular: Negative for chest pain, palpitations and leg swelling.   Gastrointestinal: Negative for abdominal pain, blood in stool, diarrhea, nausea and vomiting.   Genitourinary: Negative for difficulty urinating, frequency, hematuria and urgency.   Musculoskeletal: Positive for gait problem and myalgias. Negative for back pain, joint  swelling and neck stiffness.   Skin: Positive for wound. Negative for pallor and rash.   Neurological: Negative for tremors, seizures, syncope, speech difficulty, weakness, numbness and headaches.   Hematological: Negative for adenopathy.   Psychiatric/Behavioral: Negative for agitation, behavioral problems, confusion and sleep disturbance.     Objective:     Vital Signs (Most Recent):  Temp: 98.6 °F (37 °C) (10/24/20 1200)  Pulse: 64 (10/24/20 1600)  Resp: (!) 25 (10/24/20 1600)  BP: 125/68 (10/24/20 1600)  SpO2: 100 % (10/24/20 1600) Vital Signs (24h Range):  Temp:  [98 °F (36.7 °C)-98.6 °F (37 °C)] 98.6 °F (37 °C)  Pulse:  [55-82] 64  Resp:  [12-40] 25  SpO2:  [89 %-100 %] 100 %  BP: (102-187)/(53-96) 125/68     Weight: 127.3 kg (280 lb 10.3 oz)  Body mass index is 39.14 kg/m².    Intake/Output Summary (Last 24 hours) at 10/24/2020 1617  Last data filed at 10/24/2020 1600  Gross per 24 hour   Intake 1670 ml   Output 2250 ml   Net -580 ml      Physical Exam  Constitutional:       General: He is not in acute distress.     Appearance: He is well-developed. He is not diaphoretic.   HENT:      Head: Normocephalic and atraumatic.      Right Ear: External ear normal.      Left Ear: External ear normal.      Nose: Nose normal.   Eyes:      General: No scleral icterus.        Right eye: No discharge.         Left eye: No discharge.      Conjunctiva/sclera: Conjunctivae normal.      Pupils: Pupils are equal, round, and reactive to light.   Neck:      Musculoskeletal: Normal range of motion and neck supple.      Thyroid: No thyromegaly.   Cardiovascular:      Rate and Rhythm: Normal rate and regular rhythm.      Heart sounds: Normal heart sounds. No murmur.   Pulmonary:      Effort: Pulmonary effort is normal. No respiratory distress.      Breath sounds: No stridor. Rales present. No wheezing.      Comments: Nasal cannula currently  Abdominal:      General: Bowel sounds are normal. There is no distension.      Palpations:  Abdomen is soft.      Tenderness: There is no abdominal tenderness.   Musculoskeletal:         General: No tenderness.      Right lower leg: No edema.      Left lower leg: Edema present.   Lymphadenopathy:      Cervical: No cervical adenopathy.   Skin:     General: Skin is warm and dry.      Capillary Refill: Capillary refill takes less than 2 seconds.      Findings: Bruising present. No erythema or rash.      Comments: Asymmetric edema to left lower extremity from calf to proximal thigh. Left calf tenderness. Multiple abrasions and circular ligature to left tib fib region with surrounding eryth and tenderness. No foreign bodies. No fluctuance. No crepitus. No joint effusion.  Edema and pain does not extend past the knee. No pain with flexion or extension of the left knee.   1+ pitting edema bilaterally.    Neurological:      Mental Status: He is alert and oriented to person, place, and time.      Cranial Nerves: No cranial nerve deficit.      Sensory: No sensory deficit.      Motor: No abnormal muscle tone.      Coordination: Coordination normal.   Psychiatric:         Behavior: Behavior normal.         Thought Content: Thought content normal.         Judgment: Judgment normal.         Significant Labs:   BMP:   Recent Labs   Lab 10/24/20  0409   *      K 4.2      CO2 27   BUN 25*   CREATININE 1.3   CALCIUM 8.0*   MG 1.9     CBC:   Recent Labs   Lab 10/22/20  1700 10/23/20  0319 10/24/20  0409   WBC 8.84 7.50 6.35   HGB 11.2* 9.9* 9.2*   HCT 37.2* 33.9* 31.3*    209 179       Significant Imaging: I have reviewed all pertinent imaging results/findings within the past 24 hours.      Assessment/Plan:      * Cellulitis of left lower extremity  This patient does have evidence of infective focus    My overall impression is cellulitis of the left foot.  Vital signs were reviewed and noted in progress note.  Antibiotics given-   Antibiotics (From admission, onward)    Start     Stop Route  Frequency Ordered    10/24/20 1000  cefepime in dextrose 5 % IVPB 2 g      -- IV Every 8 hours (non-standard times) 10/24/20 0949    10/23/20 1930  vancomycin (VANCOCIN) 2,000 mg in dextrose 5 % 500 mL IVPB      -- IV Every 24 hours (non-standard times) 10/22/20 1540    10/22/20 1609  vancomycin - pharmacy to dose  (vancomycin IVPB)      -- IV pharmacy to manage frequency 10/22/20 1509        Cultures were taken-   Microbiology Results (last 7 days)     Procedure Component Value Units Date/Time    Blood culture [[id number]] Collected: 10/22/20 1700    Order Status: Completed Specimen: Blood from Antecubital, Right Arm Updated: 10/24/20 0613     Blood Culture, Routine No Growth to date      No Growth to date    Blood culture [[id number]] Collected: 10/19/20 1911    Order Status: Completed Specimen: Blood from Antecubital, Right Arm Updated: 10/24/20 0612     Blood Culture, Routine No Growth to date      No Growth to date      No Growth to date      No Growth to date      No Growth to date    Narrative:      Take 2 sets, from peripheral site. Take both aerobic and  anaerobic bottles.    Culture, Respiratory with Gram Stain [[id number]]     Order Status: No result Specimen: Respiratory from Sputum, Expectorated     Aerobic culture [[id number]]     Order Status: No result Specimen: Skin from Leg, Left         Latest lactate reviewed, they are-    Will resume vancomycin for now  Microbiology Results (last 7 days)     Procedure Component Value Units Date/Time    Blood culture [[id number]] Collected: 10/22/20 1700    Order Status: Completed Specimen: Blood from Antecubital, Right Arm Updated: 10/24/20 0613     Blood Culture, Routine No Growth to date      No Growth to date    Blood culture [[id number]] Collected: 10/19/20 1911    Order Status: Completed Specimen: Blood from Antecubital, Right Arm Updated: 10/24/20 0612     Blood Culture, Routine No Growth to date      No Growth to date      No Growth to date      No Growth to  date      No Growth to date    Narrative:      Take 2 sets, from peripheral site. Take both aerobic and  anaerobic bottles.    Culture, Respiratory with Gram Stain [[id number]]     Order Status: No result Specimen: Respiratory from Sputum, Expectorated     Aerobic culture [[id number]]     Order Status: No result Specimen: Skin from Leg, Left       will follow wound care re      Pneumonia  Will continue broad-spectrum antibiotics  Microbiology Results (last 7 days)     Procedure Component Value Units Date/Time    Blood culture [[id number]] Collected: 10/22/20 1700    Order Status: Completed Specimen: Blood from Antecubital, Right Arm Updated: 10/24/20 0613     Blood Culture, Routine No Growth to date      No Growth to date    Blood culture [[id number]] Collected: 10/19/20 1911    Order Status: Completed Specimen: Blood from Antecubital, Right Arm Updated: 10/24/20 0612     Blood Culture, Routine No Growth to date      No Growth to date      No Growth to date      No Growth to date      No Growth to date    Narrative:      Take 2 sets, from peripheral site. Take both aerobic and  anaerobic bottles.    Culture, Respiratory with Gram Stain [[id number]]     Order Status: No result Specimen: Respiratory from Sputum, Expectorated     Aerobic culture [[id number]]     Order Status: No result Specimen: Skin from Leg, Left               Acute hypoxemic respiratory failure  Patient with Hypoxic Respiratory failure which is Acute.  he is not on home oxygen. Supplemental ventilation was provided and noted- Oxygen Concentration (%):  [28-40] 28.   On NC now  Differential diagnosis includes - Pneumonia Labs and images were reviewed. Patient Has recent ABG, which has been reviewed.. Will treat underlying causes and adjust management of respiratory failure as follows- will continue BIPAP prn          Hyperkalemia  Most likely is secondary to Alonzo   Patient was given 1 dose of Kayexalate   trending down at this point  Will continue to  monitor        Unable to ambulate  Unable to place weight on the left leg  Will follow up PT OT final recs  PT OT recommended rehab            Type 2 diabetes mellitus with hyperglycemia, with long-term current use of insulin  Patient's FSGs are controlled on current hypoglycemics.   Last A1c reviewed-   Lab Results   Component Value Date    HGBA1C 6.4 (H) 10/20/2020     Most recent fingerstick glucose reviewed-   Recent Labs   Lab 10/23/20  1637 10/23/20  2113 10/24/20  0545 10/24/20  1239   POCTGLUCOSE 113* 173* 120* 146*     Current correctional scale  Low  Maintain anti-hyperglycemic dose as follows-   Antihyperglycemics (From admission, onward)    Start     Stop Route Frequency Ordered    10/22/20 1045  insulin regular injection 3 Units      -- IV Once 10/22/20 1044    10/20/20 0745  insulin aspart U-100 pen 3 Units      -- SubQ 3 times daily with meals 10/19/20 2139    10/19/20 2145  insulin detemir U-100 pen 10 Units      -- SubQ Nightly 10/19/20 2139    10/19/20 2139  insulin aspart U-100 pen 0-5 Units      -- SubQ Before meals & nightly PRN 10/19/20 2139        Hold Oral hypoglycemics while patient is in the hospital.        Anxiety And Depression   will resume home benzodiazepine    Hypertension associated with diabetes  Will resume home meds      Acute on chronic diastolic congestive heart failure  Patient is identified as having Diastolic heart failure that is Chronic. CHF is currently controlled. Latest ECHO performed and demonstrates-   Results for orders placed during the hospital encounter of 04/03/20   Echo Color Flow Doppler? Yes; Bubble Contrast? No    Narrative · Concentric left ventricular remodeling.  · The mean diastolic gradient across the mitral valve is 1 mmHg at a heart   rate of bpm.  · Severe left atrial enlargement.  · Normal left ventricular systolic function. The estimated ejection   fraction is 60%.  · Indeterminate left ventricular diastolic function.  · No wall motion  abnormalities.  · Moderate right atrial enlargement.  · Normal central venous pressure (3 mmHg).  · The estimated PA systolic pressure is 27 mmHg.  · Normal right ventricular systolic function.  · Mild tricuspid regurgitation.      . Continue Beta Blocker ACE/ARB Furosemide and monitor clinical status closely. Monitor on telemetry. Patient is off CHF pathway.  Monitor strict Is&Os and daily weights.  Place on fluid restriction of 1.5 L. Continue to stress to patient importance of self efficacy and  on diet for CHF. Last BNP reviewed- and noted below   Recent Labs   Lab 10/22/20  1700   *   .            JULY on CPAP  History noted  Will resume CPAP at night      Morbid obesity with BMI of 40.0-44.9, adult  Body mass index is 39.14 kg/m². Morbid obesity complicates all aspects of disease management from diagnostic modalities to treatment. Weight loss encouraged and health benefits explained to patient.        Atrial fibrillation with rapid ventricular response, CHADS-VAS score 3  History noted  Currently in AFib but rate controlled  Will resume blood thinner and metoprolol  Will resume digoxin        VTE Risk Mitigation (From admission, onward)         Ordered     apixaban tablet 5 mg  2 times daily      10/19/20 2139     IP VTE HIGH RISK PATIENT  Once      10/19/20 2139     Place sequential compression device  Until discontinued      10/19/20 2139                Discharge Planning   KOKI:      Code Status: Full Code   Is the patient medically ready for discharge?:     Reason for patient still in hospital (select all that apply): Patient trending condition and Treatment  Discharge Plan A: Rehab            Critical care time spent on the evaluation and treatment of severe organ dysfunction, review of pertinent labs and imaging studies, discussions with consulting providers and discussions with patient/family: 60 minutes.      Amina Sal MD  Department of Hospital Medicine   [facility]

## 2020-10-25 PROBLEM — E87.5 HYPERKALEMIA: Status: RESOLVED | Noted: 2020-10-22 | Resolved: 2020-10-25

## 2020-10-25 LAB
ANION GAP SERPL CALC-SCNC: 9 MMOL/L (ref 8–16)
BACTERIA BLD CULT: NORMAL
BASOPHILS # BLD AUTO: 0.05 K/UL (ref 0–0.2)
BASOPHILS NFR BLD: 0.9 % (ref 0–1.9)
BUN SERPL-MCNC: 22 MG/DL (ref 6–20)
CALCIUM SERPL-MCNC: 8.3 MG/DL (ref 8.7–10.5)
CHLORIDE SERPL-SCNC: 99 MMOL/L (ref 95–110)
CO2 SERPL-SCNC: 31 MMOL/L (ref 23–29)
CREAT SERPL-MCNC: 1.3 MG/DL (ref 0.5–1.4)
DIFFERENTIAL METHOD: ABNORMAL
EOSINOPHIL # BLD AUTO: 0.5 K/UL (ref 0–0.5)
EOSINOPHIL NFR BLD: 9.3 % (ref 0–8)
ERYTHROCYTE [DISTWIDTH] IN BLOOD BY AUTOMATED COUNT: 16.4 % (ref 11.5–14.5)
EST. GFR  (AFRICAN AMERICAN): >60 ML/MIN/1.73 M^2
EST. GFR  (NON AFRICAN AMERICAN): 59 ML/MIN/1.73 M^2
GLUCOSE SERPL-MCNC: 133 MG/DL (ref 70–110)
HCT VFR BLD AUTO: 36.1 % (ref 40–54)
HGB BLD-MCNC: 10.5 G/DL (ref 14–18)
IMM GRANULOCYTES # BLD AUTO: 0 K/UL (ref 0–0.04)
IMM GRANULOCYTES NFR BLD AUTO: 0 % (ref 0–0.5)
LYMPHOCYTES # BLD AUTO: 0.8 K/UL (ref 1–4.8)
LYMPHOCYTES NFR BLD: 14.2 % (ref 18–48)
MAGNESIUM SERPL-MCNC: 1.9 MG/DL (ref 1.6–2.6)
MCH RBC QN AUTO: 24.1 PG (ref 27–31)
MCHC RBC AUTO-ENTMCNC: 29.1 G/DL (ref 32–36)
MCV RBC AUTO: 83 FL (ref 82–98)
MONOCYTES # BLD AUTO: 0.4 K/UL (ref 0.3–1)
MONOCYTES NFR BLD: 7.2 % (ref 4–15)
NEUTROPHILS # BLD AUTO: 3.6 K/UL (ref 1.8–7.7)
NEUTROPHILS NFR BLD: 68.4 % (ref 38–73)
NRBC BLD-RTO: 0 /100 WBC
PHOSPHATE SERPL-MCNC: 3 MG/DL (ref 2.7–4.5)
PLATELET # BLD AUTO: 231 K/UL (ref 150–350)
PMV BLD AUTO: 12.2 FL (ref 9.2–12.9)
POCT GLUCOSE: 103 MG/DL (ref 70–110)
POCT GLUCOSE: 125 MG/DL (ref 70–110)
POCT GLUCOSE: 135 MG/DL (ref 70–110)
POCT GLUCOSE: 139 MG/DL (ref 70–110)
POTASSIUM SERPL-SCNC: 4.3 MMOL/L (ref 3.5–5.1)
RBC # BLD AUTO: 4.36 M/UL (ref 4.6–6.2)
SODIUM SERPL-SCNC: 139 MMOL/L (ref 136–145)
WBC # BLD AUTO: 5.28 K/UL (ref 3.9–12.7)

## 2020-10-25 PROCEDURE — 99233 PR SUBSEQUENT HOSPITAL CARE,LEVL III: ICD-10-PCS | Mod: S$GLB,,, | Performed by: INTERNAL MEDICINE

## 2020-10-25 PROCEDURE — 25000242 PHARM REV CODE 250 ALT 637 W/ HCPCS: Performed by: INTERNAL MEDICINE

## 2020-10-25 PROCEDURE — 11000001 HC ACUTE MED/SURG PRIVATE ROOM

## 2020-10-25 PROCEDURE — 85025 COMPLETE CBC W/AUTO DIFF WBC: CPT

## 2020-10-25 PROCEDURE — 84100 ASSAY OF PHOSPHORUS: CPT

## 2020-10-25 PROCEDURE — 27000221 HC OXYGEN, UP TO 24 HOURS

## 2020-10-25 PROCEDURE — 36415 COLL VENOUS BLD VENIPUNCTURE: CPT

## 2020-10-25 PROCEDURE — 63600175 PHARM REV CODE 636 W HCPCS: Performed by: INTERNAL MEDICINE

## 2020-10-25 PROCEDURE — 83735 ASSAY OF MAGNESIUM: CPT

## 2020-10-25 PROCEDURE — 99900035 HC TECH TIME PER 15 MIN (STAT)

## 2020-10-25 PROCEDURE — 94640 AIRWAY INHALATION TREATMENT: CPT

## 2020-10-25 PROCEDURE — 80048 BASIC METABOLIC PNL TOTAL CA: CPT

## 2020-10-25 PROCEDURE — 94761 N-INVAS EAR/PLS OXIMETRY MLT: CPT

## 2020-10-25 PROCEDURE — 30200315 PPD INTRADERMAL TEST REV CODE 302: Performed by: INTERNAL MEDICINE

## 2020-10-25 PROCEDURE — 86580 TB INTRADERMAL TEST: CPT | Performed by: INTERNAL MEDICINE

## 2020-10-25 PROCEDURE — 94660 CPAP INITIATION&MGMT: CPT

## 2020-10-25 PROCEDURE — 99233 SBSQ HOSP IP/OBS HIGH 50: CPT | Mod: S$GLB,,, | Performed by: INTERNAL MEDICINE

## 2020-10-25 PROCEDURE — 25000003 PHARM REV CODE 250: Performed by: INTERNAL MEDICINE

## 2020-10-25 RX ADMIN — IPRATROPIUM BROMIDE 0.5 MG: 0.5 SOLUTION RESPIRATORY (INHALATION) at 12:10

## 2020-10-25 RX ADMIN — GABAPENTIN 600 MG: 300 CAPSULE ORAL at 08:10

## 2020-10-25 RX ADMIN — APIXABAN 5 MG: 2.5 TABLET, FILM COATED ORAL at 08:10

## 2020-10-25 RX ADMIN — GABAPENTIN 600 MG: 300 CAPSULE ORAL at 10:10

## 2020-10-25 RX ADMIN — IPRATROPIUM BROMIDE 0.5 MG: 0.5 SOLUTION RESPIRATORY (INHALATION) at 11:10

## 2020-10-25 RX ADMIN — MELOXICAM 15 MG: 7.5 TABLET ORAL at 10:10

## 2020-10-25 RX ADMIN — TUBERCULIN PURIFIED PROTEIN DERIVATIVE 5 UNITS: 5 INJECTION, SOLUTION INTRADERMAL at 10:10

## 2020-10-25 RX ADMIN — OXYCODONE AND ACETAMINOPHEN 1 TABLET: 10; 325 TABLET ORAL at 10:10

## 2020-10-25 RX ADMIN — FUROSEMIDE 40 MG: 10 INJECTION, SOLUTION INTRAMUSCULAR; INTRAVENOUS at 10:10

## 2020-10-25 RX ADMIN — DOCUSATE SODIUM 200 MG: 100 CAPSULE, LIQUID FILLED ORAL at 08:10

## 2020-10-25 RX ADMIN — BACITRACIN, NEOMYCIN, POLYMYXIN B 1 EACH: 400; 3.5; 5 OINTMENT TOPICAL at 10:10

## 2020-10-25 RX ADMIN — MORPHINE SULFATE 15 MG: 15 TABLET, EXTENDED RELEASE ORAL at 05:10

## 2020-10-25 RX ADMIN — MORPHINE SULFATE 15 MG: 15 TABLET, EXTENDED RELEASE ORAL at 10:10

## 2020-10-25 RX ADMIN — CLOPIDOGREL BISULFATE 75 MG: 75 TABLET, FILM COATED ORAL at 10:10

## 2020-10-25 RX ADMIN — OXYBUTYNIN CHLORIDE 10 MG: 5 TABLET, EXTENDED RELEASE ORAL at 10:10

## 2020-10-25 RX ADMIN — GABAPENTIN 600 MG: 300 CAPSULE ORAL at 03:10

## 2020-10-25 RX ADMIN — BACITRACIN, NEOMYCIN, POLYMYXIN B 1 EACH: 400; 3.5; 5 OINTMENT TOPICAL at 08:10

## 2020-10-25 RX ADMIN — CEFEPIME HYDROCHLORIDE 2 G: 2 INJECTION, SOLUTION INTRAVENOUS at 02:10

## 2020-10-25 RX ADMIN — LEVALBUTEROL HYDROCHLORIDE 0.63 MG: 0.63 SOLUTION RESPIRATORY (INHALATION) at 12:10

## 2020-10-25 RX ADMIN — INSULIN DETEMIR 10 UNITS: 100 INJECTION, SOLUTION SUBCUTANEOUS at 08:10

## 2020-10-25 RX ADMIN — INSULIN ASPART 3 UNITS: 100 INJECTION, SOLUTION INTRAVENOUS; SUBCUTANEOUS at 10:10

## 2020-10-25 RX ADMIN — MAGNESIUM OXIDE 400 MG (241.3 MG MAGNESIUM) TABLET 400 MG: TABLET at 10:10

## 2020-10-25 RX ADMIN — BUPROPION HYDROCHLORIDE 150 MG: 150 TABLET, EXTENDED RELEASE ORAL at 10:10

## 2020-10-25 RX ADMIN — IPRATROPIUM BROMIDE 0.5 MG: 0.5 SOLUTION RESPIRATORY (INHALATION) at 03:10

## 2020-10-25 RX ADMIN — IPRATROPIUM BROMIDE 0.5 MG: 0.5 SOLUTION RESPIRATORY (INHALATION) at 06:10

## 2020-10-25 RX ADMIN — DOCUSATE SODIUM 200 MG: 100 CAPSULE, LIQUID FILLED ORAL at 10:10

## 2020-10-25 RX ADMIN — LEVALBUTEROL HYDROCHLORIDE 0.63 MG: 0.63 SOLUTION RESPIRATORY (INHALATION) at 03:10

## 2020-10-25 RX ADMIN — PRAVASTATIN SODIUM 80 MG: 40 TABLET ORAL at 08:10

## 2020-10-25 RX ADMIN — ESCITALOPRAM OXALATE 20 MG: 10 TABLET, FILM COATED ORAL at 10:10

## 2020-10-25 RX ADMIN — VANCOMYCIN HYDROCHLORIDE 1500 MG: 1.5 INJECTION, POWDER, LYOPHILIZED, FOR SOLUTION INTRAVENOUS at 08:10

## 2020-10-25 RX ADMIN — INSULIN ASPART 3 UNITS: 100 INJECTION, SOLUTION INTRAVENOUS; SUBCUTANEOUS at 01:10

## 2020-10-25 RX ADMIN — AMLODIPINE BESYLATE 5 MG: 5 TABLET ORAL at 10:10

## 2020-10-25 RX ADMIN — OXYCODONE AND ACETAMINOPHEN 1 TABLET: 10; 325 TABLET ORAL at 07:10

## 2020-10-25 RX ADMIN — METOPROLOL SUCCINATE 50 MG: 50 TABLET, FILM COATED, EXTENDED RELEASE ORAL at 10:10

## 2020-10-25 RX ADMIN — LEVALBUTEROL HYDROCHLORIDE 0.63 MG: 0.63 SOLUTION RESPIRATORY (INHALATION) at 06:10

## 2020-10-25 RX ADMIN — AMIODARONE HYDROCHLORIDE 200 MG: 200 TABLET ORAL at 08:10

## 2020-10-25 RX ADMIN — APIXABAN 5 MG: 2.5 TABLET, FILM COATED ORAL at 10:10

## 2020-10-25 RX ADMIN — LEVALBUTEROL HYDROCHLORIDE 0.63 MG: 0.63 SOLUTION RESPIRATORY (INHALATION) at 11:10

## 2020-10-25 RX ADMIN — MORPHINE SULFATE 15 MG: 15 TABLET, EXTENDED RELEASE ORAL at 01:10

## 2020-10-25 RX ADMIN — AMIODARONE HYDROCHLORIDE 200 MG: 200 TABLET ORAL at 10:10

## 2020-10-25 RX ADMIN — INSULIN ASPART 3 UNITS: 100 INJECTION, SOLUTION INTRAVENOUS; SUBCUTANEOUS at 05:10

## 2020-10-25 RX ADMIN — DIGOXIN 0.25 MG: 125 TABLET ORAL at 10:10

## 2020-10-25 NOTE — RESPIRATORY THERAPY
"   10/25/20 0653   Patient Assessment/Suction   Level of Consciousness (AVPU) alert   All Lung Fields Breath Sounds diminished   Cough Frequency no cough   PRE-TX-O2   O2 Device (Oxygen Therapy) nasal cannula   $ Is the patient on Low Flow Oxygen? Yes   Flow (L/min) 2  (decreased to 1L)   SpO2 100 %   Pulse Oximetry Type Intermittent   $ Pulse Oximetry - Multiple Charge Pulse Oximetry - Multiple   Pulse (!) 55   Resp 16   Aerosol Therapy   $ Aerosol Therapy Charges Aerosol Treatment   Respiratory Treatment Status (SVN) given   Treatment Route (SVN) mask   Patient Position (SVN) HOB elevated   Post Treatment Assessment (SVN) breath sounds unchanged   Signs of Intolerance (SVN) none   Breath Sounds Post-Respiratory Treatment   Post-treatment Heart Rate (beats/min) 60   Post-treatment Resp Rate (breaths/min) 16   Wound Care   $ Wound Care Tech Time 15 min   Area of Concern Bridge of nose   Skin Color/Characteristics without discoloration   Skin Temperature warm   Preset CPAP/BiPAP Settings   Mode Of Delivery Standby     Upon entering room, introduced myself to pt. As his tx was in progress, pts nurses came in for report. As soon as nurses left his room pt stated "something feels like its coming loose down here, look." I thought he was referring to tape on his leg. He then stated "no my penis". I reminded pt that I was the RT, not his nurse, but I would let her know when his tx was finished. Pt stated "it don't matter, just look @ it anyway." Nurse was notified immediately.   "

## 2020-10-25 NOTE — ASSESSMENT & PLAN NOTE
This patient does have evidence of infective focus    My overall impression is cellulitis of the left foot.  Vital signs were reviewed and noted in progress note.  Antibiotics given-   Antibiotics (From admission, onward)    Start     Stop Route Frequency Ordered    10/25/20 2030  vancomycin 1.5 g in dextrose 5 % 250 mL IVPB (ready to mix)      -- IV Every 24 hours (non-standard times) 10/25/20 0247    10/24/20 2100  neomycin-bacitracnZn-polymyxnB packet 1 each      -- Top 2 times daily 10/24/20 1833    10/22/20 1609  vancomycin - pharmacy to dose  (vancomycin IVPB)      -- IV pharmacy to manage frequency 10/22/20 1509        Cultures were taken-   Microbiology Results (last 7 days)     Procedure Component Value Units Date/Time    Blood culture [[id number]] Collected: 10/22/20 1700    Order Status: Completed Specimen: Blood from Antecubital, Right Arm Updated: 10/25/20 0612     Blood Culture, Routine No Growth to date      No Growth to date      No Growth to date    Blood culture [[id number]] Collected: 10/19/20 1911    Order Status: Completed Specimen: Blood from Antecubital, Right Arm Updated: 10/25/20 0612     Blood Culture, Routine No growth after 5 days.    Narrative:      Take 2 sets, from peripheral site. Take both aerobic and  anaerobic bottles.    Culture, Respiratory with Gram Stain [[id number]]     Order Status: No result Specimen: Respiratory from Sputum, Expectorated     Aerobic culture [[id number]]     Order Status: No result Specimen: Skin from Leg, Left         Latest lactate reviewed, they are-    Will resume vancomycin for now  Microbiology Results (last 7 days)     Procedure Component Value Units Date/Time    Blood culture [[id number]] Collected: 10/22/20 1700    Order Status: Completed Specimen: Blood from Antecubital, Right Arm Updated: 10/25/20 0612     Blood Culture, Routine No Growth to date      No Growth to date      No Growth to date    Blood culture [[id number]] Collected: 10/19/20 1911     Order Status: Completed Specimen: Blood from Antecubital, Right Arm Updated: 10/25/20 0612     Blood Culture, Routine No growth after 5 days.    Narrative:      Take 2 sets, from peripheral site. Take both aerobic and  anaerobic bottles.    Culture, Respiratory with Gram Stain [[id number]]     Order Status: No result Specimen: Respiratory from Sputum, Expectorated     Aerobic culture [[id number]]     Order Status: No result Specimen: Skin from Leg, Left       will follow wound care re

## 2020-10-25 NOTE — PROGRESS NOTES
Jose Leon [id number] is a 60 y.o. male who has been consulted for vancomycin dosing.    Vancomycin trough has been changed to 10/26 at 1930.      Patient will be followed by pharmacy for changes in renal function, toxicity, and efficacy.    Thank you for allowing us to participate in this patient's care.     Lucy Briceño, CollinD

## 2020-10-25 NOTE — SUBJECTIVE & OBJECTIVE
Interval History:  Appears to be improving at this point.  Currently on room air appears to be doing well.  States that pain is better controlled now.     Review of Systems   Constitutional: Negative for appetite change, chills, fatigue and fever.   HENT: Negative for congestion, hearing loss, rhinorrhea, sore throat, trouble swallowing and voice change.    Respiratory: Negative for cough, chest tightness, shortness of breath and wheezing.    Cardiovascular: Negative for chest pain, palpitations and leg swelling.   Gastrointestinal: Negative for abdominal pain, blood in stool, diarrhea, nausea and vomiting.   Genitourinary: Negative for difficulty urinating, frequency, hematuria and urgency.   Musculoskeletal: Positive for gait problem and myalgias. Negative for back pain, joint swelling and neck stiffness.   Skin: Positive for wound. Negative for pallor and rash.   Neurological: Negative for tremors, seizures, syncope, speech difficulty, weakness, numbness and headaches.   Hematological: Negative for adenopathy.   Psychiatric/Behavioral: Negative for agitation, behavioral problems, confusion and sleep disturbance.     Objective:     Vital Signs (Most Recent):  Temp: 97.2 °F (36.2 °C) (10/25/20 0725)  Pulse: 60 (10/25/20 0727)  Resp: 18 (10/25/20 0725)  BP: (!) 169/79 (10/25/20 0727)  SpO2: 100 % (10/25/20 0725) Vital Signs (24h Range):  Temp:  [96.3 °F (35.7 °C)-98.6 °F (37 °C)] 97.2 °F (36.2 °C)  Pulse:  [55-82] 60  Resp:  [16-40] 18  SpO2:  [89 %-100 %] 100 %  BP: (108-187)/() 169/79     Weight: 127.3 kg (280 lb 10.3 oz)  Body mass index is 39.14 kg/m².    Intake/Output Summary (Last 24 hours) at 10/25/2020 0908  Last data filed at 10/24/2020 1720  Gross per 24 hour   Intake 530 ml   Output 1275 ml   Net -745 ml      Physical Exam  Constitutional:       General: He is not in acute distress.     Appearance: He is well-developed. He is not diaphoretic.   HENT:      Head: Normocephalic and atraumatic.       Right Ear: External ear normal.      Left Ear: External ear normal.      Nose: Nose normal.   Eyes:      General: No scleral icterus.        Right eye: No discharge.         Left eye: No discharge.      Conjunctiva/sclera: Conjunctivae normal.      Pupils: Pupils are equal, round, and reactive to light.   Neck:      Musculoskeletal: Normal range of motion and neck supple.      Thyroid: No thyromegaly.   Cardiovascular:      Rate and Rhythm: Normal rate and regular rhythm.      Heart sounds: Normal heart sounds. No murmur.   Pulmonary:      Effort: Pulmonary effort is normal. No respiratory distress.      Breath sounds: No stridor. Rales present. No wheezing.      Comments: Nasal cannula currently  Abdominal:      General: Bowel sounds are normal. There is no distension.      Palpations: Abdomen is soft.      Tenderness: There is no abdominal tenderness.   Musculoskeletal:         General: No tenderness.      Right lower leg: No edema.      Left lower leg: Edema present.   Lymphadenopathy:      Cervical: No cervical adenopathy.   Skin:     General: Skin is warm and dry.      Capillary Refill: Capillary refill takes less than 2 seconds.      Findings: Bruising present. No erythema or rash.      Comments: Asymmetric edema to left lower extremity from calf to proximal thigh. Left calf tenderness. Multiple abrasions and circular ligature to left tib fib region with surrounding eryth and tenderness. No foreign bodies. No fluctuance. No crepitus. No joint effusion.  Edema and pain does not extend past the knee. No pain with flexion or extension of the left knee.   1+ pitting edema bilaterally.    Neurological:      Mental Status: He is alert and oriented to person, place, and time.      Cranial Nerves: No cranial nerve deficit.      Sensory: No sensory deficit.      Motor: No abnormal muscle tone.      Coordination: Coordination normal.   Psychiatric:         Behavior: Behavior normal.         Thought Content: Thought  content normal.         Judgment: Judgment normal.         Significant Labs:   BMP:   Recent Labs   Lab 10/24/20  0409 10/25/20  0534   *  --      --    K 4.2  --      --    CO2 27  --    BUN 25*  --    CREATININE 1.3  --    CALCIUM 8.0*  --    MG 1.9 1.9     CBC:   Recent Labs   Lab 10/24/20  0409   WBC 6.35   HGB 9.2*   HCT 31.3*          Significant Imaging: I have reviewed all pertinent imaging results/findings within the past 24 hours.

## 2020-10-25 NOTE — ASSESSMENT & PLAN NOTE
Patient's FSGs are controlled on current hypoglycemics.   Last A1c reviewed-   Lab Results   Component Value Date    HGBA1C 6.4 (H) 10/20/2020     Most recent fingerstick glucose reviewed-   Recent Labs   Lab 10/24/20  1239 10/24/20  1708 10/24/20  2043 10/25/20  0525   POCTGLUCOSE 146* 121* 128* 125*     Current correctional scale  Low  Maintain anti-hyperglycemic dose as follows-   Antihyperglycemics (From admission, onward)    Start     Stop Route Frequency Ordered    10/22/20 1045  insulin regular injection 3 Units      -- IV Once 10/22/20 1044    10/20/20 0745  insulin aspart U-100 pen 3 Units      -- SubQ 3 times daily with meals 10/19/20 2139    10/19/20 2145  insulin detemir U-100 pen 10 Units      -- SubQ Nightly 10/19/20 2139    10/19/20 2139  insulin aspart U-100 pen 0-5 Units      -- SubQ Before meals & nightly PRN 10/19/20 2139        Hold Oral hypoglycemics while patient is in the hospital.

## 2020-10-25 NOTE — PROGRESS NOTES
[facility] Medicine  Progress Note    Patient Name: Jose Leon  MRN: [id number]  Patient Class: IP- Inpatient   Admission Date: 10/19/2020  Length of Stay: 3 days  Attending Physician: Amina Sal MD  Primary Care Provider: Josh Giordano MD        Subjective:     Principal Problem:Cellulitis of left lower extremity        HPI:  Jose Leon is a 60 y.o. male with PMHx of DM, CHF, chronic LE edema, morbid obesity, chronic benzo, pain med and anticoagulant use who presented to the ED via EMS for evaluation of wound to the left lower leg s/p fall on 10/5.  When home health nurse evaluated the patient and evaluated the wound recommended patient come to the emergency room for IV antibiotics.  Patient states that he had a low-grade temperature and had chills.  Patient was given a referral to wound care nurses as an outpatient but did not go to his clinic visit.  Patient states that he has difficulty ambulating due to and swelling of the left leg and foot.  The patient denies  chest pain, or any other symptoms at this time. Patient is a former smoker.     The history is provided by the patient.     Overview/Hospital Course:  No notes on file    Interval History:  Appears to be improving at this point.  Currently on room air appears to be doing well.  States that pain is better controlled now.     Review of Systems   Constitutional: Negative for appetite change, chills, fatigue and fever.   HENT: Negative for congestion, hearing loss, rhinorrhea, sore throat, trouble swallowing and voice change.    Respiratory: Negative for cough, chest tightness, shortness of breath and wheezing.    Cardiovascular: Negative for chest pain, palpitations and leg swelling.   Gastrointestinal: Negative for abdominal pain, blood in stool, diarrhea, nausea and vomiting.   Genitourinary: Negative for difficulty urinating, frequency, hematuria and urgency.   Musculoskeletal: Positive for gait problem and  myalgias. Negative for back pain, joint swelling and neck stiffness.   Skin: Positive for wound. Negative for pallor and rash.   Neurological: Negative for tremors, seizures, syncope, speech difficulty, weakness, numbness and headaches.   Hematological: Negative for adenopathy.   Psychiatric/Behavioral: Negative for agitation, behavioral problems, confusion and sleep disturbance.     Objective:     Vital Signs (Most Recent):  Temp: 97.2 °F (36.2 °C) (10/25/20 0725)  Pulse: 60 (10/25/20 0727)  Resp: 18 (10/25/20 0725)  BP: (!) 169/79 (10/25/20 0727)  SpO2: 100 % (10/25/20 0725) Vital Signs (24h Range):  Temp:  [96.3 °F (35.7 °C)-98.6 °F (37 °C)] 97.2 °F (36.2 °C)  Pulse:  [55-82] 60  Resp:  [16-40] 18  SpO2:  [89 %-100 %] 100 %  BP: (108-187)/() 169/79     Weight: 127.3 kg (280 lb 10.3 oz)  Body mass index is 39.14 kg/m².    Intake/Output Summary (Last 24 hours) at 10/25/2020 0908  Last data filed at 10/24/2020 1720  Gross per 24 hour   Intake 530 ml   Output 1275 ml   Net -745 ml      Physical Exam  Constitutional:       General: He is not in acute distress.     Appearance: He is well-developed. He is not diaphoretic.   HENT:      Head: Normocephalic and atraumatic.      Right Ear: External ear normal.      Left Ear: External ear normal.      Nose: Nose normal.   Eyes:      General: No scleral icterus.        Right eye: No discharge.         Left eye: No discharge.      Conjunctiva/sclera: Conjunctivae normal.      Pupils: Pupils are equal, round, and reactive to light.   Neck:      Musculoskeletal: Normal range of motion and neck supple.      Thyroid: No thyromegaly.   Cardiovascular:      Rate and Rhythm: Normal rate and regular rhythm.      Heart sounds: Normal heart sounds. No murmur.   Pulmonary:      Effort: Pulmonary effort is normal. No respiratory distress.      Breath sounds: No stridor. Rales present. No wheezing.      Comments: Nasal cannula currently  Abdominal:      General: Bowel sounds are  normal. There is no distension.      Palpations: Abdomen is soft.      Tenderness: There is no abdominal tenderness.   Musculoskeletal:         General: No tenderness.      Right lower leg: No edema.      Left lower leg: Edema present.   Lymphadenopathy:      Cervical: No cervical adenopathy.   Skin:     General: Skin is warm and dry.      Capillary Refill: Capillary refill takes less than 2 seconds.      Findings: Bruising present. No erythema or rash.      Comments: Asymmetric edema to left lower extremity from calf to proximal thigh. Left calf tenderness. Multiple abrasions and circular ligature to left tib fib region with surrounding eryth and tenderness. No foreign bodies. No fluctuance. No crepitus. No joint effusion.  Edema and pain does not extend past the knee. No pain with flexion or extension of the left knee.   1+ pitting edema bilaterally.    Neurological:      Mental Status: He is alert and oriented to person, place, and time.      Cranial Nerves: No cranial nerve deficit.      Sensory: No sensory deficit.      Motor: No abnormal muscle tone.      Coordination: Coordination normal.   Psychiatric:         Behavior: Behavior normal.         Thought Content: Thought content normal.         Judgment: Judgment normal.         Significant Labs:   BMP:   Recent Labs   Lab 10/24/20  0409 10/25/20  0534   *  --      --    K 4.2  --      --    CO2 27  --    BUN 25*  --    CREATININE 1.3  --    CALCIUM 8.0*  --    MG 1.9 1.9     CBC:   Recent Labs   Lab 10/24/20  0409   WBC 6.35   HGB 9.2*   HCT 31.3*          Significant Imaging: I have reviewed all pertinent imaging results/findings within the past 24 hours.      Assessment/Plan:      * Cellulitis of left lower extremity  This patient does have evidence of infective focus    My overall impression is cellulitis of the left foot.  Vital signs were reviewed and noted in progress note.  Antibiotics given-   Antibiotics (From admission,  onward)    Start     Stop Route Frequency Ordered    10/25/20 2030  vancomycin 1.5 g in dextrose 5 % 250 mL IVPB (ready to mix)      -- IV Every 24 hours (non-standard times) 10/25/20 0247    10/24/20 2100  neomycin-bacitracnZn-polymyxnB packet 1 each      -- Top 2 times daily 10/24/20 1833    10/22/20 1609  vancomycin - pharmacy to dose  (vancomycin IVPB)      -- IV pharmacy to manage frequency 10/22/20 1509        Cultures were taken-   Microbiology Results (last 7 days)     Procedure Component Value Units Date/Time    Blood culture [[id number]] Collected: 10/22/20 1700    Order Status: Completed Specimen: Blood from Antecubital, Right Arm Updated: 10/25/20 0612     Blood Culture, Routine No Growth to date      No Growth to date      No Growth to date    Blood culture [[id number]] Collected: 10/19/20 1911    Order Status: Completed Specimen: Blood from Antecubital, Right Arm Updated: 10/25/20 0612     Blood Culture, Routine No growth after 5 days.    Narrative:      Take 2 sets, from peripheral site. Take both aerobic and  anaerobic bottles.    Culture, Respiratory with Gram Stain [[id number]]     Order Status: No result Specimen: Respiratory from Sputum, Expectorated     Aerobic culture [[id number]]     Order Status: No result Specimen: Skin from Leg, Left         Latest lactate reviewed, they are-    Will resume vancomycin for now  Microbiology Results (last 7 days)     Procedure Component Value Units Date/Time    Blood culture [[id number]] Collected: 10/22/20 1700    Order Status: Completed Specimen: Blood from Antecubital, Right Arm Updated: 10/25/20 0612     Blood Culture, Routine No Growth to date      No Growth to date      No Growth to date    Blood culture [[id number]] Collected: 10/19/20 1911    Order Status: Completed Specimen: Blood from Antecubital, Right Arm Updated: 10/25/20 0612     Blood Culture, Routine No growth after 5 days.    Narrative:      Take 2 sets, from peripheral site. Take both aerobic  and  anaerobic bottles.    Culture, Respiratory with Gram Stain [[id number]]     Order Status: No result Specimen: Respiratory from Sputum, Expectorated     Aerobic culture [[id number]]     Order Status: No result Specimen: Skin from Leg, Left       will follow wound care re      Pneumonia  Will continue broad-spectrum antibiotics  Microbiology Results (last 7 days)     Procedure Component Value Units Date/Time    Blood culture [[id number]] Collected: 10/22/20 1700    Order Status: Completed Specimen: Blood from Antecubital, Right Arm Updated: 10/25/20 0612     Blood Culture, Routine No Growth to date      No Growth to date      No Growth to date    Blood culture [[id number]] Collected: 10/19/20 1911    Order Status: Completed Specimen: Blood from Antecubital, Right Arm Updated: 10/25/20 0612     Blood Culture, Routine No growth after 5 days.    Narrative:      Take 2 sets, from peripheral site. Take both aerobic and  anaerobic bottles.    Culture, Respiratory with Gram Stain [[id number]]     Order Status: No result Specimen: Respiratory from Sputum, Expectorated     Aerobic culture [[id number]]     Order Status: No result Specimen: Skin from Leg, Left               Acute hypoxemic respiratory failure  Currently on nasal cannula improving  Most likely from acute on chronic CHF but currently improved with IV Lasix  Will stop cefepime.  Cultures neg so far  Patient with Hypoxic Respiratory failure which is Acute.  he is not on home oxygen. Supplemental ventilation was provided and noted- Oxygen Concentration (%):  [28-40] 40.   On NC now  Differential diagnosis includes - CHF and Pneumonia although pneumonia less likely procalcitonin negative   Labs and images were reviewed. Patient Has recent ABG, which has been reviewed.. Will treat underlying causes and adjust management of respiratory failure as follows- will continue BIPAP prn          Unable to ambulate  Unable to place weight on the left leg  Will follow up PT OT  final recs  PT OT recommended rehab.  Currently improving            Type 2 diabetes mellitus with hyperglycemia, with long-term current use of insulin  Patient's FSGs are controlled on current hypoglycemics.   Last A1c reviewed-   Lab Results   Component Value Date    HGBA1C 6.4 (H) 10/20/2020     Most recent fingerstick glucose reviewed-   Recent Labs   Lab 10/24/20  1239 10/24/20  1708 10/24/20  2043 10/25/20  0525   POCTGLUCOSE 146* 121* 128* 125*     Current correctional scale  Low  Maintain anti-hyperglycemic dose as follows-   Antihyperglycemics (From admission, onward)    Start     Stop Route Frequency Ordered    10/22/20 1045  insulin regular injection 3 Units      -- IV Once 10/22/20 1044    10/20/20 0745  insulin aspart U-100 pen 3 Units      -- SubQ 3 times daily with meals 10/19/20 2139    10/19/20 2145  insulin detemir U-100 pen 10 Units      -- SubQ Nightly 10/19/20 2139    10/19/20 2139  insulin aspart U-100 pen 0-5 Units      -- SubQ Before meals & nightly PRN 10/19/20 2139        Hold Oral hypoglycemics while patient is in the hospital.        Anxiety And Depression   will resume home benzodiazepine    Hypertension associated with diabetes  Will resume home meds      Acute on chronic diastolic congestive heart failure  Patient is identified as having Diastolic heart failure that is Chronic. CHF is currently controlled. Latest ECHO performed and demonstrates-   Results for orders placed during the hospital encounter of 04/03/20   Echo Color Flow Doppler? Yes; Bubble Contrast? No    Narrative · Concentric left ventricular remodeling.  · The mean diastolic gradient across the mitral valve is 1 mmHg at a heart   rate of bpm.  · Severe left atrial enlargement.  · Normal left ventricular systolic function. The estimated ejection   fraction is 60%.  · Indeterminate left ventricular diastolic function.  · No wall motion abnormalities.  · Moderate right atrial enlargement.  · Normal central venous pressure  (3 mmHg).  · The estimated PA systolic pressure is 27 mmHg.  · Normal right ventricular systolic function.  · Mild tricuspid regurgitation.      . Continue Beta Blocker ACE/ARB Furosemide and monitor clinical status closely. Monitor on telemetry. Patient is off CHF pathway.  Monitor strict Is&Os and daily weights.  Place on fluid restriction of 1.5 L. Continue to stress to patient importance of self efficacy and  on diet for CHF. Last BNP reviewed- and noted below   Recent Labs   Lab 10/22/20  1700   *   .            JULY on CPAP  History noted  Will resume CPAP at night      Morbid obesity with BMI of 40.0-44.9, adult  Body mass index is 39.14 kg/m². Morbid obesity complicates all aspects of disease management from diagnostic modalities to treatment. Weight loss encouraged and health benefits explained to patient.        Atrial fibrillation with rapid ventricular response, CHADS-VAS score 3  History noted  Currently in AFib but rate controlled  Will resume blood thinner and metoprolol  Will resume digoxin        VTE Risk Mitigation (From admission, onward)         Ordered     apixaban tablet 5 mg  2 times daily      10/19/20 2139     IP VTE HIGH RISK PATIENT  Once      10/19/20 2139     Place sequential compression device  Until discontinued      10/19/20 2139                Discharge Planning   KOKI:      Code Status: Full Code   Is the patient medically ready for discharge?:     Reason for patient still in hospital (select all that apply): Treatment  Discharge Plan A: Rehab                  Amina Sal MD  Department of Hospital Medicine   [facility]

## 2020-10-25 NOTE — ASSESSMENT & PLAN NOTE
Unable to place weight on the left leg  Will follow up PT OT final recs  PT OT recommended rehab.  Currently improving

## 2020-10-25 NOTE — ASSESSMENT & PLAN NOTE
Currently on nasal cannula improving  Most likely from acute on chronic CHF but currently improved with IV Lasix  Will stop cefepime.  Cultures neg so far  Patient with Hypoxic Respiratory failure which is Acute.  he is not on home oxygen. Supplemental ventilation was provided and noted- Oxygen Concentration (%):  [28-40] 40.   On NC now  Differential diagnosis includes - CHF and Pneumonia although pneumonia less likely procalcitonin negative   Labs and images were reviewed. Patient Has recent ABG, which has been reviewed.. Will treat underlying causes and adjust management of respiratory failure as follows- will continue BIPAP prn

## 2020-10-25 NOTE — ASSESSMENT & PLAN NOTE
Will continue broad-spectrum antibiotics  Microbiology Results (last 7 days)     Procedure Component Value Units Date/Time    Blood culture [[id number]] Collected: 10/22/20 1700    Order Status: Completed Specimen: Blood from Antecubital, Right Arm Updated: 10/25/20 0612     Blood Culture, Routine No Growth to date      No Growth to date      No Growth to date    Blood culture [[id number]] Collected: 10/19/20 1911    Order Status: Completed Specimen: Blood from Antecubital, Right Arm Updated: 10/25/20 0612     Blood Culture, Routine No growth after 5 days.    Narrative:      Take 2 sets, from peripheral site. Take both aerobic and  anaerobic bottles.    Culture, Respiratory with Gram Stain [[id number]]     Order Status: No result Specimen: Respiratory from Sputum, Expectorated     Aerobic culture [[id number]]     Order Status: No result Specimen: Skin from Leg, Left

## 2020-10-25 NOTE — PLAN OF CARE
Patient resting in bed, appears asleep, eyes closed, respirations even and unlabored. NAD. Denies pain or SOB. VSS. Afebrile. Cardiac monitoring maintained. Afib. Up with standby assistance. Room air. Glucose monitoring maintained. Diabetic snack offered. Medications administered per MD/NP order. Incontinence care performed. Bed alarm active. Side Rails up X2. Plan of care reviewed with patient. Verbalizes understanding. Frequent checks performed Q2H. Call light in reach. Pt free from fall or injury. Will monitor.

## 2020-10-25 NOTE — PLAN OF CARE
10/24/20 1956   Patient Assessment/Suction   Level of Consciousness (AVPU) alert   Respiratory Effort Normal;Unlabored   Expansion/Accessory Muscles/Retractions expansion symmetric   All Lung Fields Breath Sounds wheezes, expiratory;coarse   Rhythm/Pattern, Respiratory pattern regular   Cough Frequency infrequent   Cough Type good;nonproductive   PRE-TX-O2   O2 Device (Oxygen Therapy) nasal cannula   Flow (L/min) 2   SpO2 (!) 92 %   Pulse Oximetry Type Intermittent   Pulse 69   Resp 18   Aerosol Therapy   $ Aerosol Therapy Charges Aerosol Treatment   Respiratory Treatment Status (SVN) given   Treatment Route (SVN) mask   Patient Position (SVN) semi-Youssef's   Post Treatment Assessment (SVN) breath sounds unchanged   Signs of Intolerance (SVN) none   Breath Sounds Post-Respiratory Treatment   Post-treatment Heart Rate (beats/min) 68   Post-treatment Resp Rate (breaths/min) 18

## 2020-10-25 NOTE — PROGRESS NOTES
Pulmonary / Critical Care Medicine  Progress Note    Subjective     10/24/2020:  No major issues overnight. Subjectively improved with less dyspnea/orthopnea.  His left leg is still hurting but otherwise he has no other complaints this morning.   10/25/2020:  No major issues overnight.  Transferred out of the ICU yesterday evening.  Breathing is back to normal but his leg pain persists.  No new complaints.    Review of Systems   Constitutional: Negative for fever, chills and night sweats.   Respiratory: Negative for cough, sputum production, shortness of breath, wheezing and orthopnea.    Cardiovascular: Positive for leg swelling. Negative for chest pain and palpitations.   Gastrointestinal: Negative for nausea, vomiting and abdominal pain.      I have personally reviewed the following during today's evaluation:  past medical history, ROS, family history, social history, surgical history, current inpatient medications,drug allergies, vital signs over the past 24 hours, results of relevant diagnostic studies and nursing/provider documentation from the past 24 hours.     Objective     VS Temp:  [96.3 °F (35.7 °C)-98.1 °F (36.7 °C)]   Pulse:  [55-73]   Resp:  [16-25]   BP: (108-169)/()   SpO2:  [92 %-100 %]   Ideal body weight: 75.3 kg (166 lb 0.1 oz)  Adjusted ideal body weight: 96.1 kg (211 lb 13.8 oz)   I/O   Intake/Output Summary (Last 24 hours) at 10/25/2020 1236  Last data filed at 10/25/2020 1059  Gross per 24 hour   Intake 240 ml   Output 1325 ml   Net -1085 ml        Vent SpO2 96% on room air during my exam   PE Physical Exam   Constitutional: He is oriented to person, place, and time. He appears well-developed and well-nourished. He is cooperative.  Non-toxic appearance. No distress. He is obese.   HENT:   Head: Normocephalic.   Right Ear: External ear normal.   Left Ear: External ear normal.   Nose: Nose normal.   Mouth/Throat: Oropharynx is clear and moist. Mallampati Score: III.   Neck: Normal range  of motion. Neck supple. No JVD present. No thyromegaly present.   R IJ TLC   Cardiovascular: Normal rate and intact distal pulses. An irregular rhythm present. Exam reveals no gallop and no friction rub.   No murmur heard.  Pulmonary/Chest: Normal expansion and symmetric chest wall expansion. He has no wheezes. He has no rhonchi. He has no rales.   Abdominal: Soft. Bowel sounds are normal. He exhibits no distension. There is no abdominal tenderness.   Genitourinary:    Genitourinary Comments: [place]     Musculoskeletal: Normal range of motion.         General: No tenderness or edema.   Lymphadenopathy:     He has no cervical adenopathy.   Neurological: He is alert and oriented to person, place, and time. No cranial nerve deficit.   Skin: Skin is warm and dry. No rash noted. No cyanosis. Nails show no clubbing.   Left calf wrapped in a clean Kerlex dressing   Psychiatric: He has a normal mood and affect. His behavior is normal. Thought content normal.   Nursing note and vitals reviewed.      Labs I have personally reviewed and interpreted all labs / diagnostic studies obtained over the past 24 hours, and relevant results are as follows:  Recent Labs   Lab 10/25/20  0534   MG 1.9      Imaging I have personally reviewed and interpreted the following images and reviewed the associated Radiology report.  I have reviewed and interpreted all pertinent imaging results/findings within the past 24 hours.  CXR:  Bilateral GGOs (R>L) with improved aeration on the left compared to film from 2 days prior.     Micro I have personally reviewed and interpreted the available culture data.  Relevant results are as follows.  Blood Culture   Lab Results   Component Value Date    LABBLOO No Growth to date 10/22/2020    LABBLOO No Growth to date 10/22/2020    LABBLOO No Growth to date 10/22/2020   , Sputum Culture No results found for: GSRESP, RESPIRATORYC and Urine Culture    Lab Results   Component Value Date    LABURIN  12/11/2017      Multiple organisms isolated. None in predominance.  Repeat if    LABURIN clinically necessary. 12/11/2017      Medications Scheduled    amiodarone  200 mg Oral BID    amLODIPine  5 mg Oral Daily    apixaban  5 mg Oral BID    buPROPion  150 mg Oral Daily    clopidogreL  75 mg Oral Daily    digoxin  0.25 mg Oral Daily    docusate sodium  200 mg Oral BID    escitalopram oxalate  20 mg Oral Daily    furosemide (LASIX) IV  40 mg Intravenous Q12H    gabapentin  600 mg Oral TID    insulin aspart U-100  3 Units Subcutaneous TID WM    insulin detemir U-100  10 Units Subcutaneous QHS    insulin regular  3 Units Intravenous Once    ipratropium  0.5 mg Nebulization Q4H    levalbuterol  0.63 mg Nebulization Q4H    magnesium oxide  400 mg Oral Daily    meloxicam  15 mg Oral Daily    metoprolol succinate  50 mg Oral Daily    morphine  15 mg Oral Q8H    neomycin-bacitracnZn-polymyxnB  1 packet Topical (Top) BID    oxybutynin  10 mg Oral Daily    pravastatin  80 mg Oral QHS    vancomycin (VANCOCIN) IVPB  1,500 mg Intravenous Q24H      Continuous Infusions:      PRN   acetaminophen, dextrose 50%, dextrose 50%, diphenhydrAMINE, glucagon (human recombinant), glucose, glucose, influenza, insulin aspart U-100, oxyCODONE-acetaminophen, sodium chloride 0.9%, Pharmacy to dose Vancomycin consult **AND** vancomycin - pharmacy to dose, zolpidem        Assessment       Active Hospital Problems    Diagnosis    *Cellulitis of left lower extremity    Pneumonia    Acute hypoxemic respiratory failure    Unable to ambulate    Type 2 diabetes mellitus with hyperglycemia, with long-term current use of insulin    Anxiety And Depression    Hypertension associated with diabetes    Acute on chronic diastolic congestive heart failure    JULY on CPAP    Morbid obesity with BMI of 40.0-44.9, adult    Atrial fibrillation with rapid ventricular response, CHADS-VAS score 3      My Impression:  Here with cellulitis.  Hospital  course complicated by the development of acute hypoxemic respiratory failure 2/2 acute on chronic HFpEF, improved with diuresis and NIPPV.    Plan     · Nighlty BIPAP   · Continue empiric abx for possible LRTI for now.  De-escalate as culture data becomes available.  · Continued diuresis.  · Will defer to hospital medicine re: management of his antihypertensives and other cardiac medications.     George Spence MD  Pulmonary / Critical Care Medicine  [facility]

## 2020-10-26 VITALS
BODY MASS INDEX: 39.29 KG/M2 | RESPIRATION RATE: 18 BRPM | DIASTOLIC BLOOD PRESSURE: 71 MMHG | OXYGEN SATURATION: 99 % | TEMPERATURE: 97 F | HEART RATE: 55 BPM | HEIGHT: 71 IN | SYSTOLIC BLOOD PRESSURE: 127 MMHG | WEIGHT: 280.63 LBS

## 2020-10-26 PROBLEM — N18.9 CHRONIC KIDNEY DISEASE: Status: ACTIVE | Noted: 2020-10-26

## 2020-10-26 PROBLEM — J96.01 ACUTE HYPOXEMIC RESPIRATORY FAILURE: Status: RESOLVED | Noted: 2020-10-22 | Resolved: 2020-10-26

## 2020-10-26 PROBLEM — J18.9 PNEUMONIA: Status: RESOLVED | Noted: 2020-10-24 | Resolved: 2020-10-26

## 2020-10-26 LAB
ANION GAP SERPL CALC-SCNC: 11 MMOL/L (ref 8–16)
BASOPHILS # BLD AUTO: 0.08 K/UL (ref 0–0.2)
BASOPHILS NFR BLD: 1.4 % (ref 0–1.9)
BUN SERPL-MCNC: 18 MG/DL (ref 6–20)
CALCIUM SERPL-MCNC: 9.1 MG/DL (ref 8.7–10.5)
CHLORIDE SERPL-SCNC: 98 MMOL/L (ref 95–110)
CO2 SERPL-SCNC: 31 MMOL/L (ref 23–29)
CREAT SERPL-MCNC: 1.4 MG/DL (ref 0.5–1.4)
DIFFERENTIAL METHOD: ABNORMAL
EOSINOPHIL # BLD AUTO: 0.6 K/UL (ref 0–0.5)
EOSINOPHIL NFR BLD: 11.6 % (ref 0–8)
ERYTHROCYTE [DISTWIDTH] IN BLOOD BY AUTOMATED COUNT: 16.3 % (ref 11.5–14.5)
EST. GFR  (AFRICAN AMERICAN): >60 ML/MIN/1.73 M^2
EST. GFR  (NON AFRICAN AMERICAN): 54 ML/MIN/1.73 M^2
GLUCOSE SERPL-MCNC: 125 MG/DL (ref 70–110)
HCT VFR BLD AUTO: 41 % (ref 40–54)
HGB BLD-MCNC: 11.5 G/DL (ref 14–18)
IMM GRANULOCYTES # BLD AUTO: 0.02 K/UL (ref 0–0.04)
IMM GRANULOCYTES NFR BLD AUTO: 0.4 % (ref 0–0.5)
LYMPHOCYTES # BLD AUTO: 1.2 K/UL (ref 1–4.8)
LYMPHOCYTES NFR BLD: 21 % (ref 18–48)
MAGNESIUM SERPL-MCNC: 2 MG/DL (ref 1.6–2.6)
MCH RBC QN AUTO: 22.8 PG (ref 27–31)
MCHC RBC AUTO-ENTMCNC: 28 G/DL (ref 32–36)
MCV RBC AUTO: 81 FL (ref 82–98)
MONOCYTES # BLD AUTO: 0.4 K/UL (ref 0.3–1)
MONOCYTES NFR BLD: 7.4 % (ref 4–15)
NEUTROPHILS # BLD AUTO: 3.2 K/UL (ref 1.8–7.7)
NEUTROPHILS NFR BLD: 58.2 % (ref 38–73)
NRBC BLD-RTO: 0 /100 WBC
PHOSPHATE SERPL-MCNC: 3 MG/DL (ref 2.7–4.5)
PLATELET # BLD AUTO: 273 K/UL (ref 150–350)
PMV BLD AUTO: 10.9 FL (ref 9.2–12.9)
POCT GLUCOSE: 106 MG/DL (ref 70–110)
POCT GLUCOSE: 149 MG/DL (ref 70–110)
POTASSIUM SERPL-SCNC: 4.5 MMOL/L (ref 3.5–5.1)
RBC # BLD AUTO: 5.04 M/UL (ref 4.6–6.2)
SODIUM SERPL-SCNC: 140 MMOL/L (ref 136–145)
WBC # BLD AUTO: 5.53 K/UL (ref 3.9–12.7)

## 2020-10-26 PROCEDURE — 80048 BASIC METABOLIC PNL TOTAL CA: CPT

## 2020-10-26 PROCEDURE — 97164 PT RE-EVAL EST PLAN CARE: CPT

## 2020-10-26 PROCEDURE — 94660 CPAP INITIATION&MGMT: CPT

## 2020-10-26 PROCEDURE — 27000221 HC OXYGEN, UP TO 24 HOURS

## 2020-10-26 PROCEDURE — 97110 THERAPEUTIC EXERCISES: CPT

## 2020-10-26 PROCEDURE — 85025 COMPLETE CBC W/AUTO DIFF WBC: CPT

## 2020-10-26 PROCEDURE — 97530 THERAPEUTIC ACTIVITIES: CPT

## 2020-10-26 PROCEDURE — 99231 PR SUBSEQUENT HOSPITAL CARE,LEVL I: ICD-10-PCS | Mod: ,,, | Performed by: INTERNAL MEDICINE

## 2020-10-26 PROCEDURE — 97168 OT RE-EVAL EST PLAN CARE: CPT

## 2020-10-26 PROCEDURE — 83735 ASSAY OF MAGNESIUM: CPT

## 2020-10-26 PROCEDURE — 94761 N-INVAS EAR/PLS OXIMETRY MLT: CPT

## 2020-10-26 PROCEDURE — C1751 CATH, INF, PER/CENT/MIDLINE: HCPCS

## 2020-10-26 PROCEDURE — 97116 GAIT TRAINING THERAPY: CPT

## 2020-10-26 PROCEDURE — 25000003 PHARM REV CODE 250: Performed by: INTERNAL MEDICINE

## 2020-10-26 PROCEDURE — 84100 ASSAY OF PHOSPHORUS: CPT

## 2020-10-26 PROCEDURE — 99231 SBSQ HOSP IP/OBS SF/LOW 25: CPT | Mod: ,,, | Performed by: INTERNAL MEDICINE

## 2020-10-26 PROCEDURE — 36415 COLL VENOUS BLD VENIPUNCTURE: CPT

## 2020-10-26 PROCEDURE — 99900035 HC TECH TIME PER 15 MIN (STAT)

## 2020-10-26 PROCEDURE — 76937 US GUIDE VASCULAR ACCESS: CPT

## 2020-10-26 PROCEDURE — 36410 VNPNXR 3YR/> PHY/QHP DX/THER: CPT

## 2020-10-26 RX ORDER — OXYCODONE AND ACETAMINOPHEN 10; 325 MG/1; MG/1
1 TABLET ORAL EVERY 6 HOURS PRN
Qty: 25 TABLET | Refills: 0 | Status: SHIPPED | OUTPATIENT
Start: 2020-10-26 | End: 2023-10-08 | Stop reason: CLARIF

## 2020-10-26 RX ORDER — FUROSEMIDE 40 MG/1
40 TABLET ORAL 2 TIMES DAILY
Status: DISCONTINUED | OUTPATIENT
Start: 2020-10-26 | End: 2020-10-26 | Stop reason: HOSPADM

## 2020-10-26 RX ORDER — INSULIN ASPART 100 [IU]/ML
1-5 INJECTION, SOLUTION INTRAVENOUS; SUBCUTANEOUS
Refills: 0 | Status: ON HOLD
Start: 2020-10-26 | End: 2023-10-13 | Stop reason: SDUPTHER

## 2020-10-26 RX ORDER — AMOXICILLIN AND CLAVULANATE POTASSIUM 875; 125 MG/1; MG/1
1 TABLET, FILM COATED ORAL EVERY 12 HOURS
Status: DISCONTINUED | OUTPATIENT
Start: 2020-10-26 | End: 2020-10-26 | Stop reason: HOSPADM

## 2020-10-26 RX ORDER — FUROSEMIDE 40 MG/1
TABLET ORAL
Qty: 30 TABLET | Refills: 1 | Status: SHIPPED | OUTPATIENT
Start: 2020-10-26 | End: 2020-11-28

## 2020-10-26 RX ORDER — SULFAMETHOXAZOLE AND TRIMETHOPRIM 800; 160 MG/1; MG/1
1 TABLET ORAL 2 TIMES DAILY
Status: DISCONTINUED | OUTPATIENT
Start: 2020-10-26 | End: 2020-10-26 | Stop reason: HOSPADM

## 2020-10-26 RX ORDER — SULFAMETHOXAZOLE AND TRIMETHOPRIM 800; 160 MG/1; MG/1
1 TABLET ORAL 2 TIMES DAILY
Qty: 14 TABLET | Refills: 0 | Status: SHIPPED | OUTPATIENT
Start: 2020-10-26 | End: 2020-11-02

## 2020-10-26 RX ORDER — AMLODIPINE BESYLATE 5 MG/1
10 TABLET ORAL DAILY
Start: 2020-10-26 | End: 2023-10-08 | Stop reason: CLARIF

## 2020-10-26 RX ORDER — AMOXICILLIN AND CLAVULANATE POTASSIUM 875; 125 MG/1; MG/1
1 TABLET, FILM COATED ORAL EVERY 12 HOURS
Qty: 14 TABLET | Refills: 0 | Status: SHIPPED | OUTPATIENT
Start: 2020-10-26 | End: 2020-11-02

## 2020-10-26 RX ORDER — AMLODIPINE BESYLATE 5 MG/1
10 TABLET ORAL DAILY
Status: DISCONTINUED | OUTPATIENT
Start: 2020-10-26 | End: 2020-10-26 | Stop reason: HOSPADM

## 2020-10-26 RX ORDER — MORPHINE SULFATE 15 MG/1
15 TABLET, FILM COATED, EXTENDED RELEASE ORAL EVERY 8 HOURS
Qty: 12 TABLET | Refills: 0 | Status: SHIPPED | OUTPATIENT
Start: 2020-10-26 | End: 2020-10-30

## 2020-10-26 RX ORDER — INSULIN GLARGINE 100 [IU]/ML
10 INJECTION, SOLUTION SUBCUTANEOUS DAILY
Qty: 30 ML | Refills: 3
Start: 2020-10-26 | End: 2023-10-08 | Stop reason: CLARIF

## 2020-10-26 RX ADMIN — GABAPENTIN 600 MG: 300 CAPSULE ORAL at 08:10

## 2020-10-26 RX ADMIN — AMIODARONE HYDROCHLORIDE 200 MG: 200 TABLET ORAL at 08:10

## 2020-10-26 RX ADMIN — BUPROPION HYDROCHLORIDE 150 MG: 150 TABLET, EXTENDED RELEASE ORAL at 08:10

## 2020-10-26 RX ADMIN — MELOXICAM 15 MG: 7.5 TABLET ORAL at 08:10

## 2020-10-26 RX ADMIN — GABAPENTIN 600 MG: 300 CAPSULE ORAL at 03:10

## 2020-10-26 RX ADMIN — DIGOXIN 0.25 MG: 125 TABLET ORAL at 08:10

## 2020-10-26 RX ADMIN — MAGNESIUM OXIDE 400 MG (241.3 MG MAGNESIUM) TABLET 400 MG: TABLET at 08:10

## 2020-10-26 RX ADMIN — DOCUSATE SODIUM 200 MG: 100 CAPSULE, LIQUID FILLED ORAL at 08:10

## 2020-10-26 RX ADMIN — ESCITALOPRAM OXALATE 20 MG: 10 TABLET, FILM COATED ORAL at 08:10

## 2020-10-26 RX ADMIN — OXYCODONE AND ACETAMINOPHEN 1 TABLET: 10; 325 TABLET ORAL at 11:10

## 2020-10-26 RX ADMIN — OXYBUTYNIN CHLORIDE 10 MG: 5 TABLET, EXTENDED RELEASE ORAL at 08:10

## 2020-10-26 RX ADMIN — APIXABAN 5 MG: 2.5 TABLET, FILM COATED ORAL at 08:10

## 2020-10-26 RX ADMIN — FUROSEMIDE 40 MG: 40 TABLET ORAL at 08:10

## 2020-10-26 RX ADMIN — CLOPIDOGREL BISULFATE 75 MG: 75 TABLET, FILM COATED ORAL at 08:10

## 2020-10-26 RX ADMIN — INSULIN ASPART 3 UNITS: 100 INJECTION, SOLUTION INTRAVENOUS; SUBCUTANEOUS at 08:10

## 2020-10-26 RX ADMIN — AMLODIPINE BESYLATE 10 MG: 5 TABLET ORAL at 08:10

## 2020-10-26 RX ADMIN — INSULIN ASPART 3 UNITS: 100 INJECTION, SOLUTION INTRAVENOUS; SUBCUTANEOUS at 05:10

## 2020-10-26 RX ADMIN — BACITRACIN, NEOMYCIN, POLYMYXIN B 1 EACH: 400; 3.5; 5 OINTMENT TOPICAL at 08:10

## 2020-10-26 RX ADMIN — INSULIN ASPART 3 UNITS: 100 INJECTION, SOLUTION INTRAVENOUS; SUBCUTANEOUS at 11:10

## 2020-10-26 RX ADMIN — MORPHINE SULFATE 15 MG: 15 TABLET, EXTENDED RELEASE ORAL at 05:10

## 2020-10-26 RX ADMIN — FUROSEMIDE 40 MG: 40 TABLET ORAL at 05:10

## 2020-10-26 RX ADMIN — MORPHINE SULFATE 15 MG: 15 TABLET, EXTENDED RELEASE ORAL at 03:10

## 2020-10-26 NOTE — DISCHARGE INSTRUCTIONS
[facility] Transfer Orders        [facility]     Diagnoses:   Active Hospital Problems    Diagnosis  POA    *Cellulitis of left lower extremity [L03.116]  Yes     Priority: 1 - High    Unable to ambulate [R26.2]  Yes    Type 2 diabetes mellitus with hyperglycemia, with long-term current use of insulin [E11.65, Z79.4]  Not Applicable    Anxiety And Depression [F41.1]  Yes    Hypertension associated with diabetes [E11.59, I10]  Yes    Acute on chronic diastolic congestive heart failure [I50.33]  Yes    JULY on CPAP [G47.33, Z99.89]  Not Applicable    Morbid obesity with BMI of 40.0-44.9, adult [E66.01, Z68.41]  Not Applicable    Atrial fibrillation with rapid ventricular response, CHADS-VAS score 3 [I48.91]  Yes      Resolved Hospital Problems    Diagnosis Date Resolved POA    Pneumonia [J18.9] 10/26/2020 No    Hyperkalemia [E87.5] 10/25/2020 No    Acute hypoxemic respiratory failure [J96.01] 10/26/2020 Yes     Allergies:   Review of patient's allergies indicates:   Allergen Reactions    Atorvastatin      Other reaction(s): Generalized Myalgias    Effexor [venlafaxine] Other (See Comments)     Tremulousness       Code Status: Full code    Vitals: Routine       Diet: cardiac diet and diabetic diet: 1800 calorie    Activity: Activity as tolerated    Nursing Precautions: Aspiration  and Pressure ulcer prevention    Bed/Surface: Low Air Loss    Consults: PT to evaluate and treat- , OT to evaluate and treat, Wound Care and Nutrition to evaluate and recommend diet    Oxygen: room air    Dialysis: Patient is not on dialysis.     Labs: First blood draw on 1 week. These results to pcp.    Pending Diagnostic Studies:     None            Miscellaneous Care:   Wound Care: yes:  venous stasis ulcers to his left lower leg anterior and posterior.  Apply aquacel Silver non adherent dressing to the wound beds and covered with rolled gauze and an ace wrap. Wound care to be done  daily. Patient has a very small abrasion to his coccyx which will be appropriate to treat with critic aid with zinc bid. This patient needs aggressive wound care     Diabetes Care: Diabetes: Check blood sugar. Fingerstick blood sugar AC and HS  CHF Care: Daily Weight with notification of MD/NP of 2lb or > increase in 24 hours    v/s and O2 sat every shift    Oxygen as needed for sats <90%    Report abnormal breath sounds to MD/NP    Edema checks q shift- notify MD/NP of increased edema    Task segmentation by nursing for daily care to decrease exertion        CHF education to include diet ,medication, and CHF flags for MD notification    IV Access: Mid-line     Medications: Discontinue all previous medication orders, if any. See new list below.  Current Discharge Medication List      START taking these medications    Details   amoxicillin-clavulanate 875-125mg (AUGMENTIN) 875-125 mg per tablet Take 1 tablet by mouth every 12 (twelve) hours. for 7 days  Qty: 14 tablet, Refills: 0      morphine (MS CONTIN) 15 MG 12 hr tablet Take 1 tablet (15 mg total) by mouth every 8 (eight) hours. for 4 days  Qty: 12 tablet, Refills: 0    Comments: Quantity prescribed more than 7 day supply? No      sulfamethoxazole-trimethoprim 800-160mg (BACTRIM DS) 800-160 mg Tab Take 1 tablet by mouth 2 (two) times daily. for 7 days  Qty: 14 tablet, Refills: 0         CONTINUE these medications which have CHANGED    Details   amLODIPine (NORVASC) 5 MG tablet Take 2 tablets (10 mg total) by mouth once daily.    Comments: .      furosemide (LASIX) 40 MG tablet Take 1 tablet (40 mg total) by mouth 2 (two) times a day for 3 days, THEN 1 tablet (40 mg total) once daily.  Qty: 30 tablet, Refills: 1      insulin (BASAGLAR KWIKPEN U-100 INSULIN) glargine 100 units/mL (3mL) SubQ pen Inject 10 Units into the skin once daily.  Qty: 30 mL, Refills: 3      insulin aspart U-100 (NOVOLOG) 100 unit/mL (3 mL) InPn pen Inject 1-5 Units into the skin 3 (three)  "times daily with meals. **LOW CORRECTION DOSE**  Blood Glucose  mg/dL                  Pre-meal                Bedtime  151-200                0 unit                      0 unit  201-250                2 units                    1 unit  251-300                3 units                    1 unit  301-350                4 units                    2 units  >350                     5 units                    3 units  Administer subcutaneously if needed at times designated by monitoring schedule.   DO NOT HOLD correction dose insulin for patients who are  NPO.  "HIGH ALERT MEDICATION" - Administer with meals or TF/TPN.  Refills: 0      oxyCODONE-acetaminophen (PERCOCET)  mg per tablet Take 1 tablet by mouth every 6 (six) hours as needed.  Qty: 25 tablet, Refills: 0    Comments: Quantity prescribed more than 7 day supply? No         CONTINUE these medications which have NOT CHANGED    Details   acetaminophen (TYLENOL) 500 MG tablet Take 2 tablets (1,000 mg total) by mouth every 6 (six) hours as needed for Pain.  Refills: 0      albuterol-ipratropium (DUO-NEB) 2.5 mg-0.5 mg/3 mL nebulizer solution Take 3 mLs by nebulization every 6 (six) hours as needed for Wheezing or Shortness of Breath. Rescue  Qty: 1 Box, Refills: 0      ALPRAZolam (XANAX) 0.5 MG tablet TAKE ONE TABLET BY MOUTH TWICE DAILY AS NEEDED FOR ANXIETY  Qty: 60 tablet, Refills: 0      amiodarone (PACERONE) 200 MG Tab Take 200 mg by mouth 2 (two) times daily.   Refills: 2      apixaban (ELIQUIS) 5 mg Tab Take 5 mg by mouth 2 (two) times daily.      buPROPion (WELLBUTRIN SR) 150 MG TBSR 12 hr tablet Take 150 mg by mouth once daily.       cyclobenzaprine (FLEXERIL) 10 MG tablet Take 10 mg by mouth 2 (two) times daily as needed for Muscle spasms.      digoxin (LANOXIN) 250 mcg tablet Take 250 mcg by mouth.      docusate sodium (COLACE) 100 MG capsule Take 2 capsules (200 mg total) by mouth 2 (two) times daily.  Refills: 0      gabapentin (NEURONTIN) 600 MG " tablet Take 600 mg by mouth 3 (three) times daily.      lisinopriL (PRINIVIL,ZESTRIL) 20 MG tablet Take 20 mg by mouth once daily.    Comments: .      magnesium oxide (MAG-OX) 400 mg tablet Take 1 tablet (400 mg total) by mouth once daily.  Qty: 90 tablet, Refills: 3      metFORMIN (GLUCOPHAGE) 1000 MG tablet Take 1,000 mg by mouth 2 (two) times daily with meals.      metoprolol tartrate (LOPRESSOR) 25 MG tablet TAKE 1 TABLET BY MOUTH 2 TIMES DAILY.  Qty: 60 tablet, Refills: 11      mirabegron (MYRBETRIQ) 50 mg Tb24 Take 1 tablet by mouth once daily.      pravastatin (PRAVACHOL) 80 MG tablet TAKE 1 TABLET BY MOUTH AT BEDTIME  Qty: 90 tablet, Refills: 1      sertraline (ZOLOFT) 100 MG tablet Take 1 tablet (100 mg total) by mouth once daily.  Qty: 30 tablet, Refills: 11      SITagliptin (JANUVIA) 50 MG Tab Take 50 mg by mouth once daily.         STOP taking these medications       aspirin (ECOTRIN) 81 MG EC tablet Comments:   Reason for Stopping:         clopidogreL (PLAVIX) 75 mg tablet Comments:   Reason for Stopping:         diazePAM (VALIUM) 10 MG Tab Comments:   Reason for Stopping:         doxycycline (VIBRA-TABS) 100 MG tablet Comments:   Reason for Stopping:         escitalopram oxalate (LEXAPRO) 20 MG tablet Comments:   Reason for Stopping:         gabapentin (NEURONTIN) 300 MG capsule Comments:   Reason for Stopping:         meloxicam (MOBIC) 15 MG tablet Comments:   Reason for Stopping:         metoprolol succinate (TOPROL-XL) 50 MG 24 hr tablet Comments:   Reason for Stopping:         mupirocin (BACTROBAN) 2 % ointment Comments:   Reason for Stopping:         zolpidem (AMBIEN) 10 mg Tab Comments:   Reason for Stopping:             Follow up:   Follow-up Information     Josh Giordano MD In 4 weeks.    Specialty: Emergency Medicine  Contact information:  [address] [address] [phone]

## 2020-10-26 NOTE — PLAN OF CARE
The pt will discharge to [facility] rehab today after placement of midline requested by Dr. Delgado. CM following for dc orders. Lisa Glass LCSW     10/26/20 1325   Post-Acute Status   Post-Acute Authorization Placement   Post-Acute Placement Status Pending Post-Acute Clinical Review

## 2020-10-26 NOTE — PLAN OF CARE
Per Sera with inpt rehab- she needs therapy orders to review for acceptance. PT has not been seen since out of ICU. Therapy orders input and updated. Lisa Glass LCSW     10/26/20 0903   Post-Acute Status   Post-Acute Authorization Placement   Post-Acute Placement Status Referrals Sent

## 2020-10-26 NOTE — PLAN OF CARE
Problem: Physical Therapy Goal  Goal: Physical Therapy Goal  Description: Goals to be met by: 20    Patient will increase functional independence with mobility by performin. Supine to sit with Stand-by Assistance  2. Sit to supine with Stand-by Assistance  3. Sit to stand transfer with Contact Guard Assistance  4. Bed to chair transfer with Contact Guard Assistance using Rolling Walker  5. Gait  x 100 feet with Minimal Assistance using Rolling Walker.     Outcome: Ongoing, Progressing     PT goals revised upon PT re-evaluation on 10/26/2020

## 2020-10-26 NOTE — NURSING
Report called to Rambo at [facility] ([phone]). Still waiting on midline to be placed before d/c'ing

## 2020-10-26 NOTE — PLAN OF CARE
Patient AOX4 resting in bed, appears asleep, eyes closed, respirations even and unlabored. NAD. Denies SOB. VSS. Afebrile. Cardiac monitoring maintained. Afib. Bedfast. 2L O2 by NC. Glucose monitoring maintained. Diabetic snack offered. Medications administered per MD/NP order. Incontinence care performed. Bed alarm active. Side Rails up X2. Plan of care reviewed with patient. Verbalizes understanding. Frequent checks performed Q2H. Call light in reach. Pt free from fall or injury. Will monitor.

## 2020-10-26 NOTE — CONSULTS
18 Gx 10cm PowerGlide Midline placed to pts right brachial vein with the use of ultrasound guidance.    Ultrasound guidance: yes  Vessel Caliber: large and patent, compressibility normal  Needle advanced into vessel with real time Ultrasound guidance.  Guidewire confirmed in vessel.  Image recorded and saved.  Sterile sheath used.  Sterile dressing applied  Arm circumference- 32 cm  Dressing dated   Education provided to patient re: proper maintenance of line- pt verbalized understanding

## 2020-10-26 NOTE — PT/OT/SLP RE-EVAL
Occupational Therapy   Re-evaluation    Name: Jose Leon  MRN: [id number]  Admitting Diagnosis:  Cellulitis of left lower extremity      Recommendations:     Discharge Recommendations: rehabilitation facility  Discharge Equipment Recommendations:  bedside commode  Barriers to discharge:  Decreased caregiver support    Assessment:     Jose Leon is a 60 y.o. male with a medical diagnosis of Cellulitis of left lower extremity.  He presents with the following performance deficits affecting function are weakness, impaired endurance, impaired sensation, impaired self care skills, impaired functional mobilty, gait instability, impaired balance, decreased upper extremity function, decreased lower extremity function, decreased safety awareness, pain, decreased ROM, impaired skin, edema, impaired cardiopulmonary response to activity. Patient reports slight dizziness upon first trial of standing from bedside chair but able to tolerate static standing greater than 1 minute with Min/CGA using RW. Patient requires Min (A)/Set-up to groom himself and Set-up to drink water from cup.   Patient reports prior level of function of Mod (I) using RW and wheelchair; however, pt also reports frequent falls at home due to L knee buckling. Patient reports he has been unable to wheel himself in chair in community due to poor endurance and decreased strength, so pt uses B LE to propel himself backwards in community. PLOF with ADLs Supervision to Mod (I).  Patient is highly motivated and would like to get therapy before returning home. Recommend inpatient rehab upon discharge to facilitate pt's return home with Mod (I) and increase pt's overall safety.     Rehab Prognosis:  Good; patient would benefit from acute skilled OT services to address these deficits and reach maximum level of function.       Plan:     Patient to be seen 4 x/week to address the above listed problems via self-care/home management, therapeutic activities, therapeutic  "exercises  · Plan of Care Expires: 11/20/20  · Plan of Care Reviewed with: patient    Subjective     Chief Complaint: Weakness/pain of L LE and slight dizziness upon 1st standing trial   Patient/Family stated goals: "I want to be able to do the same things you can do and that everyone else can do." Patient also states, "I need to get therapy before I go home."  Communicated with: Nurse prior to session.  Pain/Comfort:  · Pain Rating 1: other (see comments)(not rated)  · Location - Side 1: Left  · Location - Orientation 1: lower  · Location 1: leg  · Pain Addressed 1: Distraction, Cessation of Activity    Objective:     Communicated with: NurseZoe prior to session.  Patient found up in chair with: bed alarm, central line, telemetry(L lower leg dressing intact; chair alarm) upon OT entry to room.    General Precautions: Standard, fall   Orthopedic Precautions:N/A   Braces: N/A     Occupational Performance:    Functional Mobility/Transfers:  · Patient completed Sit <> Stand Transfer -- pt performing 2 standing trials from bedside chair with overall Mod/Max (A); upon 1st trial, Mod (A) sit>stand but Max (A) stand to sit - pt attributing to dizziness which resolved once seated; upon 2nd trial, sit<>stand with Mod (A) using RW and Min (A)/CGA to maintain midline and upright posture > 1 minute standing   · Functional Mobility: N/T    Activities of Daily Living:  · Feeding:  SBA and Set-up to drink water from cup - pt is on fluid restriction so with RN clearance, pt given no more than 2 oz water   · Grooming: Min (A) to Set-up   · Lower Body Dressing: moderate assistance and maximal assistance - pt does utilize figure 4 position to don/doff socks   · Toileting: maximal assistance    ·     Cognitive/Visual Perceptual:  Oriented x 3; cooperative; poor safety awareness - high risk of fall; pt did make  more than 1 inappropriate comment during evaluation - pt did apologize for these comments    Physical Exam:  Balance:  "   -       Sit balance is Normal; Static standing balance is POOR to FAIR  Postural examination/scapula alignment:    -       Rounded shoulders  Skin integrity: Visible skin intact and L lower leg dressing intact  Edema:  Generalized   Dominant hand:    -       Right  Upper Extremity Range of Motion:     -       Right Upper Extremity: WFL  -       Left Upper Extremity: WFL except shoulder flexion/ABD limited to ~ 160* due to fall (a couple months ago)  Upper Extremity Strength:    -       Right Upper Extremity: WFL  -       Left Upper Extremity: WFL except shoulder 3- to 3+/5    Strength:    -       Right Upper Extremity: WFL  -       Left Upper Extremity: WFL  Fine Motor Coordination:    -       Intact    AMPAC 6 Click:  AMPAC Total Score: 15    Treatment & Education:  - OTR providing reinforcement of education/instruction regarding OT role/POC, safety awareness/fall prevention, calling for all OOB mobility, use of bed/chair alarms, and use of call button; OTR also providing education/instruction regarding importance mobility and re-initiating discharge planning; patient verbalizes/demonstrates understanding and in agreement when appropriate but will require reinforcement     Patient left up in bedside chair  with all lines intact, call button in reach, chair alarm on and nurse notified    GOALS:   Multidisciplinary Problems     Occupational Therapy Goals        Problem: Occupational Therapy Goal    Goal Priority Disciplines Outcome Interventions   Occupational Therapy Goal     OT, PT/OT Ongoing, Progressing    Description: Goals to be met by: 10/31/2020     Patient will increase functional independence with ADLs by performing:    LE Dressing with Supervision and Assistive Devices as needed.  Grooming while seated at sink with Supervision.  Toileting from toilet with Supervision for hygiene and clothing management.   Supine to sit with Modified [place].  Toilet transfer to toilet with Contact Guard  "Assistance.  Left Upper extremity GM and FM exercise program, with supervision.                     History:     Past Medical History:   Diagnosis Date    a A H/O Medical Noncompliance     H/O Chronic Noncompliance With His CHF Diet    a Cardiac Diastolic Dysfunction     Dr. Aure Washington    a Chronic Anticoagulation With Pradaxa     Dr. Aure Washington    a Coronary Artery Disease With H/O Stenting     Dr. Aure Washington; Was Hospitalized At [facility] 3/8/17-3/17/17 For CHF Exacerbatioin Due To "Dietary Discrepancies" With LCST Negative There    a Nonsustained Ventricular Tachycardia (NSVT)     a Paroxysmal Atrial Fibrillation With H/O RVR     Dr. Aure Washington; On Chronic Eliquis    a Syncopal Episode     [facility] 4/3/17-4/7/17 Stay For This: Was Likely Due To NSVT, And His Medications Were Adjusted    a Systolic CHF With EF 35-45%     Dr. Aure Washington; Was Hospitalized At [facility] 3/8/17-3/17/17 For CHF Exacerbatioin Due To "Dietary Discrepancies" With LCST Negative There    b Hypertension     b Proteinuria     04/2014 Referred To Dr. Leroy Allison; 4/1/14 Bilateral Renal U/S = Normal; On Lisinopril 20 Mg Daily    b Stage 2 CKD     c Hypercholesterolemia With Low HDL     d Type 2 DM On Insulin     ** 12/4/18 Referred To DM EDU; 1/11/18 Referred To Dr. Dipika Rodriguez And Re-Referred To [facility] EDU; 12/28/17 HgA1c = 12.0;" 7/5/17 Referred To [facility] EDU    f Morbid Obesity     Hyperkalemia 10/22/2020    i 1 PPD X 25+ YRs Chronic Tobacco Use Disorder     7/5/17 Increased Wellbutrin-XL To 300 Mg Daily; 6/8/17 RXd Wellbutrin- Mg Daily X 4 Months    i JULY On CPAP     Dr. Marion ngo Chronic Left Groin Pain     l Chronic Left Shoulder Pain     Dr. MARTINA martinez Chronic Recurrent Low Back Pain 12/04/2018    Dr. Llamas Is His Pain Management Neurologist; 5/219/18 Referred To Dr. Ismael martinez Left 5-7th Rib FXs 04/2016 4/23/16 [facility] Left Rib XRays = Questionable Nondisplaced Left 5-7th Rib FXs With Normal Lung " Mina    l Right Shouder SX 5/26/16 Due To Work Related Injury     [facility] Mora [facility]; Dr. MARTINA hatch H/O Transient Ischemic Attack In 2013     n Anxiety And Depression 12/04/2018    RTC In 6 Weeks; 12/4/18 Added Wellbutrin- Mg qAM; 5/29/18 Increased 100 Mg Zoloft To 100 Mg Bid    n Continuous Benzodiazepine (Xanax) Use 12/04/2018 5/29/18 I Am Weaning Him Off Of This By Decreasing 1 Mg Bid To 0.5 Mg Bid PRN, And Will Wean Further Next OV    n H/O ETOH Abuse, Quit In 09/2014     q Bilateral Lower Extremity Venous Stasis Ulcers     2/28/18 [facility]    q Chronic Bilateral Lower Extremity Edema     2/28/18 Added Metolazone 10 Mg qAM On MWF And Referred Back To Dr. Washington; On Lasix 40 Mg Bid; He Wears Bilateral Compressin Hose Stockings    q Disability Examination 7/15/16     For CHF, PAF, DM2, And JULY On CPAP    Wellness Visit 11/6/2017        Past Surgical History:   Procedure Laterality Date    CARDIAC SURGERY      coronary stent    COLONOSCOPY N/A 12/19/2017    Procedure: COLONOSCOPY;  Surgeon: Dave Allen MD;  Location: [facility];  Service: Endoscopy;  Laterality: N/A;    DIABETES MANAGEMENT LABS      heart stent      INCISION AND DRAINAGE OF WOUND      on stomach    SHOULDER SURGERY         Time Tracking:     OT Date of Treatment: 10/26/20  OT Start Time: 1049  OT Stop Time: 1118  OT Total Time (min): 29 min    Billable Minutes:Re-eval 20  Therapeutic Activity 9    AMARJIT Skelton, LOTR  10/26/2020

## 2020-10-26 NOTE — PROGRESS NOTES
Jose Leon [id number] is a 60 y.o. male who had been consulted for vancomycin dosing.    Vancomycin has been discontinued.  Pharmacy consult for vancomycin dosing in no longer required.    Thank you for allowing us to participate in this patient's care.     Joaquina Ndiaye

## 2020-10-26 NOTE — RESPIRATORY THERAPY
10/26/20 0822   Patient Assessment/Suction   Level of Consciousness (AVPU) alert   PRE-TX-O2   O2 Device (Oxygen Therapy) nasal cannula   $ Is the patient on Low Flow Oxygen? Yes   Flow (L/min) 1   SpO2 98 %   Pulse Oximetry Type Intermittent   $ Pulse Oximetry - Multiple Charge Pulse Oximetry - Multiple   Wound Care   $ Wound Care Tech Time 15 min   Area of Concern Bridge of nose   Skin Color/Characteristics without discoloration   Skin Temperature warm   Preset CPAP/BiPAP Settings   Mode Of Delivery Standby

## 2020-10-26 NOTE — PLAN OF CARE
The pt is discharging to [facility] rehab and is clear for discharge from case management. The pts daughter will transport after the pt receives his midline. Lisa Glass LCSW     10/26/20 4050   Final Note   Assessment Type Final Discharge Note   Anticipated Discharge Disposition Rehab

## 2020-10-26 NOTE — PT/OT/SLP RE-EVAL
Physical Therapy Re-evaluation    Patient Name:  Jose Leon   MRN:  [id number]    Recommendations:     Discharge Recommendations:  rehabilitation facility   Discharge Equipment Recommendations: bedside commode   Barriers to discharge: increased risk of falling with L knee buckling with ambulation    Assessment:     Jose Leon is a 60 y.o. male admitted with a medical diagnosis of Cellulitis of left lower extremity.  He presents with the following impairments/functional limitations:  weakness, impaired endurance, impaired sensation, impaired self care skills, impaired functional mobilty, gait instability, impaired balance, decreased lower extremity function, decreased safety awareness, pain, decreased ROM, impaired skin, impaired cardiopulmonary response to activity. Patient is agreeable to participation with PT re-evaluation. He is A/Ox4. He reports 7/10 left groin pain at rest. Supplemental O2 not positioned appropriately in nostrils or connected to O2 supply on wall with SpO2 of 91% at rest. He requires ModA for rolling to change wet brief. He requires ModA for supine to sit transfer with tactile cueing for hand placement throughout transfer. Soiled gown changed while patient sitting EOB requiring SBA-Radha for sitting balance. He requires Radha x2 for sit to stand transfer with RW and VC for hand placement. He ambulated 10' with RW and ModA with chair to follow before reporting dizziness and requires Radha with VC for safe stand to sit transfer. He is agreeable to sit up in chair with chair alarm on, RN notified, and LEs elevated. He demonstrates poor safety awareness with ambulation and fall prevention and would benefit from IRF upon D/C.     Rehab Prognosis:  Good; patient would benefit from acute skilled PT services to address these deficits and reach maximum level of function.      Recent Surgery: * No surgery found *      Plan:     During this hospitalization, patient to be seen daily to address the above  "listed problems via gait training, therapeutic activities, therapeutic exercises  · Plan of Care Expires:  11/26/20   Plan of Care Reviewed with: patient    Subjective     Communicated with LELA Enriquez prior to session.  Patient found HOB elevated with bed alarm, central line, telemetry(supplemental O2 on face, but not in nostrils or attached to wall with SpO2 of 91% at rest) upon PT entry to room, agreeable to evaluation.      Would like some "pull ups" brought in his room  Reports his nephew lives with him with 5 ROSARIO and R handrail  "Every time I try to walk I fall because my left leg gives out"  He is retired from the [employer] after working 30 years stating a beam fell on his right shoulder  "I drop everything because I can't feel my hands. The left is worse" - attributes this as the cause of chocolate pudding on gown, arm, and floor    Chief Complaint: 7/10 L groin pain  Patient comments/goals: call son to bring underwear   Pain/Comfort:  · Pain Rating 1: 7/10  · Location - Side 1: Left  · Location 1: groin  · Pain Addressed 1: Nurse notified, Distraction, Reposition    Patients cultural, spiritual, Orthodoxy conflicts given the current situation: no      Objective:     Patient found with: bed alarm, central line, telemetry(supplemental O2 on face, but not in nostrils or attached to wall with SpO2 of 91% at rest)     General Precautions: Standard, fall   Orthopedic Precautions:N/A   Braces: N/A(dressing on L lower leg with patient stating it has not been changed in a few days)     Exams:  · Cognitive Exam:  Patient is oriented to Person, Place, Time and Situation  · Sensation:    · -       Impaired  of bilateral hands per patient report resulting in frequently dropping objects  · Skin Integrity/Edema:      · -       Dressing in place on left lower leg with patient reporting it has not been changed in a few days; wound on L inguinal area with patient asking "is there a cut there?" -educated not to scratch " with RN aware  · RUE ROM: WFL  · LUE ROM: WFL  · RLE Strength: WFL  · LLE Strength: WFL except groin pain with knee and hip flexion    Functional Mobility:  · Bed Mobility:     · Rolling Left:  moderate assistance  · Rolling Right: moderate assistance  · Supine to Sit: moderate assistance  · Transfers:     · Sit to Stand:  minimum assistance and of 2 persons with rolling walker  · Gait: 10' RW, ModA, dizziness, chair to follow  · Balance: Fair    AM-PAC 6 CLICK MOBILITY  Total Score:12       Therapeutic Activities and Exercises:   Patient was educated on the importance of OOB activity and functional mobility to negate negative effects of prolonged bed rest during hospitalization, safe transfers and ambulation, and D/C planning     He requires ModA for rolling to allow soiled brief to be changed    Patient left up in chair with all lines intact, call button in reach, chair alarm on, RN notified and LEs.    GOALS:   Multidisciplinary Problems     Physical Therapy Goals        Problem: Physical Therapy Goal    Goal Priority Disciplines Outcome Goal Variances Interventions   Physical Therapy Goal     PT, PT/OT Ongoing, Progressing     Description: Goals to be met by: 20    Patient will increase functional independence with mobility by performin. Supine to sit with Stand-by Assistance  2. Sit to supine with Stand-by Assistance  3. Sit to stand transfer with Contact Guard Assistance  4. Bed to chair transfer with Contact Guard Assistance using Rolling Walker  5. Gait  x 100 feet with Minimal Assistance using Rolling Walker.                      History:     Past Medical History:   Diagnosis Date    a A H/O Medical Noncompliance     H/O Chronic Noncompliance With His CHF Diet    a Cardiac Diastolic Dysfunction     Dr. Aure Washington    a Chronic Anticoagulation With Pradaxa     Dr. Aure Washington    a Coronary Artery Disease With H/O Stenting     Dr. Aure Washington; Was Hospitalized At [facility] 3/8/17-3/17/17 For CHF  "Exacerbatioin Due To "Dietary Discrepancies" With LCST Negative There    a Nonsustained Ventricular Tachycardia (NSVT)     a Paroxysmal Atrial Fibrillation With H/O RVR     Dr. Aure Washington; On Chronic Eliquis    a Syncopal Episode     [facility] 4/3/17-4/7/17 Stay For This: Was Likely Due To NSVT, And His Medications Were Adjusted    a Systolic CHF With EF 35-45%     Dr. Aure Washington; Was Hospitalized At [facility] 3/8/17-3/17/17 For CHF Exacerbatioin Due To "Dietary Discrepancies" With LCST Negative There    b Hypertension     b Proteinuria     04/2014 Referred To Dr. Leroy Allison; 4/1/14 Bilateral Renal U/S = Normal; On Lisinopril 20 Mg Daily    b Stage 2 CKD     c Hypercholesterolemia With Low HDL     d Type 2 DM On Insulin     ** 12/4/18 Referred To [facility] EDU; 1/11/18 Referred To Dr. Dipika Rodriguez And Re-Referred To [facility]; 12/28/17 HgA1c = 12.0;" 7/5/17 Referred To [facility]    f Morbid Obesity     Hyperkalemia 10/22/2020    i 1 PPD X 25+ YRs Chronic Tobacco Use Disorder     7/5/17 Increased Wellbutrin-XL To 300 Mg Daily; 6/8/17 RXd Wellbutrin- Mg Daily X 4 Months    i JULY On CPAP     Dr. Marion ngo Chronic Left Groin Pain     l Chronic Left Shoulder Pain     Dr. MARTINA martinez Chronic Recurrent Low Back Pain 12/04/2018    Dr. Llamas Is His Pain Management Neurologist; 5/219/18 Referred To Dr. Ismael Hernández    l Left 5-7th Rib FXs 04/2016 4/23/16 [facility] Left Rib XRays = Questionable Nondisplaced Left 5-7th Rib FXs With Normal Lung Fields    l Right Shouder SX 5/26/16 Due To Work Related Injury     Dr. Mora [facility]; Dr. MARTINA hatch H/O Transient Ischemic Attack In 2013     n Anxiety And Depression 12/04/2018    RTC In 6 Weeks; 12/4/18 Added Wellbutrin- Mg qAM; 5/29/18 Increased 100 Mg Zoloft To 100 Mg Bid    n Continuous Benzodiazepine (Xanax) Use 12/04/2018 5/29/18 I Am Weaning Him Off Of This By Decreasing 1 Mg Bid To 0.5 Mg Bid PRN, And Will Wean Further Next OV "    n H/O ETOH Abuse, Quit In 09/2014     q Bilateral Lower Extremity Venous Stasis Ulcers     2/28/18 [facility]    q Chronic Bilateral Lower Extremity Edema     2/28/18 Added Metolazone 10 Mg qAM On MWF And Referred Back To Dr. Washington; On Lasix 40 Mg Bid; He Wears Bilateral Compressin Hose Stockings    q Disability Examination 7/15/16     For CHF, PAF, DM2, And JULY On CPAP    Wellness Visit 11/6/2017        Past Surgical History:   Procedure Laterality Date    CARDIAC SURGERY      coronary stent    COLONOSCOPY N/A 12/19/2017    Procedure: COLONOSCOPY;  Surgeon: Dave Allen MD;  Location: [facility];  Service: Endoscopy;  Laterality: N/A;    DIABETES MANAGEMENT LABS      heart stent      INCISION AND DRAINAGE OF WOUND      on stomach    SHOULDER SURGERY         Time Tracking:     PT Received On: 10/26/20  PT Start Time: 0946     PT Stop Time: 1020  PT Total Time (min): 34 min     Billable Minutes: Re-eval 10, Gait Training 12 and Therapeutic Activity 12      Sara Hernandez, PT  10/26/2020

## 2020-10-26 NOTE — PLAN OF CARE
[facility] . I updated Sera. The pts daughter Zoe is going to transport him. Lisa Glass, JUANAW   10/26/20 2779   Post-Acute Status   Post-Acute Authorization Placement   Post-Acute Placement Status Pending Post-Acute Medical Review

## 2020-10-26 NOTE — NURSING
IV removed, catheter intact. Tolerated well. Telemetry monitor removed and returned to monitor room. VSS. Pt denies complaints. Transported off unit via wheelchair. Notified Rambo that pt leaving hospital.

## 2020-10-26 NOTE — PROGRESS NOTES
Progress Note  PULMONARY    Admit Date: 10/19/2020   10/26/2020      SUBJECTIVE:     10/26- pt c/o left knee pain. Up in chair with PT, off oxygen. Breathing improved.      OBJECTIVE:     Vitals (Most recent):  Vitals:    10/26/20 0847   BP: (!) 142/68   Pulse: 61   Resp: 16   Temp: 96.7 °F (35.9 °C)       Vitals (24 hour range):  Temp:  [96.3 °F (35.7 °C)-98 °F (36.7 °C)]   Pulse:  [57-93]   Resp:  [16-18]   BP: (138-190)/(68-88)   SpO2:  [94 %-98 %]       Intake/Output Summary (Last 24 hours) at 10/26/2020 1106  Last data filed at 10/26/2020 0800  Gross per 24 hour   Intake 730 ml   Output --   Net 730 ml          Physical Exam:  The patient's neuro status (alertness,orientation,cognitive function,motor skills,), pharyngeal exam (oral lesions, hygiene, abn dentition,), Neck (jvd,mass,thyroid,nodes in neck and above/below clavicle),RESPIRATORY(symmetry,effort,fremitus,percussion,auscultation),  Cor(rhythm,heart tones including gallops,perfusion,edema)ABD(distention,hepatic&splenomegaly,tenderness,masses), Skin(rash,cyanosis),Psyc(affect,judgement,).  Exam negative except for these pertinent findings:    Obese, sitting in chair  RIJ central line in place  Diminished breath sounds bilaterally  Abdomen distended  LLE wrapped    Radiographs reviewed: view by direct vision   CXR 10/22- bilateral pulmonary edema with R>L opacification  CXR 10/25- improved    Labs     Recent Labs   Lab 10/26/20  0501   WBC 5.53   HGB 11.5*   HCT 41.0        Recent Labs   Lab 10/26/20  0501      K 4.5   CL 98   CO2 31*   BUN 18   CREATININE 1.4   *   CALCIUM 9.1   MG 2.0   PHOS 3.0   No results for input(s): PH, PCO2, PO2, HCO3 in the last 24 hours.  Microbiology Results (last 7 days)     Procedure Component Value Units Date/Time    Blood culture [[id number]] Collected: 10/22/20 1700    Order Status: Completed Specimen: Blood from Antecubital, Right Arm Updated: 10/26/20 0612     Blood Culture, Routine No Growth to date       No Growth to date      No Growth to date      No Growth to date    Culture, MRSA [[id number]]     Order Status: No result Specimen: MRSA source from Nares, Left     Blood culture [[id number]] Collected: 10/19/20 1911    Order Status: Completed Specimen: Blood from Antecubital, Right Arm Updated: 10/25/20 0612     Blood Culture, Routine No growth after 5 days.    Narrative:      Take 2 sets, from peripheral site. Take both aerobic and  anaerobic bottles.    Culture, Respiratory with Gram Stain [[id number]]     Order Status: No result Specimen: Respiratory from Sputum, Expectorated     Aerobic culture [[id number]]     Order Status: No result Specimen: Skin from Leg, Left           Impression:  Active Hospital Problems    Diagnosis  POA    *Cellulitis of left lower extremity [L03.116]  Yes    Pneumonia [J18.9]  Clinically Undetermined    Acute hypoxemic respiratory failure [J96.01]  Yes    Unable to ambulate [R26.2]  Yes    Type 2 diabetes mellitus with hyperglycemia, with long-term current use of insulin [E11.65, Z79.4]  Not Applicable    Anxiety And Depression [F41.1]  Yes    Hypertension associated with diabetes [E11.59, I10]  Yes    Acute on chronic diastolic congestive heart failure [I50.33]  Yes    JULY on CPAP [G47.33, Z99.89]  Not Applicable    Morbid obesity with BMI of 40.0-44.9, adult [E66.01, Z68.41]  Not Applicable    Atrial fibrillation with rapid ventricular response, CHADS-VAS score 3 [I48.91]  Yes      Resolved Hospital Problems    Diagnosis Date Resolved POA    Hyperkalemia [E87.5] 10/25/2020 No               Plan:   Acute hypoxemic resp failure  JULY on CPAP  Pulmonary edema  Acute on chronic CHF  Paroxysmal atrial fib  Morbid obesity    - continue nightly BIPAP  - discussed w/ hospitalist  - continue diuresis  - continue abx for cellulitis  - given rapid clearing on right, doubt pneumonia- de-escalate cefepime    Richa Steven MD  Pulmonary & Critical Care  Medicine

## 2020-10-26 NOTE — NURSING
Follow up with wound care to left lower leg. Cleaned areas with wound cleanser. Applied aquacel ag dressing piece cut for wound bed and surrounding the DTI around the entire calf area then covered with rolled gauze. Patient tolerated well.

## 2020-10-27 NOTE — DISCHARGE SUMMARY
[facility] Hospital Medicine  Discharge Summary      Patient Name: Jose Leon  MRN: [id number]  Admission Date: 10/19/2020  Hospital Length of Stay: 4 days  Discharge Date and Time: 10/26/2020  6:25 PM  Attending Physician: No att. providers found   Discharging Provider: Amina Sal MD  Primary Care Provider: Josh Giordano MD      HPI:   Jose Leon is a 60 y.o. male with PMHx of DM, CHF, chronic LE edema, morbid obesity, chronic benzo, pain med and anticoagulant use who presented to the ED via EMS for evaluation of wound to the left lower leg s/p fall on 10/5.  When home health nurse evaluated the patient and evaluated the wound recommended patient come to the emergency room for IV antibiotics.  Patient states that he had a low-grade temperature and had chills.  Patient was given a referral to wound care nurses as an outpatient but did not go to his clinic visit.  Patient states that he has difficulty ambulating due to and swelling of the left leg and foot.  The patient denies  chest pain, or any other symptoms at this time. Patient is a former smoker.     The history is provided by the patient.     * No surgery found *      Hospital Course:   Patient presented to the ED for left lower leg wound on 10/19.  He is being treated for cellulitis. Hospital course was complicated by worsening hypoxemic w/ respiratory failure requiring ICU transfer and BIPAP.  Started on empiric broad spectrum abx for possible LRTI and was given IV Lasix .  Patient improved over the course of next 24 hrs and was off BiPAP the next day.  Patient improved and transferred to telemetry. Wound nurse was consulted but did daily dressing and the wound improved over the course of his hospital stay.  Patient was continued on vancomycin and cefepime was discontinued.  All cultures were negative.  PT worked with the patient and recommended patient needs skilled nursing facility.  Patient was medically stable for  discharge on oral antibiotics and p.o. Lasix.  A midline was placed on 10/26/2020 prior to transfer.  Medically cleared to be transferred     Consults:   Consults (From admission, onward)        Status Ordering Provider     Inpatient consult to PICC team (NIAS)  Once     Provider:  (Not yet assigned)    Completed IVET PETERDARTBRITANY     Inpatient consult to Pulmonology  Once     Provider:  Richa Steven MD    Completed IVET PETERDARTBRITANY     Inpatient consult to Social Work/Case Management  Once     Provider:  (Not yet assigned)    Completed IVET PETERDARTBRITANY     IP consult to case management  Once     Provider:  (Not yet assigned)    Completed TRUDI PETERRTBRITANY          No new Assessment & Plan notes have been filed under this hospital service since the last note was generated.  Service: Hospital Medicine    Final Active Diagnoses:    Diagnosis Date Noted POA    PRINCIPAL PROBLEM:  Cellulitis of left lower extremity [L03.116] 10/19/2020 Yes    Unable to ambulate [R26.2] 10/21/2020 Yes    Type 2 diabetes mellitus with hyperglycemia, with long-term current use of insulin [E11.65, Z79.4]  Not Applicable    Anxiety And Depression [F41.1]  Yes    Hypertension associated with diabetes [E11.59, I10]  Yes    Acute on chronic diastolic congestive heart failure [I50.33] 01/01/2017 Yes    JULY on CPAP [G47.33, Z99.89]  Not Applicable    Morbid obesity with BMI of 40.0-44.9, adult [E66.01, Z68.41] 02/27/2013 Not Applicable    Atrial fibrillation with rapid ventricular response, CHADS-VAS score 3 [I48.91] 02/26/2013 Yes      Problems Resolved During this Admission:    Diagnosis Date Noted Date Resolved POA    Pneumonia [J18.9] 10/24/2020 10/26/2020 No    Hyperkalemia [E87.5] 10/22/2020 10/25/2020 No    Acute hypoxemic respiratory failure [J96.01] 10/22/2020 10/26/2020 Yes       Discharged Condition: stable    Disposition: Skilled Nursing Facility    Follow Up:  Follow-up Information     Josh Giordano MD  In 4 weeks.    Specialty: Emergency Medicine  Contact information:  [address]  [place] [address]  [phone]             [facility].    Why: Rehab  Contact information:  [address] [address] [id number] [phone]               Patient Instructions:      Notify your health care provider if you experience any of the following:  temperature >100.4     Notify your health care provider if you experience any of the following:  severe uncontrolled pain     Activity as tolerated       Significant Diagnostic Studies: Labs:   BMP:   Recent Labs   Lab 10/26/20  0501   *      K 4.5   CL 98   CO2 31*   BUN 18   CREATININE 1.4   CALCIUM 9.1   MG 2.0    and CBC   Recent Labs   Lab 10/26/20  0501   WBC 5.53   HGB 11.5*   HCT 41.0        Microbiology:   Blood Culture   Lab Results   Component Value Date    LABBLOO No Growth to date 10/22/2020    LABBLOO No Growth to date 10/22/2020    LABBLOO No Growth to date 10/22/2020    LABBLOO No Growth to date 10/22/2020    LABBLOO No Growth to date 10/22/2020   , Sputum Culture No results found for: GSRESP, RESPIRATORYC and Urine Culture    Lab Results   Component Value Date    LABURIN  12/11/2017     Multiple organisms isolated. None in predominance.  Repeat if    LABURIN clinically necessary. 12/11/2017       Pending Diagnostic Studies:     None         Medications:  Reconciled Home Medications:      Medication List      START taking these medications    amoxicillin-clavulanate 875-125mg 875-125 mg per tablet  Commonly known as: AUGMENTIN  Take 1 tablet by mouth every 12 (twelve) hours. for 7 days     morphine 15 MG 12 hr tablet  Commonly known as: MS CONTIN  Take 1 tablet (15 mg total) by mouth every 8 (eight) hours. for 4 days     sulfamethoxazole-trimethoprim 800-160mg 800-160 mg Tab  Commonly known as: BACTRIM DS  Take 1 tablet by mouth 2 (two) times daily. for 7 days        CHANGE how you take these medications    ALPRAZolam 0.5 MG  "tablet  Commonly known as: XANAX  TAKE ONE TABLET BY MOUTH TWICE DAILY AS NEEDED FOR ANXIETY  What changed:   · when to take this  · reasons to take this  · Another medication with the same name was removed. Continue taking this medication, and follow the directions you see here.     amLODIPine 5 MG tablet  Commonly known as: NORVASC  Take 2 tablets (10 mg total) by mouth once daily.  What changed: how much to take     BASAGLAR KWIKPEN U-100 INSULIN glargine 100 units/mL (3mL) SubQ pen  Generic drug: insulin  Inject 10 Units into the skin once daily.  What changed: how much to take     furosemide 40 MG tablet  Commonly known as: LASIX  Take 1 tablet (40 mg total) by mouth 2 (two) times a day for 3 days, THEN 1 tablet (40 mg total) once daily.  Start taking on: October 26, 2020  What changed:   · See the new instructions.  · Another medication with the same name was removed. Continue taking this medication, and follow the directions you see here.     gabapentin 600 MG tablet  Commonly known as: NEURONTIN  Take 600 mg by mouth 3 (three) times daily.  What changed: Another medication with the same name was removed. Continue taking this medication, and follow the directions you see here.     insulin aspart U-100 100 unit/mL (3 mL) Inpn pen  Commonly known as: NovoLOG  Inject 1-5 Units into the skin 3 (three) times daily with meals. **LOW CORRECTION DOSE**  Blood Glucose  mg/dL                  Pre-meal                Bedtime  151-200                0 unit                      0 unit  201-250                2 units                    1 unit  251-300                3 units                    1 unit  301-350                4 units                    2 units  >350                     5 units                    3 units  Administer subcutaneously if needed at times designated by monitoring schedule.   DO NOT HOLD correction dose insulin for patients who are  NPO.  "HIGH ALERT MEDICATION" - Administer with meals or " TF/TPN.  What changed:   · how much to take  · additional instructions     lisinopriL 20 MG tablet  Commonly known as: PRINIVIL,ZESTRIL  Take 20 mg by mouth once daily.  What changed: Another medication with the same name was removed. Continue taking this medication, and follow the directions you see here.     pravastatin 80 MG tablet  Commonly known as: PRAVACHOL  TAKE 1 TABLET BY MOUTH AT BEDTIME  What changed: when to take this        CONTINUE taking these medications    acetaminophen 500 MG tablet  Commonly known as: TYLENOL  Take 2 tablets (1,000 mg total) by mouth every 6 (six) hours as needed for Pain.     albuterol-ipratropium 2.5 mg-0.5 mg/3 mL nebulizer solution  Commonly known as: DUO-NEB  Take 3 mLs by nebulization every 6 (six) hours as needed for Wheezing or Shortness of Breath. Rescue     amiodarone 200 MG Tab  Commonly known as: PACERONE  Take 200 mg by mouth 2 (two) times daily.     buPROPion 150 MG TBSR 12 hr tablet  Commonly known as: WELLBUTRIN SR  Take 150 mg by mouth once daily.     cyclobenzaprine 10 MG tablet  Commonly known as: FLEXERIL  Take 10 mg by mouth 2 (two) times daily as needed for Muscle spasms.     digoxin 250 mcg tablet  Commonly known as: LANOXIN  Take 250 mcg by mouth.     docusate sodium 100 MG capsule  Commonly known as: COLACE  Take 2 capsules (200 mg total) by mouth 2 (two) times daily.     ELIQUIS 5 mg Tab  Generic drug: apixaban  Take 5 mg by mouth 2 (two) times daily.     magnesium oxide 400 mg (241.3 mg magnesium) tablet  Commonly known as: MAG-OX  Take 1 tablet (400 mg total) by mouth once daily.     metFORMIN 1000 MG tablet  Commonly known as: GLUCOPHAGE  Take 1,000 mg by mouth 2 (two) times daily with meals.     metoprolol tartrate 25 MG tablet  Commonly known as: LOPRESSOR  TAKE 1 TABLET BY MOUTH 2 TIMES DAILY.     MYRBETRIQ 50 mg Tb24  Generic drug: mirabegron  Take 1 tablet by mouth once daily.     oxyCODONE-acetaminophen  mg per tablet  Commonly known as:  PERCOCET  Take 1 tablet by mouth every 6 (six) hours as needed.     sertraline 100 MG tablet  Commonly known as: ZOLOFT  Take 1 tablet (100 mg total) by mouth once daily.     SITagliptin 50 MG Tab  Commonly known as: JANUVIA  Take 50 mg by mouth once daily.        STOP taking these medications    aspirin 81 MG EC tablet  Commonly known as: ECOTRIN     clopidogreL 75 mg tablet  Commonly known as: PLAVIX     diazePAM 10 MG Tab  Commonly known as: VALIUM     doxycycline 100 MG tablet  Commonly known as: VIBRA-TABS     escitalopram oxalate 20 MG tablet  Commonly known as: LEXAPRO     meloxicam 15 MG tablet  Commonly known as: MOBIC     metoprolol succinate 50 MG 24 hr tablet  Commonly known as: TOPROL-XL     mupirocin 2 % ointment  Commonly known as: BACTROBAN     zolpidem 10 mg Tab  Commonly known as: AMBIEN            Indwelling Lines/Drains at time of discharge:   Lines/Drains/Airways     Central Venous Catheter Line            Percutaneous Central Line Insertion/Assessment - Quad Lumen  10/22/20 1920 right internal jugular 4 days                Time spent on the discharge of patient: 60 minutes  Patient was seen and examined on the date of discharge and determined to be suitable for discharge.         Amina Sal MD  Department of Hospital Medicine  [facility]

## 2020-10-27 NOTE — HOSPITAL COURSE
Patient presented to the ED for left lower leg wound on 10/19.  He is being treated for cellulitis. Hospital course was complicated by worsening hypoxemic w/ respiratory failure requiring ICU transfer and BIPAP.  Started on empiric broad spectrum abx for possible LRTI and was given IV Lasix .  Patient improved over the course of next 24 hrs and was off BiPAP the next day.  Patient improved and transferred to telemetry. Wound nurse was consulted but did daily dressing and the wound improved over the course of his hospital stay.  Patient was continued on vancomycin and cefepime was discontinued.  All cultures were negative.  PT worked with the patient and recommended patient needs skilled nursing facility.  Patient was medically stable for discharge on oral antibiotics and p.o. Lasix.  A midline was placed on 10/26/2020 prior to transfer.  Medically cleared to be transferred

## 2020-10-28 ENCOUNTER — TELEPHONE (OUTPATIENT)
Dept: MEDSURG UNIT | Facility: HOSPITAL | Age: 60
End: 2020-10-28

## 2020-10-28 LAB — BACTERIA BLD CULT: NORMAL

## 2020-10-28 NOTE — PHYSICIAN QUERY
PT Name: Jose Leon  MR #: [id number]    Hypertensive Condition Clarification      CDS: Mona Wakefield RN, CCDS         Contact information :ext [phone] [email or web address]       This form is a permanent document in the medical record.     Query Date: October 28, 2020    By submitting this query, we are merely seeking further clarification of documentation. Please utilize your independent clinical judgment when addressing the question(s) below.    The Medical Record contains the following:   Indicators   Supporting Clinical Findings Location in Medical Record   x Hypertension associated with diabetes documented Hypertension associated with diabetes  Will resume home meds H&P 10/19/20   x Chronic condition(s) Type 2 diabetes mellitus with hyperglycemia, with long-term current use of insulin  Chronic diastolic congestive heart failure H&P 10/29/20    Vital signs      Treatment/Medication      Other         Provider, please clarify the relationship between the Hypertension and Diabetes Mellitus    [ x  ] Hypertension is a manifestation of Diabetes Mellitus (Secondary Hypertension)   [   ]  Hypertension is not a manifestation of Diabetes Mellitus (Essential Hypertension)   [   ] Other Clarification (please specify): ____________   [  ] Clinically Undetermined

## 2020-10-28 NOTE — PHYSICIAN QUERY
PT Name: Jose Leon  MR #: [id number]     RENAL CONDITION CLARIFICATION     CDS: Mona Wakefield RN, CCDS         Contact information :ext [phone] [email or web address]     This form is a permanent document in the medical record.    Query Date: October 28, 2020    By submitting this query, we are merely seeking further clarification of documentation.  Please utilize your independent clinical judgment when addressing the question(s) below.    The Medical Record contains the following:   Indicator Supporting Clinical Findings Location in Medical Record    Kidney (Renal) Insufficiency     x Kidney (Renal) Failure/Injury Hyperkalemia  Most likely is secondary to Alonzo    Hospital medicine PN 10/22/20    Nephrotoxic Agents     x BUN/Creatinine GFR BUN 16, creatinine 1.5, GFR 58  BUN 14, creatinine 1.6 GFR  53  BUN 20, creatinine 1.6, GFR 53  BUN 20, creatinine 1.6, GFR 53  BUN 20, creatinine 1.4, GFR>60  BUN 25, creatinine 1.3, GFR >60  BUN 22, creatinine 1.3, GFR >60  BUN 18, creatinine 1.4, GFR >60 Lab 10/19/20  Lab 10/20/20  Lab 10/21/20  Lab 10/22/20  Lab 10/23/20  Lab 10/24/20  Lab 10/25/20  Lab 10/26/20    Urine: Casts         Eosinophils      Dehydration      Nausea/Vomiting      Dialysis/CRRT     x Treatment: Patient was given 1 dose of Kayexalate  Repeated trending down at this point  Will continue to monitor Hospital medicine PN 10/22/20    Other:        Acute Kidney Injury/Acute Renal Failure has different defining criteria. A generally accepted guideline is:   A greater than 100% (2X) rise in serum creatinine from baseline* occurring during the course of a single hospital stay.   *Baseline as determined by the providers judgment and consideration of previous lab values and other documentation, if available.    A diagnosis of Acute Kidney Injury/Acute Renal Failure should incorporate abnormal labs and clinical findings that are clinically significant.      The clinical guidelines noted above are only a  system guideline. It does not replace the providers clinical judgment.     Provider, please specify the diagnosis or diagnoses associated with above clinical findings. Check all that apply  Please clarify CARLOS MANUEL diagnosis    [    ] Acute Kidney Failure/Injury unspecified    [    ] Other Acute Kidney Failure/Injury (please specify): ____________     [ x   ] Acute Renal Insufficiency  - Consider if SCr rise is transient and normalizes quickly with no efforts at real resuscitation of vital signs and perfusion   [    ] Other (please specify): _______________________________   [  ] Clinically Undetermined         References:     DUARTE Avalos., SHANTE Perez., RACQUEL HerediaA. et al. Acute renal failure - definition, outcome measures, animal models, fluid therapy and information technology needs: the Second International Consensus Conference of the Acute Dialysis Quality Initiative (ADQI) Group. Crit Care 8, R204 (2004). https://doi.org/10.1186/ch9819    KDIGO Clinical Practice Guideline for Acute Kidney Injury. (2012, March). Retrieved October 21, 2020, from https://kdigo.org/wp-content/uploads/2016/10/BRAKU-8585-NKW-Guideline-English.pdf    MIGUELITO Carson MD, HILARY Garcia MD, & EDDIE Harper MD. (1960). Renal medullary necrosis [Abstract]. The American Journal of Medicine, 29(1), 132-156. doi:https://doi.org/10.9731/6178-7533(54)24388-7    EDDIE Starr MD, & BE Mcfadden MD, MS. (2020, June 18). Definition and staging of chronic kidney disease in adults ([id number] HILARY Jaeger MD, ScD & [id number] RACQUEL Mehta MD, MSc, Eds.). Retrieved October 21, 2020, from https://www.[employer]/contents/definition-and-staging-of-chronic-kidney-disease-in-adults?search=ckd%20staging&source=search_result&selectedTitle=1~150&usage_type=default&display_rank=1    MICHAEL Carr., RENETTA Heredia., Kevin SDELVIS. et al. Acute Kidney Injury Network: report of an initiative to improve outcomes in acute kidney injury. Crit Care 11, R31  (2007). https://doi.org/10.1186/ak5019    OLIVIA Dillon MD, FACP. (2015, Toya 15). Acute kidney injury revisited. Retrieved October 21, 2020, from https://acphospitalist.org/archives/2015/06/coding-acute-kidney-injury.htm    SHANIQUA Garcia MD. (2019, July). Renal Cortical Necrosis. Retrieved October 21, 2020, from https://www.[facility]/professional/genitourinary-disorders/renovascular-disorders/renal-cortical-necrosis    Form No. 71813    joshua

## 2020-10-29 NOTE — PHYSICIAN QUERY
PT Name: Jose Leon  MR #: [id number]    Consultant Diagnosis Clarification     CDS: Mona Wakefield RN, CCDS         Contact information :ext [phone] [email or web address]     This form is a permanent document in the medical record.    Query Date: October 29, 2020      By submitting this query, we are merely seeking further clarification of documentation.  Please utilize your independent clinical judgment when addressing the question(s) below.    The Medical Record reflects the following:    Clinical Information Location in Medical Record       DTI which wraps around the entire circumference of his left leg patient reports his niece applied a bandage around his leg and it was so tight that when they took if off the skin was inbedded in the gauze wrap           Wound care consult 10/20/20       Please clarify/confirm the Consultants diagnosis of Deep Tissue injury Left leg, present on admission:     [ x  ] Diagnosis ruled in   [   ] Diagnosis ruled out   [   ] Other diagnosis (please specify): _____________________________   [  ] Clinically undetermined

## 2020-10-29 NOTE — PHYSICIAN QUERY
PT Name: Jose Leon  MR #: [id number]     Diagnosis Clarification      CDS: Mona Wakefield RN, CCDS         Contact information :ext [phone] [email or web address]       This form is a permanent document in the medical record.     Query Date: October 29, 2020    Dear Provider,  By submitting this query, we are merely seeking further clarification of documentation.  Please utilize your independent clinical judgment when addressing the question(s) below.     The medical record contains the following:    Supporting Clinical Information Location in Medical Record     Interval development of mild infiltrate more so right infrahilar.  Findings suggest mild CHF.  Pneumonia include in the differential    Possible R sided pneumonia   continue broad spectrum abx for possible pneumonia- add cefepime for better coverage given clinical & imaging worsening.        given rapid clearing on right, doubt pneumonia- de-escalate cefepime     Problems Resolved During this Admission:  Pneumonia     CXR 10/21/20      Pulmonology consult 10/23/20          Pulmonology PN  10/26/20      Discharge summary 10/27/20     Please clarify if the Pneumonia diagnosis has been:    [  ] Ruled In   [ x  ] Ruled In, Now Resolved   [   ] Ruled Out   [   ] Other/Clarification of findings (please specify)_______________    [   ] Clinically undetermined       Present on admission (POA) status:   [    ] Yes (Y)                           [    ] No (N)    [    ] Clinically Undetermined (W)                        [    ] Documentation insufficient to determine if condition is POA (U)

## 2020-11-05 NOTE — PHYSICIAN QUERY
PT Name: Jose Leon  MR #: [id number]     PRESENT ON ADMISSION (POA) DIAGNOSIS CLARIFICATION     CDS: Mona Wakefield RN, CCDS         Contact information :ext [phone] [email or web address]     This form is a permanent document in the medical record.     Query Date: November 5, 2020    By submitting this query, we are merely seeking further clarification of documentation.  Please utilize your independent clinical judgment when addressing the question(s) below.     The Medical Record contains following diagnoses documented:    Clinical Information Location in Medical Records     Inpatient admission order 10/22/20    Pt transferred to room 510 via bed. Pt placed on bipap for respiratory failure. Report given to LELA Corcoran    PO2 49, POC Sat O2 86    Acute hypoxemic respiratory failure  Patient with Hypoxic Respiratory failure which is Acute.  he is not on home oxygen. Supplemental ventilation was provided and noted- Oxygen Concentration (%):  [28-40] 28.   On NC now   will continue BIPAP prn    Acute hypoxemic respiratory failure  Hospital course complicated by the development of acute hypoxemic respiratory failure 2/2 acute on chronic HFpEF, which seems to be improved with diuresis and NIPPV over the past 24 hours.      Acute hypoxemic respiratory failure -POA;Yes      Acute hypoxemic respiratory failure?is confirmed and additional clinical support/decision-making indicators for the diagnosis include (please specify):____Hypoxia ABG sat 86 -POA;No     MD order 10/22/20    RN Note 10/22/20      ABG report 10/22/20    Hospital medicine PN 10/24/20                Discharge summary 10/27/20          Discharge summary 10/27/20      Physician query response 11/2/20       Present on admission (POA) is defined as present at the time inpatient admission occurs.     Conditions that develop during an outpatient encounter, including emergency department, observation or outpatient surgery, are considered as present on  admission. [facility] 4th Quarter 2008        Please clarify the the conflicting documentation re:  Present on Admission (POA) status of the diagnosis: Acute hypoxemic respiratory failure    Present on admission (POA) status: (at time of inpatient admission):    [  x   ] Yes (Y)           [     ] No (N)            [    ] Documentation insufficient to determine if condition is POA (U)

## 2020-11-06 ENCOUNTER — HOSPITAL ENCOUNTER (EMERGENCY)
Facility: HOSPITAL | Age: 60
Discharge: HOME | End: 2020-11-06
Attending: EMERGENCY MEDICINE
Payer: MEDICARE

## 2020-11-06 VITALS
SYSTOLIC BLOOD PRESSURE: 136 MMHG | HEIGHT: 71 IN | WEIGHT: 289 LBS | BODY MASS INDEX: 40.46 KG/M2 | TEMPERATURE: 98 F | RESPIRATION RATE: 18 BRPM | HEART RATE: 71 BPM | OXYGEN SATURATION: 97 % | DIASTOLIC BLOOD PRESSURE: 95 MMHG

## 2020-11-06 DIAGNOSIS — L03.90 WOUND CELLULITIS: Primary | ICD-10-CM
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
## 2020-11-06 DIAGNOSIS — N17.9 AKI (ACUTE KIDNEY INJURY): ICD-10-CM

## 2020-11-06 DIAGNOSIS — R44.3 HALLUCINATION: ICD-10-CM

## 2020-11-06 LAB
ALBUMIN SERPL BCP-MCNC: 3.6 G/DL (ref 3.5–5.2)
ALP SERPL-CCNC: 119 U/L (ref 55–135)
ALT SERPL W/O P-5'-P-CCNC: 16 U/L (ref 10–44)
AMMONIA PLAS-SCNC: 16 UMOL/L (ref 10–50)
ANION GAP SERPL CALC-SCNC: 12 MMOL/L (ref 8–16)
AST SERPL-CCNC: 16 U/L (ref 10–40)
BASOPHILS # BLD AUTO: 0.14 K/UL (ref 0–0.2)
BASOPHILS NFR BLD: 1.1 % (ref 0–1.9)
BILIRUB SERPL-MCNC: 0.8 MG/DL (ref 0.1–1)
BILIRUB UR QL STRIP: NEGATIVE
BUN SERPL-MCNC: 46 MG/DL (ref 6–20)
CALCIUM SERPL-MCNC: 8.7 MG/DL (ref 8.7–10.5)
CHLORIDE SERPL-SCNC: 98 MMOL/L (ref 95–110)
CLARITY UR: CLEAR
CO2 SERPL-SCNC: 24 MMOL/L (ref 23–29)
COLOR UR: YELLOW
CREAT SERPL-MCNC: 2 MG/DL (ref 0.5–1.4)
CRP SERPL-MCNC: 12.2 MG/DL
DIFFERENTIAL METHOD: ABNORMAL
EOSINOPHIL # BLD AUTO: 0.5 K/UL (ref 0–0.5)
EOSINOPHIL NFR BLD: 3.8 % (ref 0–8)
ERYTHROCYTE [DISTWIDTH] IN BLOOD BY AUTOMATED COUNT: 15.6 % (ref 11.5–14.5)
ERYTHROCYTE [SEDIMENTATION RATE] IN BLOOD BY WESTERGREN METHOD: 12 MM/HR (ref 0–10)
EST. GFR  (AFRICAN AMERICAN): 40.7 ML/MIN/1.73 M^2
EST. GFR  (NON AFRICAN AMERICAN): 35.2 ML/MIN/1.73 M^2
GLUCOSE SERPL-MCNC: 146 MG/DL (ref 70–110)
GLUCOSE UR QL STRIP: ABNORMAL
HCT VFR BLD AUTO: 38.1 % (ref 40–54)
HGB BLD-MCNC: 11.3 G/DL (ref 14–18)
HGB UR QL STRIP: NEGATIVE
IMM GRANULOCYTES # BLD AUTO: 0.04 K/UL (ref 0–0.04)
IMM GRANULOCYTES NFR BLD AUTO: 0.3 % (ref 0–0.5)
KETONES UR QL STRIP: NEGATIVE
LEUKOCYTE ESTERASE UR QL STRIP: NEGATIVE
LYMPHOCYTES # BLD AUTO: 1.4 K/UL (ref 1–4.8)
LYMPHOCYTES NFR BLD: 11 % (ref 18–48)
MCH RBC QN AUTO: 23.1 PG (ref 27–31)
MCHC RBC AUTO-ENTMCNC: 29.7 G/DL (ref 32–36)
MCV RBC AUTO: 78 FL (ref 82–98)
MONOCYTES # BLD AUTO: 0.9 K/UL (ref 0.3–1)
MONOCYTES NFR BLD: 7.3 % (ref 4–15)
NEUTROPHILS # BLD AUTO: 9.4 K/UL (ref 1.8–7.7)
NEUTROPHILS NFR BLD: 76.5 % (ref 38–73)
NITRITE UR QL STRIP: NEGATIVE
NRBC BLD-RTO: 0 /100 WBC
PH UR STRIP: 5 [PH] (ref 5–8)
PLATELET # BLD AUTO: 289 K/UL (ref 150–350)
PMV BLD AUTO: 12.7 FL (ref 9.2–12.9)
POTASSIUM SERPL-SCNC: 4.4 MMOL/L (ref 3.5–5.1)
PROT SERPL-MCNC: 8.2 G/DL (ref 6–8.4)
PROT UR QL STRIP: ABNORMAL
RBC # BLD AUTO: 4.89 M/UL (ref 4.6–6.2)
SODIUM SERPL-SCNC: 134 MMOL/L (ref 136–145)
SP GR UR STRIP: 1.02 (ref 1–1.03)
TROPONIN I SERPL DL<=0.01 NG/ML-MCNC: 0.03 NG/ML
URN SPEC COLLECT METH UR: ABNORMAL
UROBILINOGEN UR STRIP-ACNC: NEGATIVE EU/DL
WBC # BLD AUTO: 12.33 K/UL (ref 3.9–12.7)

## 2020-11-06 PROCEDURE — 81003 URINALYSIS AUTO W/O SCOPE: CPT

## 2020-11-06 PROCEDURE — 93010 ELECTROCARDIOGRAM REPORT: CPT | Mod: ,,, | Performed by: INTERNAL MEDICINE

## 2020-11-06 PROCEDURE — 99285 EMERGENCY DEPT VISIT HI MDM: CPT | Mod: 25

## 2020-11-06 PROCEDURE — 85651 RBC SED RATE NONAUTOMATED: CPT

## 2020-11-06 PROCEDURE — 82140 ASSAY OF AMMONIA: CPT

## 2020-11-06 PROCEDURE — 84484 ASSAY OF TROPONIN QUANT: CPT

## 2020-11-06 PROCEDURE — 63600175 PHARM REV CODE 636 W HCPCS: Performed by: EMERGENCY MEDICINE

## 2020-11-06 PROCEDURE — 36415 COLL VENOUS BLD VENIPUNCTURE: CPT

## 2020-11-06 PROCEDURE — 86140 C-REACTIVE PROTEIN: CPT

## 2020-11-06 PROCEDURE — 93005 ELECTROCARDIOGRAM TRACING: CPT | Performed by: INTERNAL MEDICINE

## 2020-11-06 PROCEDURE — 96361 HYDRATE IV INFUSION ADD-ON: CPT

## 2020-11-06 PROCEDURE — 93010 EKG 12-LEAD: ICD-10-PCS | Mod: ,,, | Performed by: INTERNAL MEDICINE

## 2020-11-06 PROCEDURE — 96366 THER/PROPH/DIAG IV INF ADDON: CPT

## 2020-11-06 PROCEDURE — 96365 THER/PROPH/DIAG IV INF INIT: CPT

## 2020-11-06 PROCEDURE — 85025 COMPLETE CBC W/AUTO DIFF WBC: CPT

## 2020-11-06 PROCEDURE — 80053 COMPREHEN METABOLIC PANEL: CPT

## 2020-11-06 PROCEDURE — 25000003 PHARM REV CODE 250: Performed by: EMERGENCY MEDICINE

## 2020-11-06 RX ORDER — METOPROLOL SUCCINATE 50 MG/1
50 TABLET, EXTENDED RELEASE ORAL DAILY
COMMUNITY
End: 2023-10-08 | Stop reason: CLARIF

## 2020-11-06 RX ORDER — SILVER SULFADIAZINE 10 G/1000G
1 CREAM TOPICAL DAILY
COMMUNITY
End: 2023-10-08 | Stop reason: CLARIF

## 2020-11-06 RX ORDER — CLINDAMYCIN HYDROCHLORIDE 150 MG/1
450 CAPSULE ORAL 4 TIMES DAILY
Qty: 84 CAPSULE | Refills: 0 | Status: SHIPPED | OUTPATIENT
Start: 2020-11-06 | End: 2020-11-11 | Stop reason: SDUPTHER

## 2020-11-06 RX ORDER — AMIODARONE HYDROCHLORIDE 200 MG/1
200 TABLET ORAL DAILY
COMMUNITY
End: 2023-10-08 | Stop reason: CLARIF

## 2020-11-06 RX ORDER — CLINDAMYCIN PHOSPHATE 900 MG/50ML
900 INJECTION, SOLUTION INTRAVENOUS
Status: COMPLETED | OUTPATIENT
Start: 2020-11-06 | End: 2020-11-06

## 2020-11-06 RX ORDER — CLOPIDOGREL BISULFATE 75 MG/1
75 TABLET ORAL DAILY
COMMUNITY

## 2020-11-06 RX ORDER — AMLODIPINE BESYLATE 5 MG/1
5 TABLET ORAL DAILY
COMMUNITY
End: 2023-10-08 | Stop reason: CLARIF

## 2020-11-06 RX ORDER — ESCITALOPRAM OXALATE 20 MG/1
20 TABLET ORAL DAILY
COMMUNITY
End: 2023-10-08 | Stop reason: DRUGHIGH

## 2020-11-06 RX ORDER — RISPERIDONE 0.5 MG/1
0.5 TABLET ORAL 2 TIMES DAILY
COMMUNITY
End: 2023-10-08 | Stop reason: CLARIF

## 2020-11-06 RX ORDER — FUROSEMIDE 20 MG/1
20 TABLET ORAL 2 TIMES DAILY
COMMUNITY
End: 2022-07-29 | Stop reason: SDUPTHER

## 2020-11-06 RX ORDER — LISINOPRIL 10 MG/1
10 TABLET ORAL DAILY
COMMUNITY
End: 2023-10-08 | Stop reason: CLARIF

## 2020-11-06 RX ADMIN — SODIUM CHLORIDE, SODIUM LACTATE, POTASSIUM CHLORIDE, AND CALCIUM CHLORIDE 1000 ML: .6; .31; .03; .02 INJECTION, SOLUTION INTRAVENOUS at 05:11

## 2020-11-06 RX ADMIN — CLINDAMYCIN IN 5 PERCENT DEXTROSE 900 MG: 18 INJECTION, SOLUTION INTRAVENOUS at 06:11

## 2020-11-06 NOTE — ED TRIAGE NOTES
EMS states pt was at a friends and was acting funny and had a fall to the ground. EMS states he was talking to someone that wasn't there. EMS states pt is complaining of bilateral leg pain.

## 2020-11-07 NOTE — ED PROVIDER NOTES
"Encounter Date: 11/6/2020       History     Chief Complaint   Patient presents with    Hallucinations     60-year-old male with past medical history significant of CAD, SVT, hypertension, diabetes, hyperlipidemia presents secondary to lower extremity pain and concern for hallucinations.  Patient states he has had episodes at home to where he would see things that were not there mostly people.  He denies any SI, HI or previous incidents.  Patient states he does not have any alcohol on board but does have a drug history and unknown time since last use.  Patient denies any fevers, chills, sweats, chest pain or shortness of breath.  He is otherwise stable and has no other complaints.        Review of patient's allergies indicates:   Allergen Reactions    Atorvastatin      Other reaction(s): Generalized Myalgias    Effexor [venlafaxine] Other (See Comments)     Tremulousness     Past Medical History:   Diagnosis Date    a A H/O Medical Noncompliance     H/O Chronic Noncompliance With His CHF Diet    a Cardiac Diastolic Dysfunction     Dr. Aure Washington    a Chronic Anticoagulation With Pradaxa     Dr. Aure Washington    a Coronary Artery Disease With H/O Stenting     Dr. Aure Washington; Was Hospitalized At [facility] 3/8/17-3/17/17 For CHF Exacerbatioin Due To "Dietary Discrepancies" With LCST Negative There    a Nonsustained Ventricular Tachycardia (NSVT)     a Paroxysmal Atrial Fibrillation With H/O RVR     Dr. Aure Washington; On Chronic Eliquis    a Syncopal Episode     [facility] 4/3/17-4/7/17 Stay For This: Was Likely Due To NSVT, And His Medications Were Adjusted    a Systolic CHF With EF 35-45%     Dr. Aure Washington; Was Hospitalized At [facility] 3/8/17-3/17/17 For CHF Exacerbatioin Due To "Dietary Discrepancies" With LCST Negative There    b Hypertension     b Proteinuria     04/2014 Referred To Dr. Leroy Allison; 4/1/14 Bilateral Renal U/S = Normal; On Lisinopril 20 Mg Daily    b Stage 2 CKD     c Hypercholesterolemia With Low HDL  " "   d Type 2 DM On Insulin     ** 12/4/18 Referred To [facility] ED[facility]; 1/11/18 Referred To Dr. Dipika Rodriguez And Re-Referred To [facility]; 12/28/17 HgA1c = 12.0;" 7/5/17 Referred To [facility]    f Morbid Obesity     Hyperkalemia 10/22/2020    i 1 PPD X 25+ YRs Chronic Tobacco Use Disorder     7/5/17 Increased Wellbutrin-XL To 300 Mg Daily; 6/8/17 RXd Wellbutrin- Mg Daily X 4 Months    i JULY On CPAP     Dr. Marion ngo Chronic Left Groin Pain     l Chronic Left Shoulder Pain     Dr. MARTINA martinez Chronic Recurrent Low Back Pain 12/04/2018    Dr. Llamas Is His Pain Management Neurologist; 5/219/18 Referred To Dr. Ismael martinez Left 5-7th Rib FXs 04/2016 4/23/16 [facility] Left Rib XRays = Questionable Nondisplaced Left 5-7th Rib FXs With Normal Lung Fields    l Right Shouder SX 5/26/16 Due To Work Related Injury     Dr. Mora At [facility]; Dr. MARTINA hatch H/O Transient Ischemic Attack In 2013     n Anxiety And Depression 12/04/2018    RTC In 6 Weeks; 12/4/18 Added Wellbutrin- Mg qAM; 5/29/18 Increased 100 Mg Zoloft To 100 Mg Bid    n Continuous Benzodiazepine (Xanax) Use 12/04/2018 5/29/18 I Am Weaning Him Off Of This By Decreasing 1 Mg Bid To 0.5 Mg Bid PRN, And Will Wean Further Next OV    n H/O ETOH Abuse, Quit In 09/2014     q Bilateral Lower Extremity Venous Stasis Ulcers     2/28/18 [facility]    q Chronic Bilateral Lower Extremity Edema     2/28/18 Added Metolazone 10 Mg qAM On MWF And Referred Back To Dr. Washington; On Lasix 40 Mg Bid; He Wears Bilateral Compressin Hose Stockings    q Disability Examination 7/15/16     For CHF, PAF, DM2, And JULY On CPAP    Wellness Visit 11/6/2017      Past Surgical History:   Procedure Laterality Date    CARDIAC SURGERY      coronary stent    COLONOSCOPY N/A 12/19/2017    Procedure: COLONOSCOPY;  Surgeon: Dave Allen MD;  Location: [facility];  Service: Endoscopy; "  Laterality: N/A;    DIABETES MANAGEMENT LABS      heart stent      INCISION AND DRAINAGE OF WOUND      on stomach    SHOULDER SURGERY       Family History   Problem Relation Age of Onset    Heart disease Mother     Arthritis Mother     Diabetes Mother     Hyperlipidemia Mother     Hypertension Mother     Heart disease Father     Arthritis Father     Asthma Father     COPD Father     Hyperlipidemia Father     Hypertension Father     Stroke Father     Diabetes Sister     Diabetes Maternal Uncle      Social History     Tobacco Use    Smoking status: Former Smoker     Packs/day: 0.25     Types: Cigarettes     Start date: 1981     Quit date: 2016     Years since quittin.1    Smokeless tobacco: Never Used    Tobacco comment: every now and again with drinks   Substance Use Topics    Alcohol use: Yes     Comment: occas    Drug use: No     Review of Systems   Constitutional: Negative for fever.   HENT: Negative for sore throat.    Respiratory: Negative for shortness of breath.    Cardiovascular: Negative for chest pain.   Gastrointestinal: Negative for nausea.   Genitourinary: Negative for dysuria.   Musculoskeletal: Positive for gait problem and myalgias. Negative for back pain.   Skin: Negative for rash.   Neurological: Negative for weakness.   Hematological: Does not bruise/bleed easily.   Psychiatric/Behavioral: Positive for confusion.   All other systems reviewed and are negative.      Physical Exam     Initial Vitals [20 1539]   BP Pulse Resp Temp SpO2   (!) 150/75 (!) 58 (!) 24 98.4 °F (36.9 °C) 100 %      MAP       --         Physical Exam    Nursing note and vitals reviewed.  Constitutional: He appears well-developed and well-nourished. He is not diaphoretic. No distress.   HENT:   Head: Normocephalic and atraumatic.   Mouth/Throat: Oropharynx is clear and moist.   Eyes: Conjunctivae and EOM are normal. Pupils are equal, round, and reactive to light.   Neck: Normal range of  motion. Neck supple. No thyromegaly present. No JVD present.   Cardiovascular: Normal rate, regular rhythm and normal heart sounds. Exam reveals no gallop and no friction rub.    No murmur heard.  Pulmonary/Chest: Breath sounds normal. No respiratory distress. He has no wheezes. He exhibits no tenderness.   Abdominal: Soft. Bowel sounds are normal. He exhibits no distension. There is no abdominal tenderness. There is no rebound and no guarding.   Musculoskeletal: Normal range of motion. No tenderness or edema.   Neurological: He is alert and oriented to person, place, and time. He has normal strength. No cranial nerve deficit or sensory deficit.   Skin: Skin is warm and dry. Capillary refill takes less than 2 seconds. No rash noted. There is erythema (Left lower extremity with cellulitic changes).   Psychiatric: He has a normal mood and affect.         ED Course   Procedures  Labs Reviewed   CBC W/ AUTO DIFFERENTIAL - Abnormal; Notable for the following components:       Result Value    Hemoglobin 11.3 (*)     Hematocrit 38.1 (*)     MCV 78 (*)     MCH 23.1 (*)     MCHC 29.7 (*)     RDW 15.6 (*)     Gran # (ANC) 9.4 (*)     Gran % 76.5 (*)     Lymph % 11.0 (*)     All other components within normal limits   COMPREHENSIVE METABOLIC PANEL - Abnormal; Notable for the following components:    Sodium 134 (*)     Glucose 146 (*)     BUN 46 (*)     Creatinine 2.0 (*)     eGFR if  40.7 (*)     eGFR if non  35.2 (*)     All other components within normal limits   URINALYSIS, REFLEX TO URINE CULTURE - Abnormal; Notable for the following components:    Protein, UA Trace (*)     Glucose, UA 1+ (*)     All other components within normal limits    Narrative:     Specimen Source->Urine   SEDIMENTATION RATE - Abnormal; Notable for the following components:    Sed Rate 12 (*)     All other components within normal limits   C-REACTIVE PROTEIN - Abnormal; Notable for the following components:    CRP  12.20 (*)     All other components within normal limits   TROPONIN I   C-REACTIVE PROTEIN   SEDIMENTATION RATE   AMMONIA   AMMONIA        ECG Results          EKG 12-lead (In process)  Result time 11/06/20 17:32:53    In process by Interface, Lab In [facility] (11/06/20 17:32:53)                 Narrative:    Test Reason : R44.3,    Vent. Rate : 068 BPM     Atrial Rate : 300 BPM     P-R Int : 000 ms          QRS Dur : 084 ms      QT Int : 360 ms       P-R-T Axes : 000 -32 194 degrees     QTc Int : 382 ms    Atrial fibrillation  Left axis deviation  Low voltage QRS  Cannot rule out Anteroseptal infarct (cited on or before 22-DEC-2018)  ST and T wave abnormality, consider inferior ischemia  Abnormal ECG  When compared with ECG of 05-OCT-2020 07:57,  Significant changes have occurred    Referred By: QI   SELF           Confirmed By:                             Imaging Results          CT Head Without Contrast (Final result)  Result time 11/06/20 17:31:43    Final result by Brittani Velez MD (11/06/20 17:31:43)                 Impression:      Generalized cerebral atrophy with no acute intracranial process    Stable encephalomalacia in the right parietal lobe and left frontal lobe    Stable soft tissue swelling in the posterior left skull base      Electronically signed by: Brittani Velez MD  Date:    11/06/2020  Time:    17:31             Narrative:      CMS MANDATED QUALITY DATA - CT RADIATION - 436    All CT scans at this facility utilize dose modulation, iterative reconstruction, and/or weight based dosing when appropriate to reduce radiation dose to as low as reasonably achievable.    EXAMINATION:  CT HEAD WITHOUT CONTRAST    CLINICAL HISTORY:  Altered mental status;    TECHNIQUE:  Head CT without IV contrast. All CT scans at this facility use dose modulation, iterative reconstruction, and/or weight based dosing when appropriate to reduce radiation dose to as low as reasonably  achievable.    COMPARISON:  10/05/2020    FINDINGS  The ventricles and sulci are prominent compatible with generalized cerebral atrophy.    There are areas of encephalomalacia in the left frontal lobe and right parietal lobe from prior infarct.  There is no hemorrhage, mass or midline shift.  There are no extra-axial fluid collections.    There is subcutaneous hematoma over the left posterior skull base.  There is no underlying fracture.  The paranasal sinuses and mastoid air cells are clear.                               X-Ray Tibia Fibula 2 View Left (Final result)  Result time 11/06/20 17:11:54    Final result by Brittani Velez MD (11/06/20 17:11:54)                 Narrative:    Reason: Pain after fall    Findings:  2 views left tibia and fibula show no fracture, dislocation, or  destructive osseous lesion. Soft tissues are unremarkable.  There is peripheral vascular calcification.      Impression:  Negative exam.    Electronically Signed by Brittani Velez M.D. on 11/6/2020 5:18 PM                             X-Ray Chest AP Portable (Final result)  Result time 11/06/20 17:10:54    Final result by Brittani Velez MD (11/06/20 17:10:54)                 Narrative:    Portable chest x-ray at 4:46 PM is compared to prior study dated  10/24/2020    Clinical history is fall    The heart is mildly enlarged. The lungs are clear. There is a heart  loop monitor in place. There are no acute osseous abnormalities.    IMPRESSION: Mild cardiomegaly with no acute pulmonary process    Electronically Signed by Brittani Velez M.D. on 11/6/2020 5:15 PM                               Medical Decision Making:   Initial Assessment:   60-year-old male on initial assessment in mild distress secondary to lower extremity pain.  Patient is alert and oriented x3, neurologically and neurovascularly intact with no focal deficits.  He reports having symptoms previously of visual hallucinations while at home when he moves his head  from 1 direction to the other.  He is nontoxic-appearing and vitals stable at this time.  Differential Diagnosis:   Polysubstance abuse, psychosis, cellulitis, osteomyelitis  Independently Interpreted Test(s):   I have ordered and independently interpreted X-rays - see prior notes.  I have ordered and independently interpreted EKG Reading(s) - see prior notes  Clinical Tests:   Lab Tests: Ordered and Reviewed  The following lab test(s) were unremarkable: CBC, CMP, Troponin and BNP  Radiological Study: Ordered and Reviewed  Medical Tests: Ordered and Reviewed  ED Management:  Patient has been reassessed noted to have no acute changes in his condition.  Labs are unremarkable for any acute pathology requiring further hospital admission or treatment this time.  Patient's CT head is negative and he has not had any episodes of hallucinations while in the ED. Patient was also given IV fluids secondary to acute kidney injury while in the ED. Patient was given IV clinda for his lower extremity cellulitis and will continue to take p.o. on outpatient basis and follow up with PCP for wound reassessment.  Patient denies any SI, HI and has a place to go tonight.  He has remained stable while in the ED and discharged home stable condition with follow-up as needed.  Mr. Leon is aware of the plan and in agreement with discharge.                             Clinical Impression:       ICD-10-CM ICD-9-CM   1. Wound cellulitis  L03.90 682.9   2. Hallucination  R44.3 780.1   3. Fall  W19.XXXA E888.9   4. CARLOS MANUEL (acute kidney injury)  N17.9 584.9                          ED Disposition Condition    Discharge Stable        ED Prescriptions     Medication Sig Dispense Start Date End Date Auth. Provider    clindamycin (CLEOCIN) 150 MG capsule Take 3 capsules (450 mg total) by mouth 4 (four) times daily. for 7 days 84 capsule 11/6/2020 11/13/2020 Pablo Zapata MD        Follow-up Information     Follow up With Specialties Details Why  Contact Info Additional Information    [facility]  As needed, If symptoms worsen [address]  [facility] [id number] [phone] 1st floor    Josh Giordano MD Emergency Medicine Schedule an appointment as soon as possible for a visit  As needed [address] [facility] [address]  [phone]                                          Pablo Zapata MD  11/06/20 2106       Pablo Zapata MD  11/06/20 2108

## 2020-11-11 ENCOUNTER — HOSPITAL ENCOUNTER (EMERGENCY)
Facility: HOSPITAL | Age: 60
Discharge: PSYCHIATRIC OR SUBSTANCE USE FACILITY | End: 2020-11-12
Attending: EMERGENCY MEDICINE
Payer: MEDICARE

## 2020-11-11 DIAGNOSIS — F29 PSYCHOSIS, UNSPECIFIED PSYCHOSIS TYPE: Primary | ICD-10-CM

## 2020-11-11 DIAGNOSIS — T14.8XXA WOUND, OPEN: ICD-10-CM

## 2020-11-11 LAB
ALBUMIN SERPL BCP-MCNC: 3.2 G/DL (ref 3.5–5.2)
ALP SERPL-CCNC: 129 U/L (ref 55–135)
ALT SERPL W/O P-5'-P-CCNC: 18 U/L (ref 10–44)
AMPHET+METHAMPHET UR QL: NEGATIVE
ANION GAP SERPL CALC-SCNC: 12 MMOL/L (ref 8–16)
APAP SERPL-MCNC: <10 UG/ML (ref 10–20)
AST SERPL-CCNC: 14 U/L (ref 10–40)
BACTERIA #/AREA URNS HPF: NEGATIVE /HPF
BARBITURATES UR QL SCN>200 NG/ML: NEGATIVE
BASOPHILS # BLD AUTO: 0.12 K/UL (ref 0–0.2)
BASOPHILS NFR BLD: 1.3 % (ref 0–1.9)
BENZODIAZ UR QL SCN>200 NG/ML: NEGATIVE
BILIRUB SERPL-MCNC: 0.7 MG/DL (ref 0.1–1)
BILIRUB UR QL STRIP: NEGATIVE
BNP SERPL-MCNC: 269 PG/ML (ref 0–99)
BUN SERPL-MCNC: 15 MG/DL (ref 6–20)
BZE UR QL SCN: NORMAL
CALCIUM SERPL-MCNC: 9.2 MG/DL (ref 8.7–10.5)
CANNABINOIDS UR QL SCN: NEGATIVE
CHLORIDE SERPL-SCNC: 102 MMOL/L (ref 95–110)
CLARITY UR: CLEAR
CO2 SERPL-SCNC: 26 MMOL/L (ref 23–29)
COLOR UR: YELLOW
CREAT SERPL-MCNC: 1.2 MG/DL (ref 0.5–1.4)
CREAT UR-MCNC: 240 MG/DL (ref 23–375)
DIFFERENTIAL METHOD: ABNORMAL
EOSINOPHIL # BLD AUTO: 0.4 K/UL (ref 0–0.5)
EOSINOPHIL NFR BLD: 4.7 % (ref 0–8)
ERYTHROCYTE [DISTWIDTH] IN BLOOD BY AUTOMATED COUNT: 15.5 % (ref 11.5–14.5)
EST. GFR  (AFRICAN AMERICAN): >60 ML/MIN/1.73 M^2
EST. GFR  (NON AFRICAN AMERICAN): >60 ML/MIN/1.73 M^2
ETHANOL SERPL-MCNC: <5 MG/DL
GLUCOSE SERPL-MCNC: 135 MG/DL (ref 70–110)
GLUCOSE UR QL STRIP: ABNORMAL
HCT VFR BLD AUTO: 36.4 % (ref 40–54)
HGB BLD-MCNC: 11 G/DL (ref 14–18)
HGB UR QL STRIP: NEGATIVE
HYALINE CASTS #/AREA URNS LPF: 1 /LPF
IMM GRANULOCYTES # BLD AUTO: 0.02 K/UL (ref 0–0.04)
IMM GRANULOCYTES NFR BLD AUTO: 0.2 % (ref 0–0.5)
INR PPP: 1.2
KETONES UR QL STRIP: NEGATIVE
LEUKOCYTE ESTERASE UR QL STRIP: NEGATIVE
LYMPHOCYTES # BLD AUTO: 1.4 K/UL (ref 1–4.8)
LYMPHOCYTES NFR BLD: 14.5 % (ref 18–48)
MAGNESIUM SERPL-MCNC: 1.7 MG/DL (ref 1.6–2.6)
MCH RBC QN AUTO: 23.5 PG (ref 27–31)
MCHC RBC AUTO-ENTMCNC: 30.2 G/DL (ref 32–36)
MCV RBC AUTO: 78 FL (ref 82–98)
MICROSCOPIC COMMENT: NORMAL
MONOCYTES # BLD AUTO: 0.7 K/UL (ref 0.3–1)
MONOCYTES NFR BLD: 7.4 % (ref 4–15)
NEUTROPHILS # BLD AUTO: 6.7 K/UL (ref 1.8–7.7)
NEUTROPHILS NFR BLD: 71.9 % (ref 38–73)
NITRITE UR QL STRIP: NEGATIVE
NRBC BLD-RTO: 0 /100 WBC
OPIATES UR QL SCN: NEGATIVE
PCP UR QL SCN>25 NG/ML: NEGATIVE
PH UR STRIP: 6 [PH] (ref 5–8)
PLATELET # BLD AUTO: 299 K/UL (ref 150–350)
PMV BLD AUTO: 11.7 FL (ref 9.2–12.9)
POTASSIUM SERPL-SCNC: 4 MMOL/L (ref 3.5–5.1)
PROT SERPL-MCNC: 7.2 G/DL (ref 6–8.4)
PROT UR QL STRIP: ABNORMAL
PROTHROMBIN TIME: 14.8 SEC (ref 10.6–14.8)
RBC # BLD AUTO: 4.68 M/UL (ref 4.6–6.2)
RBC #/AREA URNS HPF: 1 /HPF (ref 0–4)
SALICYLATES SERPL-MCNC: <4 MG/DL (ref 15–30)
SARS-COV-2 RDRP RESP QL NAA+PROBE: NEGATIVE
SODIUM SERPL-SCNC: 140 MMOL/L (ref 136–145)
SP GR UR STRIP: 1.02 (ref 1–1.03)
SQUAMOUS #/AREA URNS HPF: 1 /HPF
TOXICOLOGY INFORMATION: NORMAL
TROPONIN I SERPL DL<=0.01 NG/ML-MCNC: 0.03 NG/ML
TSH SERPL DL<=0.005 MIU/L-ACNC: 1.01 UIU/ML (ref 0.34–5.6)
URN SPEC COLLECT METH UR: ABNORMAL
UROBILINOGEN UR STRIP-ACNC: ABNORMAL EU/DL
WBC # BLD AUTO: 9.28 K/UL (ref 3.9–12.7)
WBC #/AREA URNS HPF: 0 /HPF (ref 0–5)

## 2020-11-11 PROCEDURE — 93010 ELECTROCARDIOGRAM REPORT: CPT | Mod: ,,, | Performed by: INTERNAL MEDICINE

## 2020-11-11 PROCEDURE — 84484 ASSAY OF TROPONIN QUANT: CPT

## 2020-11-11 PROCEDURE — 85610 PROTHROMBIN TIME: CPT

## 2020-11-11 PROCEDURE — 99285 EMERGENCY DEPT VISIT HI MDM: CPT | Mod: 25

## 2020-11-11 PROCEDURE — 80053 COMPREHEN METABOLIC PANEL: CPT

## 2020-11-11 PROCEDURE — 80307 DRUG TEST PRSMV CHEM ANLYZR: CPT

## 2020-11-11 PROCEDURE — 25000003 PHARM REV CODE 250: Performed by: EMERGENCY MEDICINE

## 2020-11-11 PROCEDURE — 85025 COMPLETE CBC W/AUTO DIFF WBC: CPT

## 2020-11-11 PROCEDURE — 81001 URINALYSIS AUTO W/SCOPE: CPT | Mod: 59

## 2020-11-11 PROCEDURE — U0002 COVID-19 LAB TEST NON-CDC: HCPCS

## 2020-11-11 PROCEDURE — 99205 PR OFFICE/OUTPT VISIT, NEW, LEVL V, 60-74 MIN: ICD-10-PCS | Mod: 95,,, | Performed by: PSYCHIATRY & NEUROLOGY

## 2020-11-11 PROCEDURE — 84443 ASSAY THYROID STIM HORMONE: CPT

## 2020-11-11 PROCEDURE — 83880 ASSAY OF NATRIURETIC PEPTIDE: CPT

## 2020-11-11 PROCEDURE — 83735 ASSAY OF MAGNESIUM: CPT

## 2020-11-11 PROCEDURE — 99205 OFFICE O/P NEW HI 60 MIN: CPT | Mod: 95,,, | Performed by: PSYCHIATRY & NEUROLOGY

## 2020-11-11 PROCEDURE — 36415 COLL VENOUS BLD VENIPUNCTURE: CPT

## 2020-11-11 PROCEDURE — 93005 ELECTROCARDIOGRAM TRACING: CPT | Performed by: INTERNAL MEDICINE

## 2020-11-11 PROCEDURE — 80320 DRUG SCREEN QUANTALCOHOLS: CPT

## 2020-11-11 PROCEDURE — 93010 EKG 12-LEAD: ICD-10-PCS | Mod: ,,, | Performed by: INTERNAL MEDICINE

## 2020-11-11 RX ORDER — MUPIROCIN 20 MG/G
OINTMENT TOPICAL 3 TIMES DAILY
Qty: 15 G | Refills: 0 | Status: SHIPPED | OUTPATIENT
Start: 2020-11-11 | End: 2020-11-21

## 2020-11-11 RX ORDER — CLINDAMYCIN HYDROCHLORIDE 150 MG/1
450 CAPSULE ORAL 4 TIMES DAILY
Qty: 84 CAPSULE | Refills: 0 | Status: SHIPPED | OUTPATIENT
Start: 2020-11-11 | End: 2020-11-18

## 2020-11-11 RX ORDER — BACITRACIN ZINC AND POLYMYXIN B SULFATE 500; 1000 [USP'U]/G; [USP'U]/G
OINTMENT TOPICAL
Status: COMPLETED | OUTPATIENT
Start: 2020-11-11 | End: 2020-11-11

## 2020-11-11 RX ADMIN — BACITRACIN ZINC AND POLYMYXIN B SULFATE: at 11:11

## 2020-11-12 VITALS
BODY MASS INDEX: 40.46 KG/M2 | WEIGHT: 289 LBS | TEMPERATURE: 98 F | OXYGEN SATURATION: 99 % | HEART RATE: 87 BPM | RESPIRATION RATE: 18 BRPM | SYSTOLIC BLOOD PRESSURE: 160 MMHG | HEIGHT: 71 IN | DIASTOLIC BLOOD PRESSURE: 70 MMHG

## 2020-11-12 NOTE — ED PROVIDER NOTES
"Encounter Date: 11/11/2020       History     Chief Complaint   Patient presents with    Hallucinations     Patient states he is suffering from visual hallucinations. Denies SI.      Patient presents complaining of unable to care for himself at home.  Patient daughter called and notified he has been having frequent episodes of talking to people who are no longer alive and having visual hallucinations.  In the past patient has a history of drug abuse.  Daughter states there has been no history of schizophrenia or bipolar.  Daughter concerned he may have used drugs.  Daughter concerned that he is unable to care for himself.  Patient denies auditory or visual hallucinations.  He denies being suicidal homicidal.  He was recently in the hospital for multiple weeks for lower extremity cellulitis and edema.        Review of patient's allergies indicates:   Allergen Reactions    Atorvastatin      Other reaction(s): Generalized Myalgias    Effexor [venlafaxine] Other (See Comments)     Tremulousness     Past Medical History:   Diagnosis Date    a A H/O Medical Noncompliance     H/O Chronic Noncompliance With His CHF Diet    a Cardiac Diastolic Dysfunction     Dr. Aure Washington    a Chronic Anticoagulation With Pradaxa     Dr. Aure Washington    a Coronary Artery Disease With H/O Stenting     Dr. Aure Washington; Was Hospitalized At [facility] 3/8/17-3/17/17 For CHF Exacerbatioin Due To "Dietary Discrepancies" With LCST Negative There    a Nonsustained Ventricular Tachycardia (NSVT)     a Paroxysmal Atrial Fibrillation With H/O RVR     Dr. Aure Washington; On Chronic Eliquis    a Syncopal Episode     [facility] 4/3/17-4/7/17 Stay For This: Was Likely Due To NSVT, And His Medications Were Adjusted    a Systolic CHF With EF 35-45%     Dr. Aure Washington; Was Hospitalized At [facility] 3/8/17-3/17/17 For CHF Exacerbatioin Due To "Dietary Discrepancies" With LCST Negative There    b Hypertension     b Proteinuria     04/2014 Referred To Dr. Leroy Allison; " "4/1/14 Bilateral Renal U/S = Normal; On Lisinopril 20 Mg Daily    b Stage 2 CKD     c Hypercholesterolemia With Low HDL     d Type 2 DM On Insulin     ** 12/4/18 Referred To [facility]; 1/11/18 Referred To Dr. Dipika Rodriguez And Re-Referred To [facility] EDU; 12/28/17 HgA1c = 12.0;" 7/5/17 Referred To [facility] EDU    f Morbid Obesity     Hyperkalemia 10/22/2020    i 1 PPD X 25+ YRs Chronic Tobacco Use Disorder     7/5/17 Increased Wellbutrin-XL To 300 Mg Daily; 6/8/17 RXd Wellbutrin- Mg Daily X 4 Months    i JULY On CPAP     Dr. Marion ngo Chronic Left Groin Pain     l Chronic Left Shoulder Pain     Dr. MARTINA martinez Chronic Recurrent Low Back Pain 12/04/2018    Dr. Llamas Is His Pain Management Neurologist; 5/219/18 Referred To Dr. Ismael martinez Left 5-7th Rib FXs 04/2016 4/23/16 [facility] Left Rib XRays = Questionable Nondisplaced Left 5-7th Rib FXs With Normal Lung Fields    l Right Shouder SX 5/26/16 Due To Work Related Injury     Dr. Mora At [facility]; Dr. MARTINA hatch H/O Transient Ischemic Attack In 2013     n Anxiety And Depression 12/04/2018    RTC In 6 Weeks; 12/4/18 Added Wellbutrin- Mg qAM; 5/29/18 Increased 100 Mg Zoloft To 100 Mg Bid    n Continuous Benzodiazepine (Xanax) Use 12/04/2018 5/29/18 I Am Weaning Him Off Of This By Decreasing 1 Mg Bid To 0.5 Mg Bid PRN, And Will Wean Further Next OV    n H/O ETOH Abuse, Quit In 09/2014     q Bilateral Lower Extremity Venous Stasis Ulcers     2/28/18 [facility]    q Chronic Bilateral Lower Extremity Edema     2/28/18 Added Metolazone 10 Mg qAM On MWF And Referred Back To Dr. Washington; On Lasix 40 Mg Bid; He Wears Bilateral Compressin Hose Stockings    q Disability Examination 7/15/16     For CHF, PAF, DM2, And JULY On CPAP    Wellness Visit 11/6/2017      Past Surgical History:   Procedure Laterality Date    CARDIAC SURGERY      coronary stent    " COLONOSCOPY N/A 2017    Procedure: COLONOSCOPY;  Surgeon: Dave Allen MD;  Location: [facility];  Service: Endoscopy;  Laterality: N/A;    DIABETES MANAGEMENT LABS      heart stent      INCISION AND DRAINAGE OF WOUND      on stomach    SHOULDER SURGERY       Family History   Problem Relation Age of Onset    Heart disease Mother     Arthritis Mother     Diabetes Mother     Hyperlipidemia Mother     Hypertension Mother     Heart disease Father     Arthritis Father     Asthma Father     COPD Father     Hyperlipidemia Father     Hypertension Father     Stroke Father     Diabetes Sister     Diabetes Maternal Uncle      Social History     Tobacco Use    Smoking status: Former Smoker     Packs/day: 0.25     Types: Cigarettes     Start date: 1981     Quit date: 2016     Years since quittin.1    Smokeless tobacco: Never Used    Tobacco comment: every now and again with drinks   Substance Use Topics    Alcohol use: Yes     Comment: occas    Drug use: No     Review of Systems   Psychiatric/Behavioral: Positive for hallucinations.   All other systems reviewed and are negative.      Physical Exam     Initial Vitals [20 1834]   BP Pulse Resp Temp SpO2   (!) 174/84 85 18 98.1 °F (36.7 °C) 98 %      MAP       --         Physical Exam    Nursing note and vitals reviewed.  Constitutional: He appears well-developed. He is not diaphoretic. No distress.   HENT:   Head: Normocephalic and atraumatic.   Mouth/Throat: Oropharynx is clear and moist.   Eyes: EOM are normal. Pupils are equal, round, and reactive to light.   Neck: Normal range of motion. Neck supple.   Cardiovascular: Normal heart sounds and intact distal pulses.   Irregularly irregular   Pulmonary/Chest: Breath sounds normal. No respiratory distress.   Abdominal: Soft. Bowel sounds are normal.   Musculoskeletal:      Comments: Bilateral lower extremity edema 1+ nonpitting   Neurological: He is alert and oriented to  person, place, and time. He has normal strength.   Skin: Skin is warm and dry.   There is a left lower extremity wound without surrounding cellulitis   Psychiatric: He has a normal mood and affect. His behavior is normal. Judgment and thought content normal.         ED Course   Procedures  Labs Reviewed   COMPREHENSIVE METABOLIC PANEL - Abnormal; Notable for the following components:       Result Value    Glucose 135 (*)     Albumin 3.2 (*)     All other components within normal limits   CBC W/ AUTO DIFFERENTIAL - Abnormal; Notable for the following components:    Hemoglobin 11.0 (*)     Hematocrit 36.4 (*)     MCV 78 (*)     MCH 23.5 (*)     MCHC 30.2 (*)     RDW 15.5 (*)     Lymph % 14.5 (*)     All other components within normal limits   SALICYLATE LEVEL - Abnormal; Notable for the following components:    Salicylate Lvl <4.0 (*)     All other components within normal limits   B-TYPE NATRIURETIC PEPTIDE - Abnormal; Notable for the following components:     (*)     All other components within normal limits   MAGNESIUM   PROTIME-INR   TROPONIN I   TSH   ALCOHOL,MEDICAL (ETHANOL)   ACETAMINOPHEN LEVEL   B-TYPE NATRIURETIC PEPTIDE   SARS-COV-2 RNA AMPLIFICATION, QUAL   URINALYSIS, REFLEX TO URINE CULTURE   DRUG SCREEN PANEL, URINE EMERGENCY          Imaging Results          X-ray Chest AP Portable (In process)                  Medical Decision Making:   Initial Assessment:   Patient is in no acute distress  Differential Diagnosis:   Considerations include drug-induced psychosis, psychosis not otherwise specified, infection, urinary tract infection, pneumonia  Independently Interpreted Test(s):   I have ordered and independently interpreted EKG Reading(s) - see summary below       <> Summary of EKG Reading(s): EKG is atrial fibrillation, irregularly irregular, no ST changes  Clinical Tests:   Lab Tests: Reviewed and Ordered  Radiological Study: Ordered and Reviewed  Medical Tests: Reviewed and Ordered  ED  Management:  In the emergency department physician emergency certificate was completed secondary to patient being gravely disabled.  I consulted the tele-psych physician who also agree with this assessment and plan.  I did discuss the case with the daughter as well who agreed that patient is unable to care for himself.  I did review patient's labs and chest x-ray and EKG.  Patient is in atrial fibrillation which is chronic.  Chest x-ray reveals some cardiomegaly but no volume overload.  BNP is mildly elevated 200.  Troponin is negative.  Patient is clinically stable.  Psychiatric medications were reviewed and adjustments were advised by the psychiatrist.                        Medically cleared for psychiatry placement: 11/11/2020 10:45 PM                Clinical Impression:       ICD-10-CM ICD-9-CM   1. Psychosis, unspecified psychosis type  F29 298.9   2. Wound, open  T14.8XXA 879.8                          ED Disposition Condition    Transfer to Psych Facility         ED Prescriptions     Medication Sig Dispense Start Date End Date Auth. Provider    mupirocin (BACTROBAN) 2 % ointment Apply topically 3 (three) times daily. for 10 days 15 g 11/11/2020 11/21/2020 Wilver Sahu MD    clindamycin (CLEOCIN) 150 MG capsule Take 3 capsules (450 mg total) by mouth 4 (four) times daily. for 7 days 84 capsule 11/11/2020 11/18/2020 Wilver Sahu MD        Follow-up Information    None                                      Wilver Sahu MD  11/11/20 [id number]

## 2020-11-12 NOTE — ED NOTES
Attempted to call report to [facility], they will return call. Stated they had just gotten there and had not received paper work yet.

## 2020-11-12 NOTE — CONSULTS
[facility]  Psychiatry  Telepsychiatry Consult Note    Please see previous notes:  Patient agreeable to consultation via telepsychiatry.  Tele-Consultation from Psychiatry started: 11/11/2020 at 2130  The chief complaint leading to psychiatric consultation is: psychosis/ grave disability  This consultation was requested by dr bartlett, the Emergency Department attending physician.  The location of the consulting psychiatrist is [address] [place] [address].  The patient location is  [facility] EMERGENCY DEPARTMENT   The patient arrived at the ED at: 2030    Also present with the patient at the time of the consultation: ed rn  Patient Identification:   Jose Leon is a 60 y.o. male.  Patient information was obtained from patient, past medical records and son and daughter.  Patient presented involuntarily to the Emergency Department by ambulance where the patient received see Ambulance Run Sheet prior to arrival.    Consults  Subjective:     History of Present Illness: This is a 59 y/o WM with a complicated past medical hx that presents to the ED after his son called EMS 2/2 the pt's worsening cognition and behavior. The pt was recently discharged from the hospital and sent to [facility], he was discharged 2/2 lewd behavior. 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 It does not appear he was seen by psychiatry or neurology while at the SNF. His children had concerns on d/c, as they felt he needed a higher level of care and felt he needed to have 24 hour supervision. It was explained to them that nothing could be done b/c the pt already stated that he would walk out if he were sent someplace else. It was encouraged that the family get  POA. The situation is complicated 2/2 his refractory noncompliance with medication, for virtually all his comorbidities. He was sent home from the SNF with home health. The  nurse felt he needed a higher level of  "inpt care as well. He was easily agitated, confused, hallucinating and not at all able to take care of himself. On discharge from hospital medicine on 10.26.20:  "Jose Leon is a 60 y.o. male with PMHx of DM, CHF, chronic LE edema, morbid obesity, chronic benzo, pain med and anticoagulant use who presented to the ED via EMS for evaluation of wound to the left lower leg s/p fall on 10/5.  When home health nurse evaluated the patient and evaluated the wound recommended patient come to the emergency room for IV antibiotics.  Patient states that he had a low-grade temperature and had chills.  Patient was given a referral to wound care nurses as an outpatient but did not go to his clinic visit.  Patient states that he has difficulty ambulating due to and swelling of the left leg and foot.  The patient denies  chest pain, or any other symptoms at this time. Patient is a former smoker.Hospital Course:  Patient presented to the ED for left lower leg wound on 10/19.  He is being treated for cellulitis. Hospital course was complicated by worsening hypoxemic w/ respiratory failure requiring ICU transfer and BIPAP.  Started on empiric broad spectrum abx for possible LRTI and was given IV Lasix .  Patient improved over the course of next 24 hrs and was off BiPAP the next day.  Patient improved and transferred to telemetry. Wound nurse was consulted but did daily dressing and the wound improved over the course of his hospital stay.  Patient was continued on vancomycin and cefepime was discontinued.  All cultures were negative.  PT worked with the patient and recommended patient needs skilled nursing facility.  Patient was medically stable for discharge on oral antibiotics and p.o. Lasix.  A midline was placed on 10/26/2020 prior to transfer.  Medically cleared to be transferred". At this time the pt is gravely disabled and needs admission.         Psychiatric Mental Status Exam:  Arousal: lethargic  Sensorium/Orientation: " "oriented to person, place  Behavior/Cooperation: cooperative   Speech: dysarthia  Language: grossly intact  Mood: " ok "   Affect: constricted  Thought Process: concrete  Thought Content:   Auditory hallucinations: NO  Visual hallucinations: YES:      Paranoia: YES:      Delusions:  NO  Suicidal ideation: NO  Homicidal ideation: NO  Insight: poor awareness of illness  Judgment: behavior is adequate to circumstances, limited      Past Medical History:   Past Medical History:   Diagnosis Date    a A H/O Medical Noncompliance     H/O Chronic Noncompliance With His CHF Diet    a Cardiac Diastolic Dysfunction     Dr. Aure Washington    a Chronic Anticoagulation With Pradaxa     Dr. Aure Washington    a Coronary Artery Disease With H/O Stenting     Dr. Aure Washington; Was Hospitalized At [facility] 3/8/17-3/17/17 For CHF Exacerbatioin Due To "Dietary Discrepancies" With LCST Negative There    a Nonsustained Ventricular Tachycardia (NSVT)     a Paroxysmal Atrial Fibrillation With H/O RVR     Dr. Aure Washington; On Chronic Eliquis    a Syncopal Episode     [facility] 4/3/17-4/7/17 Stay For This: Was Likely Due To NSVT, And His Medications Were Adjusted    a Systolic CHF With EF 35-45%     Dr. Aure Washington; Was Hospitalized At [facility] 3/8/17-3/17/17 For CHF Exacerbatioin Due To "Dietary Discrepancies" With LCST Negative There    b Hypertension     b Proteinuria     04/2014 Referred To Dr. Leroy Allison; 4/1/14 Bilateral Renal U/S = Normal; On Lisinopril 20 Mg Daily    b Stage 2 CKD     c Hypercholesterolemia With Low HDL     d Type 2 DM On Insulin     ** 12/4/18 Referred To DM EDU; 1/11/18 Referred To Dr. Dipika Rodriguez And Re-Referred To DM EDU; 12/28/17 HgA1c = 12.0;" 7/5/17 Referred To DM EDU    f Morbid Obesity     Hyperkalemia 10/22/2020    i 1 PPD X 25+ YRs Chronic Tobacco Use Disorder     7/5/17 Increased Wellbutrin-XL To 300 Mg Daily; 6/8/17 RXd Wellbutrin- Mg Daily X 4 Months    i JULY On CPAP     Dr. Marion ngo Chronic " Left Groin Pain     l Chronic Left Shoulder Pain     Dr. MARTINA martinez Chronic Recurrent Low Back Pain 12/04/2018    Dr. Llamas Is His Pain Management Neurologist; 5/219/18 Referred To Dr. Ismael martinez Left 5-7th Rib FXs 04/2016 4/23/16 [facility] Left Rib XRays = Questionable Nondisplaced Left 5-7th Rib FXs With Normal Lung Fields    l Right Shouder SX 5/26/16 Due To Work Related Injury     Dr. Mora At [facility]; Dr. MARTINA hatch H/O Transient Ischemic Attack In 2013     n Anxiety And Depression 12/04/2018    RTC In 6 Weeks; 12/4/18 Added Wellbutrin- Mg qAM; 5/29/18 Increased 100 Mg Zoloft To 100 Mg Bid    n Continuous Benzodiazepine (Xanax) Use 12/04/2018 5/29/18 I Am Weaning Him Off Of This By Decreasing 1 Mg Bid To 0.5 Mg Bid PRN, And Will Wean Further Next OV    n H/O ETOH Abuse, Quit In 09/2014     q Bilateral Lower Extremity Venous Stasis Ulcers     2/28/18 [facility]    q Chronic Bilateral Lower Extremity Edema     2/28/18 Added Metolazone 10 Mg qAM On MWF And Referred Back To Dr. Washington; On Lasix 40 Mg Bid; He Wears Bilateral Compressin Hose Stockings    q Disability Examination 7/15/16     For CHF, PAF, DM2, And JULY On CPAP    Wellness Visit 11/6/2017       Laboratory Data:   Labs Reviewed   COMPREHENSIVE METABOLIC PANEL - Abnormal; Notable for the following components:       Result Value    Glucose 135 (*)     Albumin 3.2 (*)     All other components within normal limits   CBC W/ AUTO DIFFERENTIAL - Abnormal; Notable for the following components:    Hemoglobin 11.0 (*)     Hematocrit 36.4 (*)     MCV 78 (*)     MCH 23.5 (*)     MCHC 30.2 (*)     RDW 15.5 (*)     Lymph % 14.5 (*)     All other components within normal limits   SALICYLATE LEVEL - Abnormal; Notable for the following components:    Salicylate Lvl <4.0 (*)     All other components within normal limits   B-TYPE NATRIURETIC PEPTIDE -  Abnormal; Notable for the following components:     (*)     All other components within normal limits   MAGNESIUM   PROTIME-INR   TROPONIN I   TSH   ALCOHOL,MEDICAL (ETHANOL)   ACETAMINOPHEN LEVEL   B-TYPE NATRIURETIC PEPTIDE   SARS-COV-2 RNA AMPLIFICATION, QUAL   URINALYSIS, REFLEX TO URINE CULTURE   DRUG SCREEN PANEL, URINE EMERGENCY     Review of patient's allergies indicates:   Allergen Reactions    Atorvastatin      Other reaction(s): Generalized Myalgias    Effexor [venlafaxine] Other (See Comments)     Tremulousness   Medications in ER: Medications - No data to display  Medications at home: per MR, hx of noncompliance  No new subjective & objective note has been filed under this hospital service since the last note was generated.      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  Consultation ended: 11/11/2020 at 2150    Jag Abdi MD   Psychiatry  [facility]

## 2020-11-12 NOTE — ED NOTES
Care resumed from LELA Griggs. Rounding on the patient has been done. he has been updated on the plan of care and his current status. Comfort positioning and restroom needs were addressed. he was advised when a reassessment would take place. The patient is resting comfortably on the stretcher, respirations are even and unlabored, skin warm and dry. Sitter outside room maintaining visual contact with patient.

## 2022-07-29 ENCOUNTER — HOSPITAL ENCOUNTER (EMERGENCY)
Facility: HOSPITAL | Age: 62
Discharge: HOME | End: 2022-07-30
Attending: EMERGENCY MEDICINE
Payer: MEDICARE

## 2022-07-29 DIAGNOSIS — I50.9 CONGESTIVE HEART FAILURE, UNSPECIFIED HF CHRONICITY, UNSPECIFIED HEART FAILURE TYPE: Primary | ICD-10-CM

## 2022-07-29 DIAGNOSIS — I51.89 DIASTOLIC DYSFUNCTION: ICD-10-CM

## 2022-07-29 DIAGNOSIS — R60.0 PERIPHERAL EDEMA: ICD-10-CM

## 2022-07-29 LAB
ALBUMIN SERPL BCP-MCNC: 3.8 G/DL (ref 3.5–5.2)
ALP SERPL-CCNC: 80 U/L (ref 55–135)
ALT SERPL W/O P-5'-P-CCNC: 13 U/L (ref 10–44)
ANION GAP SERPL CALC-SCNC: 7 MMOL/L (ref 8–16)
AST SERPL-CCNC: 11 U/L (ref 10–40)
BASOPHILS # BLD AUTO: 0.06 K/UL (ref 0–0.2)
BASOPHILS NFR BLD: 0.8 % (ref 0–1.9)
BILIRUB SERPL-MCNC: 0.8 MG/DL (ref 0.1–1)
BNP SERPL-MCNC: 431 PG/ML (ref 0–99)
BUN SERPL-MCNC: 25 MG/DL (ref 8–23)
CALCIUM SERPL-MCNC: 9.2 MG/DL (ref 8.7–10.5)
CHLORIDE SERPL-SCNC: 102 MMOL/L (ref 95–110)
CO2 SERPL-SCNC: 30 MMOL/L (ref 23–29)
CREAT SERPL-MCNC: 1.2 MG/DL (ref 0.5–1.4)
DIFFERENTIAL METHOD: ABNORMAL
EOSINOPHIL # BLD AUTO: 0.1 K/UL (ref 0–0.5)
EOSINOPHIL NFR BLD: 1.8 % (ref 0–8)
ERYTHROCYTE [DISTWIDTH] IN BLOOD BY AUTOMATED COUNT: 14.7 % (ref 11.5–14.5)
EST. GFR  (AFRICAN AMERICAN): >60 ML/MIN/1.73 M^2
EST. GFR  (NON AFRICAN AMERICAN): >60 ML/MIN/1.73 M^2
GLUCOSE SERPL-MCNC: 162 MG/DL (ref 70–110)
HCT VFR BLD AUTO: 42.8 % (ref 40–54)
HGB BLD-MCNC: 13.1 G/DL (ref 14–18)
IMM GRANULOCYTES # BLD AUTO: 0.03 K/UL (ref 0–0.04)
IMM GRANULOCYTES NFR BLD AUTO: 0.4 % (ref 0–0.5)
LYMPHOCYTES # BLD AUTO: 1.4 K/UL (ref 1–4.8)
LYMPHOCYTES NFR BLD: 18.1 % (ref 18–48)
MAGNESIUM SERPL-MCNC: 1.9 MG/DL (ref 1.6–2.6)
MCH RBC QN AUTO: 24.1 PG (ref 27–31)
MCHC RBC AUTO-ENTMCNC: 30.6 G/DL (ref 32–36)
MCV RBC AUTO: 79 FL (ref 82–98)
MONOCYTES # BLD AUTO: 0.6 K/UL (ref 0.3–1)
MONOCYTES NFR BLD: 7.5 % (ref 4–15)
NEUTROPHILS # BLD AUTO: 5.6 K/UL (ref 1.8–7.7)
NEUTROPHILS NFR BLD: 71.4 % (ref 38–73)
NRBC BLD-RTO: 0 /100 WBC
PLATELET # BLD AUTO: 161 K/UL (ref 150–450)
PMV BLD AUTO: 12.7 FL (ref 9.2–12.9)
POTASSIUM SERPL-SCNC: 4.3 MMOL/L (ref 3.5–5.1)
PROT SERPL-MCNC: 7.5 G/DL (ref 6–8.4)
RBC # BLD AUTO: 5.44 M/UL (ref 4.6–6.2)
SARS-COV-2 RDRP RESP QL NAA+PROBE: NEGATIVE
SODIUM SERPL-SCNC: 139 MMOL/L (ref 136–145)
TROPONIN I SERPL DL<=0.01 NG/ML-MCNC: <0.03 NG/ML
WBC # BLD AUTO: 7.84 K/UL (ref 3.9–12.7)

## 2022-07-29 PROCEDURE — 84484 ASSAY OF TROPONIN QUANT: CPT | Performed by: EMERGENCY MEDICINE

## 2022-07-29 PROCEDURE — 63600175 PHARM REV CODE 636 W HCPCS: Performed by: EMERGENCY MEDICINE

## 2022-07-29 PROCEDURE — U0002 COVID-19 LAB TEST NON-CDC: HCPCS | Performed by: EMERGENCY MEDICINE

## 2022-07-29 PROCEDURE — 93010 EKG 12-LEAD: ICD-10-PCS | Mod: ,,, | Performed by: GENERAL PRACTICE

## 2022-07-29 PROCEDURE — 99284 EMERGENCY DEPT VISIT MOD MDM: CPT | Mod: 25

## 2022-07-29 PROCEDURE — 93005 ELECTROCARDIOGRAM TRACING: CPT | Performed by: GENERAL PRACTICE

## 2022-07-29 PROCEDURE — 93010 ELECTROCARDIOGRAM REPORT: CPT | Mod: ,,, | Performed by: GENERAL PRACTICE

## 2022-07-29 PROCEDURE — 83880 ASSAY OF NATRIURETIC PEPTIDE: CPT | Performed by: EMERGENCY MEDICINE

## 2022-07-29 PROCEDURE — 96375 TX/PRO/DX INJ NEW DRUG ADDON: CPT

## 2022-07-29 PROCEDURE — 80053 COMPREHEN METABOLIC PANEL: CPT | Performed by: EMERGENCY MEDICINE

## 2022-07-29 PROCEDURE — 96374 THER/PROPH/DIAG INJ IV PUSH: CPT

## 2022-07-29 PROCEDURE — 83735 ASSAY OF MAGNESIUM: CPT | Performed by: EMERGENCY MEDICINE

## 2022-07-29 PROCEDURE — 85025 COMPLETE CBC W/AUTO DIFF WBC: CPT | Performed by: EMERGENCY MEDICINE

## 2022-07-29 RX ORDER — HYDROCODONE BITARTRATE AND ACETAMINOPHEN 5; 325 MG/1; MG/1
1 TABLET ORAL EVERY 6 HOURS PRN
COMMUNITY
Start: 2022-06-16 | End: 2023-10-08 | Stop reason: DRUGHIGH

## 2022-07-29 RX ORDER — FUROSEMIDE 10 MG/ML
40 INJECTION INTRAMUSCULAR; INTRAVENOUS
Status: COMPLETED | OUTPATIENT
Start: 2022-07-29 | End: 2022-07-29

## 2022-07-29 RX ORDER — GABAPENTIN 300 MG/1
300 CAPSULE ORAL 3 TIMES DAILY
Status: ON HOLD | COMMUNITY
Start: 2022-05-30 | End: 2023-10-13 | Stop reason: HOSPADM

## 2022-07-29 RX ORDER — CIPROFLOXACIN 500 MG/1
500 TABLET ORAL 2 TIMES DAILY
COMMUNITY
Start: 2022-06-22 | End: 2023-10-08 | Stop reason: CLARIF

## 2022-07-29 RX ORDER — FUROSEMIDE 20 MG/1
20 TABLET ORAL DAILY
COMMUNITY
End: 2022-07-29 | Stop reason: HOSPADM

## 2022-07-29 RX ORDER — CALCITRIOL 0.25 UG/1
0.25 CAPSULE ORAL DAILY
COMMUNITY

## 2022-07-29 RX ORDER — ROSUVASTATIN CALCIUM 10 MG/1
10 TABLET, COATED ORAL NIGHTLY
COMMUNITY
End: 2024-02-07 | Stop reason: CLARIF

## 2022-07-29 RX ORDER — SACUBITRIL AND VALSARTAN 24; 26 MG/1; MG/1
1 TABLET, FILM COATED ORAL 2 TIMES DAILY
COMMUNITY
Start: 2022-07-06 | End: 2023-10-08 | Stop reason: DRUGHIGH

## 2022-07-29 RX ORDER — HYDROCODONE BITARTRATE AND IBUPROFEN 7.5; 2 MG/1; MG/1
TABLET, FILM COATED ORAL
COMMUNITY
End: 2023-10-08 | Stop reason: CLARIF

## 2022-07-29 RX ORDER — ESCITALOPRAM OXALATE 10 MG/1
10 TABLET ORAL DAILY
COMMUNITY
Start: 2022-07-17

## 2022-07-29 RX ORDER — MEMANTINE HYDROCHLORIDE 5 MG/1
5 TABLET ORAL NIGHTLY
COMMUNITY

## 2022-07-29 RX ORDER — QUETIAPINE FUMARATE 25 MG/1
75 TABLET, FILM COATED ORAL NIGHTLY
Status: ON HOLD | COMMUNITY
End: 2024-02-14 | Stop reason: HOSPADM

## 2022-07-29 RX ORDER — HUMAN INSULIN 100 [IU]/ML
INJECTION, SOLUTION SUBCUTANEOUS
COMMUNITY
Start: 2022-07-21 | End: 2023-10-08 | Stop reason: CLARIF

## 2022-07-29 RX ORDER — METOPROLOL SUCCINATE 100 MG/1
100 TABLET, EXTENDED RELEASE ORAL DAILY
Status: ON HOLD | COMMUNITY
Start: 2022-06-27 | End: 2023-10-13 | Stop reason: HOSPADM

## 2022-07-29 RX ORDER — CEPHALEXIN 500 MG/1
500 CAPSULE ORAL 3 TIMES DAILY
COMMUNITY
Start: 2022-05-02 | End: 2023-10-08 | Stop reason: CLARIF

## 2022-07-29 RX ORDER — POTASSIUM CHLORIDE 20 MEQ/1
TABLET, EXTENDED RELEASE ORAL
Qty: 36 TABLET | Refills: 0 | Status: SHIPPED | OUTPATIENT
Start: 2022-07-29 | End: 2022-08-31

## 2022-07-29 RX ORDER — TRAZODONE HYDROCHLORIDE 100 MG/1
100 TABLET ORAL NIGHTLY
COMMUNITY
Start: 2022-06-27 | End: 2023-10-08 | Stop reason: DRUGHIGH

## 2022-07-29 RX ORDER — LOSARTAN POTASSIUM AND HYDROCHLOROTHIAZIDE 12.5; 1 MG/1; MG/1
TABLET ORAL
COMMUNITY
End: 2023-10-08 | Stop reason: CLARIF

## 2022-07-29 RX ORDER — FUROSEMIDE 20 MG/1
TABLET ORAL
Qty: 72 TABLET | Refills: 0 | Status: SHIPPED | OUTPATIENT
Start: 2022-07-29 | End: 2023-10-08 | Stop reason: CLARIF

## 2022-07-29 RX ORDER — PANTOPRAZOLE SODIUM 40 MG/1
40 TABLET, DELAYED RELEASE ORAL 2 TIMES DAILY
Status: ON HOLD | COMMUNITY
End: 2024-02-14 | Stop reason: HOSPADM

## 2022-07-29 RX ORDER — METOPROLOL SUCCINATE 25 MG/1
25 TABLET, EXTENDED RELEASE ORAL DAILY
COMMUNITY
Start: 2022-05-13 | End: 2023-10-08 | Stop reason: CLARIF

## 2022-07-29 RX ORDER — DIVALPROEX SODIUM 500 MG/1
500 TABLET, DELAYED RELEASE ORAL 2 TIMES DAILY
COMMUNITY
Start: 2022-07-17 | End: 2023-10-08 | Stop reason: DRUGHIGH

## 2022-07-29 RX ORDER — MORPHINE SULFATE 4 MG/ML
4 INJECTION, SOLUTION INTRAMUSCULAR; INTRAVENOUS
Status: COMPLETED | OUTPATIENT
Start: 2022-07-29 | End: 2022-07-29

## 2022-07-29 RX ORDER — ALPRAZOLAM 1 MG/1
1 TABLET ORAL 3 TIMES DAILY
COMMUNITY
Start: 2022-07-14 | End: 2023-10-08 | Stop reason: CLARIF

## 2022-07-29 RX ADMIN — FUROSEMIDE 40 MG: 10 INJECTION, SOLUTION INTRAVENOUS at 02:07

## 2022-07-29 RX ADMIN — MORPHINE SULFATE 4 MG: 4 INJECTION, SOLUTION INTRAMUSCULAR; INTRAVENOUS at 11:07

## 2022-07-29 NOTE — CONSULTS
"Called to evaluate for possible admission.  Pt is in acute on chronic HFpEF.  He is not SOB  He is not hypoxemic.  No CP  Significant LE edema noted.  BP (!) 131/93   Pulse 81   Temp 98.2 °F (36.8 °C) (Oral)   Resp (!) 22   Ht 5' 11" (1.803 m)   Wt (!) 154.2 kg (340 lb)   SpO2 98%   BMI 47.42 kg/m²   VSS    He hasn't taken his medication in weeks  He reports his "nephew hasn't had time to pick them up"  He reports his nieces are supposed to care for his ADLs.  He lives with 3 people who can assist him.    As per the pt's instructions, I called his daughter and informed her of the situation.  We discussed pt's condition and need for increased lasix dosing.  I provided her with these instructions verbally and told her to expect them in writing at discharge, and on the prescription bottles.  I told her the prescriptions have been sent to the [facility] pharmacy next to the hospital, and that it is a 24 hours pharmacy, and she needs to  this medication when she comes to  the pt.  I informed her that if he does not get his medication tonight, the pt will worsen and possibly won't be able to breathe.  I told her that we will stabilize the pt before discharging him but that she needs to make sure the pt is cared for from that point on.    She v/u and agreement.  She also agrees to contact the pt's PCP in the AM for further instructions and to follow-up with the PCP on 8/3/22 as scheduled.    All questions answered.    Okay to discharge per my perspective with instructions as noted above.  I have also ordered home health orders specifically for CHF follow-up and have contacted  to assist with this.    Jillian Baugh MD  [facility] Hospitalist  "

## 2022-07-29 NOTE — ED PROVIDER NOTES
"Encounter Date: 7/29/2022       History     Chief Complaint   Patient presents with    Leg Swelling     Patient here reported worsening lower extremity swelling swelling extending up into his abdomen and arms he reports shortness of breath no fever chills no chest pain no nausea vomiting no significant cough patient does have a history of congestive heart failure medical noncompliance chronic kidney disease former alcohol abuse but patient denies any known liver disease        Review of patient's allergies indicates:   Allergen Reactions    Atorvastatin      Other reaction(s): Generalized Myalgias    Effexor [venlafaxine] Other (See Comments)     Tremulousness     Past Medical History:   Diagnosis Date    a A H/O Medical Noncompliance     H/O Chronic Noncompliance With His CHF Diet    a Cardiac Diastolic Dysfunction     Dr. Aure Washington    a Chronic Anticoagulation With Pradaxa     Dr. Aure Washington    a Coronary Artery Disease With H/O Stenting     Dr. Aure Washington; Was Hospitalized At [facility] 3/8/17-3/17/17 For CHF Exacerbatioin Due To "Dietary Discrepancies" With LCST Negative There    a Nonsustained Ventricular Tachycardia (NSVT)     a Paroxysmal Atrial Fibrillation With H/O RVR     Dr. Aure Washington; On Chronic Eliquis    a Syncopal Episode     [facility] 4/3/17-4/7/17 Stay For This: Was Likely Due To NSVT, And His Medications Were Adjusted    a Systolic CHF With EF 35-45%     Dr. Aure Washington; Was Hospitalized At [facility] 3/8/17-3/17/17 For CHF Exacerbatioin Due To "Dietary Discrepancies" With LCST Negative There    b Hypertension     b Proteinuria     04/2014 Referred To Dr. Leroy Allison; 4/1/14 Bilateral Renal U/S = Normal; On Lisinopril 20 Mg Daily    b Stage 2 CKD     c Hypercholesterolemia With Low HDL     d Type 2 DM On Insulin     ** 12/4/18 Referred To [facility] ED[facility]; 1/11/18 Referred To Dr. Dipika Rodriguez And Re-Referred To [facility] ED[facility]; 12/28/17 HgA1c = 12.0;" 7/5/17 Referred To [facility] EDU    f Morbid Obesity     " Hyperkalemia 10/22/2020    i 1 PPD X 25+ YRs Chronic Tobacco Use Disorder     7/5/17 Increased Wellbutrin-XL To 300 Mg Daily; 6/8/17 RXd Wellbutrin- Mg Daily X 4 Months    i JULY On CPAP     Dr. Marion ngo Chronic Left Groin Pain     l Chronic Left Shoulder Pain     Dr. MARTINA mratinez Chronic Recurrent Low Back Pain 12/04/2018    Dr. Llamas Is His Pain Management Neurologist; 5/219/18 Referred To Dr. Ismael martinez Left 5-7th Rib FXs 04/2016 4/23/16 [facility] Left Rib XRays = Questionable Nondisplaced Left 5-7th Rib FXs With Normal Lung Fields    l Right Shouder SX 5/26/16 Due To Work Related Injury     [facility] Mora [facility]; Dr. MARTINA hatch H/O Transient Ischemic Attack In 2013     n Anxiety And Depression 12/04/2018    RTC In 6 Weeks; 12/4/18 Added Wellbutrin- Mg qAM; 5/29/18 Increased 100 Mg Zoloft To 100 Mg Bid    n Continuous Benzodiazepine (Xanax) Use 12/04/2018 5/29/18 I Am Weaning Him Off Of This By Decreasing 1 Mg Bid To 0.5 Mg Bid PRN, And Will Wean Further Next OV    n H/O ETOH Abuse, Quit In 09/2014     q Bilateral Lower Extremity Venous Stasis Ulcers     2/28/18 [facility]    q Chronic Bilateral Lower Extremity Edema     2/28/18 Added Metolazone 10 Mg qAM On MWF And Referred Back To Dr. Washington; On Lasix 40 Mg Bid; He Wears Bilateral Compressin Hose Stockings    q Disability Examination 7/15/16     For CHF, PAF, DM2, And JULY On CPAP    Wellness Visit 11/6/2017      Past Surgical History:   Procedure Laterality Date    CARDIAC SURGERY      coronary stent    COLONOSCOPY N/A 12/19/2017    Procedure: COLONOSCOPY;  Surgeon: Dave Allen MD;  Location: [facility];  Service: Endoscopy;  Laterality: N/A;    DIABETES MANAGEMENT LABS      heart stent      INCISION AND DRAINAGE OF WOUND      on stomach    SHOULDER SURGERY       Family History   Problem Relation Age of Onset    Heart  disease Mother     Arthritis Mother     Diabetes Mother     Hyperlipidemia Mother     Hypertension Mother     Heart disease Father     Arthritis Father     Asthma Father     COPD Father     Hyperlipidemia Father     Hypertension Father     Stroke Father     Diabetes Sister     Diabetes Maternal Uncle      Social History     Tobacco Use    Smoking status: Former Smoker     Packs/day: 0.25     Types: Cigarettes     Start date: 1981     Quit date: 2016     Years since quittin.8    Smokeless tobacco: Never Used    Tobacco comment: every now and again with drinks   Substance Use Topics    Alcohol use: Yes     Comment: occas    Drug use: No     Review of Systems   Constitutional: Negative for chills and fever.   HENT: Negative for congestion.    Respiratory: Positive for shortness of breath. Negative for cough.    Cardiovascular: Positive for leg swelling.   All other systems reviewed and are negative.      Physical Exam     Initial Vitals [22 1234]   BP Pulse Resp Temp SpO2   (!) 148/87 78 20 98.2 °F (36.8 °C) 99 %      MAP       --         Physical Exam    Constitutional: He appears well-developed and well-nourished. He is Obese . No distress.   HENT:   Head: Normocephalic and atraumatic.   Right Ear: External ear normal.   Left Ear: External ear normal.   Mouth/Throat: Oropharynx is clear and moist.   Eyes: Pupils are equal, round, and reactive to light.   Neck: Neck supple.   Normal range of motion.  Cardiovascular: Normal rate, regular rhythm, S1 normal, S2 normal and intact distal pulses.   Abdominal: Abdomen is soft. Bowel sounds are normal. There is no abdominal tenderness.   Abdominal wall edema   Genitourinary:    Genitourinary Comments: Significant scrotal edema     Musculoskeletal:         General: Edema present. Normal range of motion.      Cervical back: Normal range of motion and neck supple.     Neurological: He is alert and oriented to person, place, and time. GCS eye  subscore is 4. GCS verbal subscore is 5. GCS motor subscore is 6.   Skin: Skin is warm and dry. No rash noted.   Psychiatric: He has a normal mood and affect. His behavior is normal.         ED Course   Procedures  Labs Reviewed   CBC W/ AUTO DIFFERENTIAL - Abnormal; Notable for the following components:       Result Value    Hemoglobin 13.1 (*)     MCV 79 (*)     MCH 24.1 (*)     MCHC 30.6 (*)     RDW 14.7 (*)     All other components within normal limits   COMPREHENSIVE METABOLIC PANEL - Abnormal; Notable for the following components:    CO2 30 (*)     Glucose 162 (*)     BUN 25 (*)     Anion Gap 7 (*)     All other components within normal limits   B-TYPE NATRIURETIC PEPTIDE - Abnormal; Notable for the following components:     (*)     All other components within normal limits   TROPONIN I   SARS-COV-2 RNA AMPLIFICATION, QUAL   MAGNESIUM        ECG Results          EKG 12-lead (In process)  Result time 07/29/22 15:43:08    In process by Interface, Lab In [facility] (07/29/22 15:43:08)                 Narrative:    Test Reason : R06.02,    Vent. Rate : 080 BPM     Atrial Rate : 150 BPM     P-R Int : 000 ms          QRS Dur : 186 ms      QT Int : 470 ms       P-R-T Axes : 000 255 062 degrees     QTc Int : 542 ms    Sinus tachycardia with complete heart block and Ventricular-paced rhythm  Abnormal ECG  When compared with ECG of 11-NOV-2020 20:31,  Electronic ventricular pacemaker has replaced Atrial fibrillation    Referred By: [email or web address]   SELF           Confirmed By:                             Imaging Results          X-Ray Chest AP Portable (Final result)  Result time 07/29/22 14:21:15    Final result by Flavio Martinez MD (07/29/22 14:21:15)                 Narrative:    Reason: CHF Leg Swelling    FINDINGS:  Portable chest at 1406 compared with 11/11/2020 shows persistent enlarged cardiac silhouette size with normal mediastinal contours. Implantable cardiac monitor projecting over left midlung unchanged.  Since the prior exam, dual lead left transvenous ICD has been placed.    Lungs are clear. Pulmonary vasculature is normal. No acute osseous abnormality.    IMPRESSION:  Unchanged cardiomegaly without acute cardiopulmonary abnormality.    Electronically signed by:  Flavio Martinez MD  7/29/2022 2:21 PM CDT Workstation: [id number]                               Medications   furosemide injection 40 mg (40 mg Intravenous Given 7/29/22 1920)     Medical Decision Making:   ED Management:  Patient has been seen and evaluated by the hospitalist in the emergency department she feels patient is safe for discharge rec commands increasing his Lasix and placing him on potassium help remove some was fluid she has written for Lasix and potassium prescriptions patient to use at discharge recommendations for return for any worsened symptoms or new symptoms follow-up with his primary care physician in the morning                      Clinical Impression:   Final diagnoses:  [I50.9] Congestive heart failure, unspecified HF chronicity, unspecified heart failure type (Primary)  [R60.9] Peripheral edema          ED Disposition Condition    Discharge Stable        ED Prescriptions     Medication Sig Dispense Start Date End Date Auth. Provider    furosemide (LASIX) 20 MG tablet Take 2 tablets (40 mg total) by mouth 2 (two) times daily for 3 days, THEN 2 tablets (40 mg total) once daily. 72 tablet 7/29/2022 8/31/2022 Jillian Baugh MD    potassium chloride SA (K-DUR,KLOR-CON) 20 MEQ tablet Take 1 tablet (20 mEq total) by mouth 2 (two) times daily for 3 days, THEN 1 tablet (20 mEq total) once daily. TAKE WITH THE LASIX. 36 tablet 7/29/2022 8/31/2022 Jillian Baugh MD        Follow-up Information     Follow up With Specialties Details Why Contact Info    Jv Smith MD Internal Medicine Go to MAKE SURE YOU GO TO YOUR DOCTOR'S OFFICE THIS WEEK. [address]  [address]  [place] [address]  [phone]      Jv Smith MD Internal  [facility]   [address] [address]   [address]  [place] [address]  [phone]      OTHER  Follow up  YOU NEED TO TAKE YOUR LASIX (SEE INFORMATION BELOW).  THE DOSE HAS BEEN INCREASED, AND YOU ARE TO TAKE IT TWO (2) TIMES A DAY FOR 3 DAYS.  CALL YOUR DOCTOR AND TELL HIM THAT YOU WERE IN THE ER AND NEED FURTHER INSTRUCTIONS.           John Crabtree MD  07/29/22 1948

## 2022-07-30 VITALS
HEART RATE: 86 BPM | BODY MASS INDEX: 44.1 KG/M2 | RESPIRATION RATE: 20 BRPM | HEIGHT: 71 IN | SYSTOLIC BLOOD PRESSURE: 160 MMHG | DIASTOLIC BLOOD PRESSURE: 85 MMHG | WEIGHT: 315 LBS | TEMPERATURE: 98 F | OXYGEN SATURATION: 97 %

## 2022-07-30 RX ORDER — POTASSIUM CHLORIDE 20 MEQ/1
20 TABLET, EXTENDED RELEASE ORAL ONCE
Status: DISCONTINUED | OUTPATIENT
Start: 2022-07-30 | End: 2022-07-30 | Stop reason: HOSPADM

## 2022-07-30 RX ORDER — FUROSEMIDE 10 MG/ML
40 INJECTION INTRAMUSCULAR; INTRAVENOUS ONCE
Status: DISCONTINUED | OUTPATIENT
Start: 2022-07-30 | End: 2022-07-30 | Stop reason: HOSPADM

## 2022-07-30 NOTE — ED NOTES
"Called patient's daughter Zoe 4x. - no response  Called patient's son Jose 4x. - no response  Called roommate/neighbor, stated that "he cannot come home, I cannot take care of him".     Charge nurse informed of status. Continuing to call family.   "

## 2022-07-30 NOTE — ED NOTES
"FINALLY spoke with Jose, Son [phone].   States that he "will have to call someone" regarding transportation home. States that he "has 3 small kids" and "doesn't want to wake them up".     I educated on importance of patient leaving facility tonight as soon as possible. Jose verbalized understanding.   "

## 2022-07-30 NOTE — ED NOTES
ED Director, House Supervisor, Charge Nurse,  alerted to patient status. Family is still not answering the phone. Will continue to try. Provided phone in patient's room and he states that he will try to call as well.

## 2022-07-30 NOTE — ED NOTES
"Spoke with patient's son, Jose , regarding discharge and transportation. He states that "he's working on it" , states that family members are coming in to take care of small children so that he can leave to come here. No known ETA at this time.   "

## 2022-08-16 NOTE — ED NOTES
[place] tray given   Steven Mis  8/16/2022  4:13 PM      Vitals:    08/16/22 1608   BP: 137/87   Pulse: (!) 101   Resp: 16   Temp: 97.4 °F (36.3 °C)    :     Pain Assessment: 0-10  Pain Level: 5       Wt Readings from Last 1 Encounters:   08/16/22 122 lb 12.8 oz (55.7 kg)                Current Outpatient Medications:     levothyroxine (SYNTHROID) 88 MCG tablet, take 1 tablet by mouth once daily, Disp: 30 tablet, Rfl: 5    amLODIPine (NORVASC) 5 MG tablet, Take 1 tablet by mouth once daily, Disp: 30 tablet, Rfl: 5    magnesium citrate solution, Take 296 mLs by mouth once for 1 dose, Disp: 296 mL, Rfl: 0    ondansetron (ZOFRAN) 4 MG tablet, Take 1 tablet by mouth every 8 hours as needed for Nausea, Disp: 5 tablet, Rfl: 0    predniSONE (DELTASONE) 10 MG tablet, 4 tabs by mouth daily for 3 days, then 3 tabs daily for 3 days, then 2 tabs daily for 3 days, then 1 tab daily till gone., Disp: 30 tablet, Rfl: 0    tiotropium (SPIRIVA RESPIMAT) 2.5 MCG/ACT AERS inhaler, Inhale 2 puffs into the lungs daily, Disp: 1 each, Rfl: 3    tiotropium (SPIRIVA RESPIMAT) 2.5 MCG/ACT AERS inhaler, Inhale 2 puffs into the lungs daily, Disp: 2 each, Rfl: 0    Plecanatide 3 MG TABS, Take 1 tablet by mouth daily, Disp: 30 tablet, Rfl: 2    lubiprostone (AMITIZA) 8 MCG CAPS capsule, Take 1 capsule by mouth daily, Disp: 30 capsule, Rfl: 3    budesonide (PULMICORT) 0.5 MG/2ML nebulizer suspension, Take 2 mLs by nebulization 2 times daily, Disp: 60 each, Rfl: 3    senna (SENOKOT) 8.6 MG tablet, Take 1 tablet by mouth daily, Disp: , Rfl:     ipratropium-albuterol (DUONEB) 0.5-2.5 (3) MG/3ML SOLN nebulizer solution, Inhale 3 mLs into the lungs 4 times daily, Disp: 360 mL, Rfl: 3    albuterol sulfate  (90 Base) MCG/ACT inhaler, INHALE 2 PUFFS INTO THE LUNGS EVERY 4 HOURS AS NEEDED FOR WHEEZING OR SHORTNESS OF BREATH, Disp: 25.5 g, Rfl: 1    albuterol (PROVENTIL) (2.5 MG/3ML) 0.083% nebulizer solution, Take 3 mLs by nebulization every 6 hours as needed for Wheezing or Shortness of Breath, Disp: 120 each, Rfl: 3    folic acid (FOLVITE) 487 MCG tablet, Take 800 mcg by mouth daily, Disp: , Rfl:     triamcinolone (KENALOG) 0.1 % cream, Apply topically daily Apply topically once daily. , Disp: , Rfl:     pregabalin (LYRICA) 75 MG capsule, Take 1 capsule by mouth 3 times daily for 120 days. , Disp: 90 capsule, Rfl: 5    Immunizations:    Influenza status:    []   Current   [x]   Patient declined    Pneumococcal status:  []   Current  [x]   Patient declined  Covid status:   []  Dose #1:                     []  Dose #2:               [x]   Patient declined    Smoking Status:    [x] Smoker - PPD: 1 PPD   [] Nonsmoker - Quit Date:               [] Never a smoker          FALLS RISK SCREEN  Instructions:  Assess the patient and enter the appropriate indicators that are present for fall risk identification. Total the numbers entered and assign a fall risk score from Table 2.  Reassess patient at a minimum every 12 weeks or with status change. Assessment   Date  8/16/2022     1. Mental Ability: confusion/cognitively impaired 0     2. Elimination Issues: incontinence, frequency 0       3. Ambulatory: use of assistive devices (walker, cane, off-loading devices),        attached to equipment (IV pole, oxygen) 0     4. Sensory Limitations: dizziness, vertigo, impaired vision 3     5. Age less than 65        0     6. Age 72 or greater 0     7. Medication: diuretics, strong analgesics, hypnotics, sedatives,        antihypertensive agents 3   8. Falls:  recent history of falls within the last 3 months (not to include slipping or        tripping) 0   TOTAL 6    If score of 4 or greater was education given? Yes           TABLE 2   Risk Score Risk Level Plan of Care   0-3 Little or  No Risk 1. Provide assistance as indicated for ambulation activities  2. Reorient confused/cognitively impaired patient  3.   Chair/bed in low position, stretcher/bed with siderails up except when performing patient care activities  5. Educate patient/family/caregiver on falls prevention  6.  Reassess in 12 weeks or with any noted change in patient condition which places them at a risk for a fall   4-6 Moderate Risk 1. Provide assistance as indicated for ambulation activities  2. Reorient confused/cognitively impaired patient  3. Chair/bed in low position, stretcher/bed with siderails up except when performing patient care activities  4. Educate patient/family/caregiver on falls prevention     7 or   Higher High Risk 1. Place patient in easily observable treatment room  2. Patient attended at all times by family member or staff  3. Provide assistance as indicated for ambulation activities  4. Reorient confused/cognitively impaired patient  5. Chair/bed in low position, stretcher/bed with siderails up except when performing patient care activities  6. Educate patient/family/caregiver on falls prevention         PLAN: Patient is seen today in follow up. No issues regarding previous RT. Dr. August Ramey updated and examined pt. Per MD pt will return on a routine basis.          Celio Luque RN

## 2022-09-19 NOTE — ASSESSMENT & PLAN NOTE
Continue supplemental oxygen to maintain pulse ox above 92%.  Unable to use CPAP per hospital policy at this time due to COVID-19.     Exfoliation Type: scrub Extraction Method: extractor Detail Level: Zone Assessment (Optional): Photodamage Price (Use Numbers Only, No Special Characters Or $): [id number] [facility] Treatment Type (Optional): Teen Facial Facial Steaming: steamed

## 2022-10-17 NOTE — ED NOTES
Department of Anesthesiology  Postprocedure Note    Patient: Calvin Shankar  MRN: [id number]  YOB: 1977  Date of evaluation: 10/17/2022      Procedure Summary     Date: 10/17/22 Room / Location: [address]    Anesthesia Start: 2938 Anesthesia Stop: 1008    Procedure: ANTERIOR CERVICAL DISCECTOMY FUSION, C6-7 WITH DEPUY PLATE AND SCREWS (Neck) Diagnosis:       Cervical disc herniation      (CERVICAL DISC HERNIATION)    Surgeons: Long Saab MD Responsible Provider: Edna Mao MD    Anesthesia Type: general, TIVA ASA Status: 3          Anesthesia Type: General     Tracey Phase I: Tracey Score: 10    Tracey Phase II: Tracey Score: 9      Anesthesia Post Evaluation    Patient location during evaluation: PACU  Patient participation: complete - patient participated  Level of consciousness: awake and sleepy but conscious  Airway patency: patent  Nausea & Vomiting: no nausea and no vomiting  Complications: no  Cardiovascular status: hemodynamically stable and blood pressure returned to baseline  Respiratory status: spontaneous ventilation and nonlabored ventilation  Hydration status: stable  Comments: Ms. Hang Montaño was seen resting comfortably following procedure. Will allow patient to become more alert before anticipated discharge home with . NAD, patient asleep. ED tech providing continuous visual observation.

## 2023-09-19 NOTE — CONSULTS
----- Message from Perla Delgado, RT sent at 9/19/2023  1:42 PM CDT -----  Once Dr. Ramirez signs the mammo/US orders, you can fax it to [facility]. I placed the orders but they are awaiting a signature.  Thanks, Perla     Consulted for wound care.   Patient admitted with venous stasis ulcers to his left lower leg anterior and posterior. Patient reports these areas were injured during one of his many falls at home. DTI which wraps around the entire circumference of his left leg patient reports his niece applied a bandage around his leg and it was so tight that when they took if off the skin was inbedded in the gauze wrap. Cleaned the entire area with wound cleanser. Applied aquacel ag non adherent dressing to the wound beds and covered with rolled gauze and an ace wrap. Wound care to be done daily. Patient has a very small abrasion to his coccyx which will be appropriate to treat with critic aid with zinc bid. This patient needs aggressive wound care at this hospital discharge for appropriate healing.           Site 1 Left leg 6cm x 4cm    Site 2 left leg 5cm x 3cm    Left knee 2cm x 2cm    DTI    DTI    DTI.

## 2023-10-08 PROBLEM — G93.41 ENCEPHALOPATHY, METABOLIC: Status: ACTIVE | Noted: 2023-10-08

## 2023-10-08 PROBLEM — N17.9 AKI (ACUTE KIDNEY INJURY): Status: ACTIVE | Noted: 2023-10-08

## 2023-10-08 PROBLEM — N30.01 ACUTE CYSTITIS WITH HEMATURIA: Status: ACTIVE | Noted: 2023-10-08

## 2023-10-08 PROBLEM — I50.32 CHRONIC DIASTOLIC CONGESTIVE HEART FAILURE: Status: ACTIVE | Noted: 2023-10-08

## 2023-10-08 PROBLEM — Z97.8 CHRONIC INDWELLING FOLEY CATHETER: Status: ACTIVE | Noted: 2023-10-08

## 2023-10-08 PROBLEM — E86.0 DEHYDRATION: Status: ACTIVE | Noted: 2023-10-08

## 2023-10-11 PROBLEM — R00.0 WIDE-COMPLEX TACHYCARDIA: Status: ACTIVE | Noted: 2023-10-11

## 2023-10-11 PROBLEM — Z95.810 ICD (IMPLANTABLE CARDIOVERTER-DEFIBRILLATOR) IN PLACE: Status: ACTIVE | Noted: 2023-10-11

## 2023-10-19 PROBLEM — I82.622 ACUTE DEEP VEIN THROMBOSIS (DVT) OF LEFT UPPER EXTREMITY: Status: ACTIVE | Noted: 2023-10-19

## 2023-10-20 PROBLEM — B37.89 CANDIDA RASH OF GROIN: Status: ACTIVE | Noted: 2023-10-20

## 2024-01-15 PROBLEM — N17.9 AKI (ACUTE KIDNEY INJURY): Status: RESOLVED | Noted: 2023-10-08 | Resolved: 2024-01-15

## 2024-01-15 PROBLEM — J96.01 ACUTE HYPOXEMIC RESPIRATORY FAILURE: Status: RESOLVED | Noted: 2020-10-22 | Resolved: 2024-01-15

## 2024-02-06 PROBLEM — A41.9 SEVERE SEPSIS: Status: ACTIVE | Noted: 2024-02-06

## 2024-02-06 PROBLEM — K83.09 CHOLANGITIS: Status: ACTIVE | Noted: 2024-02-06

## 2024-02-06 PROBLEM — R65.20 SEVERE SEPSIS: Status: ACTIVE | Noted: 2024-02-06

## 2024-02-08 PROBLEM — E87.6 HYPOKALEMIA: Status: ACTIVE | Noted: 2024-02-08

## 2024-02-08 PROBLEM — K29.61 OTHER GASTRITIS WITH BLEEDING: Status: ACTIVE | Noted: 2024-02-08

## 2024-02-09 PROBLEM — D64.9 ANEMIA: Status: ACTIVE | Noted: 2024-02-09

## 2024-02-10 PROBLEM — K81.9 CHOLECYSTITIS: Status: ACTIVE | Noted: 2024-02-06

## 2024-05-13 PROBLEM — A41.9 SEVERE SEPSIS: Status: RESOLVED | Noted: 2024-02-06 | Resolved: 2024-05-13

## 2024-05-13 PROBLEM — R65.20 SEVERE SEPSIS: Status: RESOLVED | Noted: 2024-02-06 | Resolved: 2024-05-13

## 2025-01-11 PROBLEM — J18.9 PNEUMONIA INVOLVING RIGHT LUNG: Status: ACTIVE | Noted: 2025-01-11

## 2025-02-10 PROBLEM — I25.10 CAD (CORONARY ARTERY DISEASE): Status: ACTIVE | Noted: 2025-02-10

## 2025-02-10 PROBLEM — K92.2 GI BLEED: Status: ACTIVE | Noted: 2025-02-10

## 2025-02-10 PROBLEM — E66.01 CLASS 3 SEVERE OBESITY IN ADULT: Status: ACTIVE | Noted: 2025-02-10

## 2025-02-10 PROBLEM — I50.22 CHRONIC SYSTOLIC HEART FAILURE: Status: ACTIVE | Noted: 2025-02-10

## 2025-02-10 PROBLEM — D64.9 SYMPTOMATIC ANEMIA: Status: ACTIVE | Noted: 2025-02-10

## 2025-02-10 PROBLEM — E66.813 CLASS 3 SEVERE OBESITY IN ADULT: Status: ACTIVE | Noted: 2025-02-10

## 2025-02-10 PROBLEM — Z86.73 HISTORY OF CVA (CEREBROVASCULAR ACCIDENT): Status: ACTIVE | Noted: 2025-02-10

## 2025-06-26 ENCOUNTER — OFFICE VISIT (OUTPATIENT)
Dept: OPTOMETRY | Facility: CLINIC | Age: 65
End: 2025-06-26
Payer: MEDICARE

## 2025-06-26 DIAGNOSIS — E11.3553 STABLE PROLIFERATIVE DIABETIC RETINOPATHY OF BOTH EYES ASSOCIATED WITH TYPE 2 DIABETES MELLITUS: Primary | ICD-10-CM

## 2025-06-26 DIAGNOSIS — H52.7 REFRACTIVE ERROR: ICD-10-CM

## 2025-06-26 DIAGNOSIS — H25.11 AGE-RELATED NUCLEAR CATARACT OF RIGHT EYE: ICD-10-CM

## 2025-06-26 DIAGNOSIS — Z96.1 PSEUDOPHAKIA OF LEFT EYE: ICD-10-CM

## 2025-06-26 PROCEDURE — 92004 COMPRE OPH EXAM NEW PT 1/>: CPT | Mod: S$PBB,,,

## 2025-06-26 PROCEDURE — 99214 OFFICE O/P EST MOD 30 MIN: CPT | Mod: PBBFAC,PO

## 2025-06-26 PROCEDURE — 99999 PR PBB SHADOW E&M-EST. PATIENT-LVL IV: CPT | Mod: PBBFAC,,,

## 2025-06-26 NOTE — PROGRESS NOTES
HPI    Ocular health visit [facility] 10/2024 (unknown)    Caretakers w pt stated pt would like a second opinion but unsure why. Pt   had cataract sx 10/2024 OD 3/2025 OS and complains of blurred va since. Pt   complains of floaters, flashes but denies eye pain. DM controlled w aid  Gtts: pred (unsure how many times pt takes drop)    Hemoglobin A1C       Date                     Value               Ref Range             Status                01/11/2025               6.4 (H)             4.0 - 5.6 %           Final                    02/06/2024               6.0 (H)             0.0 - 5.6 %           Final                   10/08/2023               8.0 (H)             0.0 - 5.6 %           Final            Last edited by Louise Davis, OD on 6/26/2025  2:50 PM.            Assessment /Plan     For exam results, see Encounter Report.    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 Per pt's records, pt was being followed by Dr. Rahat Gibson at [facility]. 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 Pt confused as to why he isn't seeing well and wanted a second opinion - ed pt that retinal damage from diabetes is etiology of reduced DVA. Pt unable to sit for OCT imaging. Pt reports being told by Dr. Gibson that nothing else could be done to improve vision. Discussed option for referral Dr. Viveros / retina if wanting to establish with [facility]. Can also refer to [facility] services if desired.  Mild nuclear sclerosis OD. No need for cataract surgery at this time. Monitor.   PCIOL OS. Stable. Monitor yearly for changes.  Unable to obtain  auto-refraction. Attempted loose lens refraction without success. Continue uncorrected at this time.    RTC: as scheduled with Dr. Gibson, sooner prn